# Patient Record
Sex: MALE | Race: WHITE | NOT HISPANIC OR LATINO | Employment: UNEMPLOYED | URBAN - NONMETROPOLITAN AREA
[De-identification: names, ages, dates, MRNs, and addresses within clinical notes are randomized per-mention and may not be internally consistent; named-entity substitution may affect disease eponyms.]

---

## 2018-08-24 ENCOUNTER — OFFICE VISIT (OUTPATIENT)
Dept: FAMILY MEDICINE CLINIC | Facility: CLINIC | Age: 43
End: 2018-08-24

## 2018-08-24 VITALS
SYSTOLIC BLOOD PRESSURE: 122 MMHG | DIASTOLIC BLOOD PRESSURE: 86 MMHG | OXYGEN SATURATION: 99 % | HEART RATE: 73 BPM | BODY MASS INDEX: 23.36 KG/M2 | HEIGHT: 74 IN | WEIGHT: 182 LBS | TEMPERATURE: 97.8 F

## 2018-08-24 DIAGNOSIS — Z00.00 ENCOUNTER FOR MEDICAL EXAMINATION TO ESTABLISH CARE: Primary | ICD-10-CM

## 2018-08-24 DIAGNOSIS — Z13.29 THYROID DISORDER SCREENING: ICD-10-CM

## 2018-08-24 DIAGNOSIS — E34.9 TESTOSTERONE DEFICIENCY: ICD-10-CM

## 2018-08-24 DIAGNOSIS — E55.9 VITAMIN D DEFICIENCY: ICD-10-CM

## 2018-08-24 DIAGNOSIS — R30.0 DYSURIA: ICD-10-CM

## 2018-08-24 DIAGNOSIS — N52.8 OTHER MALE ERECTILE DYSFUNCTION: ICD-10-CM

## 2018-08-24 DIAGNOSIS — Z13.220 SCREENING CHOLESTEROL LEVEL: ICD-10-CM

## 2018-08-24 DIAGNOSIS — R53.83 OTHER FATIGUE: ICD-10-CM

## 2018-08-24 LAB
25(OH)D3 SERPL-MCNC: 22 NG/ML
ALBUMIN SERPL-MCNC: 4.4 G/DL (ref 3.5–5)
ALBUMIN/GLOB SERPL: 1.6 G/DL (ref 1.5–2.5)
ALP SERPL-CCNC: 122 U/L (ref 40–129)
ALT SERPL W P-5'-P-CCNC: 24 U/L (ref 10–44)
ANION GAP SERPL CALCULATED.3IONS-SCNC: 1.9 MMOL/L (ref 3.6–11.2)
AST SERPL-CCNC: 21 U/L (ref 10–34)
BASOPHILS # BLD AUTO: 0.08 10*3/MM3 (ref 0–0.3)
BASOPHILS NFR BLD AUTO: 0.7 % (ref 0–2)
BILIRUB BLD-MCNC: NEGATIVE MG/DL
BILIRUB SERPL-MCNC: 0.5 MG/DL (ref 0.2–1.8)
BUN BLD-MCNC: 10 MG/DL (ref 7–21)
BUN/CREAT SERPL: 15.2 (ref 7–25)
CALCIUM SPEC-SCNC: 9.3 MG/DL (ref 7.7–10)
CHLORIDE SERPL-SCNC: 109 MMOL/L (ref 99–112)
CHOLEST SERPL-MCNC: 204 MG/DL (ref 0–200)
CLARITY, POC: CLEAR
CO2 SERPL-SCNC: 28.1 MMOL/L (ref 24.3–31.9)
COLOR UR: YELLOW
CREAT BLD-MCNC: 0.66 MG/DL (ref 0.43–1.29)
DEPRECATED RDW RBC AUTO: 46.5 FL (ref 37–54)
EOSINOPHIL # BLD AUTO: 0.18 10*3/MM3 (ref 0–0.7)
EOSINOPHIL NFR BLD AUTO: 1.5 % (ref 0–5)
ERYTHROCYTE [DISTWIDTH] IN BLOOD BY AUTOMATED COUNT: 14.7 % (ref 11.5–14.5)
GFR SERPL CREATININE-BSD FRML MDRD: 131 ML/MIN/1.73
GLOBULIN UR ELPH-MCNC: 2.7 GM/DL
GLUCOSE BLD-MCNC: 81 MG/DL (ref 70–110)
GLUCOSE UR STRIP-MCNC: NEGATIVE MG/DL
HBA1C MFR BLD: 5.2 % (ref 4.5–5.7)
HCT VFR BLD AUTO: 44.2 % (ref 42–52)
HDLC SERPL-MCNC: 34 MG/DL (ref 60–100)
HGB BLD-MCNC: 14.6 G/DL (ref 14–18)
IMM GRANULOCYTES # BLD: 0.03 10*3/MM3 (ref 0–0.03)
IMM GRANULOCYTES NFR BLD: 0.2 % (ref 0–0.5)
KETONES UR QL: NEGATIVE
LDLC SERPL CALC-MCNC: 144 MG/DL (ref 0–100)
LDLC/HDLC SERPL: 4.22 {RATIO}
LEUKOCYTE EST, POC: NEGATIVE
LYMPHOCYTES # BLD AUTO: 2.85 10*3/MM3 (ref 1–3)
LYMPHOCYTES NFR BLD AUTO: 23.7 % (ref 21–51)
MCH RBC QN AUTO: 29 PG (ref 27–33)
MCHC RBC AUTO-ENTMCNC: 33 G/DL (ref 33–37)
MCV RBC AUTO: 87.7 FL (ref 80–94)
MONOCYTES # BLD AUTO: 0.88 10*3/MM3 (ref 0.1–0.9)
MONOCYTES NFR BLD AUTO: 7.3 % (ref 0–10)
NEUTROPHILS # BLD AUTO: 7.99 10*3/MM3 (ref 1.4–6.5)
NEUTROPHILS NFR BLD AUTO: 66.6 % (ref 30–70)
NITRITE UR-MCNC: NEGATIVE MG/ML
OSMOLALITY SERPL CALC.SUM OF ELEC: 275.6 MOSM/KG (ref 273–305)
PH UR: 6 [PH] (ref 5–8)
PLATELET # BLD AUTO: 303 10*3/MM3 (ref 130–400)
PMV BLD AUTO: 11.3 FL (ref 6–10)
POTASSIUM BLD-SCNC: 4.5 MMOL/L (ref 3.5–5.3)
PROT SERPL-MCNC: 7.1 G/DL (ref 6–8)
PROT UR STRIP-MCNC: NEGATIVE MG/DL
RBC # BLD AUTO: 5.04 10*6/MM3 (ref 4.7–6.1)
RBC # UR STRIP: NEGATIVE /UL
SODIUM BLD-SCNC: 139 MMOL/L (ref 135–153)
SP GR UR: 1.01 (ref 1–1.03)
T4 FREE SERPL-MCNC: 1.07 NG/DL (ref 0.89–1.76)
TRIGL SERPL-MCNC: 132 MG/DL (ref 0–150)
TSH SERPL DL<=0.05 MIU/L-ACNC: 1.1 MIU/ML (ref 0.55–4.78)
UROBILINOGEN UR QL: NORMAL
VIT B12 BLD-MCNC: 272 PG/ML (ref 211–911)
VLDLC SERPL-MCNC: 26.4 MG/DL
WBC NRBC COR # BLD: 12.01 10*3/MM3 (ref 4.5–12.5)

## 2018-08-24 PROCEDURE — 84439 ASSAY OF FREE THYROXINE: CPT | Performed by: NURSE PRACTITIONER

## 2018-08-24 PROCEDURE — 84443 ASSAY THYROID STIM HORMONE: CPT | Performed by: NURSE PRACTITIONER

## 2018-08-24 PROCEDURE — 36415 COLL VENOUS BLD VENIPUNCTURE: CPT | Performed by: NURSE PRACTITIONER

## 2018-08-24 PROCEDURE — 84402 ASSAY OF FREE TESTOSTERONE: CPT | Performed by: NURSE PRACTITIONER

## 2018-08-24 PROCEDURE — 85025 COMPLETE CBC W/AUTO DIFF WBC: CPT | Performed by: NURSE PRACTITIONER

## 2018-08-24 PROCEDURE — 82306 VITAMIN D 25 HYDROXY: CPT | Performed by: NURSE PRACTITIONER

## 2018-08-24 PROCEDURE — 84403 ASSAY OF TOTAL TESTOSTERONE: CPT | Performed by: NURSE PRACTITIONER

## 2018-08-24 PROCEDURE — 83036 HEMOGLOBIN GLYCOSYLATED A1C: CPT | Performed by: NURSE PRACTITIONER

## 2018-08-24 PROCEDURE — 81003 URINALYSIS AUTO W/O SCOPE: CPT | Performed by: NURSE PRACTITIONER

## 2018-08-24 PROCEDURE — 99203 OFFICE O/P NEW LOW 30 MIN: CPT | Performed by: NURSE PRACTITIONER

## 2018-08-24 PROCEDURE — 80053 COMPREHEN METABOLIC PANEL: CPT | Performed by: NURSE PRACTITIONER

## 2018-08-24 PROCEDURE — 80061 LIPID PANEL: CPT | Performed by: NURSE PRACTITIONER

## 2018-08-24 PROCEDURE — 82607 VITAMIN B-12: CPT | Performed by: NURSE PRACTITIONER

## 2018-08-24 RX ORDER — SILDENAFIL CITRATE 20 MG/1
60-100 TABLET ORAL DAILY
Qty: 60 TABLET | Refills: 5 | Status: SHIPPED | OUTPATIENT
Start: 2018-08-24 | End: 2020-06-05 | Stop reason: SDUPTHER

## 2018-08-24 NOTE — PROGRESS NOTES
"Subjective   Scott Montes is a 43 y.o. male.     Chief Complaint   Patient presents with   • Establish Care       History of Present Illness     Establish Care-Here to establish Care.    Low Testosterone-has been under care of Dr Diehl.  Has been on testosterone injections and sildenafil but reports he has not seen him since he \"left [place]\".  He has been off approx 1 year.  He reports increase in fatigue and ED.  He reports he tolerated the injection well.  Would liek to resume.  Not at goall   Low back pain-on the right side.  He reports sharp radiated up and down his back.  He does reports an increase in soda intake.  He denies any urine change or dysuria since the pain started.  Not at goal.    Labs-needs updated routine labs.      The following portions of the patient's history were reviewed and updated as appropriate: allergies, current medications, past family history, past medical history, past social history, past surgical history and problem list.    Review of Systems   Constitutional: Positive for fatigue. Negative for appetite change and unexpected weight change.   HENT: Positive for rhinorrhea. Negative for congestion, ear pain, nosebleeds, postnasal drip, sore throat, trouble swallowing and voice change.    Eyes: Negative for pain and visual disturbance.        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  Does not wear corrective lens   Respiratory: Positive for cough. Negative for shortness of breath and wheezing.    Cardiovascular: Negative for chest pain and palpitations.   Gastrointestinal: Positive for abdominal pain. Negative for blood in stool, constipation and diarrhea.   Endocrine: Negative for cold intolerance and polydipsia.   Genitourinary: Positive for flank pain and testicular pain. Negative for difficulty urinating and hematuria.   Musculoskeletal: Positive for back pain. Negative for arthralgias, gait problem, joint swelling and myalgias.   Skin: Negative for color change and " "rash.        sebaceous cyst on right under chin, top of left ear   Allergic/Immunologic: Negative.    Neurological: Positive for headaches (occasional). Negative for dizziness, syncope and numbness.   Hematological: Negative.    Psychiatric/Behavioral: Negative for dysphoric mood, sleep disturbance and suicidal ideas. The patient is not nervous/anxious.    All other systems reviewed and are negative.      Objective     /86   Pulse 73   Temp 97.8 °F (36.6 °C) (Temporal Artery )   Ht 188 cm (74\")   Wt 82.6 kg (182 lb)   SpO2 99%   BMI 23.37 kg/m²     Physical Exam   Constitutional: He is oriented to person, place, and time. He appears well-developed and well-nourished. No distress.   HENT:   Head: Normocephalic and atraumatic.   Right Ear: Hearing, tympanic membrane, external ear and ear canal normal.   Left Ear: Hearing, tympanic membrane, external ear and ear canal normal.   Nose: No mucosal edema or rhinorrhea.   Mouth/Throat: Oropharynx is clear and moist and mucous membranes are normal.   Eyes: Pupils are equal, round, and reactive to light. Conjunctivae, EOM and lids are normal. No scleral icterus. Right eye exhibits normal extraocular motion and no nystagmus. Left eye exhibits normal extraocular motion and no nystagmus.   Neck: Normal range of motion. Neck supple. No JVD present. No tracheal tenderness present. Carotid bruit is not present. No thyromegaly present.   Cardiovascular: Normal rate, regular rhythm, S1 normal, S2 normal, normal heart sounds and intact distal pulses.    No murmur heard.  Pulmonary/Chest: Effort normal and breath sounds normal. He exhibits no tenderness.   Abdominal: Soft. Bowel sounds are normal. He exhibits no mass. There is no hepatosplenomegaly. There is no tenderness.   Musculoskeletal: Normal range of motion. He exhibits no edema or tenderness.   Gait normal.   equal bilaterally. No muscular atrophy or flaccidity.   Lymphadenopathy:     He has no cervical " adenopathy.        Right cervical: No superficial cervical adenopathy present.       Left cervical: No superficial cervical adenopathy present.   Neurological: He is alert and oriented to person, place, and time. He has normal strength and normal reflexes. He displays no tremor. No cranial nerve deficit or sensory deficit. He exhibits normal muscle tone. Coordination and gait normal.   Skin: Skin is warm and dry. Capillary refill takes less than 2 seconds. No rash noted. He is not diaphoretic. No cyanosis. No pallor. Nails show no clubbing.   Multiple sebaceous cyst under neck   Psychiatric: He has a normal mood and affect. His speech is normal and behavior is normal. Judgment and thought content normal. He is not actively hallucinating. Cognition and memory are normal. He is attentive.   Vitals reviewed.      Assessment/Plan     Problem List Items Addressed This Visit        Endocrine    Testosterone deficiency    Relevant Medications    Testosterone Cypionate 200 MG/ML kit    Other Relevant Orders    Comprehensive Metabolic Panel (Completed)    CBC & Differential (Completed)    Lipid Panel (Completed)    TSH (Completed)    Hemoglobin A1c (Completed)    Testosterone, Free, Total (Completed)    Vitamin D 25 Hydroxy (Completed)    Vitamin B12 (Completed)    T4, Free (Completed)    CBC Auto Differential (Completed)    Osmolality, Calculated (Completed)       Other    Other male erectile dysfunction    Relevant Medications    sildenafil (REVATIO) 20 MG tablet    Other Relevant Orders    Comprehensive Metabolic Panel (Completed)    CBC & Differential (Completed)    Lipid Panel (Completed)    TSH (Completed)    Hemoglobin A1c (Completed)    Testosterone, Free, Total (Completed)    Vitamin D 25 Hydroxy (Completed)    Vitamin B12 (Completed)    T4, Free (Completed)    CBC Auto Differential (Completed)    Osmolality, Calculated (Completed)      Other Visit Diagnoses     Encounter for medical examination to establish care     -  Primary    Relevant Orders    Comprehensive Metabolic Panel (Completed)    CBC & Differential (Completed)    Lipid Panel (Completed)    TSH (Completed)    Hemoglobin A1c (Completed)    Testosterone, Free, Total (Completed)    Vitamin D 25 Hydroxy (Completed)    Vitamin B12 (Completed)    T4, Free (Completed)    CBC Auto Differential (Completed)    Osmolality, Calculated (Completed)    Screening cholesterol level        Relevant Orders    Comprehensive Metabolic Panel (Completed)    CBC & Differential (Completed)    Lipid Panel (Completed)    TSH (Completed)    Hemoglobin A1c (Completed)    Testosterone, Free, Total (Completed)    Vitamin D 25 Hydroxy (Completed)    Vitamin B12 (Completed)    T4, Free (Completed)    CBC Auto Differential (Completed)    Osmolality, Calculated (Completed)    Thyroid disorder screening        Relevant Orders    Comprehensive Metabolic Panel (Completed)    CBC & Differential (Completed)    Lipid Panel (Completed)    TSH (Completed)    Hemoglobin A1c (Completed)    Testosterone, Free, Total (Completed)    Vitamin D 25 Hydroxy (Completed)    Vitamin B12 (Completed)    T4, Free (Completed)    CBC Auto Differential (Completed)    Osmolality, Calculated (Completed)    Other fatigue        Relevant Orders    Comprehensive Metabolic Panel (Completed)    CBC & Differential (Completed)    Lipid Panel (Completed)    TSH (Completed)    Hemoglobin A1c (Completed)    Testosterone, Free, Total (Completed)    Vitamin D 25 Hydroxy (Completed)    Vitamin B12 (Completed)    T4, Free (Completed)    CBC Auto Differential (Completed)    Osmolality, Calculated (Completed)    Vitamin D deficiency        Relevant Orders    Comprehensive Metabolic Panel (Completed)    CBC & Differential (Completed)    Lipid Panel (Completed)    TSH (Completed)    Hemoglobin A1c (Completed)    Testosterone, Free, Total (Completed)    Vitamin D 25 Hydroxy (Completed)    Vitamin B12 (Completed)    T4, Free (Completed)    CBC  Auto Differential (Completed)    Osmolality, Calculated (Completed)    Dysuria        Relevant Orders    POC Urinalysis Dipstick, Automated (Completed)          Patient's Body mass index is 23.37 kg/m². BMI is within normal parameters. No follow-up required.   (Normal BMI:  18.5-24.9, OW 25-29.9, Obesity 30 or greater)  Understands disease processes and need for medications.  Understands reasons for urgent and emergent care.  Patient (& family) verbalized agreement for treatment plan.   Emotional support and active listening provided.  Patient provided time to verbalize feelings.  CHADWICK reviewed today and consistent.  Will refill prescribed controlled medication today.  Patient is aware they cannot receive narcotics from any other provider except if under care of pain management or speciality clinic.  Risk and benefits of medication use has been reviewed.  History and physical exam exhibit continued safe and appropriate use of controlled substances.  Fasting labs ordered.   RTC 6 months, sooner if needed.           This document has been electronically signed by:  EDDIE Thurman, FNP-C

## 2018-08-28 LAB
TESTOST FREE SERPL-MCNC: 5.3 PG/ML (ref 6.8–21.5)
TESTOST SERPL-MCNC: 358 NG/DL (ref 264–916)

## 2018-08-28 NOTE — PROGRESS NOTES
Testosterone is low on the free lab but low normal on the total.  Thyroid is normal.  Vit D is low.  A1C is normal (no diabetes).  No anemia.  B12 is normal but low.  Would he like for me to send a RX for B12 and Vit d or would he like to obtain OTC?  I will print RX for testosterone.

## 2018-08-31 ENCOUNTER — TELEPHONE (OUTPATIENT)
Dept: FAMILY MEDICINE CLINIC | Facility: CLINIC | Age: 43
End: 2018-08-31

## 2018-08-31 DIAGNOSIS — E34.9 TESTOSTERONE DEFICIENCY: ICD-10-CM

## 2018-08-31 RX ORDER — RANITIDINE 150 MG/1
150 TABLET ORAL 2 TIMES DAILY
Qty: 60 TABLET | Refills: 5 | Status: SHIPPED | OUTPATIENT
Start: 2018-08-31 | End: 2020-07-01

## 2018-09-06 RX ORDER — ERGOCALCIFEROL 1.25 MG/1
50000 CAPSULE ORAL WEEKLY
Qty: 4 CAPSULE | Refills: 5 | Status: SHIPPED | OUTPATIENT
Start: 2018-09-06 | End: 2020-07-01

## 2018-09-10 ENCOUNTER — TELEPHONE (OUTPATIENT)
Dept: FAMILY MEDICINE CLINIC | Facility: CLINIC | Age: 43
End: 2018-09-10

## 2018-09-10 NOTE — TELEPHONE ENCOUNTER
----- Message from EDDIE Thurman sent at 9/6/2018  3:39 PM EDT -----  done  ----- Message -----  From: Rosa Bryant MA  Sent: 9/4/2018   3:55 PM  To: EDDIE Thurman    Patient called and wanted to see if you would send him in some Vit b and Vit d to the pharmacy.

## 2018-09-21 ENCOUNTER — PRIOR AUTHORIZATION (OUTPATIENT)
Dept: FAMILY MEDICINE CLINIC | Facility: CLINIC | Age: 43
End: 2018-09-21

## 2019-08-24 ENCOUNTER — OFFICE VISIT (OUTPATIENT)
Dept: FAMILY MEDICINE CLINIC | Facility: CLINIC | Age: 44
End: 2019-08-24

## 2019-08-24 VITALS
HEIGHT: 74 IN | DIASTOLIC BLOOD PRESSURE: 70 MMHG | RESPIRATION RATE: 14 BRPM | WEIGHT: 178.6 LBS | HEART RATE: 80 BPM | SYSTOLIC BLOOD PRESSURE: 110 MMHG | OXYGEN SATURATION: 98 % | TEMPERATURE: 97.7 F | BODY MASS INDEX: 22.92 KG/M2

## 2019-08-24 DIAGNOSIS — M25.512 ACUTE PAIN OF LEFT SHOULDER: ICD-10-CM

## 2019-08-24 DIAGNOSIS — R07.9 CHEST PAIN IN ADULT: Primary | ICD-10-CM

## 2019-08-24 PROCEDURE — 99214 OFFICE O/P EST MOD 30 MIN: CPT | Performed by: NURSE PRACTITIONER

## 2019-08-24 RX ORDER — NAPROXEN SODIUM 220 MG
220 TABLET ORAL 2 TIMES DAILY PRN
COMMUNITY
End: 2021-03-31

## 2019-08-24 NOTE — PROGRESS NOTES
"   History of Present Illness   Scott Montes is a 44 y.o. male who presents to the clinic today c/o chest pain which started two weeks ago. He described it as a pain in his left side of his chest which \"took my breath\" The episode he reports lasted only a few seconds. After the episode, he developed left shoulder, axillary, and left arm discomfort. He denies any shortness of breath, nausea or vomiting. His symptoms since that time \"come and go\" and there is no specific activities which aggravate them. He has tried no medications or home remedies.   Refer to ROS for additional information.    The following portions of the patient's history were reviewed and updated as appropriate: allergies, current medications, past family history, past medical history, past social history, past surgical history and problem list.    Current Outpatient Medications:   •  naproxen sodium (ALEVE) 220 MG tablet, Take 220 mg by mouth 2 (Two) Times a Day As Needed., Disp: , Rfl:   •  Cyanocobalamin (VITAMIN B12) 1000 MCG tablet controlled-release, Take 1 tablet by mouth Daily., Disp: 30 tablet, Rfl: 5  •  raNITIdine (ZANTAC) 150 MG tablet, Take 1 tablet by mouth 2 (Two) Times a Day., Disp: 60 tablet, Rfl: 5  •  sildenafil (REVATIO) 20 MG tablet, Take 3-5 tablets by mouth Daily., Disp: 60 tablet, Rfl: 5  •  Testosterone Cypionate 200 MG/ML kit, Inject 200 mg into the appropriate muscle as directed by prescriber 1 (One) Time Per Week., Disp: 4 each, Rfl: 0  •  vitamin D (ERGOCALCIFEROL) 31959 units capsule capsule, Take 1 capsule by mouth 1 (One) Time Per Week., Disp: 4 capsule, Rfl: 5    No Known Allergies    Review of Systems   Constitutional: Positive for activity change and fatigue. Negative for appetite change, chills and fever.   HENT: Positive for congestion. Negative for sinus pressure and sore throat.    Respiratory: Positive for cough (Baseline). Negative for shortness of breath and wheezing.    Cardiovascular: Negative for chest " "pain.   Gastrointestinal: Negative for nausea and vomiting.   Musculoskeletal: Positive for arthralgias (Left shoulder/arm: achy).   Skin: Negative for color change and rash.   Neurological: Negative for dizziness, speech difficulty and headaches.   Hematological: Negative for adenopathy.     Visit Vitals  /70   Pulse 80   Temp 97.7 °F (36.5 °C) (Oral)   Resp 14   Ht 188 cm (74\")   Wt 81 kg (178 lb 9.6 oz)   SpO2 98%   BMI 22.93 kg/m²     Physical Exam   Constitutional: He is oriented to person, place, and time. He appears well-developed and well-nourished. No distress.   HENT:   Head: Normocephalic.   Right Ear: Tympanic membrane and ear canal normal.   Left Ear: Tympanic membrane and ear canal normal.   Nose: Nose normal.   Mouth/Throat: Oropharynx is clear and moist. No oropharyngeal exudate.   Eyes: Conjunctivae are normal. Pupils are equal, round, and reactive to light. Right eye exhibits no discharge. Left eye exhibits no discharge. No scleral icterus.   Neck: Neck supple. No tracheal tenderness present.   Cardiovascular: Normal rate, regular rhythm and normal heart sounds. Exam reveals no friction rub.   No murmur heard.  Pulmonary/Chest: Effort normal and breath sounds normal. No respiratory distress. He has no wheezes. He has no rales.   Musculoskeletal: He exhibits no edema.        Left shoulder: He exhibits normal range of motion, no tenderness, no swelling, no crepitus and normal strength. Pain: Achy.   Lymphadenopathy:     He has no cervical adenopathy.   Neurological: He is alert and oriented to person, place, and time.   Skin: Skin is warm and dry. Capillary refill takes less than 2 seconds. No rash noted. No erythema.   Psychiatric: He has a normal mood and affect. His behavior is normal. Judgment and thought content normal.   Vitals reviewed.    Assessment/Plan   Diagnoses and all orders for this visit:    Chest pain in adult  Comments:   Recommended he seek higher level of care at this time. "     Acute pain of left shoulder  Comments:  Findings and recommendations discussed with Scott and his wife who is present today. Treatment options reviewed.       Findings and recommendations discussed with Scott and his wife who is present today. Recommended he seek higher level of care at this time. Wife wishes to drive him to the Emergency Room. Encouraged him to return to the clinic as needed.         This document has been electronically signed by EDDIE Seymour, KATHERYN, SUMIT

## 2019-08-24 NOTE — PATIENT INSTRUCTIONS
Shoulder Pain  Many things can cause shoulder pain, including:  · An injury.  · Moving the arm in the same way again and again (overuse).  · Joint pain (arthritis).  Follow these instructions at home:  Take these actions to help with your pain:  · Squeeze a soft ball or a foam pad as much as you can. This helps to prevent swelling. It also makes the arm stronger.  · Take over-the-counter and prescription medicines only as told by your doctor.  · If told, put ice on the area:  ? Put ice in a plastic bag.  ? Place a towel between your skin and the bag.  ? Leave the ice on for 20 minutes, 2-3 times per day. Stop putting on ice if it does not help with the pain.  · If you were given a shoulder sling or immobilizer:  ? Wear it as told.  ? Remove it to shower or bathe.  ? Move your arm as little as possible.  ? Keep your hand moving. This helps prevent swelling.  Contact a doctor if:  · Your pain gets worse.  · Medicine does not help your pain.  · You have new pain in your arm, hand, or fingers.  Get help right away if:  · Your arm, hand, or fingers:  ? Tingle.  ? Are numb.  ? Are swollen.  ? Are painful.  ? Turn white or blue.  This information is not intended to replace advice given to you by your health care provider. Make sure you discuss any questions you have with your health care provider.  Document Released: 06/05/2009 Document Revised: 08/13/2017 Document Reviewed: 04/11/2016  [employer] Interactive Patient Education © 2019 [employer]

## 2020-06-04 DIAGNOSIS — N52.8 OTHER MALE ERECTILE DYSFUNCTION: ICD-10-CM

## 2020-06-05 RX ORDER — SILDENAFIL CITRATE 20 MG/1
TABLET ORAL
Qty: 60 TABLET | Refills: 5 | Status: SHIPPED | OUTPATIENT
Start: 2020-06-05 | End: 2020-07-01 | Stop reason: SINTOL

## 2020-07-01 ENCOUNTER — OFFICE VISIT (OUTPATIENT)
Dept: FAMILY MEDICINE CLINIC | Facility: CLINIC | Age: 45
End: 2020-07-01

## 2020-07-01 ENCOUNTER — TELEPHONE (OUTPATIENT)
Dept: FAMILY MEDICINE CLINIC | Facility: CLINIC | Age: 45
End: 2020-07-01

## 2020-07-01 DIAGNOSIS — E34.9 TESTOSTERONE DEFICIENCY: ICD-10-CM

## 2020-07-01 DIAGNOSIS — N52.8 OTHER MALE ERECTILE DYSFUNCTION: Primary | ICD-10-CM

## 2020-07-01 PROCEDURE — 99442 PR PHYS/QHP TELEPHONE EVALUATION 11-20 MIN: CPT | Performed by: NURSE PRACTITIONER

## 2020-07-01 RX ORDER — TADALAFIL 20 MG/1
20 TABLET ORAL
Qty: 10 TABLET | Refills: 5 | Status: SHIPPED | OUTPATIENT
Start: 2020-07-01 | End: 2021-04-30

## 2020-07-01 RX ORDER — ERGOCALCIFEROL 1.25 MG/1
50000 CAPSULE ORAL WEEKLY
Qty: 4 CAPSULE | Refills: 5 | Status: SHIPPED | OUTPATIENT
Start: 2020-07-01 | End: 2022-02-09 | Stop reason: SDUPTHER

## 2020-07-01 NOTE — TELEPHONE ENCOUNTER
LVM      ----- Message from EDDIE Thurman sent at 7/1/2020 11:32 AM EDT -----  Stop the sildenafil.  He needs to make An appointment for follow-up as I have not seen him in approximately 2 years.  He will need some updated labs  ----- Message -----  From: Loraine Gonzales MA  Sent: 7/1/2020  11:07 AM EDT  To: EDDIE Thurman    Pt wants to change to Cialis... The sidenafil is giving him headaches.

## 2020-07-01 NOTE — PROGRESS NOTES
"You have chosen to receive care through a telephone visit today. Do you consent to use a telephone visit for your medical care today? Yes    Establish patient makes contact with office of [facility] to discuss health conditions.  Patient is due for routine checkup but due to current pandemic is not recommended to present to the office for face-to-face evaluation.      Chief Complaint   Patient presents with   • Erectile Dysfunction       Brief HPI/ROS obtained as follows:    Discussed medication-patient found the office today to discuss medication.  He reports that sildenafil is giving him a headache and he would like to change to Cialis.  He reports he has always had a HA but he has reached the point he \"cannot tolerate\".  He reports he has stopped testosterone but would be interested in resuming.  He reports he took \"4 tablets\" of the 20 mg last night and \"woke with a HA that still hasn't  Gone away\".    Chest pain- last seen in this office by different provider for reports of chest pain.  He reports was muscular in nature and \"has not happened again\".  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  He reports that after several days the symptoms in his chest resolved he reports he did not have any shortness of breath or palpitations with the episode.  Pain did radiate into his shoulder but reports \"could not tell if it was coming from my shoulder or going to my shoulder\".    The following portions of the patient's history were reviewed and updated as appropriate: CC, ROS, allergies, current medications, past family history, past medical history, past social history, past surgical history and problem list.    Review of Systems   Constitutional: Negative for appetite change, fatigue and unexpected weight change.   HENT: Negative for congestion, ear pain, nosebleeds, postnasal drip, rhinorrhea, sore throat, trouble swallowing and voice change.    Eyes: Negative for " photophobia, pain and visual disturbance.   Respiratory: Negative for cough, chest tightness, shortness of breath and wheezing.    Cardiovascular: Negative for chest pain and palpitations.   Gastrointestinal: Negative for abdominal pain, blood in stool, constipation, diarrhea and nausea.   Endocrine: Negative for cold intolerance and polydipsia.   Genitourinary: Negative for difficulty urinating, flank pain, frequency and hematuria.   Musculoskeletal: Negative for arthralgias (occasional), back pain, gait problem, joint swelling and myalgias.   Skin: Negative for color change and rash.   Allergic/Immunologic: Negative.    Neurological: Negative for dizziness, syncope, numbness and headaches.   Hematological: Negative.    Psychiatric/Behavioral: Negative for dysphoric mood, sleep disturbance and suicidal ideas. The patient is not nervous/anxious.    All other systems reviewed and are negative.      Assessment     Physical Exam     Patient alert and oriented.  Able to follow conversation without sounding breathless.  No obvious distress.  Thought processes congruent with conversation.  Answers and ask questions without difficulty.      Problem List Items Addressed This Visit        Endocrine    Testosterone deficiency    Relevant Medications    Testosterone Cypionate 200 MG/ML kit       Other    Other male erectile dysfunction - Primary    Relevant Medications    tadalafil (CIALIS) 20 MG tablet          Patient instructed and advised to call if symptoms are increasing or new symptoms occur.    Understands reasons for urgent and emergent care.  Patient (& family) verbalized agreement for treatment plan.     Generalized precautions advised including social distancing, hand washing, cough/sneeze hygiene.     [facility] #[id number] reviewed today and consistent.  Will refill prescribed controlled medication today.  Patient is aware they cannot receive narcotics from any other provider except if under care of pain management or  speciality clinic.  Risk and benefits of medication use has been reviewed.  History and physical exam exhibit continued safe and appropriate use of controlled substances.  The patient is aware of the potential for addiction and dependence.  This patient has been made aware of the appropriate use of such medications, including potential risk of somnolence, limited ability to drive and / or work safely, and potential for overdose.    It has also been made clear that these medications are for use by this patient only, without concomitant use of alcohol or other substances unless prescribed/advised by medical provider.  Patient understands they may be subject to UDS and pill counts at random.      Patient considered to be low risk for addiction due to use of single controlled medications.  Patient understands and accepts these risks.  Patient need for medication will be reassessed at each visit.  Doses will be adjusted according to patient need and findings.    Goal of TX: Patient will not have any adverse reactions of medication.  Patient will have stabilization of testosterone with use of testosterone supplement.  Patient will not have any injection site reactions.    RTC PRN      This visit has been rescheduled as a phone visit to comply with patient safety concerns in accordance with CDC recommendations. Total time of discussion was 12 minutes.        This document has been electronically signed by:  EDDIE Thurman, FNP-C    Dragon disclaimer:  Much of this encounter note is an electronic transcription/translation of spoken language to printed text. The electronic translation of spoken language may permit erroneous, or at times, nonsensical words or phrases to be inadvertently transcribed; Although I have reviewed the note for such errors, some may still exist.

## 2020-08-04 DIAGNOSIS — E34.9 TESTOSTERONE DEFICIENCY: ICD-10-CM

## 2020-08-06 RX ORDER — TESTOSTERONE CYPIONATE 200 MG/ML
INJECTION, SOLUTION INTRAMUSCULAR
Qty: 4 ML | Refills: 0 | Status: SHIPPED | OUTPATIENT
Start: 2020-08-06 | End: 2020-09-17

## 2020-09-17 DIAGNOSIS — E34.9 TESTOSTERONE DEFICIENCY: ICD-10-CM

## 2020-09-17 RX ORDER — TESTOSTERONE CYPIONATE 200 MG/ML
INJECTION, SOLUTION INTRAMUSCULAR
Qty: 4 ML | Refills: 0 | Status: SHIPPED | OUTPATIENT
Start: 2020-09-17 | End: 2020-10-19

## 2020-09-18 ENCOUNTER — PRIOR AUTHORIZATION (OUTPATIENT)
Dept: FAMILY MEDICINE CLINIC | Facility: CLINIC | Age: 45
End: 2020-09-18

## 2020-10-17 DIAGNOSIS — E34.9 TESTOSTERONE DEFICIENCY: ICD-10-CM

## 2020-10-19 RX ORDER — TESTOSTERONE CYPIONATE 200 MG/ML
INJECTION, SOLUTION INTRAMUSCULAR
Qty: 1 ML | Refills: 0 | Status: SHIPPED | OUTPATIENT
Start: 2020-10-19 | End: 2020-10-21 | Stop reason: SDUPTHER

## 2020-10-21 ENCOUNTER — TELEPHONE (OUTPATIENT)
Dept: FAMILY MEDICINE CLINIC | Facility: CLINIC | Age: 45
End: 2020-10-21

## 2020-10-21 DIAGNOSIS — E34.9 TESTOSTERONE DEFICIENCY: ICD-10-CM

## 2020-10-21 RX ORDER — TESTOSTERONE CYPIONATE 200 MG/ML
200 INJECTION, SOLUTION INTRAMUSCULAR WEEKLY
Qty: 4 ML | Refills: 0 | Status: SHIPPED | OUTPATIENT
Start: 2020-10-21 | End: 2021-03-31 | Stop reason: SDUPTHER

## 2020-10-21 NOTE — TELEPHONE ENCOUNTER
----- Message from EDDIE Thurman sent at 10/21/2020 11:19 AM EDT -----  It said 10ml so I meant for 1 vial.  I will send a new   ----- Message -----  From: Rosa Bryant MA  Sent: 10/21/2020  10:54 AM EDT  To: EDDIE Thurman    On the patient's testosterone prescription the quantity was sent in for 1 instead of 4. Could you send in a new prescription or does he only need 1 injection?

## 2021-03-25 ENCOUNTER — HOSPITAL ENCOUNTER (OUTPATIENT)
Dept: GENERAL RADIOLOGY | Facility: HOSPITAL | Age: 46
Discharge: HOME | End: 2021-03-25
Admitting: NURSE PRACTITIONER

## 2021-03-25 ENCOUNTER — OFFICE VISIT (OUTPATIENT)
Dept: FAMILY MEDICINE CLINIC | Facility: CLINIC | Age: 46
End: 2021-03-25

## 2021-03-25 VITALS
WEIGHT: 187.2 LBS | HEART RATE: 72 BPM | BODY MASS INDEX: 24.02 KG/M2 | OXYGEN SATURATION: 98 % | SYSTOLIC BLOOD PRESSURE: 132 MMHG | HEIGHT: 74 IN | TEMPERATURE: 97.1 F | DIASTOLIC BLOOD PRESSURE: 80 MMHG

## 2021-03-25 DIAGNOSIS — M54.9 MECHANICAL BACK PAIN: ICD-10-CM

## 2021-03-25 DIAGNOSIS — E34.9 TESTOSTERONE DEFICIENCY: Primary | ICD-10-CM

## 2021-03-25 DIAGNOSIS — Z13.21 ENCOUNTER FOR VITAMIN DEFICIENCY SCREENING: ICD-10-CM

## 2021-03-25 DIAGNOSIS — Z13.1 DIABETES MELLITUS SCREENING: ICD-10-CM

## 2021-03-25 DIAGNOSIS — Z13.220 SCREENING CHOLESTEROL LEVEL: ICD-10-CM

## 2021-03-25 DIAGNOSIS — M25.512 CHRONIC LEFT SHOULDER PAIN: ICD-10-CM

## 2021-03-25 DIAGNOSIS — Z13.29 THYROID DISORDER SCREEN: ICD-10-CM

## 2021-03-25 DIAGNOSIS — G89.29 CHRONIC LEFT SHOULDER PAIN: ICD-10-CM

## 2021-03-25 DIAGNOSIS — R07.9 CHEST PAIN IN ADULT: ICD-10-CM

## 2021-03-25 DIAGNOSIS — Z12.5 PROSTATE CANCER SCREENING: ICD-10-CM

## 2021-03-25 PROCEDURE — 72040 X-RAY EXAM NECK SPINE 2-3 VW: CPT

## 2021-03-25 PROCEDURE — 84403 ASSAY OF TOTAL TESTOSTERONE: CPT | Performed by: NURSE PRACTITIONER

## 2021-03-25 PROCEDURE — 99214 OFFICE O/P EST MOD 30 MIN: CPT | Performed by: NURSE PRACTITIONER

## 2021-03-25 PROCEDURE — 73523 X-RAY EXAM HIPS BI 5/> VIEWS: CPT | Performed by: RADIOLOGY

## 2021-03-25 PROCEDURE — 82607 VITAMIN B-12: CPT | Performed by: NURSE PRACTITIONER

## 2021-03-25 PROCEDURE — 72100 X-RAY EXAM L-S SPINE 2/3 VWS: CPT | Performed by: RADIOLOGY

## 2021-03-25 PROCEDURE — 84439 ASSAY OF FREE THYROXINE: CPT | Performed by: NURSE PRACTITIONER

## 2021-03-25 PROCEDURE — 72040 X-RAY EXAM NECK SPINE 2-3 VW: CPT | Performed by: RADIOLOGY

## 2021-03-25 PROCEDURE — 73030 X-RAY EXAM OF SHOULDER: CPT

## 2021-03-25 PROCEDURE — 82306 VITAMIN D 25 HYDROXY: CPT | Performed by: NURSE PRACTITIONER

## 2021-03-25 PROCEDURE — 85025 COMPLETE CBC W/AUTO DIFF WBC: CPT | Performed by: NURSE PRACTITIONER

## 2021-03-25 PROCEDURE — 84402 ASSAY OF FREE TESTOSTERONE: CPT | Performed by: NURSE PRACTITIONER

## 2021-03-25 PROCEDURE — 84153 ASSAY OF PSA TOTAL: CPT | Performed by: NURSE PRACTITIONER

## 2021-03-25 PROCEDURE — 73523 X-RAY EXAM HIPS BI 5/> VIEWS: CPT

## 2021-03-25 PROCEDURE — 72070 X-RAY EXAM THORAC SPINE 2VWS: CPT | Performed by: RADIOLOGY

## 2021-03-25 PROCEDURE — 84154 ASSAY OF PSA FREE: CPT | Performed by: NURSE PRACTITIONER

## 2021-03-25 PROCEDURE — 72100 X-RAY EXAM L-S SPINE 2/3 VWS: CPT

## 2021-03-25 PROCEDURE — 73030 X-RAY EXAM OF SHOULDER: CPT | Performed by: RADIOLOGY

## 2021-03-25 PROCEDURE — 80061 LIPID PANEL: CPT | Performed by: NURSE PRACTITIONER

## 2021-03-25 PROCEDURE — 72070 X-RAY EXAM THORAC SPINE 2VWS: CPT

## 2021-03-25 PROCEDURE — 83036 HEMOGLOBIN GLYCOSYLATED A1C: CPT | Performed by: NURSE PRACTITIONER

## 2021-03-25 PROCEDURE — 84443 ASSAY THYROID STIM HORMONE: CPT | Performed by: NURSE PRACTITIONER

## 2021-03-25 PROCEDURE — 80053 COMPREHEN METABOLIC PANEL: CPT | Performed by: NURSE PRACTITIONER

## 2021-03-25 NOTE — PROGRESS NOTES
"Subjective   Scott Montes is a 45 y.o. male.     Chief Complaint   Patient presents with   • Back Pain       History of Present Illness     Back pain-ongoing. Has been intermittent for about 6 months but for the last 2 weeks.   He reports feels like \"crunched together\".  He reports some neck pain.    Left shoulder and arm pain-has noted for at least a couple of months.  He reports is occurring at rest as well as with certain arm positions.  \"Like a tooth ache\".   He reports no specific injury.  He reports pain has been so bad he stopped all meds to see if symptoms changed.  He reports it has not  Testosterone deficiency-is presently off supplement.  He was not having any side effects of medication.  He denies any injection site reactions.  He would like to have an updated lab review today.  Routine health-would like to have updated labs.     The following portions of the patient's history were reviewed and updated as appropriate: CC, ROS, allergies, current medications, past family history, past medical history, past social history, past surgical history and problem list.      Review of Systems   Constitutional: Negative for activity change, appetite change, fatigue and fever.   HENT: Negative for congestion, ear pain, facial swelling, nosebleeds, rhinorrhea, sinus pressure, sore throat and trouble swallowing.    Eyes: Negative for blurred vision, double vision and redness.   Respiratory: Negative for cough, chest tightness, shortness of breath and wheezing.    Cardiovascular: Positive for chest pain. Negative for palpitations and leg swelling.   Gastrointestinal: Negative for abdominal pain, blood in stool, constipation and diarrhea.   Endocrine: Negative.    Genitourinary: Negative for dysuria, flank pain, frequency, hematuria and urgency.   Musculoskeletal: Positive for back pain and neck pain.   Skin: Negative.    Allergic/Immunologic: Negative.    Neurological: Negative for dizziness, weakness, " "light-headedness and headache.   Hematological: Negative.    Psychiatric/Behavioral: Negative for self-injury, sleep disturbance and suicidal ideas.   All other systems reviewed and are negative.      Objective     /80   Pulse 72   Temp 97.1 °F (36.2 °C)   Ht 188 cm (74.02\")   Wt 84.9 kg (187 lb 3.2 oz)   SpO2 98%   BMI 24.02 kg/m²     Physical Exam  Vitals reviewed.   Constitutional:       General: He is not in acute distress.     Appearance: He is well-developed. He is not diaphoretic.   HENT:      Head: Normocephalic and atraumatic.      Comments: Oropharynx not examined.  Patient is presently wearing a face covering/mask due to COVID-19 pandemic.     Right Ear: Hearing, tympanic membrane, ear canal and external ear normal.      Left Ear: Hearing, tympanic membrane, ear canal and external ear normal.   Eyes:      General: Lids are normal. No scleral icterus.     Extraocular Movements:      Right eye: Normal extraocular motion and no nystagmus.      Left eye: Normal extraocular motion and no nystagmus.      Conjunctiva/sclera: Conjunctivae normal.      Pupils: Pupils are equal, round, and reactive to light.   Neck:      Thyroid: No thyromegaly.      Vascular: No carotid bruit or JVD.      Trachea: No tracheal tenderness.      Comments: Positive Spurling's test with worsening on the left versus right.  Bilateral trapezius spasm  Cardiovascular:      Rate and Rhythm: Normal rate and regular rhythm.      Heart sounds: Normal heart sounds, S1 normal and S2 normal. No murmur heard.     Pulmonary:      Effort: Pulmonary effort is normal.      Breath sounds: Normal breath sounds.   Chest:      Chest wall: No tenderness.   Abdominal:      General: Bowel sounds are normal.      Palpations: Abdomen is soft. There is no mass.      Tenderness: There is no abdominal tenderness.   Musculoskeletal:      Right shoulder: Tenderness and crepitus present.      Left shoulder: Tenderness and crepitus present. Decreased " range of motion.      Cervical back: Neck supple. Pain with movement, spinous process tenderness and muscular tenderness present. Decreased range of motion.      Thoracic back: Tenderness present.      Lumbar back: Spasms, tenderness and bony tenderness present. Decreased range of motion.      Right lower leg: No edema.      Left lower leg: No edema.      Comments: No muscular atrophy or flaccidity.   Lymphadenopathy:      Cervical: No cervical adenopathy.      Right cervical: No superficial cervical adenopathy.     Left cervical: No superficial cervical adenopathy.   Skin:     General: Skin is warm and dry.      Capillary Refill: Capillary refill takes less than 2 seconds.      Coloration: Skin is not pale.      Findings: No erythema.      Nails: There is no clubbing.   Neurological:      Mental Status: He is alert and oriented to person, place, and time.      Cranial Nerves: No cranial nerve deficit or facial asymmetry.      Sensory: No sensory deficit.      Motor: No tremor, atrophy or abnormal muscle tone.      Coordination: Coordination normal.      Deep Tendon Reflexes: Reflexes are normal and symmetric.   Psychiatric:         Attention and Perception: He is attentive.         Mood and Affect: Mood normal.         Speech: Speech normal.         Behavior: Behavior normal.         Thought Content: Thought content normal.         Judgment: Judgment normal.       Assessment/Plan     Problem List Items Addressed This Visit        Endocrine and Metabolic    Testosterone deficiency - Primary    Relevant Medications    Testosterone Cypionate (DEPOTESTOTERONE CYPIONATE) 200 MG/ML injection    Other Relevant Orders    Testosterone, Free, Total      Other Visit Diagnoses     Screening cholesterol level        Relevant Orders    Lipid Panel (Completed)    Thyroid disorder screen        Relevant Orders    TSH (Completed)    T4, Free (Completed)    Encounter for vitamin deficiency screening        Relevant Orders    Vitamin  D 25 Hydroxy (Completed)    Vitamin B12 (Completed)    Diabetes mellitus screening        Relevant Orders    CBC & Differential (Completed)    Comprehensive Metabolic Panel (Completed)    Hemoglobin A1c (Completed)    Chest pain in adult        Relevant Orders    Ambulatory Referral to Cardiology    Mechanical back pain        Relevant Orders    XR Spine Cervical 2 View (Completed)    XR Spine Thoracic 2 View (Completed)    XR Spine Lumbar 2 or 3 View (Completed)    XR Hip With or Without Pelvis 2 - 3 View Left    XR Hips Bilateral With or Without Pelvis 5 View (Completed)    Chronic left shoulder pain        Relevant Orders    XR Shoulder 2+ View Left (Completed)    Prostate cancer screening        Relevant Orders    PSA, Total & Free (Completed)          Patient's Body mass index is 24.02 kg/m². BMI is within normal parameters. No follow-up required..       Understands disease processes and need for medications.  Understands reasons for urgent and emergent care.  Patient (& family) verbalized agreement for treatment plan.   Emotional support and active listening provided.  Patient provided time to verbalize feelings.    Imaging ordered.   Labs ordered.     RTC 2-3 weeks for re-eval, sooner for worsening of symptoms.             This document has been electronically signed by:  EDDIE Thurman, FNP-C    Dragon disclaimer:  Much of this encounter note is an electronic transcription/translation of spoken language to printed text. The electronic translation of spoken language may permit erroneous, or at times, nonsensical words or phrases to be inadvertently transcribed; Although I have reviewed the note for such errors, some may still exist.

## 2021-03-26 LAB
25(OH)D3 SERPL-MCNC: 29.6 NG/ML (ref 30–100)
ALBUMIN SERPL-MCNC: 4.3 G/DL (ref 3.5–5.2)
ALBUMIN/GLOB SERPL: 1.5 G/DL
ALP SERPL-CCNC: 138 U/L (ref 39–117)
ALT SERPL W P-5'-P-CCNC: 17 U/L (ref 1–41)
ANION GAP SERPL CALCULATED.3IONS-SCNC: 9.3 MMOL/L (ref 5–15)
AST SERPL-CCNC: 13 U/L (ref 1–40)
BASOPHILS # BLD AUTO: 0.12 10*3/MM3 (ref 0–0.2)
BASOPHILS NFR BLD AUTO: 1 % (ref 0–1.5)
BILIRUB SERPL-MCNC: 0.2 MG/DL (ref 0–1.2)
BUN SERPL-MCNC: 12 MG/DL (ref 6–20)
BUN/CREAT SERPL: 14.3 (ref 7–25)
CALCIUM SPEC-SCNC: 9.5 MG/DL (ref 8.6–10.5)
CHLORIDE SERPL-SCNC: 104 MMOL/L (ref 98–107)
CHOLEST SERPL-MCNC: 194 MG/DL (ref 0–200)
CO2 SERPL-SCNC: 25.7 MMOL/L (ref 22–29)
CREAT SERPL-MCNC: 0.84 MG/DL (ref 0.76–1.27)
DEPRECATED RDW RBC AUTO: 42.5 FL (ref 37–54)
EOSINOPHIL # BLD AUTO: 0.2 10*3/MM3 (ref 0–0.4)
EOSINOPHIL NFR BLD AUTO: 1.6 % (ref 0.3–6.2)
ERYTHROCYTE [DISTWIDTH] IN BLOOD BY AUTOMATED COUNT: 13.7 % (ref 12.3–15.4)
GFR SERPL CREATININE-BSD FRML MDRD: 98 ML/MIN/1.73
GLOBULIN UR ELPH-MCNC: 2.8 GM/DL
GLUCOSE SERPL-MCNC: 82 MG/DL (ref 65–99)
HBA1C MFR BLD: 5.3 % (ref 4.8–5.6)
HCT VFR BLD AUTO: 48.2 % (ref 37.5–51)
HDLC SERPL-MCNC: 28 MG/DL (ref 40–60)
HGB BLD-MCNC: 15.5 G/DL (ref 13–17.7)
IMM GRANULOCYTES # BLD AUTO: 0.05 10*3/MM3 (ref 0–0.05)
IMM GRANULOCYTES NFR BLD AUTO: 0.4 % (ref 0–0.5)
LDLC SERPL CALC-MCNC: 125 MG/DL (ref 0–100)
LDLC/HDLC SERPL: 4.29 {RATIO}
LYMPHOCYTES # BLD AUTO: 2.66 10*3/MM3 (ref 0.7–3.1)
LYMPHOCYTES NFR BLD AUTO: 21.6 % (ref 19.6–45.3)
MCH RBC QN AUTO: 27.7 PG (ref 26.6–33)
MCHC RBC AUTO-ENTMCNC: 32.2 G/DL (ref 31.5–35.7)
MCV RBC AUTO: 86.1 FL (ref 79–97)
MONOCYTES # BLD AUTO: 0.93 10*3/MM3 (ref 0.1–0.9)
MONOCYTES NFR BLD AUTO: 7.5 % (ref 5–12)
NEUTROPHILS NFR BLD AUTO: 67.9 % (ref 42.7–76)
NEUTROPHILS NFR BLD AUTO: 8.38 10*3/MM3 (ref 1.7–7)
NRBC BLD AUTO-RTO: 0 /100 WBC (ref 0–0.2)
PLATELET # BLD AUTO: 362 10*3/MM3 (ref 140–450)
PMV BLD AUTO: 11.4 FL (ref 6–12)
POTASSIUM SERPL-SCNC: 4.8 MMOL/L (ref 3.5–5.2)
PROT SERPL-MCNC: 7.1 G/DL (ref 6–8.5)
RBC # BLD AUTO: 5.6 10*6/MM3 (ref 4.14–5.8)
SODIUM SERPL-SCNC: 139 MMOL/L (ref 136–145)
T4 FREE SERPL-MCNC: 1.29 NG/DL (ref 0.93–1.7)
TRIGL SERPL-MCNC: 230 MG/DL (ref 0–150)
TSH SERPL DL<=0.05 MIU/L-ACNC: 1.25 UIU/ML (ref 0.27–4.2)
VIT B12 BLD-MCNC: 922 PG/ML (ref 211–946)
VLDLC SERPL-MCNC: 41 MG/DL (ref 5–40)
WBC # BLD AUTO: 12.34 10*3/MM3 (ref 3.4–10.8)

## 2021-03-27 LAB
PSA FREE MFR SERPL: 25.7 %
PSA FREE SERPL-MCNC: 0.18 NG/ML
PSA SERPL-MCNC: 0.7 NG/ML (ref 0–4)

## 2021-03-29 ENCOUNTER — TELEPHONE (OUTPATIENT)
Dept: CARDIOLOGY | Facility: CLINIC | Age: 46
End: 2021-03-29

## 2021-03-29 ENCOUNTER — TELEPHONE (OUTPATIENT)
Dept: FAMILY MEDICINE CLINIC | Facility: CLINIC | Age: 46
End: 2021-03-29

## 2021-03-29 NOTE — TELEPHONE ENCOUNTER
.    Caller: Jeanette Montes    Relationship: Emergency Contact    Best call back number:     Caller requesting test results: SPOUSE    What test was performed: LABS    When was the test performed: LAST WEEK    Where was the test performed: [facility]     Additional notes: WOULD LIKE TO GO OVER RESULTS WITH HIM; HE GOT THE RESULTS IN MYCHART BUT HAS ISSUES UNDERSTANDING

## 2021-03-29 NOTE — PROGRESS NOTES
Results released to [employer].  Patient message is as follows    It is noted that you have degenerative changes in your neck with some bone spurring.  The worst of those changes is at C5-6 and C6-7B alignment of your neck is normal.

## 2021-03-29 NOTE — PROGRESS NOTES
Patient notified of labs via [employer].  Patient message is as follows:      Arthritis changes are noted in your low back with evidence of an old fracture at your L1.  Arthritis changes are noted to be moderate

## 2021-03-29 NOTE — PROGRESS NOTES
Patient notified of labs via [email or web address].  Patient message is as follows:      You have arthritis changes in both of your hips.

## 2021-03-29 NOTE — TELEPHONE ENCOUNTER
His labs are good overall.  No significant concerns.  He needs to watch his diet.  His back and neck have a lot of arthritis changes.  If he would like to see me sooner to go over all the test he can make an appt.

## 2021-03-29 NOTE — TELEPHONE ENCOUNTER
Patients wife notified of providers response and states verbal understanding.     She wants to know if he has bone spurs?

## 2021-03-29 NOTE — PROGRESS NOTES
Patient notified of labs via [employer].  Patient message is as follows:      PSA was normal.  Vitamin D is minimally low but not significant.  Your white blood count was mildly elevated we will continue to watch that.  Thyroid was normal.  Blood sugars were normal.  Kidney function is good.  Cholesterol is on the border of normal but your triglycerides is elevated.  I encourage you to be watching your diet and eliminating any fried or fatty foods.  Your testosterone level is not resulted at this time.  I will let you know how it is later.

## 2021-03-29 NOTE — TELEPHONE ENCOUNTER
Left message on answering machine requesting patient return my call.  Patient needs a new patient appointment scheduled.

## 2021-03-29 NOTE — PROGRESS NOTES
Patient notified of labs via [employer].  Patient message is as follows:      The x-ray of your mid back is normal

## 2021-03-30 LAB
TESTOST FREE SERPL-MCNC: 5.3 PG/ML (ref 6.8–21.5)
TESTOST SERPL-MCNC: 323 NG/DL (ref 264–916)

## 2021-03-30 NOTE — PROGRESS NOTES
Patient notified of labs via [employer].  Patient message is as follows:      Testosterone is low.  You can resume your supplement if you would like

## 2021-03-31 ENCOUNTER — OFFICE VISIT (OUTPATIENT)
Dept: FAMILY MEDICINE CLINIC | Facility: CLINIC | Age: 46
End: 2021-03-31

## 2021-03-31 VITALS
OXYGEN SATURATION: 98 % | TEMPERATURE: 96.9 F | SYSTOLIC BLOOD PRESSURE: 120 MMHG | WEIGHT: 185 LBS | BODY MASS INDEX: 23.74 KG/M2 | HEART RATE: 79 BPM | HEIGHT: 74 IN | DIASTOLIC BLOOD PRESSURE: 80 MMHG

## 2021-03-31 DIAGNOSIS — E55.9 VITAMIN D DEFICIENCY: ICD-10-CM

## 2021-03-31 DIAGNOSIS — E34.9 TESTOSTERONE DEFICIENCY: ICD-10-CM

## 2021-03-31 DIAGNOSIS — M15.9 PRIMARY OSTEOARTHRITIS INVOLVING MULTIPLE JOINTS: Primary | ICD-10-CM

## 2021-03-31 PROCEDURE — 99214 OFFICE O/P EST MOD 30 MIN: CPT | Performed by: NURSE PRACTITIONER

## 2021-03-31 RX ORDER — PREDNISONE 20 MG/1
TABLET ORAL
Qty: 10 TABLET | Refills: 0 | Status: SHIPPED | OUTPATIENT
Start: 2021-03-31 | End: 2021-04-10

## 2021-03-31 RX ORDER — NABUMETONE 500 MG/1
500 TABLET, FILM COATED ORAL 2 TIMES DAILY PRN
Qty: 60 TABLET | Refills: 1 | Status: SHIPPED | OUTPATIENT
Start: 2021-03-31 | End: 2022-02-09

## 2021-03-31 RX ORDER — SYRINGE WITH NEEDLE, 1 ML 25GX5/8"
1 SYRINGE, EMPTY DISPOSABLE MISCELLANEOUS WEEKLY
Qty: 4 EACH | Refills: 5 | Status: SHIPPED | OUTPATIENT
Start: 2021-03-31 | End: 2022-02-09 | Stop reason: SDUPTHER

## 2021-03-31 RX ORDER — CYCLOBENZAPRINE HCL 5 MG
5 TABLET ORAL 3 TIMES DAILY PRN
Qty: 90 TABLET | Refills: 5 | Status: SHIPPED | OUTPATIENT
Start: 2021-03-31 | End: 2022-02-09 | Stop reason: SDUPTHER

## 2021-03-31 RX ORDER — TESTOSTERONE CYPIONATE 200 MG/ML
200 INJECTION, SOLUTION INTRAMUSCULAR WEEKLY
Qty: 4 ML | Refills: 0 | Status: SHIPPED | OUTPATIENT
Start: 2021-03-31 | End: 2022-02-09 | Stop reason: SDUPTHER

## 2021-04-06 ENCOUNTER — TELEPHONE (OUTPATIENT)
Dept: FAMILY MEDICINE CLINIC | Facility: CLINIC | Age: 46
End: 2021-04-06

## 2021-04-09 PROBLEM — E55.9 VITAMIN D DEFICIENCY: Status: ACTIVE | Noted: 2021-04-09

## 2021-04-09 PROBLEM — M15.0 PRIMARY OSTEOARTHRITIS INVOLVING MULTIPLE JOINTS: Status: ACTIVE | Noted: 2021-04-09

## 2021-04-09 PROBLEM — M15.9 PRIMARY OSTEOARTHRITIS INVOLVING MULTIPLE JOINTS: Status: ACTIVE | Noted: 2021-04-09

## 2021-04-09 NOTE — ASSESSMENT & PLAN NOTE
Noted on most recent x-rays.  Degenerative disc changes with osteophytes.  Trial of anti-inflammatories for pain symptoms.  Steroids today to help decrease current inflammation.

## 2021-04-09 NOTE — ASSESSMENT & PLAN NOTE
Noted on most recent labs.  Supplement has been started.  We will continue to monitor vitamin D levels

## 2021-04-28 RX ORDER — PREDNISONE 20 MG/1
TABLET ORAL
Qty: 10 TABLET | Refills: 0 | Status: SHIPPED | OUTPATIENT
Start: 2021-04-28 | End: 2021-05-08

## 2021-04-30 DIAGNOSIS — N52.8 OTHER MALE ERECTILE DYSFUNCTION: ICD-10-CM

## 2021-04-30 RX ORDER — TADALAFIL 10 MG/1
TABLET ORAL
Qty: 20 TABLET | Refills: 5 | Status: SHIPPED | OUTPATIENT
Start: 2021-04-30 | End: 2022-02-09 | Stop reason: SDUPTHER

## 2022-01-26 ENCOUNTER — LAB (OUTPATIENT)
Dept: FAMILY MEDICINE CLINIC | Facility: CLINIC | Age: 47
End: 2022-01-26

## 2022-01-26 DIAGNOSIS — R05.9 COUGH: ICD-10-CM

## 2022-01-26 DIAGNOSIS — J34.89 STUFFY AND RUNNY NOSE: Primary | ICD-10-CM

## 2022-01-26 LAB
B PARAPERT DNA SPEC QL NAA+PROBE: NOT DETECTED
B PERT DNA SPEC QL NAA+PROBE: NOT DETECTED
C PNEUM DNA NPH QL NAA+NON-PROBE: NOT DETECTED
FLUAV SUBTYP SPEC NAA+PROBE: NOT DETECTED
FLUBV RNA ISLT QL NAA+PROBE: NOT DETECTED
HADV DNA SPEC NAA+PROBE: NOT DETECTED
HCOV 229E RNA SPEC QL NAA+PROBE: NOT DETECTED
HCOV HKU1 RNA SPEC QL NAA+PROBE: NOT DETECTED
HCOV NL63 RNA SPEC QL NAA+PROBE: NOT DETECTED
HCOV OC43 RNA SPEC QL NAA+PROBE: NOT DETECTED
HMPV RNA NPH QL NAA+NON-PROBE: NOT DETECTED
HPIV1 RNA ISLT QL NAA+PROBE: NOT DETECTED
HPIV2 RNA SPEC QL NAA+PROBE: NOT DETECTED
HPIV3 RNA NPH QL NAA+PROBE: NOT DETECTED
HPIV4 P GENE NPH QL NAA+PROBE: NOT DETECTED
M PNEUMO IGG SER IA-ACNC: NOT DETECTED
RHINOVIRUS RNA SPEC NAA+PROBE: NOT DETECTED
RSV RNA NPH QL NAA+NON-PROBE: NOT DETECTED
SARS-COV-2 RNA NPH QL NAA+NON-PROBE: DETECTED

## 2022-01-26 PROCEDURE — 0202U NFCT DS 22 TRGT SARS-COV-2: CPT | Performed by: NURSE PRACTITIONER

## 2022-02-09 DIAGNOSIS — E34.9 TESTOSTERONE DEFICIENCY: ICD-10-CM

## 2022-02-09 DIAGNOSIS — M15.9 PRIMARY OSTEOARTHRITIS INVOLVING MULTIPLE JOINTS: ICD-10-CM

## 2022-02-09 DIAGNOSIS — N52.8 OTHER MALE ERECTILE DYSFUNCTION: ICD-10-CM

## 2022-02-09 RX ORDER — TADALAFIL 10 MG/1
10 TABLET ORAL DAILY PRN
Qty: 20 TABLET | Refills: 5 | Status: SHIPPED | OUTPATIENT
Start: 2022-02-09 | End: 2022-09-07 | Stop reason: HOSPADM

## 2022-02-09 RX ORDER — TESTOSTERONE CYPIONATE 200 MG/ML
200 INJECTION, SOLUTION INTRAMUSCULAR WEEKLY
Qty: 4 ML | Refills: 0 | Status: SHIPPED | OUTPATIENT
Start: 2022-02-09 | End: 2022-04-14

## 2022-02-09 RX ORDER — CYCLOBENZAPRINE HCL 5 MG
5 TABLET ORAL 3 TIMES DAILY PRN
Qty: 90 TABLET | Refills: 5 | Status: SHIPPED | OUTPATIENT
Start: 2022-02-09 | End: 2023-03-05

## 2022-02-09 RX ORDER — SYRINGE WITH NEEDLE, 1 ML 25GX5/8"
1 SYRINGE, EMPTY DISPOSABLE MISCELLANEOUS WEEKLY
Qty: 4 EACH | Refills: 5 | Status: ON HOLD | OUTPATIENT
Start: 2022-02-09 | End: 2022-05-06

## 2022-02-09 RX ORDER — ERGOCALCIFEROL 1.25 MG/1
50000 CAPSULE ORAL WEEKLY
Qty: 4 CAPSULE | Refills: 5 | Status: SHIPPED | OUTPATIENT
Start: 2022-02-09 | End: 2022-04-21 | Stop reason: SDUPTHER

## 2022-04-05 ENCOUNTER — TELEPHONE (OUTPATIENT)
Dept: FAMILY MEDICINE CLINIC | Facility: CLINIC | Age: 47
End: 2022-04-05

## 2022-04-05 NOTE — TELEPHONE ENCOUNTER
----- Message from EDDIE Thurman sent at 4/1/2022  4:36 PM EDT -----  He will likely need to see a general surgeon first and have a PCP visit.  He needs to make an appt.   ----- Message -----  From: Ирина Helms MA  Sent: 4/1/2022   1:29 PM EDT  To: EDDIE Thurman    Patient calling, requesting referral to Dr. Arnold in [place]. Patients brother has found spots that are cancerous and he said he has similar spots and he believes he might have cancer.     Please advise.

## 2022-04-14 ENCOUNTER — OFFICE VISIT (OUTPATIENT)
Dept: FAMILY MEDICINE CLINIC | Facility: CLINIC | Age: 47
End: 2022-04-14

## 2022-04-14 VITALS
WEIGHT: 179 LBS | SYSTOLIC BLOOD PRESSURE: 120 MMHG | BODY MASS INDEX: 22.97 KG/M2 | HEIGHT: 74 IN | HEART RATE: 95 BPM | DIASTOLIC BLOOD PRESSURE: 85 MMHG | TEMPERATURE: 97.1 F | OXYGEN SATURATION: 97 %

## 2022-04-14 DIAGNOSIS — Z13.220 SCREENING CHOLESTEROL LEVEL: ICD-10-CM

## 2022-04-14 DIAGNOSIS — Z13.1 DIABETES MELLITUS SCREENING: ICD-10-CM

## 2022-04-14 DIAGNOSIS — E55.9 VITAMIN D DEFICIENCY: ICD-10-CM

## 2022-04-14 DIAGNOSIS — R53.83 OTHER FATIGUE: ICD-10-CM

## 2022-04-14 DIAGNOSIS — Z13.29 THYROID DISORDER SCREEN: ICD-10-CM

## 2022-04-14 DIAGNOSIS — M15.9 PRIMARY OSTEOARTHRITIS INVOLVING MULTIPLE JOINTS: ICD-10-CM

## 2022-04-14 DIAGNOSIS — E34.9 TESTOSTERONE DEFICIENCY: ICD-10-CM

## 2022-04-14 DIAGNOSIS — R63.4 WEIGHT LOSS: ICD-10-CM

## 2022-04-14 DIAGNOSIS — L72.3 SEBACEOUS CYST: Primary | ICD-10-CM

## 2022-04-14 PROCEDURE — 99214 OFFICE O/P EST MOD 30 MIN: CPT | Performed by: NURSE PRACTITIONER

## 2022-04-14 NOTE — PROGRESS NOTES
Subjective   Scott Montes is a 47 y.o. male.     Chief Complaint   Patient presents with   • Cyst       History of Present Illness     sebaceous cyst-on his sternum and around his neck.  He has had one removed from his back that was a lipoma.  He is concerned since his brother has been diagnosed with Lymphoma.  He reports the area on the sternum he is able to express out at times but it returns.    Recent URI symptoms-lasted several days.  He reports fever and chills.  Generalized malaise.  Poor appetite.  He used Shreya seltzer cold and flu and is feeling better.  Joint pain-continues to have random joint pain.    He reports worse in his back and hip.   He reports his left hip is the most bothersome.  He continues to work as a .  He is taking aleve daily.  He reports that the aleve does help with his symptoms.  He has tried some prescription anti-inflammatories but does not feel that they helped any better.  Testosterone-has not been on supplement.  He has been on and off.  He reports his wife became concerned that they were dangerous so she refused to give him the shot.  He reports when he is not on the supplement he does seem to have decreased muscle mass.  He is also noted some unintended weight loss.    The following portions of the patient's history were reviewed and updated as appropriate: CC, ROS, allergies, current medications, past family history, past medical history, past social history, past surgical history and problem list.      Review of Systems   Constitutional: Positive for fatigue. Negative for appetite change, unexpected weight gain and unexpected weight loss.   HENT: Negative for congestion, ear pain, postnasal drip, rhinorrhea, sore throat, swollen glands, trouble swallowing and voice change.    Eyes: Negative for pain and visual disturbance.   Respiratory: Negative for cough, chest tightness, shortness of breath and wheezing.    Cardiovascular: Negative for chest pain, palpitations and  "leg swelling.   Gastrointestinal: Negative for abdominal pain, blood in stool, constipation, diarrhea, nausea and indigestion.   Genitourinary: Negative for dysuria, hematuria and urgency.   Musculoskeletal: Positive for arthralgias, back pain and myalgias. Negative for gait problem and joint swelling.   Skin: Negative for color change and skin lesions.   Allergic/Immunologic: Negative.    Neurological: Negative for numbness, headache and confusion.   Hematological: Negative.    Psychiatric/Behavioral: Negative for dysphoric mood, sleep disturbance and suicidal ideas. The patient is not nervous/anxious.    All other systems reviewed and are negative.       Objective     /85   Pulse 95   Temp 97.1 °F (36.2 °C) (Temporal)   Ht 188 cm (74\")   Wt 81.2 kg (179 lb)   SpO2 97%   BMI 22.98 kg/m²     Physical Exam  Vitals reviewed.   Constitutional:       General: He is not in acute distress.     Appearance: He is well-developed. He is not diaphoretic.   HENT:      Head: Normocephalic and atraumatic.      Jaw: No tenderness.        Comments: Oropharynx not examined.  Patient is presently wearing a face covering/mask due to COVID-19 pandemic.     Right Ear: Hearing, tympanic membrane, ear canal and external ear normal.      Left Ear: Hearing, tympanic membrane, ear canal and external ear normal.   Eyes:      General: Lids are normal. No scleral icterus.     Extraocular Movements:      Right eye: Normal extraocular motion and no nystagmus.      Left eye: Normal extraocular motion and no nystagmus.      Conjunctiva/sclera: Conjunctivae normal.      Pupils: Pupils are equal, round, and reactive to light.   Neck:      Thyroid: No thyromegaly or thyroid tenderness.      Vascular: No carotid bruit or JVD.      Trachea: No tracheal tenderness.   Cardiovascular:      Rate and Rhythm: Normal rate and regular rhythm.      Pulses:           Dorsalis pedis pulses are 2+ on the right side and 2+ on the left side.        " Posterior tibial pulses are 2+ on the right side and 2+ on the left side.      Heart sounds: Normal heart sounds, S1 normal and S2 normal. No murmur heard.  Pulmonary:      Effort: Pulmonary effort is normal. No accessory muscle usage, prolonged expiration or respiratory distress.      Breath sounds: Normal breath sounds.   Chest:      Chest wall: No tenderness.   Abdominal:      General: Bowel sounds are normal. There is no distension.      Palpations: Abdomen is soft. There is no mass.      Tenderness: There is no abdominal tenderness.   Musculoskeletal:      Right shoulder: Tenderness present. Decreased range of motion.      Left shoulder: Tenderness present. Decreased range of motion.      Cervical back: Neck supple. Spasms, tenderness and bony tenderness present. Pain with movement present. Decreased range of motion.      Thoracic back: Spasms and tenderness present. Decreased range of motion.      Lumbar back: Spasms and tenderness present. Decreased range of motion.      Right lower leg: No edema.      Left lower leg: No edema.      Comments: No muscular atrophy or flaccidity.   Lymphadenopathy:      Head:      Right side of head: No submental or submandibular adenopathy.      Left side of head: No submental or submandibular adenopathy.      Cervical: No cervical adenopathy.      Right cervical: No superficial cervical adenopathy.     Left cervical: No superficial cervical adenopathy.   Skin:     General: Skin is warm and dry.      Capillary Refill: Capillary refill takes less than 2 seconds.      Coloration: Skin is not jaundiced or pale.      Findings: No erythema.      Nails: There is no clubbing.   Neurological:      Mental Status: He is alert and oriented to person, place, and time.      Cranial Nerves: No cranial nerve deficit or facial asymmetry.      Sensory: No sensory deficit.      Motor: No weakness, tremor, atrophy or abnormal muscle tone.      Coordination: Coordination normal.      Deep Tendon  Reflexes: Reflexes are normal and symmetric.   Psychiatric:         Attention and Perception: He is attentive.         Mood and Affect: Mood normal.         Speech: Speech normal.         Behavior: Behavior normal. Behavior is cooperative.         Thought Content: Thought content normal.         Judgment: Judgment normal.       Assessment/Plan     Diagnoses and all orders for this visit:    1. Sebaceous cyst (Primary)  -     Ambulatory Referral to General Surgery    2. Testosterone deficiency  Assessment & Plan:  Testosterone level ordered.    Orders:  -     Testosterone, Free, Total    3. Vitamin D deficiency  Assessment & Plan:  Vitamin D level ordered.  Continue vitamin D supplement    Orders:  -     Vitamin D 25 Hydroxy    4. Thyroid disorder screen  -     TSH  -     T4, Free    5. Screening cholesterol level    6. Diabetes mellitus screening  -     Hemoglobin A1c    7. Weight loss  -     CBC & Differential  -     Comprehensive Metabolic Panel  -     Lipid Panel  -     TSH  -     Hemoglobin A1c  -     T4, Free  -     Vitamin B12  -     Testosterone, Free, Total    8. Other fatigue  -     Hemoglobin A1c  -     Vitamin D 25 Hydroxy  -     Testosterone, Free, Total    9. Primary osteoarthritis involving multiple joints  Assessment & Plan:  He may continue to use OTC Aleve as long as kidney function is stable.         Patient's Body mass index is 22.98 kg/m². indicating that he is within normal range (BMI 18.5-24.9). No BMI management plan needed.     Understands disease processes and need for medications.  Understands reasons for urgent and emergent care.  Patient (& family) verbalized agreement for treatment plan.   Emotional support and active listening provided.  Patient provided time to verbalize feelings.      We will plan for updated fasting labs.  We will plan for general surgery consult for possible removal of cystic/vasculitis.  We will await lab results before making decision regarding hematology  referral.    RTC in 1 month, sooner if needed for problems or concerns              This document has been electronically signed by:  EDDIE Thurman, FNP-C    Dragon disclaimer:  Part of this note may be an electronic transcription/translation of spoken language to printed text using the Dragon Dictation System.

## 2022-04-19 ENCOUNTER — LAB (OUTPATIENT)
Dept: FAMILY MEDICINE CLINIC | Facility: CLINIC | Age: 47
End: 2022-04-19

## 2022-04-19 LAB
25(OH)D3 SERPL-MCNC: 18.5 NG/ML (ref 30–100)
ALBUMIN SERPL-MCNC: 3.5 G/DL (ref 3.5–5.2)
ALBUMIN/GLOB SERPL: 1 G/DL
ALP SERPL-CCNC: 149 U/L (ref 39–117)
ALT SERPL W P-5'-P-CCNC: 35 U/L (ref 1–41)
ANION GAP SERPL CALCULATED.3IONS-SCNC: 14.3 MMOL/L (ref 5–15)
AST SERPL-CCNC: 18 U/L (ref 1–40)
BASOPHILS # BLD AUTO: 0.09 10*3/MM3 (ref 0–0.2)
BASOPHILS NFR BLD AUTO: 0.6 % (ref 0–1.5)
BILIRUB SERPL-MCNC: 0.3 MG/DL (ref 0–1.2)
BUN SERPL-MCNC: 10 MG/DL (ref 6–20)
BUN/CREAT SERPL: 13.5 (ref 7–25)
CALCIUM SPEC-SCNC: 9.2 MG/DL (ref 8.6–10.5)
CHLORIDE SERPL-SCNC: 100 MMOL/L (ref 98–107)
CHOLEST SERPL-MCNC: 161 MG/DL (ref 0–200)
CO2 SERPL-SCNC: 23.7 MMOL/L (ref 22–29)
CREAT SERPL-MCNC: 0.74 MG/DL (ref 0.76–1.27)
DEPRECATED RDW RBC AUTO: 38.4 FL (ref 37–54)
EGFRCR SERPLBLD CKD-EPI 2021: 112.5 ML/MIN/1.73
EOSINOPHIL # BLD AUTO: 0.07 10*3/MM3 (ref 0–0.4)
EOSINOPHIL NFR BLD AUTO: 0.5 % (ref 0.3–6.2)
ERYTHROCYTE [DISTWIDTH] IN BLOOD BY AUTOMATED COUNT: 13.4 % (ref 12.3–15.4)
GLOBULIN UR ELPH-MCNC: 3.5 GM/DL
GLUCOSE SERPL-MCNC: 81 MG/DL (ref 65–99)
HBA1C MFR BLD: 5.6 % (ref 4.8–5.6)
HCT VFR BLD AUTO: 40.7 % (ref 37.5–51)
HDLC SERPL-MCNC: 28 MG/DL (ref 40–60)
HGB BLD-MCNC: 13.1 G/DL (ref 13–17.7)
IMM GRANULOCYTES # BLD AUTO: 0.08 10*3/MM3 (ref 0–0.05)
IMM GRANULOCYTES NFR BLD AUTO: 0.5 % (ref 0–0.5)
LDLC SERPL CALC-MCNC: 115 MG/DL (ref 0–100)
LDLC/HDLC SERPL: 4.06 {RATIO}
LYMPHOCYTES # BLD AUTO: 1.88 10*3/MM3 (ref 0.7–3.1)
LYMPHOCYTES NFR BLD AUTO: 12.5 % (ref 19.6–45.3)
MCH RBC QN AUTO: 25.7 PG (ref 26.6–33)
MCHC RBC AUTO-ENTMCNC: 32.2 G/DL (ref 31.5–35.7)
MCV RBC AUTO: 80 FL (ref 79–97)
MONOCYTES # BLD AUTO: 1.09 10*3/MM3 (ref 0.1–0.9)
MONOCYTES NFR BLD AUTO: 7.2 % (ref 5–12)
NEUTROPHILS NFR BLD AUTO: 11.84 10*3/MM3 (ref 1.7–7)
NEUTROPHILS NFR BLD AUTO: 78.7 % (ref 42.7–76)
NRBC BLD AUTO-RTO: 0 /100 WBC (ref 0–0.2)
PLATELET # BLD AUTO: 538 10*3/MM3 (ref 140–450)
PMV BLD AUTO: 10.5 FL (ref 6–12)
POTASSIUM SERPL-SCNC: 4.5 MMOL/L (ref 3.5–5.2)
PROT SERPL-MCNC: 7 G/DL (ref 6–8.5)
RBC # BLD AUTO: 5.09 10*6/MM3 (ref 4.14–5.8)
SODIUM SERPL-SCNC: 138 MMOL/L (ref 136–145)
T4 FREE SERPL-MCNC: 1.38 NG/DL (ref 0.93–1.7)
TRIGL SERPL-MCNC: 97 MG/DL (ref 0–150)
TSH SERPL DL<=0.05 MIU/L-ACNC: 1.15 UIU/ML (ref 0.27–4.2)
VIT B12 BLD-MCNC: 548 PG/ML (ref 211–946)
VLDLC SERPL-MCNC: 18 MG/DL (ref 5–40)
WBC NRBC COR # BLD: 15.05 10*3/MM3 (ref 3.4–10.8)

## 2022-04-19 PROCEDURE — 84403 ASSAY OF TOTAL TESTOSTERONE: CPT | Performed by: NURSE PRACTITIONER

## 2022-04-19 PROCEDURE — 84402 ASSAY OF FREE TESTOSTERONE: CPT | Performed by: NURSE PRACTITIONER

## 2022-04-19 PROCEDURE — 83036 HEMOGLOBIN GLYCOSYLATED A1C: CPT | Performed by: NURSE PRACTITIONER

## 2022-04-19 PROCEDURE — 80061 LIPID PANEL: CPT | Performed by: NURSE PRACTITIONER

## 2022-04-19 PROCEDURE — 84439 ASSAY OF FREE THYROXINE: CPT | Performed by: NURSE PRACTITIONER

## 2022-04-19 PROCEDURE — 82306 VITAMIN D 25 HYDROXY: CPT | Performed by: NURSE PRACTITIONER

## 2022-04-19 PROCEDURE — 82607 VITAMIN B-12: CPT | Performed by: NURSE PRACTITIONER

## 2022-04-19 PROCEDURE — 80050 GENERAL HEALTH PANEL: CPT | Performed by: NURSE PRACTITIONER

## 2022-04-21 ENCOUNTER — OFFICE VISIT (OUTPATIENT)
Dept: FAMILY MEDICINE CLINIC | Facility: CLINIC | Age: 47
End: 2022-04-21

## 2022-04-21 VITALS — BODY MASS INDEX: 22.97 KG/M2 | HEIGHT: 74 IN | WEIGHT: 179 LBS

## 2022-04-21 DIAGNOSIS — E55.9 VITAMIN D DEFICIENCY: ICD-10-CM

## 2022-04-21 DIAGNOSIS — R74.8 ELEVATED ALKALINE PHOSPHATASE LEVEL: ICD-10-CM

## 2022-04-21 DIAGNOSIS — E34.9 TESTOSTERONE DEFICIENCY: ICD-10-CM

## 2022-04-21 DIAGNOSIS — D72.828 OTHER ELEVATED WHITE BLOOD CELL (WBC) COUNT: Primary | ICD-10-CM

## 2022-04-21 DIAGNOSIS — R79.89 ELEVATED PLATELET COUNT: ICD-10-CM

## 2022-04-21 LAB
TESTOST FREE SERPL-MCNC: 6.6 PG/ML (ref 6.8–21.5)
TESTOST SERPL-MCNC: 340 NG/DL (ref 264–916)

## 2022-04-21 PROCEDURE — 99214 OFFICE O/P EST MOD 30 MIN: CPT | Performed by: NURSE PRACTITIONER

## 2022-04-21 RX ORDER — ERGOCALCIFEROL 1.25 MG/1
50000 CAPSULE ORAL WEEKLY
Qty: 4 CAPSULE | Refills: 5 | Status: ON HOLD | OUTPATIENT
Start: 2022-04-21 | End: 2022-09-06

## 2022-05-04 ENCOUNTER — CONSULT (OUTPATIENT)
Dept: ONCOLOGY | Facility: CLINIC | Age: 47
End: 2022-05-04

## 2022-05-04 VITALS
HEIGHT: 74 IN | OXYGEN SATURATION: 98 % | WEIGHT: 173 LBS | HEART RATE: 90 BPM | BODY MASS INDEX: 22.2 KG/M2 | SYSTOLIC BLOOD PRESSURE: 116 MMHG | TEMPERATURE: 97.8 F | DIASTOLIC BLOOD PRESSURE: 82 MMHG | RESPIRATION RATE: 18 BRPM

## 2022-05-04 DIAGNOSIS — D72.829 LEUKOCYTOSIS, UNSPECIFIED TYPE: Primary | ICD-10-CM

## 2022-05-04 DIAGNOSIS — D75.839 THROMBOCYTOSIS, UNSPECIFIED: ICD-10-CM

## 2022-05-04 LAB
ALBUMIN SERPL-MCNC: 3.56 G/DL (ref 3.5–5.2)
ALBUMIN/GLOB SERPL: 0.6 G/DL
ALP SERPL-CCNC: 176 U/L (ref 39–117)
ALT SERPL W P-5'-P-CCNC: 28 U/L (ref 1–41)
ANION GAP SERPL CALCULATED.3IONS-SCNC: 11.6 MMOL/L (ref 5–15)
AST SERPL-CCNC: 22 U/L (ref 1–40)
BASOPHILS # BLD AUTO: 0.1 10*3/MM3 (ref 0–0.2)
BASOPHILS NFR BLD AUTO: 0.9 % (ref 0–1.5)
BILIRUB SERPL-MCNC: 0.5 MG/DL (ref 0–1.2)
BUN SERPL-MCNC: 9 MG/DL (ref 6–20)
BUN/CREAT SERPL: 11.4 (ref 7–25)
CALCIUM SPEC-SCNC: 9.3 MG/DL (ref 8.6–10.5)
CHLORIDE SERPL-SCNC: 97 MMOL/L (ref 98–107)
CO2 SERPL-SCNC: 23.4 MMOL/L (ref 22–29)
CREAT SERPL-MCNC: 0.79 MG/DL (ref 0.76–1.27)
CRP SERPL-MCNC: 11.5 MG/DL (ref 0–0.5)
DEPRECATED RDW RBC AUTO: 41.6 FL (ref 37–54)
EGFRCR SERPLBLD CKD-EPI 2021: 109.6 ML/MIN/1.73
EOSINOPHIL # BLD AUTO: 0.15 10*3/MM3 (ref 0–0.4)
EOSINOPHIL NFR BLD AUTO: 1.3 % (ref 0.3–6.2)
ERYTHROCYTE [DISTWIDTH] IN BLOOD BY AUTOMATED COUNT: 14.4 % (ref 12.3–15.4)
ERYTHROCYTE [SEDIMENTATION RATE] IN BLOOD: 118 MM/HR (ref 0–15)
FERRITIN SERPL-MCNC: 659.9 NG/ML (ref 30–400)
GLOBULIN UR ELPH-MCNC: 5.8 GM/DL
GLUCOSE SERPL-MCNC: 84 MG/DL (ref 65–99)
HCT VFR BLD AUTO: 41.6 % (ref 37.5–51)
HGB BLD-MCNC: 12.9 G/DL (ref 13–17.7)
HIV1+2 AB SER QL: NORMAL
IMM GRANULOCYTES # BLD AUTO: 0.03 10*3/MM3 (ref 0–0.05)
IMM GRANULOCYTES NFR BLD AUTO: 0.3 % (ref 0–0.5)
IRON 24H UR-MRATE: 17 MCG/DL (ref 59–158)
IRON SATN MFR SERPL: 5 % (ref 20–50)
LYMPHOCYTES # BLD AUTO: 2.47 10*3/MM3 (ref 0.7–3.1)
LYMPHOCYTES NFR BLD AUTO: 21.9 % (ref 19.6–45.3)
MCH RBC QN AUTO: 24.6 PG (ref 26.6–33)
MCHC RBC AUTO-ENTMCNC: 31 G/DL (ref 31.5–35.7)
MCV RBC AUTO: 79.4 FL (ref 79–97)
MONOCYTES # BLD AUTO: 1.12 10*3/MM3 (ref 0.1–0.9)
MONOCYTES NFR BLD AUTO: 9.9 % (ref 5–12)
NEUTROPHILS NFR BLD AUTO: 65.7 % (ref 42.7–76)
NEUTROPHILS NFR BLD AUTO: 7.4 10*3/MM3 (ref 1.7–7)
NRBC BLD AUTO-RTO: 0 /100 WBC (ref 0–0.2)
PLATELET # BLD AUTO: 559 10*3/MM3 (ref 140–450)
PMV BLD AUTO: 9.6 FL (ref 6–12)
POTASSIUM SERPL-SCNC: 4.1 MMOL/L (ref 3.5–5.2)
PROT SERPL-MCNC: 9.4 G/DL (ref 6–8.5)
RBC # BLD AUTO: 5.24 10*6/MM3 (ref 4.14–5.8)
SODIUM SERPL-SCNC: 132 MMOL/L (ref 136–145)
TIBC SERPL-MCNC: 349 MCG/DL (ref 298–536)
TRANSFERRIN SERPL-MCNC: 234 MG/DL (ref 200–360)
WBC NRBC COR # BLD: 11.27 10*3/MM3 (ref 3.4–10.8)

## 2022-05-04 PROCEDURE — 81279 JAK2 GENE TRGT SEQUENCE ALYS: CPT

## 2022-05-04 PROCEDURE — 86140 C-REACTIVE PROTEIN: CPT | Performed by: NURSE PRACTITIONER

## 2022-05-04 PROCEDURE — 99215 OFFICE O/P EST HI 40 MIN: CPT | Performed by: NURSE PRACTITIONER

## 2022-05-04 PROCEDURE — 85025 COMPLETE CBC W/AUTO DIFF WBC: CPT | Performed by: NURSE PRACTITIONER

## 2022-05-04 PROCEDURE — 81206 BCR/ABL1 GENE MAJOR BP: CPT

## 2022-05-04 PROCEDURE — 80053 COMPREHEN METABOLIC PANEL: CPT | Performed by: NURSE PRACTITIONER

## 2022-05-04 PROCEDURE — 86038 ANTINUCLEAR ANTIBODIES: CPT | Performed by: NURSE PRACTITIONER

## 2022-05-04 PROCEDURE — 81207 BCR/ABL1 GENE MINOR BP: CPT

## 2022-05-04 PROCEDURE — 82728 ASSAY OF FERRITIN: CPT | Performed by: NURSE PRACTITIONER

## 2022-05-04 PROCEDURE — G0432 EIA HIV-1/HIV-2 SCREEN: HCPCS | Performed by: NURSE PRACTITIONER

## 2022-05-04 PROCEDURE — 84466 ASSAY OF TRANSFERRIN: CPT | Performed by: NURSE PRACTITIONER

## 2022-05-04 PROCEDURE — 83540 ASSAY OF IRON: CPT | Performed by: NURSE PRACTITIONER

## 2022-05-04 PROCEDURE — 80074 ACUTE HEPATITIS PANEL: CPT | Performed by: NURSE PRACTITIONER

## 2022-05-04 PROCEDURE — 81270 JAK2 GENE: CPT | Performed by: NURSE PRACTITIONER

## 2022-05-04 PROCEDURE — 85652 RBC SED RATE AUTOMATED: CPT | Performed by: NURSE PRACTITIONER

## 2022-05-04 PROCEDURE — 86431 RHEUMATOID FACTOR QUANT: CPT | Performed by: NURSE PRACTITIONER

## 2022-05-04 PROCEDURE — 85060 BLOOD SMEAR INTERPRETATION: CPT | Performed by: NURSE PRACTITIONER

## 2022-05-04 RX ORDER — FERROUS SULFATE 325(65) MG
TABLET ORAL
Qty: 30 TABLET | Refills: 1 | Status: ON HOLD | OUTPATIENT
Start: 2022-05-04 | End: 2022-05-06

## 2022-05-04 RX ORDER — AMOXICILLIN 250 MG
2 CAPSULE ORAL 2 TIMES DAILY
Qty: 120 TABLET | Refills: 1 | Status: SHIPPED | OUTPATIENT
Start: 2022-05-04 | End: 2022-05-07 | Stop reason: HOSPADM

## 2022-05-04 NOTE — PROGRESS NOTES
DATE OF CONSULTATION:  5/4/2022    REASON FOR REFERRAL: Leukocytosis;Thrombocytosis    REFERRING PHYSICIAN:  EDDIE Thurman    CHIEF COMPLAINT:  Weakness and Fatigue, Weight loss      HISTORY OF PRESENT ILLNESS:   Scott Montes is a very pleasant 47 y.o. male who is being seen today at the request of EDDIE Thurman for evaluation and treatment of leukocytosis and thrombocytosis. He reports that he follows with PCP on an as needed basis. However, he recently developed pain in the abdomen and groin area and was evaluated by PCP. At that time, she obtained lab work which showed abnormal counts. Previous available labs were reviewed and patient had elevated WBC (15.05) and elevated platelet count 538,000 on 04/19/2022. CBC from one year ago showed elevated WBC (12.34) with normal H/H and platelet count. CBC from 2018 was normal. Unfortunately, there are no other CBCs available to review for comparison of trend. He is a chronic, heavy smoker 1 PPD for the last 30 years. He will occasionally drink 2-3 beers on the weekend. He reports a poor appetite and has had a 12 pound weight loss within the last year. He denies any fever/chills or drenching night sweats. Denies tender/enlarged LNs. Denies any recent infections. He does report worsening fatigue and generalized weakness. He also reports chronic arthritic pain in his back.  Denies headache or any focal weakness. Denies any abnormal bleeding. He reports having EGD/colonoscopy many years ago. His PCP has arranged CT Chest, AP on 05/18/2022. He denies any other specific complaints today.     PAST MEDICAL HISTORY:  Past Medical History:   Diagnosis Date   • Low back pain        PAST SURGICAL HISTORY:  Past Surgical History:   Procedure Laterality Date   • CYST REMOVAL     • VASECTOMY         FAMILY HISTORY:  History reviewed. No pertinent family history.    SOCIAL HISTORY:  Social History     Socioeconomic History   • Marital status: Unknown   Tobacco Use   •  "Smoking status: Current Every Day Smoker     Packs/day: 1.00     Years: 30.00     Pack years: 30.00   • Smokeless tobacco: Never Used   • Tobacco comment: Smoking cessation encouraged   Vaping Use   • Vaping Use: Never used   Substance and Sexual Activity   • Alcohol use: No   • Drug use: No   • Sexual activity: Defer       MEDICATIONS:  The current medication list was reviewed in the EMR    Current Outpatient Medications:   •  Cyanocobalamin (Vitamin B12) 1000 MCG tablet controlled-release, Take 1 tablet by mouth Daily., Disp: 30 tablet, Rfl: 5  •  cyclobenzaprine (FLEXERIL) 5 MG tablet, Take 1 tablet by mouth 3 (Three) Times a Day As Needed for Muscle Spasms., Disp: 90 tablet, Rfl: 5  •  Diclofenac Sodium (VOLTAREN) 1 % gel gel, Apply 4 g topically to the appropriate area as directed 3 (Three) Times a Day., Disp: 100 g, Rfl: 5  •  Syringe/Needle, Disp, (B-D 3CC LUER-DAYNA SYR 23GX1\") 23G X 1\" 3 ML misc, 1 each 1 (One) Time Per Week., Disp: 4 each, Rfl: 5  •  tadalafil (CIALIS) 10 MG tablet, Take 1 tablet by mouth Daily As Needed for Erectile Dysfunction., Disp: 20 tablet, Rfl: 5  •  vitamin D (ERGOCALCIFEROL) 1.25 MG (43290 UT) capsule capsule, Take 1 capsule by mouth 1 (One) Time Per Week., Disp: 4 capsule, Rfl: 5    ALLERGIES:  No Known Allergies      REVIEW OF SYSTEMS:    A comprehensive 14 point review of systems was performed.  Significant findings as mentioned above.  All other systems reviewed and are negative.        Physical Exam   Vital Signs: /82   Pulse 90   Temp 97.8 °F (36.6 °C)   Resp 18   Ht 188 cm (74\")   Wt 78.5 kg (173 lb)   SpO2 98%   BMI 22.21 kg/m²  BMI is within normal parameters. No follow-up required.      ECOG score: 0   General: Well developed, well nourished, alert and oriented x 3, in no acute distress.   Head: ATNC   Eyes: PERRL, No evidence of conjunctivitis.   Nose: No nasal discharge.   Mouth: Oral mucosal membranes moist. No oral ulceration or hemorrhages.   Neck: Neck " supple. No thyromegaly. No JVD. Sebaceous cyst right neck  Lungs: Clear in all fields to A&P without rales, rhonchi or wheezing.   Heart: S1, S2. Regular rate and rhythm. No murmurs, rubs, or gallops.   Abdomen: Soft. Bowel sounds are normoactive. Nontender with palpation. No Hepatosplenomegaly can be appreciated.   Extremities: No cyanosis or edema. Peripheral pulses palpable and equal bilaterally.   Integumentary: Warm, dry, intact.   Hem/Lymph Nodes: No palpable cervical, submandibular, supraclavicular, axillary  lymphadenopathy noted. No petechiae, purpura or ecchymosis noted.   Neurologic: Grossly non-focal exam    Pain Score:  Pain Score    05/04/22 1251   PainSc: 0-No pain       PHQ-Score Total:  PHQ-9 Total Score:         PATHOLOGY:        ENDOSCOPY:        IMAGING:  XR Hips Bilateral With or Without Pelvis 5 View 03/25/2021         RECENT LABS:  Lab Results   Component Value Date    WBC 15.05 (H) 04/19/2022    HGB 13.1 04/19/2022    HCT 40.7 04/19/2022    MCV 80.0 04/19/2022    RDW 13.4 04/19/2022     (H) 04/19/2022    NEUTRORELPCT 78.7 (H) 04/19/2022    LYMPHORELPCT 12.5 (L) 04/19/2022    MONORELPCT 7.2 04/19/2022    EOSRELPCT 0.5 04/19/2022    BASORELPCT 0.6 04/19/2022    NEUTROABS 11.84 (H) 04/19/2022    LYMPHSABS 1.88 04/19/2022       Lab Results   Component Value Date     04/19/2022    K 4.5 04/19/2022    CO2 23.7 04/19/2022     04/19/2022    BUN 10 04/19/2022    CREATININE 0.74 (L) 04/19/2022    EGFRIFNONA 98 03/25/2021    GLUCOSE 81 04/19/2022    CALCIUM 9.2 04/19/2022    ALKPHOS 149 (H) 04/19/2022    AST 18 04/19/2022    ALT 35 04/19/2022    BILITOT 0.3 04/19/2022    ALBUMIN 3.50 04/19/2022    PROTEINTOT 7.0 04/19/2022           ASSESSMENT & PLAN:  Scott Montes is a very pleasant 47 y.o. male with    1. Leukocytosis  2. Thrombocytosis  - Previous available labs were reviewed and patient had elevated WBC (15.05) and elevated platelet count 538,000 on 04/19/2022. CBC from one year  ago showed elevated WBC (12.34) with normal H/H and platelet count. CBC from 2018 was normal. Unfortunately, there are no other CBCs available to review for comparison of trend.  - He is a chronic, heavy smoker 1 PPD for the last 30 years. Other than worsening fatigue, he has no concerning B-symptoms.   - Based on history, smoking could be contributing to his elevated WBC along with underlying inflammation.  - Obtained repeat CBC today which showed ongoing leukocytosis with WBC (11.27) which has improved with mildly low Hg (12.9) and elevated platelet count 559,000.   - Sent additional labs today to further evaluate including PBS, ESR, CRP, Acute Hepatitis panel, HIV panel, CADY and RF. Will also obtain Iron panel and ferritin to evaluate for reactive causes related to thrombocytosis. Also sent Flow cytometry, BCR/ABL, ELOY II V617F and ELOY II Exon 12-15 to evaluate for possible underlying myeloproliferative disorder.   - Advised to have CT imaging as scheduled or sooner if possible. Would also recommend GI referral for possible EGD/colonoscopy.   - Will follow up in 4 weeks with repeat CBC and to discuss above pending work up and further management.   - In the meantime, strongly advised patient to cut back and quit smoking.     ACO / MATIAS/Other  Quality measures  -  Scott Montes did not receive 2021 flu vaccine. This was recommended, he declined.  -  Scott Montes reports a pain score of 0.    -  Current outpatient and discharge medications have been reconciled for the patient.  Reviewed by: EDDIE Gray        The patient was in agreement with the plan and all questions were answered to his satisfaction.     Thank you so much for allowing us to participate in the care of Scott Montes . Please do not hesitate to contact us with any questions or concerns.     A total of 45 minutes were spent coordinating this patient’s care in clinic today; more than 50% of this time was face-to-face with the patient,  reviewing his interim medical history and counseling on the current treatment and followup plan. All questions were answered to his satisfaction.      Electronically Signed by: EDDIE Desai , May 4, 2022 13:49 EDT           Electronically Signed by: EDDIE Desai , May 4, 2022 13:49 EDT       CC:   EDDIE Thurman Cynthia, APRN

## 2022-05-05 DIAGNOSIS — D64.9 ANEMIA, UNSPECIFIED TYPE: Primary | ICD-10-CM

## 2022-05-05 LAB
ANA SER QL: NEGATIVE
CHROMATIN AB SERPL-ACNC: 12.2 IU/ML (ref 0–14)
CYTOLOGIST CVX/VAG CYTO: NORMAL
HAV IGM SERPL QL IA: NORMAL
HBV CORE IGM SERPL QL IA: NORMAL
HBV SURFACE AG SERPL QL IA: NORMAL
HCV AB SER DONR QL: NORMAL
PATH INTERP BLD-IMP: NORMAL

## 2022-05-06 ENCOUNTER — HOSPITAL ENCOUNTER (OUTPATIENT)
Dept: CT IMAGING | Facility: HOSPITAL | Age: 47
Discharge: HOME | End: 2022-05-06

## 2022-05-06 ENCOUNTER — APPOINTMENT (OUTPATIENT)
Dept: GENERAL RADIOLOGY | Facility: HOSPITAL | Age: 47
End: 2022-05-06

## 2022-05-06 ENCOUNTER — HOSPITAL ENCOUNTER (OUTPATIENT)
Facility: HOSPITAL | Age: 47
Setting detail: OBSERVATION
Discharge: HOME | End: 2022-05-07
Attending: FAMILY MEDICINE | Admitting: SURGERY

## 2022-05-06 ENCOUNTER — TELEPHONE (OUTPATIENT)
Dept: GENERAL RADIOLOGY | Facility: HOSPITAL | Age: 47
End: 2022-05-06

## 2022-05-06 DIAGNOSIS — R79.89 ELEVATED PLATELET COUNT: ICD-10-CM

## 2022-05-06 DIAGNOSIS — D72.828 OTHER ELEVATED WHITE BLOOD CELL (WBC) COUNT: ICD-10-CM

## 2022-05-06 DIAGNOSIS — R10.9 ABDOMINAL PAIN, UNSPECIFIED ABDOMINAL LOCATION: ICD-10-CM

## 2022-05-06 DIAGNOSIS — K52.9 COLITIS: Primary | ICD-10-CM

## 2022-05-06 DIAGNOSIS — R74.8 ELEVATED ALKALINE PHOSPHATASE LEVEL: ICD-10-CM

## 2022-05-06 DIAGNOSIS — R93.89 ABNORMAL CHEST CT: Primary | ICD-10-CM

## 2022-05-06 DIAGNOSIS — R91.1 INCIDENTAL LUNG NODULE, GREATER THAN OR EQUAL TO 8MM: ICD-10-CM

## 2022-05-06 DIAGNOSIS — R79.89 ELEVATED LFTS: ICD-10-CM

## 2022-05-06 LAB
ALBUMIN SERPL-MCNC: 3.2 G/DL (ref 3.5–5.2)
ALBUMIN/GLOB SERPL: 0.6 G/DL
ALP SERPL-CCNC: 145 U/L (ref 39–117)
ALT SERPL W P-5'-P-CCNC: 20 U/L (ref 1–41)
ANION GAP SERPL CALCULATED.3IONS-SCNC: 9.1 MMOL/L (ref 5–15)
AST SERPL-CCNC: 17 U/L (ref 1–40)
BASOPHILS # BLD AUTO: 0.12 10*3/MM3 (ref 0–0.2)
BASOPHILS NFR BLD AUTO: 1 % (ref 0–1.5)
BILIRUB SERPL-MCNC: 0.3 MG/DL (ref 0–1.2)
BUN SERPL-MCNC: 11 MG/DL (ref 6–20)
BUN/CREAT SERPL: 16.2 (ref 7–25)
CALCIUM SPEC-SCNC: 9.1 MG/DL (ref 8.6–10.5)
CHLORIDE SERPL-SCNC: 101 MMOL/L (ref 98–107)
CO2 SERPL-SCNC: 24.9 MMOL/L (ref 22–29)
CREAT SERPL-MCNC: 0.68 MG/DL (ref 0.76–1.27)
CRP SERPL-MCNC: 6.72 MG/DL (ref 0–0.5)
D-LACTATE SERPL-SCNC: 1.3 MMOL/L (ref 0.5–2)
DEPRECATED RDW RBC AUTO: 41.6 FL (ref 37–54)
EGFRCR SERPLBLD CKD-EPI 2021: 114.7 ML/MIN/1.73
EOSINOPHIL # BLD AUTO: 0.2 10*3/MM3 (ref 0–0.4)
EOSINOPHIL NFR BLD AUTO: 1.7 % (ref 0.3–6.2)
ERYTHROCYTE [DISTWIDTH] IN BLOOD BY AUTOMATED COUNT: 14.5 % (ref 12.3–15.4)
FLUAV RNA RESP QL NAA+PROBE: NOT DETECTED
FLUBV RNA RESP QL NAA+PROBE: NOT DETECTED
GLOBULIN UR ELPH-MCNC: 5.2 GM/DL
GLUCOSE SERPL-MCNC: 81 MG/DL (ref 65–99)
HCT VFR BLD AUTO: 38.1 % (ref 37.5–51)
HGB BLD-MCNC: 11.9 G/DL (ref 13–17.7)
HOLD SPECIMEN: NORMAL
HOLD SPECIMEN: NORMAL
IMM GRANULOCYTES # BLD AUTO: 0.07 10*3/MM3 (ref 0–0.05)
IMM GRANULOCYTES NFR BLD AUTO: 0.6 % (ref 0–0.5)
LEUK/LYMPH PHENO: NORMAL
LIPASE SERPL-CCNC: 18 U/L (ref 13–60)
LYMPHOCYTES # BLD AUTO: 2.47 10*3/MM3 (ref 0.7–3.1)
LYMPHOCYTES NFR BLD AUTO: 21.6 % (ref 19.6–45.3)
MCH RBC QN AUTO: 24.6 PG (ref 26.6–33)
MCHC RBC AUTO-ENTMCNC: 31.2 G/DL (ref 31.5–35.7)
MCV RBC AUTO: 78.7 FL (ref 79–97)
MONOCYTES # BLD AUTO: 1.01 10*3/MM3 (ref 0.1–0.9)
MONOCYTES NFR BLD AUTO: 8.8 % (ref 5–12)
NEUTROPHILS NFR BLD AUTO: 66.3 % (ref 42.7–76)
NEUTROPHILS NFR BLD AUTO: 7.59 10*3/MM3 (ref 1.7–7)
NRBC BLD AUTO-RTO: 0 /100 WBC (ref 0–0.2)
PLATELET # BLD AUTO: 532 10*3/MM3 (ref 140–450)
PMV BLD AUTO: 9.6 FL (ref 6–12)
POTASSIUM SERPL-SCNC: 4.5 MMOL/L (ref 3.5–5.2)
PROT SERPL-MCNC: 8.4 G/DL (ref 6–8.5)
RBC # BLD AUTO: 4.84 10*6/MM3 (ref 4.14–5.8)
SARS-COV-2 RNA RESP QL NAA+PROBE: NOT DETECTED
SODIUM SERPL-SCNC: 135 MMOL/L (ref 136–145)
TROPONIN T SERPL-MCNC: <0.01 NG/ML (ref 0–0.03)
WBC NRBC COR # BLD: 11.46 10*3/MM3 (ref 3.4–10.8)
WHOLE BLOOD HOLD SPECIMEN: NORMAL
WHOLE BLOOD HOLD SPECIMEN: NORMAL

## 2022-05-06 PROCEDURE — G0378 HOSPITAL OBSERVATION PER HR: HCPCS

## 2022-05-06 PROCEDURE — 96365 THER/PROPH/DIAG IV INF INIT: CPT

## 2022-05-06 PROCEDURE — 93010 ELECTROCARDIOGRAM REPORT: CPT | Performed by: INTERNAL MEDICINE

## 2022-05-06 PROCEDURE — 96372 THER/PROPH/DIAG INJ SC/IM: CPT

## 2022-05-06 PROCEDURE — 74177 CT ABD & PELVIS W/CONTRAST: CPT

## 2022-05-06 PROCEDURE — 74177 CT ABD & PELVIS W/CONTRAST: CPT | Performed by: RADIOLOGY

## 2022-05-06 PROCEDURE — 99284 EMERGENCY DEPT VISIT MOD MDM: CPT

## 2022-05-06 PROCEDURE — 25010000002 HEPARIN (PORCINE) PER 1000 UNITS: Performed by: SURGERY

## 2022-05-06 PROCEDURE — 80053 COMPREHEN METABOLIC PANEL: CPT | Performed by: FAMILY MEDICINE

## 2022-05-06 PROCEDURE — 71045 X-RAY EXAM CHEST 1 VIEW: CPT

## 2022-05-06 PROCEDURE — 25010000002 PIPERACILLIN SOD-TAZOBACTAM PER 1 G: Performed by: SURGERY

## 2022-05-06 PROCEDURE — 82378 CARCINOEMBRYONIC ANTIGEN: CPT | Performed by: FAMILY MEDICINE

## 2022-05-06 PROCEDURE — 85025 COMPLETE CBC W/AUTO DIFF WBC: CPT | Performed by: FAMILY MEDICINE

## 2022-05-06 PROCEDURE — 83605 ASSAY OF LACTIC ACID: CPT | Performed by: FAMILY MEDICINE

## 2022-05-06 PROCEDURE — 86140 C-REACTIVE PROTEIN: CPT | Performed by: FAMILY MEDICINE

## 2022-05-06 PROCEDURE — 99219 PR INITIAL OBSERVATION CARE/DAY 50 MINUTES: CPT | Performed by: SURGERY

## 2022-05-06 PROCEDURE — C9803 HOPD COVID-19 SPEC COLLECT: HCPCS

## 2022-05-06 PROCEDURE — 87040 BLOOD CULTURE FOR BACTERIA: CPT | Performed by: FAMILY MEDICINE

## 2022-05-06 PROCEDURE — 84484 ASSAY OF TROPONIN QUANT: CPT | Performed by: FAMILY MEDICINE

## 2022-05-06 PROCEDURE — 71260 CT THORAX DX C+: CPT | Performed by: RADIOLOGY

## 2022-05-06 PROCEDURE — 25010000002 IOPAMIDOL 61 % SOLUTION: Performed by: NURSE PRACTITIONER

## 2022-05-06 PROCEDURE — 83690 ASSAY OF LIPASE: CPT | Performed by: FAMILY MEDICINE

## 2022-05-06 PROCEDURE — 93005 ELECTROCARDIOGRAM TRACING: CPT | Performed by: FAMILY MEDICINE

## 2022-05-06 PROCEDURE — 25010000002 PIPERACILLIN SOD-TAZOBACTAM PER 1 G: Performed by: FAMILY MEDICINE

## 2022-05-06 PROCEDURE — 96361 HYDRATE IV INFUSION ADD-ON: CPT

## 2022-05-06 PROCEDURE — 71045 X-RAY EXAM CHEST 1 VIEW: CPT | Performed by: RADIOLOGY

## 2022-05-06 PROCEDURE — 87636 SARSCOV2 & INF A&B AMP PRB: CPT | Performed by: FAMILY MEDICINE

## 2022-05-06 PROCEDURE — 71260 CT THORAX DX C+: CPT

## 2022-05-06 RX ORDER — FERROUS SULFATE 325(65) MG
325 TABLET ORAL
Status: CANCELLED | OUTPATIENT
Start: 2022-05-07

## 2022-05-06 RX ORDER — AMOXICILLIN 250 MG
2 CAPSULE ORAL 2 TIMES DAILY
Status: CANCELLED | OUTPATIENT
Start: 2022-05-06

## 2022-05-06 RX ORDER — SODIUM CHLORIDE 0.9 % (FLUSH) 0.9 %
10 SYRINGE (ML) INJECTION AS NEEDED
Status: DISCONTINUED | OUTPATIENT
Start: 2022-05-06 | End: 2022-05-07 | Stop reason: HOSPADM

## 2022-05-06 RX ORDER — NAPROXEN 250 MG/1
250 TABLET ORAL DAILY PRN
Status: CANCELLED | OUTPATIENT
Start: 2022-05-06

## 2022-05-06 RX ORDER — NAPROXEN SODIUM 220 MG
440 TABLET ORAL DAILY PRN
COMMUNITY
End: 2022-07-12 | Stop reason: HOSPADM

## 2022-05-06 RX ORDER — ONDANSETRON 2 MG/ML
4 INJECTION INTRAMUSCULAR; INTRAVENOUS EVERY 6 HOURS PRN
Status: DISCONTINUED | OUTPATIENT
Start: 2022-05-06 | End: 2022-05-07 | Stop reason: HOSPADM

## 2022-05-06 RX ORDER — CYCLOBENZAPRINE HCL 10 MG
5 TABLET ORAL 3 TIMES DAILY PRN
Status: CANCELLED | OUTPATIENT
Start: 2022-05-06

## 2022-05-06 RX ORDER — SODIUM CHLORIDE, SODIUM LACTATE, POTASSIUM CHLORIDE, CALCIUM CHLORIDE 600; 310; 30; 20 MG/100ML; MG/100ML; MG/100ML; MG/100ML
125 INJECTION, SOLUTION INTRAVENOUS CONTINUOUS
Status: DISCONTINUED | OUTPATIENT
Start: 2022-05-06 | End: 2022-05-07

## 2022-05-06 RX ORDER — NAPROXEN 250 MG/1
500 TABLET ORAL DAILY PRN
Status: CANCELLED | OUTPATIENT
Start: 2022-05-06

## 2022-05-06 RX ORDER — NICOTINE 21 MG/24HR
1 PATCH, TRANSDERMAL 24 HOURS TRANSDERMAL
Status: DISCONTINUED | OUTPATIENT
Start: 2022-05-06 | End: 2022-05-07 | Stop reason: HOSPADM

## 2022-05-06 RX ORDER — ACETAMINOPHEN 500 MG
1000 TABLET ORAL EVERY 6 HOURS
Status: DISCONTINUED | OUTPATIENT
Start: 2022-05-06 | End: 2022-05-07 | Stop reason: HOSPADM

## 2022-05-06 RX ORDER — FERROUS SULFATE 325(65) MG
325 TABLET ORAL
COMMUNITY
End: 2022-05-26

## 2022-05-06 RX ORDER — TRAMADOL HYDROCHLORIDE 50 MG/1
50 TABLET ORAL EVERY 6 HOURS PRN
Status: DISCONTINUED | OUTPATIENT
Start: 2022-05-06 | End: 2022-05-07 | Stop reason: HOSPADM

## 2022-05-06 RX ORDER — HEPARIN SODIUM 5000 [USP'U]/ML
5000 INJECTION, SOLUTION INTRAVENOUS; SUBCUTANEOUS EVERY 8 HOURS SCHEDULED
Status: DISCONTINUED | OUTPATIENT
Start: 2022-05-06 | End: 2022-05-07 | Stop reason: HOSPADM

## 2022-05-06 RX ORDER — LANOLIN ALCOHOL/MO/W.PET/CERES
1000 CREAM (GRAM) TOPICAL DAILY
Refills: 5 | Status: CANCELLED | OUTPATIENT
Start: 2022-05-06

## 2022-05-06 RX ORDER — LANOLIN ALCOHOL/MO/W.PET/CERES
1000 CREAM (GRAM) TOPICAL DAILY
Status: CANCELLED | OUTPATIENT
Start: 2022-05-06

## 2022-05-06 RX ORDER — CYCLOBENZAPRINE HCL 10 MG
5 TABLET ORAL 3 TIMES DAILY PRN
Status: DISCONTINUED | OUTPATIENT
Start: 2022-05-06 | End: 2022-05-07 | Stop reason: HOSPADM

## 2022-05-06 RX ADMIN — PIPERACILLIN SODIUM AND TAZOBACTAM SODIUM 3.38 G: 3; .375 INJECTION, POWDER, LYOPHILIZED, FOR SOLUTION INTRAVENOUS at 18:20

## 2022-05-06 RX ADMIN — HEPARIN SODIUM 5000 UNITS: 5000 INJECTION INTRAVENOUS; SUBCUTANEOUS at 14:57

## 2022-05-06 RX ADMIN — NICOTINE 1 PATCH: 14 PATCH, EXTENDED RELEASE TRANSDERMAL at 15:19

## 2022-05-06 RX ADMIN — IOPAMIDOL 88 ML: 612 INJECTION, SOLUTION INTRAVENOUS at 08:28

## 2022-05-06 RX ADMIN — ACETAMINOPHEN 1000 MG: 500 TABLET ORAL at 14:58

## 2022-05-06 RX ADMIN — ACETAMINOPHEN 1000 MG: 500 TABLET ORAL at 20:47

## 2022-05-06 RX ADMIN — PIPERACILLIN SODIUM AND TAZOBACTAM SODIUM 3.38 G: 3; .375 INJECTION, POWDER, LYOPHILIZED, FOR SOLUTION INTRAVENOUS at 14:55

## 2022-05-06 RX ADMIN — HEPARIN SODIUM 5000 UNITS: 5000 INJECTION INTRAVENOUS; SUBCUTANEOUS at 20:47

## 2022-05-06 RX ADMIN — SODIUM CHLORIDE, POTASSIUM CHLORIDE, SODIUM LACTATE AND CALCIUM CHLORIDE 125 ML/HR: 600; 310; 30; 20 INJECTION, SOLUTION INTRAVENOUS at 14:55

## 2022-05-06 RX ADMIN — SODIUM CHLORIDE, POTASSIUM CHLORIDE, SODIUM LACTATE AND CALCIUM CHLORIDE 1000 ML: 600; 310; 30; 20 INJECTION, SOLUTION INTRAVENOUS at 14:55

## 2022-05-06 NOTE — PLAN OF CARE
Goal Outcome Evaluation:  Plan of Care Reviewed With: patient        Progress: improving  Outcome Evaluation: recieved pt from ER. family at bedside. pt denies pain at this time. VSS. Will coninue with plan of care.

## 2022-05-06 NOTE — PROGRESS NOTES
Discussed with patient and his wife that he has a right nodule 9.9 mm and a left lung nodule that is 7 mm.  It is advised that he have additional imaging/PET scan.  Patient understands this scan has already been ordered.  He will be called for scheduling.

## 2022-05-06 NOTE — ED PROVIDER NOTES
Subjective   47-year-old male presents the emergency room with complaints of abnormal imaging.  Patient reports he has had abdominal pain for the past 2 weeks.  He states abdominal discomfort is diffuse.  He states it comes and goes.  He also reports decreased bowel sounds as well.  He denies nausea or vomiting.  Denies fever chills.  Denies abdominal injury.  Patient reports that he had a CT performed by outpatient imaging today per the request of his family doctor.  He states that he was called by his pain doctor and told to come emergency room for evaluation.  He denies chest pain shortness of breath.  He denies anticoagulation.  He does report that he last ate approximate couple hours ago.  He denies pain with urination.      Abdominal Pain  Pain location:  Generalized  Pain quality: aching    Pain radiates to:  Does not radiate  Pain severity:  Mild  Duration:  2 weeks  Timing:  Intermittent  Progression:  Waxing and waning  Chronicity:  New  Relieved by:  Nothing  Worsened by:  Nothing  Ineffective treatments:  None tried  Associated symptoms: constipation    Associated symptoms: no belching, no chest pain, no chills, no cough, no fever, no flatus, no nausea, no shortness of breath and no vomiting        Review of Systems   Constitutional: Negative for chills and fever.   Respiratory: Negative for cough and shortness of breath.    Cardiovascular: Negative for chest pain.   Gastrointestinal: Positive for abdominal pain and constipation. Negative for flatus, nausea and vomiting.   All other systems reviewed and are negative.      Past Medical History:   Diagnosis Date   • Low back pain        No Known Allergies    Past Surgical History:   Procedure Laterality Date   • CYST REMOVAL     • VASECTOMY         No family history on file.    Social History     Socioeconomic History   • Marital status: Unknown   Tobacco Use   • Smoking status: Current Every Day Smoker     Packs/day: 1.00     Years: 30.00     Pack years:  30.00   • Smokeless tobacco: Never Used   • Tobacco comment: Smoking cessation encouraged   Vaping Use   • Vaping Use: Never used   Substance and Sexual Activity   • Alcohol use: No   • Drug use: No   • Sexual activity: Defer           Objective   Physical Exam  Vitals and nursing note reviewed.   Constitutional:       Appearance: He is not ill-appearing or diaphoretic.   HENT:      Head: Normocephalic and atraumatic.      Comments: Moist mucous membranes.     Mouth/Throat:      Mouth: Mucous membranes are moist.   Cardiovascular:      Rate and Rhythm: Normal rate and regular rhythm.      Comments: No murmurs rubs or gallops.  2+ radial pulse bilaterally.  Pulmonary:      Effort: Pulmonary effort is normal.      Breath sounds: Normal breath sounds.      Comments: No rhonchi's rales or wheezes.  Abdominal:      Palpations: Abdomen is soft.      Tenderness: There is generalized abdominal tenderness. There is no right CVA tenderness, left CVA tenderness, guarding or rebound. Negative signs include McBurney's sign.   Skin:     General: Skin is warm and dry.   Neurological:      General: No focal deficit present.      Mental Status: He is alert.      Cranial Nerves: No cranial nerve deficit.   Psychiatric:         Mood and Affect: Mood normal.         Procedures           ED Course  ED Course as of 05/06/22 1502   Fri May 06, 2022   1404 Patient is noted to have recent CT imaging that shows concern for urgent infection sigmoid colon as well as possible small abscesses microperforations.  Patient to be started on IV antibiotics.  Will obtain labs.  Patient is hemodynamically stable. [BB]   1410 Patient's white blood cell count 11.46. [BB]   1414 Have spoken to Dr. Rudolph with general surgery who states that he wants a CRP as well as CEA added to patient's labs.  He states he will review imaging [BB]   1430 Lactic acid 1.3. [BB]   1436 Lipase unremarkable.  Sodium 135 creatinine 0.68 patient's LFTs outside of slightly  elevated alk phos is unremarkable.  CRP 6.72 troponin unremarkable. [BB]   1445 Dr. Kramer has presented to bedside and evaluated patient he states he will admit patient to the hospital and observe him overnight. [BB]   1501 EKG normal sinus rhythm rate 77 parable 128 QRS 74  normal axis no ST elevation. [BB]      ED Course User Index  [BB] Slim Thompson MD                                                 [facility]    Final diagnoses:   Abdominal pain, unspecified abdominal location   Colitis       ED Disposition  ED Disposition     ED Disposition   Decision to Admit    Condition   --    Comment   Level of Care: Med/Surg [1]   Diagnosis: Colitis [[id number]]   Admitting Physician: CALDERON KRAMER [[id number]]   Attending Physician: CALDERON KRAMER [[id number]]               No follow-up provider specified.       Medication List      No changes were made to your prescriptions during this visit.          Slim Thompson MD  05/06/22 1149       Slim Thompson MD  05/06/22 [id number]

## 2022-05-06 NOTE — PROGRESS NOTES
Discussed with patient and his wife that the CT of his pelvis shows a potential perforation and abscess.  He is advised to report to the emergency department due to his most recent labs with abnormal white count.  Patient verbalized understanding.

## 2022-05-06 NOTE — CONSULTS
Consulting physician: EDDIE Rodrigues    Referring physician: Dr. Rae    Date of consultation: 05/06/22     Chief complaint Contained perforation    Subjective     Patient is a 47 y.o. male who presented to the ED today per his PCP after a CT scan. He had a CT scan of his abdomen and pelvis that revealed thickening of his sigmoid colon, fluid collections (Abscess vs. Necrotic masses), and a contained perforation. He states that he has had abdominal pain times 2-3 weeks. Reports that his pain is on his right side. States that it eased off over the past several days. He does report radiation of pain to his groin, legs and back at times. He reports that he normally has daily bowel movements, however, has recently been constipated. Last BM was 3 days ago, states that it was loose. Rates pain a 4-5/10 currently. Denies any vomiting, does complain of nausea at times, as well as belching. He too complains of urinary hesitancy. His wife also reports that he has lost 12 lbs over the course of the last several weeks.       Review of Systems  Review of Systems - General ROS: positive for - weight loss  Psychological ROS: negative for - behavioral disorder   Ophthalmic ROS: negative for - dry eyes  ENT ROS: negative for - vertigo or vocal changes  Hematological and Lymphatic ROS: negative for - swollen lymph nodes, DVT, PE.   Respiratory ROS: negative for - sputum changes or stridor.  Cardiovascular ROS: negative for - irregular heartbeat or murmur  Gastrointestinal ROS: negative for - blood in stools or change in stools. Positive for abdominal pain and nausea.  Genitourinary ROS: negative for - hematuria or incontinence. Positive for hesitancy.   Musculoskeletal ROS: negative for - gait disturbance      History  Past Medical History:   Diagnosis Date   • Low back pain      Past Surgical History:   Procedure Laterality Date   • CYST REMOVAL     • VASECTOMY       No family history on file.  Social History     Tobacco Use    • Smoking status: Current Every Day Smoker     Packs/day: 1.00     Years: 30.00     Pack years: 30.00   • Smokeless tobacco: Never Used   • Tobacco comment: Smoking cessation encouraged   Vaping Use   • Vaping Use: Never used   Substance Use Topics   • Alcohol use: No   • Drug use: No     (Not in a hospital admission)    Allergies:  Patient has no known allergies.    Objective     Vital Signs  Temp:  [97.7 °F (36.5 °C)] 97.7 °F (36.5 °C)  Heart Rate:  [83] 83  Resp:  [18] 18  BP: (129)/(85) 129/85    Physical Exam:  General:  This is a WD WN thin in no acute distress  Vital signs: Stable, afebrile  HEENT exam:  WNL. Sclerae are anicteric.  EOMI  Neck:  Supple, FROM.  No JVD.  Trachea midline  Lungs:  Respiratory effort normal. Auscultation: Clear, without wheezes, rhonchi, rales  Heart:  Regular rate and rhythm, without murmur, gallop, rub.  No pedal edema  Abdomen: soft, no guarding. Patient complains of mild tenderness upon palpation. Abdomen is nondistended.  Musculoskeletal:  Muscle strength/tone is normal.    Psych:  Alert, oriented x 3.  Mood and affect are appropriate  Skin:  Warm with good turgor.  Without rash or lesion  Extremities:  Examination of the extremities revealed no cyanosis, clubbing or edema.    Results Review:   Results from last 7 days   Lab Units 05/06/22  1359   TROPONIN T ng/mL <0.010     Results from last 7 days   Lab Units 05/06/22  1359 05/04/22  1359   CRP mg/dL 6.72* 11.50*   LACTATE mmol/L 1.3  --    WBC 10*3/mm3 11.46* 11.27*   HEMOGLOBIN g/dL 11.9* 12.9*   HEMATOCRIT % 38.1 41.6   PLATELETS 10*3/mm3 532* 559*         Results from last 7 days   Lab Units 05/06/22  1359 05/04/22  1359   SODIUM mmol/L 135* 132*   POTASSIUM mmol/L 4.5 4.1   CHLORIDE mmol/L 101 97*   CO2 mmol/L 24.9 23.4   BUN mg/dL 11 9   CREATININE mg/dL 0.68* 0.79   CALCIUM mg/dL 9.1 9.3   GLUCOSE mg/dL 81 84   ALBUMIN g/dL 3.20* 3.56   BILIRUBIN mg/dL 0.3 0.5   ALK PHOS U/L 145* 176*   AST (SGOT) U/L 17 22   ALT  (SGPT) U/L 20 28   Estimated Creatinine Clearance: 147.9 mL/min (A) (by C-G formula based on SCr of 0.68 mg/dL (L)).  No results found for: AMMONIA      No results found for: BLOODCX  No results found for: URINECX  No results found for: WOUNDCX  No results found for: STOOLCX    Imaging:  Imaging Results (Last 24 Hours)     ** No results found for the last 24 hours. **          Reveiwed      Impression:     47 year old male smoker with colitis of unknown etiology with contained perforation, hemodynamically stable         Patient Active Problem List   Diagnosis Code   • Testosterone deficiency E34.9   • Other male erectile dysfunction N52.8   • Primary osteoarthritis involving multiple joints M15.9   • Vitamin D deficiency E55.9       Plan:  Colitis with contained Perforation  -Differential includes malignancy, diverticulitis and inflammatory colitis  -Observe overnight on IV antibiotics. If remains stable, transition to oral antibiotics upon discharge.  -Colonoscopy in 4-6 weeks    Tobacco abuse  -Encouraged cessation of smoking and educated on the importance due to nicotine increasing risk of perioperative complications  -Nicotine patches as needed    Unintentional weight loss, hypoalbuminemia  -TID ensure  -Nutrition consult    Chronic anemia  -No signs of acute bleeding    Disposition  -Admit to general surgery for IV antibiotics and monitoring. Potential discharge tomorrow.      Discussion:  Discussed with patient and family and Dr. Ronni Modi, APRN  05/06/22  14:48 EDT    Time: Time spent:    Please note that portions of this note were completed with a voice recognition program.

## 2022-05-07 ENCOUNTER — READMISSION MANAGEMENT (OUTPATIENT)
Dept: CALL CENTER | Facility: HOSPITAL | Age: 47
End: 2022-05-07

## 2022-05-07 VITALS
SYSTOLIC BLOOD PRESSURE: 124 MMHG | RESPIRATION RATE: 16 BRPM | HEIGHT: 74 IN | OXYGEN SATURATION: 99 % | DIASTOLIC BLOOD PRESSURE: 85 MMHG | TEMPERATURE: 98.4 F | BODY MASS INDEX: 22.2 KG/M2 | HEART RATE: 77 BPM | WEIGHT: 173 LBS

## 2022-05-07 LAB
CEA SERPL-MCNC: 0.68 NG/ML
CRP SERPL-MCNC: 5.15 MG/DL (ref 0–0.5)
DEPRECATED RDW RBC AUTO: 41.1 FL (ref 37–54)
ERYTHROCYTE [DISTWIDTH] IN BLOOD BY AUTOMATED COUNT: 14.4 % (ref 12.3–15.4)
HCT VFR BLD AUTO: 35.4 % (ref 37.5–51)
HGB BLD-MCNC: 11.1 G/DL (ref 13–17.7)
MCH RBC QN AUTO: 24.7 PG (ref 26.6–33)
MCHC RBC AUTO-ENTMCNC: 31.4 G/DL (ref 31.5–35.7)
MCV RBC AUTO: 78.7 FL (ref 79–97)
PLATELET # BLD AUTO: 430 10*3/MM3 (ref 140–450)
PMV BLD AUTO: 9.9 FL (ref 6–12)
QT INTERVAL: 388 MS
QTC INTERVAL: 439 MS
RBC # BLD AUTO: 4.5 10*6/MM3 (ref 4.14–5.8)
WBC NRBC COR # BLD: 12.31 10*3/MM3 (ref 3.4–10.8)

## 2022-05-07 PROCEDURE — 96361 HYDRATE IV INFUSION ADD-ON: CPT

## 2022-05-07 PROCEDURE — 96372 THER/PROPH/DIAG INJ SC/IM: CPT

## 2022-05-07 PROCEDURE — 86140 C-REACTIVE PROTEIN: CPT | Performed by: SURGERY

## 2022-05-07 PROCEDURE — 25010000002 PIPERACILLIN SOD-TAZOBACTAM PER 1 G: Performed by: SURGERY

## 2022-05-07 PROCEDURE — 96366 THER/PROPH/DIAG IV INF ADDON: CPT

## 2022-05-07 PROCEDURE — G0378 HOSPITAL OBSERVATION PER HR: HCPCS

## 2022-05-07 PROCEDURE — 25010000002 HEPARIN (PORCINE) PER 1000 UNITS: Performed by: SURGERY

## 2022-05-07 PROCEDURE — 85027 COMPLETE CBC AUTOMATED: CPT | Performed by: SURGERY

## 2022-05-07 PROCEDURE — 99217 PR OBSERVATION CARE DISCHARGE MANAGEMENT: CPT | Performed by: SURGERY

## 2022-05-07 RX ORDER — TRAMADOL HYDROCHLORIDE 50 MG/1
50 TABLET ORAL EVERY 6 HOURS PRN
Qty: 12 TABLET | Refills: 0 | Status: SHIPPED | OUTPATIENT
Start: 2022-05-07 | End: 2022-05-13

## 2022-05-07 RX ORDER — ACETAMINOPHEN 500 MG
1000 TABLET ORAL EVERY 6 HOURS
Qty: 40 TABLET | Refills: 0 | Status: ON HOLD | OUTPATIENT
Start: 2022-05-07 | End: 2022-07-12

## 2022-05-07 RX ORDER — NICOTINE 21 MG/24HR
1 PATCH, TRANSDERMAL 24 HOURS TRANSDERMAL
Qty: 7 PATCH | Refills: 0 | Status: ON HOLD | OUTPATIENT
Start: 2022-05-08 | End: 2022-09-06

## 2022-05-07 RX ORDER — AMOXICILLIN AND CLAVULANATE POTASSIUM 875; 125 MG/1; MG/1
1 TABLET, FILM COATED ORAL 2 TIMES DAILY
Qty: 28 TABLET | Refills: 0 | Status: SHIPPED | OUTPATIENT
Start: 2022-05-07 | End: 2022-05-26

## 2022-05-07 RX ADMIN — ACETAMINOPHEN 1000 MG: 500 TABLET ORAL at 08:27

## 2022-05-07 RX ADMIN — PIPERACILLIN SODIUM AND TAZOBACTAM SODIUM 3.38 G: 3; .375 INJECTION, POWDER, LYOPHILIZED, FOR SOLUTION INTRAVENOUS at 06:20

## 2022-05-07 RX ADMIN — PIPERACILLIN SODIUM AND TAZOBACTAM SODIUM 3.38 G: 3; .375 INJECTION, POWDER, LYOPHILIZED, FOR SOLUTION INTRAVENOUS at 01:14

## 2022-05-07 RX ADMIN — HEPARIN SODIUM 5000 UNITS: 5000 INJECTION INTRAVENOUS; SUBCUTANEOUS at 06:20

## 2022-05-07 RX ADMIN — SODIUM CHLORIDE, POTASSIUM CHLORIDE, SODIUM LACTATE AND CALCIUM CHLORIDE 125 ML/HR: 600; 310; 30; 20 INJECTION, SOLUTION INTRAVENOUS at 01:14

## 2022-05-07 NOTE — PLAN OF CARE
Goal Outcome Evaluation:  Plan of Care Reviewed With: patient        Progress: improving     Pt denies pain, but took his scheduled tylenol. Fluids and ABX running. No acute changes. Will continue to monitor.

## 2022-05-07 NOTE — OUTREACH NOTE
Prep Survey    Flowsheet Row Responses   [facility] facility patient discharged from? [place]   Is LACE score < 7 ? Yes   Emergency Room discharge w/ pulse ox? No   Eligibility [facility]   Hospital [place]   Date of Admission 05/06/22   Date of Discharge 05/07/22   Discharge Disposition Home or Self Care   Discharge diagnosis Coloitis   Does the patient have one of the following disease processes/diagnoses(primary or secondary)? Other   Does the patient have Home health ordered? No   Is there a DME ordered? No   Prep survey completed? Yes          RIC OSORIO - Registered Nurse

## 2022-05-07 NOTE — DISCHARGE SUMMARY
Discharge Summary    Patient Name:  Scott Montes  YOB: 1975  [id number]      DATE OF ADMISSION: 2022    DATE OF DISCHARGE: 2022       FINAL DIAGNOSES:   Patient Active Problem List   Diagnosis   • Testosterone deficiency   • Other male erectile dysfunction   • Primary osteoarthritis involving multiple joints   • Vitamin D deficiency   • Colitis       CONSULTING SERVICES: None      PROCEDURES PERFORMED:   1.  None    HISTORY: The patient is a 47-year-old male smoker who was incidentally found to have colitis with a contained perforation on imaging as part of outpatient work-up for 2 to 3 weeks of abdominal pain and unintentional weight loss.  He had never had a colonoscopy.  Mother  of a colonic perforation.     HOSPITAL COURSE: Patient appeared very comfortable in the emergency room with stable vital signs.  He had a benign abdominal exam.  He was admitted for observation on IV antibiotics.  The following morning, he was feeling very well.  Abdominal exam was unchanged.  Lab work stable.  He was discharged in stable condition with instructions to follow-up in several weeks so that we may discuss colonoscopy     CONDITION: At the time of discharge, the patient is tolerating a clear liquid diet without difficulty.     DISCHARGE MEDICATIONS:   1. All previous home medications.   2. Tramadol 50 mg PO q4h PRN severe pain  3.  I would suggest MiraLAX for a daily bowel regimen  4.  Acetaminophen 1000 mg q6h a for baseline pain control  5.  14-day course of Augmentin     DISCHARGE INSTRUCTIONS:  1. The patient is instructed to follow up with Dr. Rudolph in roughly 3 weeks.  2. If the patient has worsening problems with fever >101.5, nausea or vomiting affecting oral intake or causing dehydration, he is to contact Dr. Rudolph's office         Gonzalo Rudolph MD  2022  09:51 EDT

## 2022-05-07 NOTE — H&P
[facility] General Surgery  History & Physical    Patient Identification:  Name:  Scott Montes  Age:  47 y.o.  Sex:  male  :  1975  MRN:  [id number]   Visit Number:  [id number]  Admit Date: 2022   Primary Care Physician:  Asuncion Keyes APRN    Subjective     Chief complaint abdominal pain, abnormal imaging    Subjective     Patient is a 47 y.o. male who presented to the ED today per his PCP after a CT scan. He had a CT scan of his abdomen and pelvis that revealed thickening of his sigmoid colon, fluid collections (Abscess vs. Necrotic masses), and a contained perforation. He states that he has had abdominal pain times 2-3 weeks. Reports that his pain is on his right side. States that it eased off over the past several days. He does report radiation of pain to his groin, legs and back at times. He reports that he normally has daily bowel movements, however, has recently been constipated. Last BM was 3 days ago, states that it was loose. Rates pain a 4-5/10 currently. Denies any vomiting, does complain of nausea at times, as well as belching. He too complains of urinary hesitancy. His wife also reports that he has lost 12 lbs over the course of the last several weeks.         ---------------------------------------------------------------------------------------------------------------------   Review of Systems  General ROS: positive for - weight loss  Psychological ROS: negative for - behavioral disorder   Ophthalmic ROS: negative for - dry eyes  ENT ROS: negative for - vertigo or vocal changes  Hematological and Lymphatic ROS: negative for - swollen lymph nodes, DVT, PE.   Respiratory ROS: negative for - sputum changes or stridor.  Cardiovascular ROS: negative for - irregular heartbeat or murmur  Gastrointestinal ROS: negative for - blood in stools or change in stools. Positive for abdominal pain and  nausea.    ---------------------------------------------------------------------------------------------------------------------   History  Past Medical History:   Diagnosis Date   • Low back pain      Past Surgical History:   Procedure Laterality Date   • CYST REMOVAL     • VASECTOMY       History reviewed. No pertinent family history.  Social History     Tobacco Use   • Smoking status: Current Every Day Smoker     Packs/day: 1.00     Years: 30.00     Pack years: 30.00   • Smokeless tobacco: Never Used   • Tobacco comment: Smoking cessation encouraged   Vaping Use   • Vaping Use: Never used   Substance Use Topics   • Alcohol use: No   • Drug use: No     Medications Prior to Admission   Medication Sig Dispense Refill Last Dose   • Cyanocobalamin (Vitamin B12) 1000 MCG tablet controlled-release Take 1 tablet by mouth Daily. 30 tablet 5 5/5/2022 at Unknown time   • cyclobenzaprine (FLEXERIL) 5 MG tablet Take 1 tablet by mouth 3 (Three) Times a Day As Needed for Muscle Spasms. 90 tablet 5 5/6/2022 at Unknown time   • ferrous sulfate 325 (65 FE) MG tablet Take 325 mg by mouth Daily With Breakfast.   5/6/2022 at Unknown time   • naproxen sodium (ALEVE) 220 MG tablet Take 440 mg by mouth Daily As Needed.   5/6/2022 at Unknown time   • sennosides-docusate (senna-docusate sodium) 8.6-50 MG per tablet Take 2 tablets by mouth 2 (Two) Times a Day. 120 tablet 1 5/6/2022 at Unknown time   • vitamin D (ERGOCALCIFEROL) 1.25 MG (58757 UT) capsule capsule Take 1 capsule by mouth 1 (One) Time Per Week. (Patient taking differently: Take 50,000 Units by mouth 1 (One) Time Per Week. Prior to [facility] Admission, Patient was on:  patient takes on Fridays) 4 capsule 5 5/6/2022 at Unknown time   • tadalafil (CIALIS) 10 MG tablet Take 1 tablet by mouth Daily As Needed for Erectile Dysfunction. 20 tablet 5      Allergies:  Patient has no known allergies.    Objective     Objective     Vital Signs:  Temp:  [97.7 °F (36.5 °C)-98.1 °F (36.7 °C)]  98.1 °F (36.7 °C)  Heart Rate:  [73-83] 81  Resp:  [18-20] 20  BP: (117-135)/(76-98) 132/76      05/06/22  1345 05/06/22  1625   Weight: 78.7 kg (173 lb 6.4 oz) 78.5 kg (173 lb)     Body mass index is 22.21 kg/m².  ---------------------------------------------------------------------------------------------------------------------       Physical Exam  General:  This is a WD WN thin in no acute distress  Vital signs: Stable, afebrile  HEENT exam:  WNL. Sclerae are anicteric.  EOMI  Neck:  Supple, FROM.  No JVD.  Trachea midline  Lungs:  Respiratory effort normal. Auscultation: Clear, without wheezes, rhonchi, rales  Heart:  Regular rate and rhythm, without murmur, gallop, rub.  No pedal edema  Abdomen: soft, no guarding. Patient complains of mild tenderness upon palpation. Abdomen is nondistended.  Musculoskeletal:  Muscle strength/tone is normal.    Psych:  Alert, oriented x 3.  Mood and affect are appropriate  Skin:  Warm with good turgor.  Without rash or lesion  Extremities:  Examination of the extremities revealed no cyanosis, clubbing or edema.    ---------------------------------------------------------------------------------------------------------------------   Results Review:   Results from last 7 days   Lab Units 05/06/22  1359   TROPONIN T ng/mL <0.010     Results from last 7 days   Lab Units 05/06/22  1359 05/04/22  1359   CRP mg/dL 6.72* 11.50*   LACTATE mmol/L 1.3  --    WBC 10*3/mm3 11.46* 11.27*   HEMOGLOBIN g/dL 11.9* 12.9*   HEMATOCRIT % 38.1 41.6   PLATELETS 10*3/mm3 532* 559*         Results from last 7 days   Lab Units 05/06/22  1359 05/04/22  1359   SODIUM mmol/L 135* 132*   POTASSIUM mmol/L 4.5 4.1   CHLORIDE mmol/L 101 97*   CO2 mmol/L 24.9 23.4   BUN mg/dL 11 9   CREATININE mg/dL 0.68* 0.79   CALCIUM mg/dL 9.1 9.3   GLUCOSE mg/dL 81 84   ALBUMIN g/dL 3.20* 3.56   BILIRUBIN mg/dL 0.3 0.5   ALK PHOS U/L 145* 176*   AST (SGOT) U/L 17 22   ALT (SGPT) U/L 20 28   Estimated Creatinine Clearance:  147.5 mL/min (A) (by C-G formula based on SCr of 0.68 mg/dL (L)).  No results found for: AMMONIA      No results found for: BLOODCX  No results found for: URINECX  No results found for: WOUNDCX  No results found for: STOOLCX  ---------------------------------------------------------------------------------------------------------------------  Imaging:  Imaging Results (Last 24 Hours)     Procedure Component Value Units Date/Time    XR Chest AP [[id number]] Collected: 05/06/22 1612     Updated: 05/06/22 1633    Narrative:      EXAM:    XR Chest, 1 View     EXAM DATE:    5/6/2022 2:53 PM     CLINICAL HISTORY:    abdominal pain; R10.9-Unspecified abdominal pain; K52.9-Noninfective  gastroenteritis and colitis, unspecified     TECHNIQUE:    Frontal view of the chest.     COMPARISON:    No relevant prior studies available.     FINDINGS:    Lungs:  Unremarkable.  No consolidation.    Pleural space:  Unremarkable.  No pneumothorax.    Heart:  Unremarkable.  No cardiomegaly.    Mediastinum:  Unremarkable.    Bones/joints:  Unremarkable.       Impression:        Unremarkable exam. No acute cardiopulmonary findings identified.     This report was finalized on 5/6/2022 4:12 PM by Dr. Tarun Chavira MD.         Results for orders placed during the hospital encounter of 05/06/22    CT Abdomen Pelvis With Contrast    Narrative  EXAM: CT ABDOMEN PELVIS W CONTRAST-      TECHNIQUE: Multiple axial CT images were obtained from lung bases  through pubic symphysis WITH administration of IV contrast. Reformatted  images in the coronal and/or sagittal plane(s) were generated from the  axial data set to facilitate diagnostic accuracy and/or surgical  planning.  Oral Contrast:NONE.    Radiation dose reduction techniques were utilized per ALARA protocol.  Automated exposure control was initiated through either or [employer] or  [employer] software packages by  protocol.  DOSE: 701.31 mGy.cm    Clinical information elevated WBC,  elevated Alk Phos; D72.828-Other  elevated white blood cell count; R74.8-Abnormal levels of other serum  enzymes; R79.89-Other specified abnormal findings of blood chemistry    Comparison None available.    FINDINGS:    Lower thorax: Please see the report for the CT scan of the chest.    Abdomen:    Liver: Homogeneous. No focal hepatic mass or ductal dilatation.    Gallbladder: Cholelithiasis    Pancreas: Unremarkable. No mass or ductal dilatation.    Spleen: Homogeneous. No splenomegaly.    Adrenals: No mass.    Kidneys/ureters: No mass. No obstructive uropathy.  No evidence of  urolithiasis.    GI tract: Extensive thickening in the sigmoid colon wall. There is  adjacent stranding and a small 1.7 cm fluid collection as well as what  appears to be a contained focus of extraluminal air.. Possibilities  consider extensive inflammation with resulting tiny contained  perforation and abscess. Cannot exclude a mass, however.    MESENTERY: Stranding in the midline of the pelvis as above.    Trace free fluid is seen    Vasculature: Evidence of atherosclerotic vascular disease    Abdominal wall: No focal hernia or mass.      Bladder: Urinary bladder wall thickening    Reproductive: Unremarkable as visualized    Bones: No acute bony abnormality.    Impression  1. Extensive abnormal thickening in the sigmoid colon. Small adjacent  fluid collections concerning for small adjacent abscesses versus less  likely necrotic masses. Contained perforation is noted as well. Trace  free fluid in the pelvis. I favor this representing inflammatory change  with resulting small adjacent abscesses. However, direct visualization  is suggested after resolution of the acute inflammatory event to exclude  any underlying mass.  2.Thickening of the urinary bladder wall.  3. Cholelithiasis.    This report was finalized on 5/6/2022 9:23 AM by Dr. Miller Guzman MD.    I have personally reviewed the above radiology images and read the final radiology  report on 05/06/22  ---------------------------------------------------------------------------------------------------------------------  Assessment / Plan     Impression:  47 year old male smoker with colitis of unknown etiology with contained perforation, hemodynamically stable   Patient Active Problem List   Diagnosis Code   • Testosterone deficiency E34.9   • Other male erectile dysfunction N52.8   • Primary osteoarthritis involving multiple joints M15.9   • Vitamin D deficiency E55.9   • Colitis K52.9       Plan:  Colitis with contained Perforation  -Differential includes malignancy, diverticulitis and inflammatory colitis  -Observe overnight on IV antibiotics. If remains stable, transition to oral antibiotics upon discharge.  -Colonoscopy in 4-6 weeks    Tobacco abuse  -Encouraged cessation of smoking and educated on the importance due to nicotine increasing risk of perioperative complications  -Nicotine patches as needed    Unintentional weight loss, hypoalbuminemia  -TID ensure  -Nutrition consult    Chronic anemia  -No signs of acute bleeding    Disposition  -Admit to general surgery for IV antibiotics and monitoring. Potential discharge tomorrow.      Discussion:  Discussed with patient, family and Dr. Ronni Modi, APRN  05/06/22  20:16 EDT  ---------------------------------------------------------------------------------------------------------------------     Please note that portions of this note were completed with a voice recognition program.

## 2022-05-09 ENCOUNTER — TRANSITIONAL CARE MANAGEMENT TELEPHONE ENCOUNTER (OUTPATIENT)
Dept: CALL CENTER | Facility: HOSPITAL | Age: 47
End: 2022-05-09

## 2022-05-09 NOTE — OUTREACH NOTE
Call Center TCM Note    Flowsheet Row Responses   [facility] patient discharged from? [facility]   Does the patient have one of the following disease processes/diagnoses(primary or secondary)? Other   TCM attempt successful? Yes  [no verbal release]   Call start time 1431   Call end time 1432   Discharge diagnosis Coloitis   Meds reviewed with patient/caregiver? Yes   Is the patient having any side effects they believe may be caused by any medication additions or changes? No   Does the patient have all medications ordered at discharge? Yes   Is the patient taking all medications as directed (includes completed medication regime)? Yes   Does the patient have a primary care provider?  Yes   Does the patient have an appointment with their PCP within 7 days of discharge? Greater than 7 days   Comments regarding PCP PCP appt on 5/16/22 @ 4pm. Not listed as hosp dc fu and states telephone visit. Please ensure appt meets TCM guidelines as Pt has DC from hosp. (route to office)   What is preventing the patient from scheduling follow up appointments within 7 days of discharge? Earlier appointment not available   Nursing Interventions Verified appointment date/time/provider   Has home health visited the patient within 72 hours of discharge? N/A   Psychosocial issues? No   Did the patient receive a copy of their discharge instructions? Yes   Nursing interventions Reviewed instructions with patient   What is the patient's perception of their health status since discharge? Improving   Is the patient/caregiver able to teach back signs and symptoms related to disease process for when to call PCP? Yes   Is the patient/caregiver able to teach back signs and symptoms related to disease process for when to call 911? Yes   Is the patient/caregiver able to teach back the hierarchy of who to call/visit for symptoms/problems? PCP, Specialist, Home health nurse, Urgent Care, ED, 911 Yes   If the patient is a current smoker, are they able  to teach back resources for cessation? Smoking cessation medications   TCM call completed? Yes   Wrap up additional comments Pt reports he is improving.           Clementina Gamez RN    5/9/2022, 14:33 EDT

## 2022-05-11 ENCOUNTER — HOSPITAL ENCOUNTER (OUTPATIENT)
Dept: PET IMAGING | Facility: HOSPITAL | Age: 47
Discharge: HOME | End: 2022-05-11
Admitting: NURSE PRACTITIONER

## 2022-05-11 LAB
BACTERIA SPEC AEROBE CULT: NORMAL
BACTERIA SPEC AEROBE CULT: NORMAL
CITATION REF LAB TEST: NORMAL
CITATION REF LAB TEST: NORMAL
INTERPRETATION: NEGATIVE
JAK2 GENE MUT ANL BLD/T: NORMAL
JAK2 P.V617F BLD/T QL: NORMAL
LAB DIRECTOR NAME PROVIDER: NORMAL
LABORATORY COMMENT REPORT: NORMAL
REF LAB TEST METHOD: NORMAL
T(ABL1,BCR)B2A2/CONTROL BLD/T: NORMAL %
T(ABL1,BCR)B3A2/CONTROL BLD/T: NORMAL %
T(ABL1,BCR)E1A2/CONTROL BLD/T: NORMAL %

## 2022-05-11 PROCEDURE — 0 FLUDEOXYGLUCOSE F18 SOLUTION: Performed by: NURSE PRACTITIONER

## 2022-05-11 PROCEDURE — 78815 PET IMAGE W/CT SKULL-THIGH: CPT

## 2022-05-11 PROCEDURE — A9552 F18 FDG: HCPCS | Performed by: NURSE PRACTITIONER

## 2022-05-11 PROCEDURE — 78815 PET IMAGE W/CT SKULL-THIGH: CPT | Performed by: RADIOLOGY

## 2022-05-11 RX ADMIN — FLUDEOXYGLUCOSE F18 1 DOSE: 300 INJECTION INTRAVENOUS at 10:29

## 2022-05-12 NOTE — PROGRESS NOTES
Ok. At least the lung nodules aren't concerning. Doesn't make a difference regarding the colon since it can't differentiate between cancer and infection

## 2022-05-18 ENCOUNTER — APPOINTMENT (OUTPATIENT)
Dept: CT IMAGING | Facility: HOSPITAL | Age: 47
End: 2022-05-18

## 2022-05-26 ENCOUNTER — OFFICE VISIT (OUTPATIENT)
Dept: SURGERY | Facility: CLINIC | Age: 47
End: 2022-05-26

## 2022-05-26 VITALS
BODY MASS INDEX: 22.89 KG/M2 | HEIGHT: 74 IN | HEART RATE: 97 BPM | SYSTOLIC BLOOD PRESSURE: 131 MMHG | DIASTOLIC BLOOD PRESSURE: 87 MMHG | WEIGHT: 178.4 LBS

## 2022-05-26 DIAGNOSIS — K52.9 COLITIS: Primary | ICD-10-CM

## 2022-05-26 PROCEDURE — 99214 OFFICE O/P EST MOD 30 MIN: CPT | Performed by: SURGERY

## 2022-05-26 RX ORDER — NICOTINE 21 MG/24HR
1 PATCH, TRANSDERMAL 24 HOURS TRANSDERMAL EVERY 24 HOURS
Qty: 7 PATCH | Refills: 0 | Status: ON HOLD | OUTPATIENT
Start: 2022-05-26 | End: 2022-09-06

## 2022-05-26 RX ORDER — CEFAZOLIN SODIUM 2 G/50ML
2 SOLUTION INTRAVENOUS ONCE
Status: CANCELLED | OUTPATIENT
Start: 2022-05-26 | End: 2022-05-26

## 2022-05-26 RX ORDER — POLYETHYLENE GLYCOL 3350 17 G/17G
17 POWDER, FOR SOLUTION ORAL DAILY
Qty: 30 EACH | Refills: 0 | Status: ON HOLD | OUTPATIENT
Start: 2022-05-26 | End: 2022-09-06

## 2022-05-26 NOTE — PROGRESS NOTES
Patient Name:  Scott Montes  YOB: 1975  [id number]    Surgery Progress Note    Date of visit: 2022    Subjective   Subjective:   I first saw Mr. Montes on  with 2 to 3 weeks of abdominal discomfort associate with obstipation and unintentional weight loss.  He has never had a colonoscopy and his mother  of a colon perforation of unknown etiology.  He is a heavy smoker.  In the emergency department, his vital signs were stable, he appeared comfortable and his abdominal exam was benign.  I had recommended overnight observation on IV antibiotics with discharge on an oral regimen   He was observed overnight on IV antibiotics and discharged the following day on Augmentin.    He has completed antibiotics and is feeling better.  He is eating more and taking in boost supplements.  He is trying to quit smoking along with his significant other.  He did have some constipation that he attributes to iron supplement so he stopped taking his iron.    Past Medical History:   Diagnosis Date   • Low back pain           Surgical History         Past Surgical History:   Procedure Laterality Date   • CYST REMOVAL       • VASECTOMY             History reviewed. No pertinent family history.  Social History            Tobacco Use   • Smoking status: Current Every Day Smoker       Packs/day: 1.00       Years: 30.00       Pack years: 30.00   • Smokeless tobacco: Never Used   • Tobacco comment: Smoking cessation encouraged   Vaping Use   • Vaping Use: Never used   Substance Use Topics   • Alcohol use: No   • Drug use: No      Prescriptions Prior to Admission           Medications Prior to Admission   Medication Sig Dispense Refill Last Dose   • Cyanocobalamin (Vitamin B12) 1000 MCG tablet controlled-release Take 1 tablet by mouth Daily. 30 tablet 5 2022 at Unknown time   • cyclobenzaprine (FLEXERIL) 5 MG tablet Take 1 tablet by mouth 3 (Three) Times a Day As Needed for Muscle Spasms. 90 tablet 5 2022 at  "Unknown time   • ferrous sulfate 325 (65 FE) MG tablet Take 325 mg by mouth Daily With Breakfast.     5/6/2022 at Unknown time   • naproxen sodium (ALEVE) 220 MG tablet Take 440 mg by mouth Daily As Needed.     5/6/2022 at Unknown time   • sennosides-docusate (senna-docusate sodium) 8.6-50 MG per tablet Take 2 tablets by mouth 2 (Two) Times a Day. 120 tablet 1 5/6/2022 at Unknown time   • vitamin D (ERGOCALCIFEROL) 1.25 MG (75278 UT) capsule capsule Take 1 capsule by mouth 1 (One) Time Per Week. (Patient taking differently: Take 50,000 Units by mouth 1 (One) Time Per Week. Prior to [facility] Admission, Patient was on:  patient takes on Fridays) 4 capsule 5 5/6/2022 at Unknown time   • tadalafil (CIALIS) 10 MG tablet Take 1 tablet by mouth Daily As Needed for Erectile Dysfunction. 20 tablet 5           Allergies:  Patient has no known allergies.          Objective     Objective:     /87   Pulse 97   Ht 188 cm (74\")   Wt 80.9 kg (178 lb 6.4 oz)   BMI 22.91 kg/m²       Constitution: No acute distress  CV:  Rhythm regular and rate regular   L:  Symmetric chest expansion. No respiratory distress  Abd:   soft, nondistended, nontender   Ext:  No cyanosis, clubbing, edema           Assessment & Plan     Assessment/ Plan:    47-year-old male smoker with recent sigmoid colitis versus malignancy with contained perforation    - He has completed oral antibiotics and feels better  - He is working on his nutrition with dietary supplementation  - He is trying to quit smoking.  I have prescribed him the 21 mg nicotine patches since he does not think the 14 mg patches are enough  - I prescribed him some MiraLAX for daily bowel regimen that is gentle  - I will perform a colonoscopy and excision of right neck mass in mid June        Gonzalo Rudolph MD  [facility]  5/26/2022  16:19 EDT    "

## 2022-06-01 ENCOUNTER — OFFICE VISIT (OUTPATIENT)
Dept: ONCOLOGY | Facility: CLINIC | Age: 47
End: 2022-06-01

## 2022-06-01 ENCOUNTER — TELEPHONE (OUTPATIENT)
Dept: ONCOLOGY | Facility: OTHER | Age: 47
End: 2022-06-01

## 2022-06-01 ENCOUNTER — LAB (OUTPATIENT)
Dept: ONCOLOGY | Facility: CLINIC | Age: 47
End: 2022-06-01

## 2022-06-01 VITALS
OXYGEN SATURATION: 99 % | BODY MASS INDEX: 22.54 KG/M2 | SYSTOLIC BLOOD PRESSURE: 128 MMHG | DIASTOLIC BLOOD PRESSURE: 82 MMHG | WEIGHT: 175.6 LBS | TEMPERATURE: 97.1 F | HEART RATE: 90 BPM | RESPIRATION RATE: 18 BRPM | HEIGHT: 74 IN

## 2022-06-01 DIAGNOSIS — D64.9 ANEMIA, UNSPECIFIED TYPE: ICD-10-CM

## 2022-06-01 DIAGNOSIS — D72.829 LEUKOCYTOSIS, UNSPECIFIED TYPE: ICD-10-CM

## 2022-06-01 DIAGNOSIS — D75.839 THROMBOCYTOSIS, UNSPECIFIED: ICD-10-CM

## 2022-06-01 DIAGNOSIS — D72.829 LEUKOCYTOSIS, UNSPECIFIED TYPE: Primary | ICD-10-CM

## 2022-06-01 LAB
BASOPHILS # BLD AUTO: 0.13 10*3/MM3 (ref 0–0.2)
BASOPHILS NFR BLD AUTO: 0.8 % (ref 0–1.5)
DEPRECATED RDW RBC AUTO: 49.1 FL (ref 37–54)
EOSINOPHIL # BLD AUTO: 0.13 10*3/MM3 (ref 0–0.4)
EOSINOPHIL NFR BLD AUTO: 0.8 % (ref 0.3–6.2)
ERYTHROCYTE [DISTWIDTH] IN BLOOD BY AUTOMATED COUNT: 17.2 % (ref 12.3–15.4)
HCT VFR BLD AUTO: 37.9 % (ref 37.5–51)
HGB BLD-MCNC: 11.8 G/DL (ref 13–17.7)
IMM GRANULOCYTES # BLD AUTO: 0.07 10*3/MM3 (ref 0–0.05)
IMM GRANULOCYTES NFR BLD AUTO: 0.4 % (ref 0–0.5)
LYMPHOCYTES # BLD AUTO: 2.32 10*3/MM3 (ref 0.7–3.1)
LYMPHOCYTES NFR BLD AUTO: 14.7 % (ref 19.6–45.3)
MCH RBC QN AUTO: 24.6 PG (ref 26.6–33)
MCHC RBC AUTO-ENTMCNC: 31.1 G/DL (ref 31.5–35.7)
MCV RBC AUTO: 79 FL (ref 79–97)
MONOCYTES # BLD AUTO: 1.22 10*3/MM3 (ref 0.1–0.9)
MONOCYTES NFR BLD AUTO: 7.7 % (ref 5–12)
NEUTROPHILS NFR BLD AUTO: 11.91 10*3/MM3 (ref 1.7–7)
NEUTROPHILS NFR BLD AUTO: 75.6 % (ref 42.7–76)
NRBC BLD AUTO-RTO: 0 /100 WBC (ref 0–0.2)
PLATELET # BLD AUTO: 464 10*3/MM3 (ref 140–450)
PMV BLD AUTO: 9.3 FL (ref 6–12)
RBC # BLD AUTO: 4.8 10*6/MM3 (ref 4.14–5.8)
WBC NRBC COR # BLD: 15.78 10*3/MM3 (ref 3.4–10.8)

## 2022-06-01 PROCEDURE — 85025 COMPLETE CBC W/AUTO DIFF WBC: CPT | Performed by: NURSE PRACTITIONER

## 2022-06-01 PROCEDURE — 99214 OFFICE O/P EST MOD 30 MIN: CPT | Performed by: NURSE PRACTITIONER

## 2022-06-01 NOTE — PROGRESS NOTES
DATE:  6/1/2022    DIAGNOSIS:   Leukocytosis, Thrombocytosis, Anemia    CHIEF COMPLAINT:  Abdominal Pain    TREATMENT HISTORY:  Ferrous Sulfate 325 mg daily- discontinued     HISTORY OF PRESENT ILLNESS:   Scott Montes is a very pleasant 47 y.o. male who is being seen today at the request of EDDIE Thurman for evaluation and treatment of leukocytosis and thrombocytosis. He reports that he follows with PCP on an as needed basis. However, he recently developed pain in the abdomen and groin area and was evaluated by PCP. At that time, she obtained lab work which showed abnormal counts. Previous available labs were reviewed and patient had elevated WBC (15.05) and elevated platelet count 538,000 on 04/19/2022. CBC from one year ago showed elevated WBC (12.34) with normal H/H and platelet count. CBC from 2018 was normal. Unfortunately, there are no other CBCs available to review for comparison of trend. He is a chronic, heavy smoker 1 PPD for the last 30 years. He will occasionally drink 2-3 beers on the weekend. He reports a poor appetite and has had a 12 pound weight loss within the last year. He denies any fever/chills or drenching night sweats. Denies tender/enlarged LNs. Denies any recent infections. He does report worsening fatigue and generalized weakness. He also reports chronic arthritic pain in his back.  Denies headache or any focal weakness. Denies any abnormal bleeding. He reports having EGD/colonoscopy many years ago. His PCP has arranged CT Chest, AP on 05/18/2022. He denies any other specific complaints today.     Interval History:  Mr. Montes presents today accompanied by wife for follow up. He reports that since his last visit he is feeling slightly better. CT imaging was performed which showed colitis with a contained perforation. He was admitted for an overnight observation and discharged home on oral antibiotics which he has completed. Pain has somewhat improved. He continues to struggle  with poor appetite, stable weight. He is able to supplement with 1-2 Boost daily. He also continues to have a difficult time with constipation despite the use of stool softeners and Miralax. He discontinued Ferrous Sulfate due to constipation.  He has an upcoming follow up with GI next week and Dr. Rudolph is planning to follow up with patient in mid June. He denies any other specific complaints today.     The following portions of the patient's history were reviewed and updated as appropriate: allergies, current medications, past family history, past medical history, past social history, past surgical history and problem list.    PAST MEDICAL HISTORY:  Past Medical History:   Diagnosis Date   • Low back pain        PAST SURGICAL HISTORY:  Past Surgical History:   Procedure Laterality Date   • CYST REMOVAL     • VASECTOMY         SOCIAL HISTORY:  Social History     Socioeconomic History   • Marital status:    Tobacco Use   • Smoking status: Current Every Day Smoker     Packs/day: 0.50     Years: 30.00     Pack years: 15.00   • Smokeless tobacco: Never Used   • Tobacco comment: Smoking cessation encouraged   Vaping Use   • Vaping Use: Never used   Substance and Sexual Activity   • Alcohol use: No   • Drug use: No   • Sexual activity: Defer           FAMILY HISTORY:  History reviewed. No pertinent family history.      MEDICATIONS:  The current medication list was reviewed in the EMR    Current Outpatient Medications:   •  acetaminophen (TYLENOL) 500 MG tablet, Take 2 tablets by mouth Every 6 (Six) Hours., Disp: 40 tablet, Rfl: 0  •  Cyanocobalamin (Vitamin B12) 1000 MCG tablet controlled-release, Take 1 tablet by mouth Daily., Disp: 30 tablet, Rfl: 5  •  cyclobenzaprine (FLEXERIL) 5 MG tablet, Take 1 tablet by mouth 3 (Three) Times a Day As Needed for Muscle Spasms., Disp: 90 tablet, Rfl: 5  •  naproxen sodium (ALEVE) 220 MG tablet, Take 440 mg by mouth Daily As Needed., Disp: , Rfl:   •  nicotine (NICODERM  CQ) 14 MG/24HR patch, Place 1 patch on the skin as directed by provider Daily., Disp: 7 patch, Rfl: 0  •  nicotine (NICODERM CQ) 21 MG/24HR patch, Place 1 patch on the skin as directed by provider Daily., Disp: 7 patch, Rfl: 0  •  nicotine (Nicoderm CQ) 7 MG/24HR patch, Place 1 patch on the skin as directed by provider Daily. Start after completed 14 mg patches, Disp: 14 patch, Rfl: 0  •  polyethylene glycol (MIRALAX) 17 g packet, Take 17 g by mouth Daily., Disp: 30 each, Rfl: 0  •  tadalafil (CIALIS) 10 MG tablet, Take 1 tablet by mouth Daily As Needed for Erectile Dysfunction., Disp: 20 tablet, Rfl: 5  •  vitamin D (ERGOCALCIFEROL) 1.25 MG (11383 UT) capsule capsule, Take 1 capsule by mouth 1 (One) Time Per Week. (Patient taking differently: Take 50,000 Units by mouth 1 (One) Time Per Week. Prior to [facility] Admission, Patient was on:  patient takes on Fridays), Disp: 4 capsule, Rfl: 5    ALLERGIES:  No Known Allergies      REVIEW OF SYSTEMS:    A comprehensive 14 point review of systems was performed.  Significant findings as mentioned above.  All other systems reviewed and are negative.        Physical Exam   Vital Signs:   Vitals:    06/01/22 1015   BP: 128/82   Pulse: 90   Resp: 18   Temp: 97.1 °F (36.2 °C)   SpO2: 99%    BMI is within normal parameters. No other follow-up for BMI required.    ECOG score: 0   General: Well developed, well nourished, alert and oriented x 3, in no acute distress.   Head: ATNC   Eyes: PERRL, No evidence of conjunctivitis.   Nose: No nasal discharge.   Mouth: Oral mucosal membranes moist. No oral ulceration or hemorrhages.   Neck: Neck supple. No thyromegaly. No JVD.   Lungs: Clear in all fields to A&P without rales, rhonchi or wheezing.   Heart: S1, S2. Regular rate and rhythm. No murmurs, rubs, or gallops.   Abdomen: Soft. Bowel sounds are normoactive. Mild tenderness with palpation.  Extremities: No clubbing, cyanosis or edema bilaterally.    Integumentary: No rash, sores,  erythema or nodules. No blistering, bruising, or dry skin.   Lymph Nodes: No palpable cervical, submandibular, supraclavicular, axillary lymphadenopathy noted. No petechiae, purpura or ecchymosis noted.   Neurologic: Grossly non-focal exam.     Pain Score:  Pain Score    06/01/22 1015   PainSc:   4   PainLoc: Comment: side       PHQ-Score Total:  PHQ-9 Total Score:         PATHOLOGY:        ENDOSCOPY:        IMAGING:  CT Chest with Contrast 05/06/2022  IMPRESSION:  1. Parenchymal nodules bilaterally. The largest is in the right lung and  measures above 8 mm. Therefore, this may be visible at PET/CT.  2. No effusions or adenopathy.  3. Recommend PET/CT or short interval 3-month follow-up CT of the chest.    CT AP with Contrast 05/06/2022  IMPRESSION:   1. Extensive abnormal thickening in the sigmoid colon. Small adjacent  fluid collections concerning for small adjacent abscesses versus less  likely necrotic masses. Contained perforation is noted as well. Trace  free fluid in the pelvis. I favor this representing inflammatory change  with resulting small adjacent abscesses. However, direct visualization  is suggested after resolution of the acute inflammatory event to exclude  any underlying mass.  2.Thickening of the urinary bladder wall.  3. Cholelithiasis.    NM PET-CT 05/11/2022  IMPRESSION:  1.  Bilateral pulmonary nodules detailed above which show no FDG  hypermetabolism in the range of malignancy. Some of the nodules are  below size threshold for adequate characterization with PET/CT.  2.  Wall thickening with inflammation and small extraluminal fluid, air,  and retraction type changes in the rectosigmoid region overall minimally  improved from the previous exam and with intense FDG hypermetabolism  noted. Maximum SUV: 10.7. This may represent infection or inflammation.  Malignancy not excluded given location and other findings detailed  above. Continued surveillance and follow-up imaging is warranted.  3.   Cholelithiasis.  4.  Other nonacute findings as detailed above.      RECENT LABS:  Lab Results   Component Value Date    WBC 15.78 (H) 06/01/2022    HGB 11.8 (L) 06/01/2022    HCT 37.9 06/01/2022    MCV 79.0 06/01/2022    RDW 17.2 (H) 06/01/2022     (H) 06/01/2022    NEUTRORELPCT 75.6 06/01/2022    LYMPHORELPCT 14.7 (L) 06/01/2022    MONORELPCT 7.7 06/01/2022    EOSRELPCT 0.8 06/01/2022    BASORELPCT 0.8 06/01/2022    NEUTROABS 11.91 (H) 06/01/2022    LYMPHSABS 2.32 06/01/2022       Lab Results   Component Value Date     (L) 05/06/2022    K 4.5 05/06/2022    CO2 24.9 05/06/2022     05/06/2022    BUN 11 05/06/2022    CREATININE 0.68 (L) 05/06/2022    EGFRIFNONA 98 03/25/2021    GLUCOSE 81 05/06/2022    CALCIUM 9.1 05/06/2022    ALKPHOS 145 (H) 05/06/2022    AST 17 05/06/2022    ALT 20 05/06/2022    BILITOT 0.3 05/06/2022    ALBUMIN 3.20 (L) 05/06/2022    PROTEINTOT 8.4 05/06/2022           Lab Results   Component Value Date    FERRITIN 659.90 (H) 05/04/2022    IRON 17 (L) 05/04/2022    TIBC 349 05/04/2022    LABIRON 5 (L) 05/04/2022    LNRZUISN22 548 04/19/2022      Work up 05/04/2022      Sed Rate   0 - 15 mm/hr 118 High      C-Reactive Protein   0.00 - 0.50 mg/dL 11.50 High      Hepatitis B Surface Ag   Non-Reactive Non-Reactive    Hep A IgM   Non-Reactive Non-Reactive    Hep B C IgM   Non-Reactive Non-Reactive    Hepatitis C Ab   Non-Reactive Non-Reactive      HIV-1/ HIV-2   Non-Reactive Non-Reactive      CADY Direct   Negative Negative      Rheumatoid Factor Quantitative   0.0 - 14.0 IU/mL 12.2         BCR/ABL: Negative  JAK2 V617 Mutation Qnt Result Comment    Comment: NEGATIVE      JAK2 Exons 12-15 Mut Det PCR Comment    Comment: NEGATIVE        ASSESSMENT & PLAN:  Scott Montes is a very pleasant 47 y.o. male with    1. Leukocytosis  2. Thrombocytosis  - Previous available labs were reviewed and patient had elevated WBC (15.05) and elevated platelet count 538,000 on 04/19/2022. CBC from one  year ago showed elevated WBC (12.34) with normal H/H and platelet count. CBC from 2018 was normal. Unfortunately, there are no other CBCs available to review for comparison of trend.  - He is a chronic, heavy smoker 1 PPD for the last 30 years. Other than worsening fatigue, he has no concerning B-symptoms.   - Based on history, smoking could be contributing to his elevated WBC along with underlying inflammation.  - CBC from initial consultation showed ongoing leukocytosis with WBC (11.27) which had improved with mildly low Hg (12.9) and elevated platelet count 559,000.   - Sent additional labs to further evaluate including PBS (summarized above). ESR and CRP significantly elevated and suggestive of chronic, underlying inflammation. Acute Hepatitis panel and HIV panel non-reactive. CADY and RF negative. Iron panel was low with elevated ferritin which is likely reactive. Therefore, started him on Ferrous Sulfate daily which he discontinued due to worsening constipation. Low Iron panel is also likely contributing to thrombocytosis. Also sent Flow cytometry, BCR/ABL, ELOY II V617F and ELOY II Exon 12-15 to evaluate for possible underlying myeloproliferative disorder which was negative.    - CT imaging was performed (summarized above) and was concerning for colitis with contained perforation. PET-CT was also obtained which showed bilateral pulmonary nodules which showed no FDG hypermetabolism in the range of malignancy, wall thickening with inflammation and small extraluminal fluid, air and retraction type changes in the rectosigmoid region minimally improved from previous exam and with intense FDG hypermetabolism noted which may represent infection or inflammation, malignancy not excluded, cholelithiasis.  - Repeat CBC from today continues to show leukocytosis, anemia and thrombocytosis. Discussed with patient that abnormal findings on CBC is likely related to acute issues (inflammation/infection versus possible  malignancy) and will improve once acute issues have resolved. Will discontinue Ferrous Sulfate and continue to closely monitor blood work.   - Will follow up in 3 months with repeat labs.   - In the meantime, strongly advised patient to cut back and quit smoking.       ACO / MATIAS/Other  Quality measures  -  Scott Montes did not receive 2021 flu vaccine. This was recommended, he declined.   -  Scott Montes reports a pain score of 4.  Given his pain assessment as noted, treatment options were discussed and the following options were decided upon as a follow-up plan to address the patient's pain: continuation of current treatment plan for pain and referral to Primary Care for assistance in pain treatment guidance.  -  Current outpatient and discharge medications have been reconciled for the patient.  Reviewed by: EDDIE Gray        A total of 30 minutes were spent coordinating this patient’s care in clinic today; more than 50% of this time was face-to-face with the patient, reviewing his interim medical history and counseling on the current treatment and followup plan. All questions were answered to his satisfaction.          Electronically Signed by: EDDIE Desai APRN June 1, 2022 11:51 EDT       CC:   No ref. provider found  Asuncion Keyes APRN

## 2022-06-01 NOTE — TELEPHONE ENCOUNTER
PTS WIFE STATED THEY JUST LEFT OFFICE AND REALIZED APPT SCHED DIDN'T WORK FOR THEM - W/T TO [facility] TO FURTHER ASSIST.

## 2022-06-06 ENCOUNTER — OFFICE VISIT (OUTPATIENT)
Dept: GASTROENTEROLOGY | Facility: CLINIC | Age: 47
End: 2022-06-06

## 2022-06-06 VITALS
SYSTOLIC BLOOD PRESSURE: 132 MMHG | BODY MASS INDEX: 23.98 KG/M2 | OXYGEN SATURATION: 98 % | HEART RATE: 88 BPM | HEIGHT: 72 IN | DIASTOLIC BLOOD PRESSURE: 74 MMHG | WEIGHT: 177 LBS

## 2022-06-06 DIAGNOSIS — R10.84 GENERALIZED ABDOMINAL PAIN: Primary | ICD-10-CM

## 2022-06-06 PROCEDURE — 99214 OFFICE O/P EST MOD 30 MIN: CPT | Performed by: PHYSICIAN ASSISTANT

## 2022-06-06 NOTE — PROGRESS NOTES
Chief Complaint   Patient presents with   • Anemia       Scott Montes is a 47 y.o. male who presents to the office today for evaluation of Anemia  .    HPI  Patient was seen in the ED back on 5/6 for abdominal pain.  He had recently underwent CT scan ordered by his PCP that showed extensive thickening in the sigmoid colon wall.  There is adjacent stranding and a small 1.7 cm fluid collection as well as what appears to be a contained focus of extraluminal air.  Possibly consider extensive inflammation with resulting tiny contained perforation and abscess-cannot exclude a mass however.  Patient was started on IV antibiotics while in the ED.  He was admitted to the hospital by Dr. Rudolph for overnight observation-she was discharged home with Augmentin prescription and told to follow-up for colonoscopy in roughly 6 weeks.  He is hesitant to have this colonoscopy performed so soon as his abdominal pain is not improved and he is afraid of colon perforation.  His mother does have a history of colon perforation that ultimately led to her death.  Patient did complete the antibiotic regimen he was discharged home with.  His PCP did lab work a few days ago in which it showed a white blood cell count of 15.7 which is actually increased from when he was originally seen in the ED.  His hemoglobin level is currently holding steady at 11.8.  Devious CEA drawn in the ED was within normal limits.  C-reactive protein showed increased at 6.7.  He has never had a colonoscopy performed in the past-denies any family history of GI or colon cancers.  His primary care also had him undergo a PET scan which showed Wall thickening with inflammation and small extraluminal fluid, air, and retraction type changes in the rectosigmoid region overall minimally improved from the previous exam and with intense FDG hypermetabolism  noted. Maximum SUV: 10.7. This may represent infection or inflammation.  Malignancy not excluded given location and  other findings detailed  above. Continued surveillance and follow-up imaging is warranted.  Review of Systems   Constitutional: Positive for unexpected weight change.   HENT: Negative for sore throat and trouble swallowing.    Eyes: Negative.    Respiratory: Negative for chest tightness.    Cardiovascular: Negative for chest pain.   Gastrointestinal: Positive for abdominal distention, abdominal pain, constipation and rectal pain. Negative for anal bleeding, blood in stool, diarrhea, nausea and vomiting.   Endocrine: Negative.    Genitourinary: Positive for decreased urine volume, difficulty urinating and urgency.   Musculoskeletal: Positive for back pain. Negative for neck pain.   Skin: Negative.    Allergic/Immunologic: Negative for environmental allergies and food allergies.   Neurological: Negative for dizziness and headaches.   Hematological: Bruises/bleeds easily.   Psychiatric/Behavioral: Positive for sleep disturbance. Negative for agitation. The patient is not nervous/anxious.        ACTIVE PROBLEMS:   Specialty Problems        Gastroenterology Problems    Colitis              PAST MEDICAL HISTORY:  Past Medical History:   Diagnosis Date   • Anemia    • Low back pain        SURGICAL HISTORY:  Past Surgical History:   Procedure Laterality Date   • CYST REMOVAL     • VASECTOMY         FAMILY HISTORY:  Family History   Problem Relation Age of Onset   • Colon cancer Mother        SOCIAL HISTORY:  Social History     Tobacco Use   • Smoking status: Current Every Day Smoker     Packs/day: 0.50     Years: 30.00     Pack years: 15.00   • Smokeless tobacco: Never Used   • Tobacco comment: Smoking cessation encouraged   Substance Use Topics   • Alcohol use: No       CURRENT MEDICATION:    Current Outpatient Medications:   •  naproxen sodium (ALEVE) 220 MG tablet, Take 440 mg by mouth Daily As Needed., Disp: , Rfl:   •  nicotine (NICODERM CQ) 14 MG/24HR patch, Place 1 patch on the skin as directed by provider Daily.,  "Disp: 7 patch, Rfl: 0  •  nicotine (NICODERM CQ) 21 MG/24HR patch, Place 1 patch on the skin as directed by provider Daily., Disp: 7 patch, Rfl: 0  •  nicotine (Nicoderm CQ) 7 MG/24HR patch, Place 1 patch on the skin as directed by provider Daily. Start after completed 14 mg patches, Disp: 14 patch, Rfl: 0  •  polyethylene glycol (MIRALAX) 17 g packet, Take 17 g by mouth Daily., Disp: 30 each, Rfl: 0  •  vitamin D (ERGOCALCIFEROL) 1.25 MG (73149 UT) capsule capsule, Take 1 capsule by mouth 1 (One) Time Per Week. (Patient taking differently: Take 50,000 Units by mouth 1 (One) Time Per Week. Prior to [facility] Admission, Patient was on:  patient takes on Fridays), Disp: 4 capsule, Rfl: 5  •  acetaminophen (TYLENOL) 500 MG tablet, Take 2 tablets by mouth Every 6 (Six) Hours., Disp: 40 tablet, Rfl: 0  •  ciprofloxacin (Cipro) 500 MG tablet, Take 1 tablet by mouth 2 (Two) Times a Day for 10 days., Disp: 20 tablet, Rfl: 0  •  Cyanocobalamin (Vitamin B12) 1000 MCG tablet controlled-release, Take 1 tablet by mouth Daily., Disp: 30 tablet, Rfl: 5  •  cyclobenzaprine (FLEXERIL) 5 MG tablet, Take 1 tablet by mouth 3 (Three) Times a Day As Needed for Muscle Spasms., Disp: 90 tablet, Rfl: 5  •  linaclotide (Linzess) 72 MCG capsule capsule, Take 1 capsule by mouth Every Morning Before Breakfast., Disp: 30 capsule, Rfl: 1  •  magnesium citrate 1.745 GM/30ML solution solution, Take 296 mL by mouth Take As Directed for 2 doses. Take one full bottle at 4:00 PM and take second bottle at 10:00 PM., Disp: 592 mL, Rfl: 0  •  metroNIDAZOLE (FLAGYL) 500 MG tablet, Take 1 tablet by mouth 4 (Four) Times a Day for 10 days., Disp: 56 tablet, Rfl: 0  •  tadalafil (CIALIS) 10 MG tablet, Take 1 tablet by mouth Daily As Needed for Erectile Dysfunction., Disp: 20 tablet, Rfl: 5    ALLERGIES:  Patient has no known allergies.    VISIT VITALS:  /74   Pulse 88   Ht 182.9 cm (72\")   Wt 80.3 kg (177 lb)   SpO2 98%   BMI 24.01 kg/m²   Physical " Exam  Constitutional:       General: He is not in acute distress.     Appearance: Normal appearance. He is well-developed.   HENT:      Head: Normocephalic and atraumatic.   Eyes:      Pupils: Pupils are equal, round, and reactive to light.   Cardiovascular:      Rate and Rhythm: Normal rate and regular rhythm.      Heart sounds: Normal heart sounds.   Pulmonary:      Effort: Pulmonary effort is normal. No respiratory distress.      Breath sounds: Normal breath sounds. No wheezing, rhonchi or rales.   Abdominal:      General: Abdomen is flat. Bowel sounds are normal. There is no distension.      Palpations: Abdomen is soft. There is no mass.      Tenderness: There is abdominal tenderness (Generalized tenderness). There is no guarding or rebound.      Hernia: No hernia is present.   Musculoskeletal:         General: No swelling. Normal range of motion.      Cervical back: Normal range of motion and neck supple.      Right lower leg: No edema.      Left lower leg: No edema.   Skin:     General: Skin is warm and dry.   Neurological:      Mental Status: He is alert and oriented to person, place, and time.   Psychiatric:         Attention and Perception: Attention normal.         Mood and Affect: Mood normal.         Speech: Speech normal.         Behavior: Behavior normal. Behavior is cooperative.         Thought Content: Thought content normal.         Assessment    Due to the patient's symptoms-I will go ahead and obtain a stat CT of the abdomen and pelvis with and without contrast to evaluate if abdominal perforation/diverticulitis/inflammatory bowel/malignancy has improved or worsened.  Depending on the results of the CT we will continue with additional round of Cipro/Flagyl to help improve white blood cell count which is elevated currently.  He is electing to undergo colonoscopy with Dr. Casanova rather than general surgery at this time.  Procedure was thoroughly explained to the patient he voiced understanding and  agreement to the treatment plan.   Diagnosis Plan   1. Generalized abdominal pain  CT Abdomen Pelvis With & Without Contrast       Return After procedure.                   This document has been electronically signed by Akira Marie PA-C  June 9, 2022 10:26 EDT    Part of this note may be an electronic transcription/translation of spoken language to printed text using the Dragon Dictation System.

## 2022-06-07 ENCOUNTER — OFFICE VISIT (OUTPATIENT)
Dept: FAMILY MEDICINE CLINIC | Facility: CLINIC | Age: 47
End: 2022-06-07

## 2022-06-07 ENCOUNTER — HOSPITAL ENCOUNTER (OUTPATIENT)
Dept: CT IMAGING | Facility: HOSPITAL | Age: 47
Discharge: HOME | End: 2022-06-07
Admitting: PHYSICIAN ASSISTANT

## 2022-06-07 VITALS — HEIGHT: 72 IN | BODY MASS INDEX: 23.98 KG/M2 | WEIGHT: 177 LBS

## 2022-06-07 DIAGNOSIS — R93.3 ABNORMAL CT SCAN, COLON: ICD-10-CM

## 2022-06-07 DIAGNOSIS — K59.01 SLOW TRANSIT CONSTIPATION: ICD-10-CM

## 2022-06-07 DIAGNOSIS — D72.828 OTHER ELEVATED WHITE BLOOD CELL (WBC) COUNT: Primary | ICD-10-CM

## 2022-06-07 DIAGNOSIS — R10.84 GENERALIZED ABDOMINAL PAIN: ICD-10-CM

## 2022-06-07 DIAGNOSIS — R10.84 GENERALIZED ABDOMINAL PAIN: Primary | ICD-10-CM

## 2022-06-07 PROCEDURE — 74178 CT ABD&PLV WO CNTR FLWD CNTR: CPT | Performed by: RADIOLOGY

## 2022-06-07 PROCEDURE — 74178 CT ABD&PLV WO CNTR FLWD CNTR: CPT

## 2022-06-07 PROCEDURE — 99213 OFFICE O/P EST LOW 20 MIN: CPT | Performed by: NURSE PRACTITIONER

## 2022-06-07 PROCEDURE — 25010000002 IOPAMIDOL 61 % SOLUTION: Performed by: PHYSICIAN ASSISTANT

## 2022-06-07 RX ORDER — CIPROFLOXACIN 500 MG/1
500 TABLET, FILM COATED ORAL 2 TIMES DAILY
Qty: 20 TABLET | Refills: 0 | Status: SHIPPED | OUTPATIENT
Start: 2022-06-07 | End: 2022-06-17

## 2022-06-07 RX ORDER — BISACODYL 5 MG/1
20 TABLET, DELAYED RELEASE ORAL ONCE
Qty: 4 TABLET | Refills: 0 | Status: SHIPPED | OUTPATIENT
Start: 2022-06-07 | End: 2022-06-07

## 2022-06-07 RX ORDER — METRONIDAZOLE 500 MG/1
500 TABLET ORAL 4 TIMES DAILY
Qty: 56 TABLET | Refills: 0 | Status: SHIPPED | OUTPATIENT
Start: 2022-06-07 | End: 2022-06-17

## 2022-06-07 RX ORDER — MAGNESIUM CARB/ALUMINUM HYDROX 105-160MG
296 TABLET,CHEWABLE ORAL TAKE AS DIRECTED
Qty: 592 ML | Refills: 0 | Status: SHIPPED | OUTPATIENT
Start: 2022-06-07 | End: 2022-07-12 | Stop reason: HOSPADM

## 2022-06-07 RX ADMIN — IOPAMIDOL 100 ML: 612 INJECTION, SOLUTION INTRAVENOUS at 08:36

## 2022-06-07 NOTE — PROGRESS NOTES
You have chosen to receive care through a telehealth visit.  Do you consent to use a video/audio connection for your medical care today? Yes    Patient has chosen to have a telehealth/video visit due to current pandemic is not recommended to present to the office for face-to-face evaluation.   I did not complete this video visit with the patient using [employer].  This video visit was done with [employer] application.    Chief Complaint   Patient presents with   • GI Problem       Brief HPI/ROS obtained as follows:    GI Problem-continues to have some GI pain and discomfort.  He had a CT noted today.  Some thickening noted of the sigmoid colon and terminal ileum not changed.  Mesenteric fat stranding.   Small amount free fluid.  He continues to have some free fluid.      The following portions of the patient's history were reviewed and updated as appropriate: CC, ROS, allergies, current medications, past family history, past medical history, past social history, past surgical history and problem list.    Review of Systems   Constitutional: Negative for appetite change, fatigue and unexpected weight change.   HENT: Negative for congestion, ear pain, nosebleeds, postnasal drip, rhinorrhea, sore throat, trouble swallowing and voice change.    Eyes: Negative for photophobia, pain and visual disturbance.   Respiratory: Negative for cough, chest tightness, shortness of breath and wheezing.    Cardiovascular: Negative for chest pain and palpitations.   Gastrointestinal: Negative for abdominal pain, blood in stool, constipation, diarrhea and nausea.   Endocrine: Negative for cold intolerance and polydipsia.   Genitourinary: Negative for difficulty urinating, flank pain, frequency and hematuria.   Musculoskeletal: Negative for arthralgias, back pain, gait problem, joint swelling and myalgias.   Skin: Negative for color change and rash.   Allergic/Immunologic: Negative.    Neurological: Negative for dizziness, syncope, numbness  and headaches.   Hematological: Negative.    Psychiatric/Behavioral: Negative for dysphoric mood, sleep disturbance and suicidal ideas. The patient is not nervous/anxious.    All other systems reviewed and are negative.      Assessment     Physical Exam   Constitutional: He appears well-developed and well-nourished. No distress.   fatigued appearing    HENT:   Head: Normocephalic.   Right Ear: External ear normal.   Left Ear: External ear normal.   Nose: Nose normal.   Mouth/Throat: Oropharynx is clear and moist.   Eyes: Pupils are equal, round, and reactive to light. Conjunctivae and EOM are normal. No scleral icterus.   Neck: Neck normal appearance.No JVD present. No tracheal deviation present. No thyromegaly present.   Pulmonary/Chest: Effort normal.  No respiratory distress. He no audible wheeze...  Abdominal: Abdomen appears normal. Soft. He exhibits no distension and no visible mass.   Musculoskeletal:         General: Tenderness (generalized back pain) present.      Lumbar back: He exhibits tenderness.   Neurological: He is alert. No cranial nerve deficit. Coordination normal.   Skin: Skin is warm and dry. Capillary refill takes less than 2 seconds. He is not diaphoretic. No nail bed cyanosis or erythema. No pallor. Nails show no clubbing.   Psychiatric: He has a normal mood and affect. He mood appears normal. His affect is normal. His behavior is normal. Thought content is normal. He does not express abnormal judgement.       Diagnoses and all orders for this visit:    1. Other elevated white blood cell (WBC) count (Primary)  Comments:  Continue under the care of hematology  Orders:  -     CBC w AUTO Differential  -     Comprehensive Metabolic Panel    2. Slow transit constipation  Comments:  Bowel care advised:  Increase PO fluids for hydration, exercise regularly, Increase fiber.  Stool softeners PRN  Orders:  -     linaclotide (Linzess) 72 MCG capsule capsule; Take 1 capsule by mouth Every Morning Before  Breakfast.  Dispense: 30 capsule; Refill: 1        Patient instructed and advised to call if symptoms are increasing or new symptoms occur.    Understands reasons for urgent and emergent care.  Patient (& family) verbalized agreement for treatment plan.     Generalized precautions advised including social distancing, hand washing, cough/sneeze hygiene.     Reviewed CT scan.     RTC 1 month, sooner if needed.       This is an audio and video enabled enabled telehealth encounter.      The provider was in the office at [facility] and the patient was in their home in a private area.      This visit/video call lasted:   25 minutes      This document has been electronically signed by:  EDDIE Thurman, FNP-C    Dragon disclaimer:  Part of this note may be an electronic transcription/translation of spoken language to printed text using the Dragon Dictation System.

## 2022-06-08 DIAGNOSIS — R93.3 ABNORMAL CT SCAN, COLON: Primary | ICD-10-CM

## 2022-06-08 DIAGNOSIS — R10.84 GENERALIZED ABDOMINAL PAIN: ICD-10-CM

## 2022-06-13 ENCOUNTER — TELEPHONE (OUTPATIENT)
Dept: SURGERY | Facility: CLINIC | Age: 47
End: 2022-06-13

## 2022-06-13 NOTE — TELEPHONE ENCOUNTER
"SPOKE WITH PABLO THIS DATE REGARDING SCHEDULING OF HIS COLONOSCOPY AND NECK MASS EXCISION.  PATIENT STATES HE IS BEING REFERRED TO ANOTHER \"BLOOD DOCTOR\" AT THIS TIME AND REQUESTS TO HOLD OFF ON THE COLONOSCOPY AND NECK MASS EXCISION AT THIS TIME.  PATIENT STATES HE WILL CONTACT CLINIC WHEN HE IS CLEARED TO HAVE THIS PROCEDURE DONE WITH DR. KRAMER.  INSTRUCTED PATIENT TO CALL US IF HE HAD ANY QUESTIONS OR CONCERNS.    PATIENT VERBALIZED UNDERSTANDING THAT AT HIS REQUEST WE WILL HOLD SCHEDULING HIS COLONOSCOPY AND NECK MASS EXCISION AT THIS TIME.  "

## 2022-06-29 PROBLEM — R93.3 ABNORMAL CT SCAN, COLON: Status: ACTIVE | Noted: 2022-06-29

## 2022-06-29 PROBLEM — R10.84 GENERALIZED ABDOMINAL PAIN: Status: ACTIVE | Noted: 2022-06-29

## 2022-07-08 ENCOUNTER — LAB (OUTPATIENT)
Dept: LAB | Facility: HOSPITAL | Age: 47
End: 2022-07-08

## 2022-07-08 DIAGNOSIS — R93.3 ABNORMAL CT SCAN, COLON: ICD-10-CM

## 2022-07-08 DIAGNOSIS — R10.84 GENERALIZED ABDOMINAL PAIN: ICD-10-CM

## 2022-07-08 DIAGNOSIS — D64.9 ANEMIA, UNSPECIFIED TYPE: ICD-10-CM

## 2022-07-08 PROCEDURE — U0004 COV-19 TEST NON-CDC HGH THRU: HCPCS | Performed by: INTERNAL MEDICINE

## 2022-07-09 LAB — SARS-COV-2 RNA PNL SPEC NAA+PROBE: NOT DETECTED

## 2022-07-12 ENCOUNTER — ANESTHESIA EVENT (OUTPATIENT)
Dept: PERIOP | Facility: HOSPITAL | Age: 47
End: 2022-07-12

## 2022-07-12 ENCOUNTER — TELEPHONE (OUTPATIENT)
Dept: SURGERY | Facility: CLINIC | Age: 47
End: 2022-07-12

## 2022-07-12 ENCOUNTER — HOSPITAL ENCOUNTER (OUTPATIENT)
Facility: HOSPITAL | Age: 47
Setting detail: HOSPITAL OUTPATIENT SURGERY
Discharge: HOME | End: 2022-07-12
Attending: INTERNAL MEDICINE | Admitting: INTERNAL MEDICINE

## 2022-07-12 ENCOUNTER — ANESTHESIA (OUTPATIENT)
Dept: PERIOP | Facility: HOSPITAL | Age: 47
End: 2022-07-12

## 2022-07-12 VITALS
WEIGHT: 169 LBS | HEART RATE: 85 BPM | DIASTOLIC BLOOD PRESSURE: 95 MMHG | TEMPERATURE: 98 F | OXYGEN SATURATION: 99 % | SYSTOLIC BLOOD PRESSURE: 131 MMHG | HEIGHT: 74 IN | BODY MASS INDEX: 21.69 KG/M2 | RESPIRATION RATE: 20 BRPM

## 2022-07-12 DIAGNOSIS — R93.3 ABNORMAL CT SCAN, COLON: ICD-10-CM

## 2022-07-12 DIAGNOSIS — R10.84 GENERALIZED ABDOMINAL PAIN: ICD-10-CM

## 2022-07-12 DIAGNOSIS — R30.0 DYSURIA: Primary | ICD-10-CM

## 2022-07-12 PROCEDURE — 25010000002 PROPOFOL 10 MG/ML EMULSION: Performed by: NURSE ANESTHETIST, CERTIFIED REGISTERED

## 2022-07-12 PROCEDURE — 45380 COLONOSCOPY AND BIOPSY: CPT | Performed by: INTERNAL MEDICINE

## 2022-07-12 RX ORDER — SODIUM CHLORIDE, SODIUM LACTATE, POTASSIUM CHLORIDE, CALCIUM CHLORIDE 600; 310; 30; 20 MG/100ML; MG/100ML; MG/100ML; MG/100ML
125 INJECTION, SOLUTION INTRAVENOUS ONCE
Status: DISCONTINUED | OUTPATIENT
Start: 2022-07-12 | End: 2022-07-12 | Stop reason: HOSPADM

## 2022-07-12 RX ORDER — ONDANSETRON 2 MG/ML
4 INJECTION INTRAMUSCULAR; INTRAVENOUS AS NEEDED
Status: DISCONTINUED | OUTPATIENT
Start: 2022-07-12 | End: 2022-07-12 | Stop reason: HOSPADM

## 2022-07-12 RX ORDER — SODIUM CHLORIDE 0.9 % (FLUSH) 0.9 %
10 SYRINGE (ML) INJECTION AS NEEDED
Status: DISCONTINUED | OUTPATIENT
Start: 2022-07-12 | End: 2022-07-12 | Stop reason: HOSPADM

## 2022-07-12 RX ORDER — FENTANYL CITRATE 50 UG/ML
50 INJECTION, SOLUTION INTRAMUSCULAR; INTRAVENOUS
Status: DISCONTINUED | OUTPATIENT
Start: 2022-07-12 | End: 2022-07-12 | Stop reason: HOSPADM

## 2022-07-12 RX ORDER — MEPERIDINE HYDROCHLORIDE 25 MG/ML
12.5 INJECTION INTRAMUSCULAR; INTRAVENOUS; SUBCUTANEOUS
Status: DISCONTINUED | OUTPATIENT
Start: 2022-07-12 | End: 2022-07-12 | Stop reason: HOSPADM

## 2022-07-12 RX ORDER — MIDAZOLAM HYDROCHLORIDE 1 MG/ML
1 INJECTION INTRAMUSCULAR; INTRAVENOUS
Status: DISCONTINUED | OUTPATIENT
Start: 2022-07-12 | End: 2022-07-12 | Stop reason: HOSPADM

## 2022-07-12 RX ORDER — SODIUM CHLORIDE, SODIUM LACTATE, POTASSIUM CHLORIDE, CALCIUM CHLORIDE 600; 310; 30; 20 MG/100ML; MG/100ML; MG/100ML; MG/100ML
INJECTION, SOLUTION INTRAVENOUS CONTINUOUS PRN
Status: DISCONTINUED | OUTPATIENT
Start: 2022-07-12 | End: 2022-07-12 | Stop reason: SURG

## 2022-07-12 RX ORDER — SODIUM CHLORIDE, SODIUM LACTATE, POTASSIUM CHLORIDE, CALCIUM CHLORIDE 600; 310; 30; 20 MG/100ML; MG/100ML; MG/100ML; MG/100ML
100 INJECTION, SOLUTION INTRAVENOUS ONCE AS NEEDED
Status: DISCONTINUED | OUTPATIENT
Start: 2022-07-12 | End: 2022-07-12 | Stop reason: HOSPADM

## 2022-07-12 RX ORDER — IPRATROPIUM BROMIDE AND ALBUTEROL SULFATE 2.5; .5 MG/3ML; MG/3ML
3 SOLUTION RESPIRATORY (INHALATION) ONCE AS NEEDED
Status: DISCONTINUED | OUTPATIENT
Start: 2022-07-12 | End: 2022-07-12 | Stop reason: HOSPADM

## 2022-07-12 RX ORDER — KETOROLAC TROMETHAMINE 30 MG/ML
30 INJECTION, SOLUTION INTRAMUSCULAR; INTRAVENOUS EVERY 6 HOURS PRN
Status: DISCONTINUED | OUTPATIENT
Start: 2022-07-12 | End: 2022-07-12 | Stop reason: HOSPADM

## 2022-07-12 RX ORDER — SODIUM CHLORIDE 0.9 % (FLUSH) 0.9 %
10 SYRINGE (ML) INJECTION EVERY 12 HOURS SCHEDULED
Status: DISCONTINUED | OUTPATIENT
Start: 2022-07-12 | End: 2022-07-12 | Stop reason: HOSPADM

## 2022-07-12 RX ORDER — PROPOFOL 10 MG/ML
VIAL (ML) INTRAVENOUS AS NEEDED
Status: DISCONTINUED | OUTPATIENT
Start: 2022-07-12 | End: 2022-07-12 | Stop reason: SURG

## 2022-07-12 RX ORDER — LIDOCAINE HYDROCHLORIDE 20 MG/ML
INJECTION, SOLUTION INFILTRATION; PERINEURAL AS NEEDED
Status: DISCONTINUED | OUTPATIENT
Start: 2022-07-12 | End: 2022-07-12 | Stop reason: SURG

## 2022-07-12 RX ORDER — OXYCODONE HYDROCHLORIDE AND ACETAMINOPHEN 5; 325 MG/1; MG/1
1 TABLET ORAL ONCE AS NEEDED
Status: DISCONTINUED | OUTPATIENT
Start: 2022-07-12 | End: 2022-07-12 | Stop reason: HOSPADM

## 2022-07-12 RX ADMIN — LIDOCAINE HYDROCHLORIDE 60 MG: 20 INJECTION, SOLUTION INFILTRATION; PERINEURAL at 11:03

## 2022-07-12 RX ADMIN — PROPOFOL 100 MG: 10 INJECTION, EMULSION INTRAVENOUS at 11:03

## 2022-07-12 RX ADMIN — LIDOCAINE HYDROCHLORIDE 40 MG: 20 INJECTION, SOLUTION INFILTRATION; PERINEURAL at 11:09

## 2022-07-12 RX ADMIN — SODIUM CHLORIDE, POTASSIUM CHLORIDE, SODIUM LACTATE AND CALCIUM CHLORIDE: 600; 310; 30; 20 INJECTION, SOLUTION INTRAVENOUS at 11:01

## 2022-07-12 RX ADMIN — PROPOFOL 100 MCG/KG/MIN: 10 INJECTION, EMULSION INTRAVENOUS at 11:03

## 2022-07-12 NOTE — H&P
"      Chief complaint follow-up on possible bowel perforation    Subjective     Patient is a 47 y.o. male who presents today for a colonoscopy.   The patient was recently seen in our GI clinic.  He had been having abdominal pain and was seen in the ED on 5/6/2022.  He had recently underwent CT scan ordered by his PCP that showed extensive thickening in the sigmoid colon wall.  There is adjacent stranding and a small 1.7 cm fluid collection as well as what appears to be a contained focus of extraluminal air.  Possibly consider extensive inflammation with resulting tiny contained perforation and abscess-cannot exclude a mass however.  Patient was started on IV antibiotics while in the ED.  He was admitted to the hospital by Dr. Rudolph for overnight observation.  The patient was discharged home with Augmentin prescription and told to follow-up for colonoscopy in roughly 6 weeks.   The patient reports continued abdominal pain in his lower abdomen.  He also reports problem with his prostate gland which is causing him dysuria.  He has not followed up with anyone for the prostate.  He denies rectal bleeding, black stools, nausea, and vomiting.  Patient had a CEA drawn in the ER that was normal.  His primary care had patient undergo a PET scan which showed \"wall thickening and inflammation and small extraluminal fluid, air, and retraction type changes in the rectosigmoid region overall minimally improved from the previous exam and with intense FDG hypermetabolism noted.  Maximum SUV: 10.7.  This may represent infection or inflammation malignancy not excluded given location of the findings above\".  The patient has had a significant weight loss.  Most recent labs were taken on 6/1/2022 and revealed an elevated white blood count of 15.78 and hemoglobin of 11.8.  His platelets were elevated to 464.  Lymphocyte percentage was decreased at 14.7.  Absolute neutrophils were increased to 11.91. A follow up CT scan was done on " "6/7/22 with the following results:  \"1.  Again there is extensive abnormal thickening of the sigmoid colon  and terminal ileum that has not changed drastically in appearance since  previous study.  Mesenteric fat stranding again noted.  2.  Small free fluid in the pelvis is again noted.  3.  Right lower lobe pulmonary nodule measuring 9 mm in maximum  transverse dimension.  For low-risk or high-risk patients consider a  follow-up chest CT at 3 months.  If unchanged consider an additional  follow-up CT at 18-24 months.  Alternatively (or additionally) PET/CT or  tissue sampling could be performed.\"    Family history is positive for his mother having a perforated bowel and father with prostate cancer.  Medical, surgical, and social histories were reviewed and are listed below.\"      Review of Systems  Review of Systems - General ROS: Positive for- weight loss  Psychological ROS: negative for - behavioral disorder  Ophthalmic ROS: negative for - dry eyes  ENT ROS: negative for - vertigo or vocal changes  Hematological and Lymphatic ROS: negative for - jaundice or swollen lymph nodes  Respiratory ROS: negative for - sputum changes or stridor  Cardiovascular ROS: negative for - irregular heartbeat or murmur  Gastrointestinal ROS: positive for-abdominal pain; negative for - blood in stools or change in stools  Genitourinary ROS: Positive for- dysuria, suprapubic pain; negative for - hematuria or incontinence  Musculoskeletal ROS: negative for - gait disturbance      History  Past Medical History:   Diagnosis Date   • Anemia    • Arthritis    • Elevated cholesterol    • Low back pain      Past Surgical History:   Procedure Laterality Date   • CYST REMOVAL     • VASECTOMY       Family History   Problem Relation Age of Onset   • Colon cancer Mother      Social History     Tobacco Use   • Smoking status: Current Every Day Smoker     Packs/day: 0.50     Years: 30.00     Pack years: 15.00   • Smokeless tobacco: Never Used   • " Tobacco comment: Smoking cessation encouraged   Vaping Use   • Vaping Use: Never used   Substance Use Topics   • Alcohol use: No   • Drug use: No     Medications Prior to Admission   Medication Sig Dispense Refill Last Dose   • magnesium citrate 1.745 GM/30ML solution solution Take 296 mL by mouth Take As Directed for 2 doses. Take one full bottle at 4:00 PM and take second bottle at 10:00 PM. 592 mL 0 7/11/2022 at Unknown time   • naproxen sodium (ALEVE) 220 MG tablet Take 440 mg by mouth Daily As Needed.   Past Week at Unknown time   • Cyanocobalamin (Vitamin B12) 1000 MCG tablet controlled-release Take 1 tablet by mouth Daily. 30 tablet 5 More than a month at Unknown time   • cyclobenzaprine (FLEXERIL) 5 MG tablet Take 1 tablet by mouth 3 (Three) Times a Day As Needed for Muscle Spasms. 90 tablet 5 More than a month at Unknown time   • linaclotide (Linzess) 72 MCG capsule capsule Take 1 capsule by mouth Every Morning Before Breakfast. 30 capsule 1 More than a month at Unknown time   • nicotine (NICODERM CQ) 14 MG/24HR patch Place 1 patch on the skin as directed by provider Daily. 7 patch 0 More than a month at Unknown time   • nicotine (NICODERM CQ) 21 MG/24HR patch Place 1 patch on the skin as directed by provider Daily. 7 patch 0 More than a month at Unknown time   • nicotine (Nicoderm CQ) 7 MG/24HR patch Place 1 patch on the skin as directed by provider Daily. Start after completed 14 mg patches 14 patch 0 More than a month at Unknown time   • polyethylene glycol (MIRALAX) 17 g packet Take 17 g by mouth Daily. 30 each 0 More than a month at Unknown time   • tadalafil (CIALIS) 10 MG tablet Take 1 tablet by mouth Daily As Needed for Erectile Dysfunction. 20 tablet 5 More than a month at Unknown time   • vitamin D (ERGOCALCIFEROL) 1.25 MG (52336 UT) capsule capsule Take 1 capsule by mouth 1 (One) Time Per Week. (Patient taking differently: Take 50,000 Units by mouth 1 (One) Time Per Week. Prior to [facility], Patient was on:  patient takes on Fridays) 4 capsule 5 More than a month at Unknown time     Allergies:  Patient has no known allergies.    Objective     Vital Signs  Temp:  [98.1 °F (36.7 °C)] 98.1 °F (36.7 °C)  Heart Rate:  [98] 98  Resp:  [18] 18  BP: (124)/(91) 124/91    Physical Exam  General:  This is a WD pleasant male in no acute distress  Vital signs: Stable, afebrile  HEENT exam:  WNL. Sclerae are anicteric.  EOMI  Neck: Supple, FROM.  No JVD.  Trachea midline  Lungs:  Respiratory effort normal. Auscultation: Clear, without wheezes, rhonchi, rales  Heart:  Regular rate and rhythm, without murmur, gallop, rub.  No pedal edema  Abdomen: Lower quadrant abdominal and suprapubic tenderness; no palpable masses;  Bowel sounds normal  Musculoskeletal: Muscle strength/tone is normal.    Psych:  Alert, oriented x 3.  Mood and affect are appropriate  Skin:  Warm with good turgor.  Without rash or lesion  Extremities:  Examination of the extremities revealed no cyanosis, clubbing or edema.    Results Review:                     Invalid input(s): PROTCrCl cannot be calculated (Patient's most recent lab result is older than the maximum 30 days allowed.).  No results found for: AMMONIA      No results found for: BLOODCX  No results found for: URINECX  No results found for: WOUNDCX  No results found for: STOOLCX    Imaging:  Imaging Results (Last 24 Hours)     ** No results found for the last 24 hours. **                Impression:  Patient Active Problem List   Diagnosis Code   • Testosterone deficiency E34.9   • Other male erectile dysfunction N52.8   • Primary osteoarthritis involving multiple joints M15.9   • Vitamin D deficiency E55.9   • Colitis K52.9   • Generalized abdominal pain R10.84   • Abnormal CT scan, colon R93.3       Assessment:  1.  Anemia  2.  Thickening in the sigmoid colon wall   3.  Small free fluid in pelvis  4.  Lower abdominal pain  5.  Suprapubic pain    Plan:  The patient will undergo  a colonoscopy today.  He voiced understanding and agreement of the procedure.    CHEO Rhoades  07/12/22  10:36 EDT

## 2022-07-12 NOTE — ANESTHESIA PREPROCEDURE EVALUATION
Anesthesia Evaluation     Patient summary reviewed and Nursing notes reviewed   no history of anesthetic complications:  NPO Solid Status: > 8 hours  NPO Liquid Status: > 8 hours           Airway   Mallampati: II  TM distance: >3 FB  Neck ROM: full  No difficulty expected  Dental - normal exam     Pulmonary - normal exam    breath sounds clear to auscultation  (+) a smoker Current Smoked day of surgery,   Cardiovascular - normal exam    ECG reviewed  Rhythm: regular  Rate: normal    (+) hyperlipidemia,   (-) past MI      Neuro/Psych- negative ROS  (-) seizures, CVA  GI/Hepatic/Renal/Endo - negative ROS     Musculoskeletal     Abdominal  - normal exam   Substance History - negative use  (-) alcohol use     OB/GYN negative ob/gyn ROS         Other   arthritis,                      Anesthesia Plan    ASA 2     general   total IV anesthesia  intravenous induction     Anesthetic plan, risks, benefits, and alternatives have been provided, discussed and informed consent has been obtained with: patient.  Use of blood products discussed with patient  Consented to blood products.       CODE STATUS:

## 2022-07-12 NOTE — ANESTHESIA POSTPROCEDURE EVALUATION
Patient: Scott Montes    Procedure Summary     Date: 07/12/22 Room / Location: [facility] OR  /  [facility] OR    Anesthesia Start: 1101 Anesthesia Stop: 1125    Procedure: COLONOSCOPY (N/A ) Diagnosis:       Generalized abdominal pain      Abnormal CT scan, colon      (Generalized abdominal pain [R10.84])      (Abnormal CT scan, colon [R93.3])    Surgeons: Stella Aparicio MD Provider: Jb Leonard MD    Anesthesia Type: general ASA Status: 2          Anesthesia Type: general    Vitals  Vitals Value Taken Time   /95 07/12/22 1200   Temp 98 °F (36.7 °C) 07/12/22 1200   Pulse 85 07/12/22 1200   Resp 20 07/12/22 1200   SpO2 99 % 07/12/22 1200           Post Anesthesia Care and Evaluation    Patient location during evaluation: PACU  Patient participation: complete - patient participated  Level of consciousness: awake  Pain score: 1  Pain management: adequate    Airway patency: patent  Anesthetic complications: No anesthetic complications  PONV Status: controlled  Cardiovascular status: acceptable and blood pressure returned to baseline  Respiratory status: acceptable and room air  Hydration status: acceptable    Comments: Patient comfortable with discharge at this time.

## 2022-07-12 NOTE — TELEPHONE ENCOUNTER
LEFT MESSAGE FOR PATIENT TO CONTACT CLINIC TOMORROW 07/13/22 REGARDING HIS FMLA PAPERWORK THAT WE HAVE RECEIVED.  INFORM PATIENT THAT THIS FMLA PAPER WORK WILL NEED TO BE FILLED OUT BY HIS CURRENT MD THAT HAS HIM PLACED OFF WORK DUE TO CURRENT CONDITION / DX.    PABLO WILL NEED TO CONTACT HIS BENEFITS GROUP AND HAVE THEM SEND REQUEST TO THAT MD / SPECIALIST.

## 2022-07-14 LAB — REF LAB TEST METHOD: NORMAL

## 2022-07-15 ENCOUNTER — TELEPHONE (OUTPATIENT)
Dept: ONCOLOGY | Facility: CLINIC | Age: 47
End: 2022-07-15

## 2022-07-15 ENCOUNTER — LAB (OUTPATIENT)
Dept: LAB | Facility: HOSPITAL | Age: 47
End: 2022-07-15

## 2022-07-15 DIAGNOSIS — K50.00 CROHN'S DISEASE OF SMALL INTESTINE WITHOUT COMPLICATION: Primary | ICD-10-CM

## 2022-07-15 PROCEDURE — 85025 COMPLETE CBC W/AUTO DIFF WBC: CPT | Performed by: PHYSICIAN ASSISTANT

## 2022-07-15 PROCEDURE — 86140 C-REACTIVE PROTEIN: CPT | Performed by: PHYSICIAN ASSISTANT

## 2022-07-15 PROCEDURE — 85652 RBC SED RATE AUTOMATED: CPT | Performed by: PHYSICIAN ASSISTANT

## 2022-07-15 RX ORDER — PREDNISONE 10 MG/1
10 TABLET ORAL TAKE AS DIRECTED
Qty: 73 TABLET | Refills: 0 | Status: ON HOLD | OUTPATIENT
Start: 2022-07-15 | End: 2022-09-06

## 2022-07-15 NOTE — TELEPHONE ENCOUNTER
I called Jeanette and recommended she call Dr. Casanova's office to obtain the results from his recent colonoscopy. She verbalized understanding and has no other questions at this time.

## 2022-07-15 NOTE — TELEPHONE ENCOUNTER
Caller: Jeanette Montes    Relationship: Emergency Contact    Best call back number: [phone]    Caller requesting test results: YES    What test was performed: LABS AND SCOPE, AND BIOPSY      When was the test performed: 7-12    Where was the test performed:     Additional notes: PLEASE ADVISE

## 2022-07-16 ENCOUNTER — LAB (OUTPATIENT)
Dept: LAB | Facility: HOSPITAL | Age: 47
End: 2022-07-16

## 2022-07-16 DIAGNOSIS — K50.00 CROHN'S DISEASE OF SMALL INTESTINE WITHOUT COMPLICATION: ICD-10-CM

## 2022-07-16 LAB
BASOPHILS # BLD AUTO: 0.11 10*3/MM3 (ref 0–0.2)
BASOPHILS NFR BLD AUTO: 0.8 % (ref 0–1.5)
CRP SERPL-MCNC: 5.43 MG/DL (ref 0–0.5)
DEPRECATED RDW RBC AUTO: 47.3 FL (ref 37–54)
EOSINOPHIL # BLD AUTO: 0.1 10*3/MM3 (ref 0–0.4)
EOSINOPHIL NFR BLD AUTO: 0.7 % (ref 0.3–6.2)
ERYTHROCYTE [DISTWIDTH] IN BLOOD BY AUTOMATED COUNT: 17.1 % (ref 12.3–15.4)
ERYTHROCYTE [SEDIMENTATION RATE] IN BLOOD: 47 MM/HR (ref 0–15)
HCT VFR BLD AUTO: 38.5 % (ref 37.5–51)
HGB BLD-MCNC: 12.3 G/DL (ref 13–17.7)
IMM GRANULOCYTES # BLD AUTO: 0.05 10*3/MM3 (ref 0–0.05)
IMM GRANULOCYTES NFR BLD AUTO: 0.3 % (ref 0–0.5)
LYMPHOCYTES # BLD AUTO: 1.78 10*3/MM3 (ref 0.7–3.1)
LYMPHOCYTES NFR BLD AUTO: 12.4 % (ref 19.6–45.3)
MCH RBC QN AUTO: 24.9 PG (ref 26.6–33)
MCHC RBC AUTO-ENTMCNC: 31.9 G/DL (ref 31.5–35.7)
MCV RBC AUTO: 77.9 FL (ref 79–97)
MONOCYTES # BLD AUTO: 1.15 10*3/MM3 (ref 0.1–0.9)
MONOCYTES NFR BLD AUTO: 8 % (ref 5–12)
NEUTROPHILS NFR BLD AUTO: 11.22 10*3/MM3 (ref 1.7–7)
NEUTROPHILS NFR BLD AUTO: 77.8 % (ref 42.7–76)
NRBC BLD AUTO-RTO: 0 /100 WBC (ref 0–0.2)
PLATELET # BLD AUTO: 478 10*3/MM3 (ref 140–450)
PMV BLD AUTO: 11 FL (ref 6–12)
RBC # BLD AUTO: 4.94 10*6/MM3 (ref 4.14–5.8)
WBC NRBC COR # BLD: 14.41 10*3/MM3 (ref 3.4–10.8)

## 2022-07-16 PROCEDURE — 83993 ASSAY FOR CALPROTECTIN FECAL: CPT

## 2022-07-18 NOTE — PROGRESS NOTES
At the time of your recent colonoscopy, biopsies were taken of the colon and the terminal ileum.  The biopsies are consistent with Crohn's disease.  We have started you on prednisone and will check with your insurance to start a biologic.  Please keep your follow-up appointment.

## 2022-07-19 ENCOUNTER — TRANSCRIBE ORDERS (OUTPATIENT)
Dept: ONCOLOGY | Facility: HOSPITAL | Age: 47
End: 2022-07-19

## 2022-07-20 ENCOUNTER — OFFICE VISIT (OUTPATIENT)
Dept: GASTROENTEROLOGY | Facility: CLINIC | Age: 47
End: 2022-07-20

## 2022-07-20 VITALS
OXYGEN SATURATION: 98 % | SYSTOLIC BLOOD PRESSURE: 115 MMHG | HEART RATE: 82 BPM | WEIGHT: 167.6 LBS | DIASTOLIC BLOOD PRESSURE: 76 MMHG | BODY MASS INDEX: 21.51 KG/M2 | HEIGHT: 74 IN

## 2022-07-20 DIAGNOSIS — K50.00 CROHN'S DISEASE OF SMALL INTESTINE WITHOUT COMPLICATION: Primary | ICD-10-CM

## 2022-07-20 PROCEDURE — 99214 OFFICE O/P EST MOD 30 MIN: CPT | Performed by: PHYSICIAN ASSISTANT

## 2022-07-20 RX ORDER — MESALAMINE 1.2 G/1
1200 TABLET, DELAYED RELEASE ORAL
Qty: 30 TABLET | Refills: 1 | Status: ON HOLD | OUTPATIENT
Start: 2022-07-20 | End: 2022-09-06

## 2022-07-20 NOTE — PROGRESS NOTES
Chief Complaint   Patient presents with   • Crohn's Disease       Scott Montes is a 47 y.o. male who presents to the office today for evaluation of Crohn's Disease  .    HPI   Patient presents the clinic today for follow-up of recently diagnosed Crohn's disease.  If she originally started back in May of this year when he was hospitalized.  CT scan revealed extensive thickening in the sigmoid colon wall as well as adjacent stranding with small 1.7 cm fluid collection as well as what appears to be contained focus of extraluminal air.  At the time they were concerned for perforation and abscess.  Patient was given IV antibiotics while in the hospital and discharged home with oral.  He presented to the clinic concerned about having a colonoscopy performed due to his severe abdominal pain.  We continued with a another round of antibiotics prior to getting him scheduled for colonoscopy with Dr. Casanova.  He underwent that procedure on 7/12 which ultimately led to his diagnosis of Crohn's disease.  It appears that is Crohn's disease is located within the sigmoid colon and terminal ileum.  Patient continues to have severe abdominal pain that seems to be worsened after eating.  Pain is generalized throughout the abdomen and is not in one particular spot.  He does have some continued weight loss.  He has not been able to eat much due to the pain and loose bowel movements.  Most of his stools are type V-VII on the [place] stool scale.  Colonoscopy results:  - The perianal and digital rectal examinations were normal.  - Multiple patchy non-bleeding erosions were found in the rectum, in the sigmoid colon, in the descending colon, in the  transverse colon and in the ascending colon. Biopsies were taken with a cold forceps for histology.  - Internal hemorrhoids were found during retroflexion. The hemorrhoids were Grade I (internal hemorrhoids that do not  prolapse).  - The terminal ileum appeared normal.    Review of Systems    Constitutional: Positive for unexpected weight change.   HENT: Negative for sore throat and trouble swallowing.    Eyes: Negative.    Respiratory: Negative for chest tightness.    Cardiovascular: Negative for chest pain.   Gastrointestinal: Positive for abdominal distention, abdominal pain, constipation, nausea and rectal pain. Negative for anal bleeding, blood in stool, diarrhea and vomiting.   Endocrine: Negative.    Genitourinary: Positive for decreased urine volume, difficulty urinating and urgency.   Musculoskeletal: Positive for back pain. Negative for neck pain.   Skin: Negative.    Allergic/Immunologic: Negative for environmental allergies and food allergies.   Neurological: Negative for dizziness and headaches.   Hematological: Bruises/bleeds easily.   Psychiatric/Behavioral: Positive for sleep disturbance. Negative for agitation. The patient is not nervous/anxious.        ACTIVE PROBLEMS:   Specialty Problems        Gastroenterology Problems    Colitis              PAST MEDICAL HISTORY:  Past Medical History:   Diagnosis Date   • Anemia    • Arthritis    • Elevated cholesterol    • Low back pain        SURGICAL HISTORY:  Past Surgical History:   Procedure Laterality Date   • COLONOSCOPY N/A 7/12/2022    Procedure: COLONOSCOPY;  Surgeon: Stella Aparicio MD;  Location: [facility];  Service: Gastroenterology;  Laterality: N/A;   • CYST REMOVAL     • VASECTOMY         FAMILY HISTORY:  Family History   Problem Relation Age of Onset   • Colon cancer Mother        SOCIAL HISTORY:  Social History     Tobacco Use   • Smoking status: Current Every Day Smoker     Packs/day: 0.50     Years: 30.00     Pack years: 15.00   • Smokeless tobacco: Never Used   • Tobacco comment: Smoking cessation encouraged   Substance Use Topics   • Alcohol use: No       CURRENT MEDICATION:    Current Outpatient Medications:   •  Cyanocobalamin (Vitamin B12) 1000 MCG tablet controlled-release, Take 1 tablet by mouth Daily.,  "Disp: 30 tablet, Rfl: 5  •  cyclobenzaprine (FLEXERIL) 5 MG tablet, Take 1 tablet by mouth 3 (Three) Times a Day As Needed for Muscle Spasms., Disp: 90 tablet, Rfl: 5  •  linaclotide (Linzess) 72 MCG capsule capsule, Take 1 capsule by mouth Every Morning Before Breakfast., Disp: 30 capsule, Rfl: 1  •  mesalamine (LIALDA) 1.2 g EC tablet, Take 1 tablet by mouth Daily With Breakfast., Disp: 30 tablet, Rfl: 1  •  nicotine (NICODERM CQ) 14 MG/24HR patch, Place 1 patch on the skin as directed by provider Daily., Disp: 7 patch, Rfl: 0  •  nicotine (NICODERM CQ) 21 MG/24HR patch, Place 1 patch on the skin as directed by provider Daily., Disp: 7 patch, Rfl: 0  •  nicotine (Nicoderm CQ) 7 MG/24HR patch, Place 1 patch on the skin as directed by provider Daily. Start after completed 14 mg patches, Disp: 14 patch, Rfl: 0  •  polyethylene glycol (MIRALAX) 17 g packet, Take 17 g by mouth Daily., Disp: 30 each, Rfl: 0  •  predniSONE (DELTASONE) 10 MG tablet, Take 1 tablet by mouth Take As Directed. 40mgx7, 30mgx7, 20mgx7, 10mgx7, 5mgx6, Disp: 73 tablet, Rfl: 0  •  tadalafil (CIALIS) 10 MG tablet, Take 1 tablet by mouth Daily As Needed for Erectile Dysfunction., Disp: 20 tablet, Rfl: 5  •  vitamin D (ERGOCALCIFEROL) 1.25 MG (80570 UT) capsule capsule, Take 1 capsule by mouth 1 (One) Time Per Week. (Patient taking differently: Take 50,000 Units by mouth 1 (One) Time Per Week. Prior to [facility] Admission, Patient was on:  patient takes on Fridays), Disp: 4 capsule, Rfl: 5    ALLERGIES:  Patient has no known allergies.    VISIT VITALS:  /76   Pulse 82   Ht 188 cm (74\")   Wt 76 kg (167 lb 9.6 oz)   SpO2 98%   BMI 21.52 kg/m²   Physical Exam  Constitutional:       General: He is not in acute distress.     Appearance: Normal appearance. He is well-developed.   HENT:      Head: Normocephalic and atraumatic.   Eyes:      Pupils: Pupils are equal, round, and reactive to light.   Cardiovascular:      Rate and Rhythm: Normal rate " and regular rhythm.      Heart sounds: Normal heart sounds.   Pulmonary:      Effort: Pulmonary effort is normal. No respiratory distress.      Breath sounds: Normal breath sounds. No wheezing, rhonchi or rales.   Abdominal:      General: Abdomen is flat. Bowel sounds are normal. There is no distension.      Palpations: Abdomen is soft. There is no mass.      Tenderness: There is abdominal tenderness (Generalized). There is no guarding or rebound.      Hernia: No hernia is present.   Musculoskeletal:         General: No swelling. Normal range of motion.      Cervical back: Normal range of motion and neck supple.      Right lower leg: No edema.      Left lower leg: No edema.   Skin:     General: Skin is warm and dry.   Neurological:      Mental Status: He is alert and oriented to person, place, and time.   Psychiatric:         Attention and Perception: Attention normal.         Mood and Affect: Mood normal.         Speech: Speech normal.         Behavior: Behavior normal. Behavior is cooperative.         Thought Content: Thought content normal.         Assessment    Due to the patient's symptoms-I will go ahead and get him started on mesalamine 1.2 grams once daily for his Crohn's disease.  In addition to that an order will be placed for Entyvio infusions to control the Crohn's disease.  A previous prednisone taper pack was sent into his pharmacy however he is not picked this up as of yet that she was told to do so today and start tomorrow.  He and his wife were educated on Crohn's disease and several questions ask.    I explained that the patient is at increased risk of infection and lymphoma as well as PML when taking a biologic. There is also increased risk of skin cancers, such as, melanoma and non-melanoma skin cancers.  Because biologics affect the immune system to help control IBD they can affect your ability to fight infection.  Anti-- TNF medications have been associated with a small increase in the  incidence of lymphoma which is an uncommon cancer. The risk is higher in patients that are using anti-TNF medications in combination with azathioprine.  Rarely, anti-TNF medications can cause a lupus-like reaction.  This would be characterized by rash, joint pain, muscle pain and/or fever.  This will resolve generally upon stopping the anti-TNF medication.  Skin reaction such as rashes and an anti-TNF induced psoriasis have been reported.  Rarely, a disorder called progressive multifocal leukoencephalopathy (PML) can occur with certain biologics.  This is very rare.  Occasionally biologics will cause redness, itching, bruising, pain or swelling at the injection site if the medication is given intravenously.  Biologics can also cause headache, fever, chills, hives and other rashes.  Rarely, severe allergic reaction may occur.       Diagnosis Plan   1. Crohn's disease of small intestine without complication (HCC)  mesalamine (LIALDA) 1.2 g EC tablet       Return in about 3 months (around 10/20/2022).                   This document has been electronically signed by Akira Nettles PA-C  July 22, 2022 10:49 EDT    Part of this note may be an electronic transcription/translation of spoken language to printed text using the Dragon Dictation System.

## 2022-07-22 LAB — CALPROTECTIN STL-MCNT: 895 UG/G (ref 0–120)

## 2022-07-25 DIAGNOSIS — R91.8 MULTIPLE LUNG NODULES: Primary | ICD-10-CM

## 2022-07-26 ENCOUNTER — TELEPHONE (OUTPATIENT)
Dept: FAMILY MEDICINE CLINIC | Facility: CLINIC | Age: 47
End: 2022-07-26

## 2022-07-26 ENCOUNTER — TELEPHONE (OUTPATIENT)
Dept: SURGERY | Facility: CLINIC | Age: 47
End: 2022-07-26

## 2022-07-26 NOTE — TELEPHONE ENCOUNTER
SPOKE TO PATIENT, INFORMED HIM  THAT DR. KRAMER CAN NOT FILL OUT HIS FMLA PAPER WORK THAT THE PAPER WORK WILL NEED TO BE FILLED OUT BY MD / PROVIDER THAT PLACED HIM OFF WORK.    PATIENT VERBALLY ACKNOWLEDGED TO CONTACT CURRENT PROVIDER AND LET THEM KNOW THAT FMLA PAPER WORK WILL BE SCANNED INTO MEDIA AND THEY CAN PRINT FROM THAT OPTION.

## 2022-07-26 NOTE — TELEPHONE ENCOUNTER
----- Message from EDDIE Thurman sent at 7/25/2022 12:20 PM EDT -----  Regarding: referral to lung nodule clinic  Due to his lung nodules, I have placed an order for the lung nodule clinic to monitor him.  This will be at [facility].

## 2022-08-01 ENCOUNTER — TELEPHONE (OUTPATIENT)
Dept: ONCOLOGY | Facility: CLINIC | Age: 47
End: 2022-08-01

## 2022-08-01 NOTE — TELEPHONE ENCOUNTER
Caller: Scott Montes    Relationship to patient: Self    Best call back number: [phone]    Chief complaint: PATIENT WANTED TO RESCHEDULE     Type of visit: LAB AND FOLLOW UP    Requested date: SOMETIME IN AUGUST     If rescheduling, when is the original appointment: 9-12-22     Additional notes:PLEASE CALL TO CONFIRM WITH PATIENT

## 2022-08-04 ENCOUNTER — LAB (OUTPATIENT)
Dept: FAMILY MEDICINE CLINIC | Facility: CLINIC | Age: 47
End: 2022-08-04

## 2022-08-04 DIAGNOSIS — U07.1 COVID-19: Primary | ICD-10-CM

## 2022-08-04 LAB
FLUAV SUBTYP SPEC NAA+PROBE: NOT DETECTED
FLUBV RNA ISLT QL NAA+PROBE: NOT DETECTED
SARS-COV-2 RNA PNL SPEC NAA+PROBE: DETECTED

## 2022-08-04 PROCEDURE — 87636 SARSCOV2 & INF A&B AMP PRB: CPT | Performed by: NURSE PRACTITIONER

## 2022-08-09 ENCOUNTER — INFUSION (OUTPATIENT)
Dept: ONCOLOGY | Facility: HOSPITAL | Age: 47
End: 2022-08-09

## 2022-08-09 VITALS
RESPIRATION RATE: 18 BRPM | DIASTOLIC BLOOD PRESSURE: 80 MMHG | SYSTOLIC BLOOD PRESSURE: 124 MMHG | TEMPERATURE: 97.5 F | OXYGEN SATURATION: 99 % | HEART RATE: 71 BPM

## 2022-08-09 DIAGNOSIS — K52.9 COLITIS: Primary | ICD-10-CM

## 2022-08-09 PROCEDURE — 96365 THER/PROPH/DIAG IV INF INIT: CPT

## 2022-08-09 PROCEDURE — 25010000002 VEDOLIZUMAB 300 MG RECONSTITUTED SOLUTION 1 EACH VIAL: Performed by: PHYSICIAN ASSISTANT

## 2022-08-09 RX ADMIN — VEDOLIZUMAB 300 MG: 300 INJECTION, POWDER, LYOPHILIZED, FOR SOLUTION INTRAVENOUS at 12:20

## 2022-08-10 ENCOUNTER — OFFICE VISIT (OUTPATIENT)
Dept: GASTROENTEROLOGY | Facility: CLINIC | Age: 47
End: 2022-08-10

## 2022-08-10 VITALS
DIASTOLIC BLOOD PRESSURE: 85 MMHG | OXYGEN SATURATION: 99 % | SYSTOLIC BLOOD PRESSURE: 133 MMHG | WEIGHT: 172.4 LBS | HEIGHT: 72 IN | HEART RATE: 89 BPM | BODY MASS INDEX: 23.35 KG/M2

## 2022-08-10 DIAGNOSIS — K50.00 CROHN'S DISEASE OF SMALL INTESTINE WITHOUT COMPLICATION: Primary | ICD-10-CM

## 2022-08-10 PROCEDURE — 99213 OFFICE O/P EST LOW 20 MIN: CPT | Performed by: PHYSICIAN ASSISTANT

## 2022-08-10 NOTE — PROGRESS NOTES
Chief Complaint   Patient presents with   • Crohn's Disease       Scott Montes is a 47 y.o. male who presents to the office today for evaluation of Crohn's Disease  .    HPI  Presents to the clinic today for follow-up of Crohn's disease.  He recently underwent his first infusion of Entyvio.  Patient states that he has been doing very well since having the infusion and is not having any side effects at this time.  He states that his abdominal pain has been improving with the use of steroids.  He is currently on the 20 mg dose and drops down to 10 mg tomorrow.  He states as the dosing continues to decrease his pain slightly increases but hopefully with Entyvio on board now it we will start improving.  We have recently resubmitted his [facility] paperwork.  He is currently denying any bright red blood per rectum or melena.  Patient has gained roughly 5 pounds since he was seen in clinic last.     Review of Systems   Constitutional: Positive for unexpected weight change.   HENT: Negative for sore throat and trouble swallowing.    Eyes: Negative.    Respiratory: Negative for chest tightness.    Cardiovascular: Negative for chest pain.   Gastrointestinal: Positive for abdominal distention, abdominal pain, constipation and nausea. Negative for anal bleeding, blood in stool, diarrhea, rectal pain and vomiting.   Endocrine: Negative.    Genitourinary: Positive for decreased urine volume, difficulty urinating and urgency.   Musculoskeletal: Positive for back pain. Negative for neck pain.   Skin: Negative.    Allergic/Immunologic: Negative for environmental allergies and food allergies.   Neurological: Negative for dizziness and headaches.   Hematological: Bruises/bleeds easily.   Psychiatric/Behavioral: Positive for sleep disturbance. Negative for agitation. The patient is not nervous/anxious.        ACTIVE PROBLEMS:   Specialty Problems        Gastroenterology Problems    Colitis              PAST MEDICAL HISTORY:  Past Medical  History:   Diagnosis Date   • Anemia    • Arthritis    • Elevated cholesterol    • Low back pain        SURGICAL HISTORY:  Past Surgical History:   Procedure Laterality Date   • COLONOSCOPY N/A 7/12/2022    Procedure: COLONOSCOPY;  Surgeon: Stella Aparicio MD;  Location: [facility];  Service: Gastroenterology;  Laterality: N/A;   • CYST REMOVAL     • VASECTOMY         FAMILY HISTORY:  Family History   Problem Relation Age of Onset   • Colon cancer Mother        SOCIAL HISTORY:  Social History     Tobacco Use   • Smoking status: Current Every Day Smoker     Packs/day: 0.50     Years: 30.00     Pack years: 15.00   • Smokeless tobacco: Never Used   • Tobacco comment: Smoking cessation encouraged   Substance Use Topics   • Alcohol use: No       CURRENT MEDICATION:    Current Outpatient Medications:   •  Cyanocobalamin (Vitamin B12) 1000 MCG tablet controlled-release, Take 1 tablet by mouth Daily., Disp: 30 tablet, Rfl: 5  •  cyclobenzaprine (FLEXERIL) 5 MG tablet, Take 1 tablet by mouth 3 (Three) Times a Day As Needed for Muscle Spasms., Disp: 90 tablet, Rfl: 5  •  linaclotide (Linzess) 72 MCG capsule capsule, Take 1 capsule by mouth Every Morning Before Breakfast., Disp: 30 capsule, Rfl: 1  •  mesalamine (LIALDA) 1.2 g EC tablet, Take 1 tablet by mouth Daily With Breakfast., Disp: 30 tablet, Rfl: 1  •  nicotine (NICODERM CQ) 14 MG/24HR patch, Place 1 patch on the skin as directed by provider Daily., Disp: 7 patch, Rfl: 0  •  nicotine (NICODERM CQ) 21 MG/24HR patch, Place 1 patch on the skin as directed by provider Daily., Disp: 7 patch, Rfl: 0  •  nicotine (Nicoderm CQ) 7 MG/24HR patch, Place 1 patch on the skin as directed by provider Daily. Start after completed 14 mg patches, Disp: 14 patch, Rfl: 0  •  polyethylene glycol (MIRALAX) 17 g packet, Take 17 g by mouth Daily., Disp: 30 each, Rfl: 0  •  predniSONE (DELTASONE) 10 MG tablet, Take 1 tablet by mouth Take As Directed. 40mgx7, 30mgx7, 20mgx7, 10mgx7,  "5mgx6, Disp: 73 tablet, Rfl: 0  •  tadalafil (CIALIS) 10 MG tablet, Take 1 tablet by mouth Daily As Needed for Erectile Dysfunction., Disp: 20 tablet, Rfl: 5  •  vitamin D (ERGOCALCIFEROL) 1.25 MG (74382 UT) capsule capsule, Take 1 capsule by mouth 1 (One) Time Per Week. (Patient taking differently: Take 50,000 Units by mouth 1 (One) Time Per Week. Prior to [facility] Admission, Patient was on:  patient takes on Fridays), Disp: 4 capsule, Rfl: 5    ALLERGIES:  Patient has no known allergies.    VISIT VITALS:  /85   Pulse 89   Ht 182.9 cm (72\")   Wt 78.2 kg (172 lb 6.4 oz)   SpO2 99%   BMI 23.38 kg/m²   Physical Exam  Constitutional:       General: He is not in acute distress.     Appearance: Normal appearance. He is well-developed.   HENT:      Head: Normocephalic and atraumatic.   Eyes:      Pupils: Pupils are equal, round, and reactive to light.   Cardiovascular:      Rate and Rhythm: Normal rate and regular rhythm.      Heart sounds: Normal heart sounds.   Pulmonary:      Effort: Pulmonary effort is normal. No respiratory distress.      Breath sounds: Normal breath sounds. No wheezing, rhonchi or rales.   Abdominal:      General: Abdomen is flat. Bowel sounds are normal. There is no distension.      Palpations: Abdomen is soft. There is no mass.      Tenderness: There is no abdominal tenderness. There is no guarding or rebound.      Hernia: No hernia is present.   Musculoskeletal:         General: No swelling. Normal range of motion.      Cervical back: Normal range of motion and neck supple.      Right lower leg: No edema.      Left lower leg: No edema.   Skin:     General: Skin is warm and dry.   Neurological:      Mental Status: He is alert and oriented to person, place, and time.   Psychiatric:         Attention and Perception: Attention normal.         Mood and Affect: Mood normal.         Speech: Speech normal.         Behavior: Behavior normal. Behavior is cooperative.         Thought Content: " Thought content normal.         Assessment    Patient recently underwent his first infusion of Entyvio for his Crohn's disease.  He is currently doing well and we will continue with therapy.  His insurance is requiring nonhospital-based infusion clinic to take over maintenance dosing.  I will be working on finding patient a place for this can be performed.   Diagnosis Plan   1. Crohn's disease of small intestine without complication (HCC)         Return in about 2 months (around 10/10/2022).                   This document has been electronically signed by Akira Nettles PA-C  August 18, 2022 11:31 EDT    Part of this note may be an electronic transcription/translation of spoken language to printed text using the Dragon Dictation System.

## 2022-08-17 ENCOUNTER — TELEPHONE (OUTPATIENT)
Dept: ONCOLOGY | Facility: CLINIC | Age: 47
End: 2022-08-17

## 2022-08-17 NOTE — TELEPHONE ENCOUNTER
Caller: NESHA    Relationship: [facility] [employer]    Best call back number: [phone]    What is the best time to reach you: ANYTIME    Who are you requesting to speak with (clinical staff, provider,  specific staff member): SONU MORGAN OR NURSE    What was the call regarding: CALLING TO REQ FMLA PAPERWORK - LOOKS LIKE IT IS IN THE PTS CHART UNDER MEDIA FOR 8/10/22. STATES THEY DID NOT RECV YET.     PLEASE FAX -[phone]     REF # [id number]    Do you require a callback: NO - PLEASE FAX COPY OF PAPERWORK ASAP.

## 2022-08-23 ENCOUNTER — INFUSION (OUTPATIENT)
Dept: ONCOLOGY | Facility: HOSPITAL | Age: 47
End: 2022-08-23

## 2022-08-23 ENCOUNTER — OFFICE VISIT (OUTPATIENT)
Dept: ONCOLOGY | Facility: CLINIC | Age: 47
End: 2022-08-23

## 2022-08-23 ENCOUNTER — LAB (OUTPATIENT)
Dept: ONCOLOGY | Facility: CLINIC | Age: 47
End: 2022-08-23

## 2022-08-23 VITALS
RESPIRATION RATE: 18 BRPM | WEIGHT: 171.8 LBS | BODY MASS INDEX: 22.05 KG/M2 | HEART RATE: 76 BPM | TEMPERATURE: 97.8 F | OXYGEN SATURATION: 98 % | HEIGHT: 74 IN | SYSTOLIC BLOOD PRESSURE: 128 MMHG | DIASTOLIC BLOOD PRESSURE: 88 MMHG

## 2022-08-23 VITALS
RESPIRATION RATE: 18 BRPM | SYSTOLIC BLOOD PRESSURE: 126 MMHG | BODY MASS INDEX: 22.05 KG/M2 | HEIGHT: 74 IN | TEMPERATURE: 97.5 F | WEIGHT: 171.8 LBS | HEART RATE: 96 BPM | DIASTOLIC BLOOD PRESSURE: 83 MMHG | OXYGEN SATURATION: 98 %

## 2022-08-23 DIAGNOSIS — D64.9 ANEMIA, UNSPECIFIED TYPE: ICD-10-CM

## 2022-08-23 DIAGNOSIS — D75.839 THROMBOCYTOSIS, UNSPECIFIED: ICD-10-CM

## 2022-08-23 DIAGNOSIS — D72.829 LEUKOCYTOSIS, UNSPECIFIED TYPE: Primary | ICD-10-CM

## 2022-08-23 DIAGNOSIS — K50.10 CROHN'S DISEASE OF COLON WITHOUT COMPLICATION: ICD-10-CM

## 2022-08-23 DIAGNOSIS — K52.9 COLITIS: Primary | ICD-10-CM

## 2022-08-23 DIAGNOSIS — K51.80 OTHER ULCERATIVE COLITIS WITHOUT COMPLICATION: ICD-10-CM

## 2022-08-23 LAB
BASOPHILS # BLD AUTO: 0.15 10*3/MM3 (ref 0–0.2)
BASOPHILS NFR BLD AUTO: 1.2 % (ref 0–1.5)
DEPRECATED RDW RBC AUTO: 53.4 FL (ref 37–54)
EOSINOPHIL # BLD AUTO: 0.21 10*3/MM3 (ref 0–0.4)
EOSINOPHIL NFR BLD AUTO: 1.6 % (ref 0.3–6.2)
ERYTHROCYTE [DISTWIDTH] IN BLOOD BY AUTOMATED COUNT: 17.5 % (ref 12.3–15.4)
FERRITIN SERPL-MCNC: 325.3 NG/ML (ref 30–400)
HCT VFR BLD AUTO: 46.1 % (ref 37.5–51)
HGB BLD-MCNC: 14.3 G/DL (ref 13–17.7)
IMM GRANULOCYTES # BLD AUTO: 0.06 10*3/MM3 (ref 0–0.05)
IMM GRANULOCYTES NFR BLD AUTO: 0.5 % (ref 0–0.5)
IRON 24H UR-MRATE: 40 MCG/DL (ref 59–158)
IRON SATN MFR SERPL: 10 % (ref 20–50)
LYMPHOCYTES # BLD AUTO: 2.34 10*3/MM3 (ref 0.7–3.1)
LYMPHOCYTES NFR BLD AUTO: 18.2 % (ref 19.6–45.3)
MCH RBC QN AUTO: 26 PG (ref 26.6–33)
MCHC RBC AUTO-ENTMCNC: 31 G/DL (ref 31.5–35.7)
MCV RBC AUTO: 83.7 FL (ref 79–97)
MONOCYTES # BLD AUTO: 0.79 10*3/MM3 (ref 0.1–0.9)
MONOCYTES NFR BLD AUTO: 6.1 % (ref 5–12)
NEUTROPHILS NFR BLD AUTO: 72.4 % (ref 42.7–76)
NEUTROPHILS NFR BLD AUTO: 9.3 10*3/MM3 (ref 1.7–7)
NRBC BLD AUTO-RTO: 0 /100 WBC (ref 0–0.2)
PLATELET # BLD AUTO: 360 10*3/MM3 (ref 140–450)
PMV BLD AUTO: 9.5 FL (ref 6–12)
RBC # BLD AUTO: 5.51 10*6/MM3 (ref 4.14–5.8)
TIBC SERPL-MCNC: 404 MCG/DL (ref 298–536)
TRANSFERRIN SERPL-MCNC: 271 MG/DL (ref 200–360)
WBC NRBC COR # BLD: 12.85 10*3/MM3 (ref 3.4–10.8)

## 2022-08-23 PROCEDURE — 84466 ASSAY OF TRANSFERRIN: CPT | Performed by: NURSE PRACTITIONER

## 2022-08-23 PROCEDURE — 85025 COMPLETE CBC W/AUTO DIFF WBC: CPT | Performed by: NURSE PRACTITIONER

## 2022-08-23 PROCEDURE — 96365 THER/PROPH/DIAG IV INF INIT: CPT

## 2022-08-23 PROCEDURE — 99214 OFFICE O/P EST MOD 30 MIN: CPT | Performed by: NURSE PRACTITIONER

## 2022-08-23 PROCEDURE — 83540 ASSAY OF IRON: CPT | Performed by: NURSE PRACTITIONER

## 2022-08-23 PROCEDURE — 82728 ASSAY OF FERRITIN: CPT | Performed by: NURSE PRACTITIONER

## 2022-08-23 PROCEDURE — 25010000002 VEDOLIZUMAB 300 MG RECONSTITUTED SOLUTION 1 EACH VIAL: Performed by: PHYSICIAN ASSISTANT

## 2022-08-23 RX ADMIN — VEDOLIZUMAB 300 MG: 300 INJECTION, POWDER, LYOPHILIZED, FOR SOLUTION INTRAVENOUS at 11:03

## 2022-08-23 NOTE — PROGRESS NOTES
DATE:  8/23/2022    DIAGNOSIS:   Leukocytosis, Thrombocytosis, Anemia    CHIEF COMPLAINT:  None.    TREATMENT HISTORY:  Ferrous Sulfate 325 mg daily- discontinued     HISTORY OF PRESENT ILLNESS:   Scott Montes is a very pleasant 47 y.o. male who is being seen today at the request of EDDIE Thurman for evaluation and treatment of leukocytosis and thrombocytosis. He reports that he follows with PCP on an as needed basis. However, he recently developed pain in the abdomen and groin area and was evaluated by PCP. At that time, she obtained lab work which showed abnormal counts. Previous available labs were reviewed and patient had elevated WBC (15.05) and elevated platelet count 538,000 on 04/19/2022. CBC from one year ago showed elevated WBC (12.34) with normal H/H and platelet count. CBC from 2018 was normal. Unfortunately, there are no other CBCs available to review for comparison of trend. He is a chronic, heavy smoker 1 PPD for the last 30 years. He will occasionally drink 2-3 beers on the weekend. He reports a poor appetite and has had a 12 pound weight loss within the last year. He denies any fever/chills or drenching night sweats. Denies tender/enlarged LNs. Denies any recent infections. He does report worsening fatigue and generalized weakness. He also reports chronic arthritic pain in his back.  Denies headache or any focal weakness. Denies any abnormal bleeding. He reports having EGD/colonoscopy many years ago. His PCP has arranged CT Chest, AP on 05/18/2022. He denies any other specific complaints today.     Interval History:  Mr. Montes presents today for follow up. Overall, he reports that he has been doing much better since his last visit. He was diagnosed with Crohn's and Ulcerative Colitis and has been started on Entyvio and mesalamine which has been helpful. He has also recently completed a prednisone taper. Appetite has improved and he has gained weight. He denies any abdominal pain today.  Denies weakness/fatigue or headache. He denies any obvious or abnormal bleeding. He continues to smoke 1 PPD.  He denies any other specific complaints today.     The following portions of the patient's history were reviewed and updated as appropriate: allergies, current medications, past family history, past medical history, past social history, past surgical history and problem list.    PAST MEDICAL HISTORY:  Past Medical History:   Diagnosis Date   • Anemia    • Arthritis    • Elevated cholesterol    • Low back pain        PAST SURGICAL HISTORY:  Past Surgical History:   Procedure Laterality Date   • COLONOSCOPY N/A 7/12/2022    Procedure: COLONOSCOPY;  Surgeon: Stella Aparicio MD;  Location: [facility];  Service: Gastroenterology;  Laterality: N/A;   • CYST REMOVAL     • VASECTOMY         SOCIAL HISTORY:  Social History     Socioeconomic History   • Marital status:    Tobacco Use   • Smoking status: Current Every Day Smoker     Packs/day: 0.50     Years: 30.00     Pack years: 15.00   • Smokeless tobacco: Never Used   • Tobacco comment: Smoking cessation encouraged   Vaping Use   • Vaping Use: Never used   Substance and Sexual Activity   • Alcohol use: No   • Drug use: No   • Sexual activity: Defer           FAMILY HISTORY:  Family History   Problem Relation Age of Onset   • Colon cancer Mother          MEDICATIONS:  The current medication list was reviewed in the EMR    Current Outpatient Medications:   •  Cyanocobalamin (Vitamin B12) 1000 MCG tablet controlled-release, Take 1 tablet by mouth Daily., Disp: 30 tablet, Rfl: 5  •  cyclobenzaprine (FLEXERIL) 5 MG tablet, Take 1 tablet by mouth 3 (Three) Times a Day As Needed for Muscle Spasms., Disp: 90 tablet, Rfl: 5  •  linaclotide (Linzess) 72 MCG capsule capsule, Take 1 capsule by mouth Every Morning Before Breakfast., Disp: 30 capsule, Rfl: 1  •  mesalamine (LIALDA) 1.2 g EC tablet, Take 1 tablet by mouth Daily With Breakfast., Disp: 30 tablet,  Rfl: 1  •  nicotine (NICODERM CQ) 14 MG/24HR patch, Place 1 patch on the skin as directed by provider Daily., Disp: 7 patch, Rfl: 0  •  nicotine (NICODERM CQ) 21 MG/24HR patch, Place 1 patch on the skin as directed by provider Daily., Disp: 7 patch, Rfl: 0  •  nicotine (Nicoderm CQ) 7 MG/24HR patch, Place 1 patch on the skin as directed by provider Daily. Start after completed 14 mg patches, Disp: 14 patch, Rfl: 0  •  polyethylene glycol (MIRALAX) 17 g packet, Take 17 g by mouth Daily., Disp: 30 each, Rfl: 0  •  tadalafil (CIALIS) 10 MG tablet, Take 1 tablet by mouth Daily As Needed for Erectile Dysfunction., Disp: 20 tablet, Rfl: 5  •  vitamin D (ERGOCALCIFEROL) 1.25 MG (04648 UT) capsule capsule, Take 1 capsule by mouth 1 (One) Time Per Week. (Patient taking differently: Take 50,000 Units by mouth 1 (One) Time Per Week. Prior to [facility] [place] [place] by [facility] Admission, Patient was on:  patient takes on Fridays), Disp: 4 capsule, Rfl: 5  •  predniSONE (DELTASONE) 10 MG tablet, Take 1 tablet by mouth Take As Directed. 40mgx7, 30mgx7, 20mgx7, 10mgx7, 5mgx6, Disp: 73 tablet, Rfl: 0    ALLERGIES:  No Known Allergies      REVIEW OF SYSTEMS:    A comprehensive 14 point review of systems was performed.  Significant findings as mentioned above.  All other systems reviewed and are negative.        Physical Exam   Vital Signs:   Vitals:    08/23/22 0922   BP: 126/83   Pulse: 96   Resp: 18   Temp: 97.5 °F (36.4 °C)   SpO2: 98%    BMI is within normal parameters. No other follow-up for BMI required.    ECOG score: 0   General: Well developed, well nourished, alert and oriented x 3, in no acute distress.   Head: ATNC   Eyes: PERRL, No evidence of conjunctivitis.   Nose: No nasal discharge.   Mouth: Oral mucosal membranes moist. No oral ulceration or hemorrhages.   Neck: Neck supple. No thyromegaly. No JVD.   Lungs: Clear in all fields to A&P without rales, rhonchi or wheezing.   Heart: S1, S2. Regular rate and rhythm. No murmurs, rubs, or gallops.    Abdomen: Soft. Bowel sounds are normoactive. Mild tenderness with palpation.  Extremities: No clubbing, cyanosis or edema bilaterally.    Integumentary: No rash, sores, erythema or nodules. No blistering, bruising, or dry skin.   Lymph Nodes: No palpable cervical, submandibular, supraclavicular, axillary lymphadenopathy noted. No petechiae, purpura or ecchymosis noted.   Neurologic: Grossly non-focal exam.     Pain Score:  Pain Score    08/23/22 0922   PainSc: 0-No pain       PHQ-Score Total:  PHQ-9 Total Score:         PATHOLOGY:        ENDOSCOPY:  Colonoscopy 07/12/2022- No procedure note available.       IMAGING:  CT Chest with Contrast 05/06/2022  IMPRESSION:  1. Parenchymal nodules bilaterally. The largest is in the right lung and  measures above 8 mm. Therefore, this may be visible at PET/CT.  2. No effusions or adenopathy.  3. Recommend PET/CT or short interval 3-month follow-up CT of the chest.    CT AP with Contrast 05/06/2022  IMPRESSION:   1. Extensive abnormal thickening in the sigmoid colon. Small adjacent  fluid collections concerning for small adjacent abscesses versus less  likely necrotic masses. Contained perforation is noted as well. Trace  free fluid in the pelvis. I favor this representing inflammatory change  with resulting small adjacent abscesses. However, direct visualization  is suggested after resolution of the acute inflammatory event to exclude  any underlying mass.  2.Thickening of the urinary bladder wall.  3. Cholelithiasis.    NM PET-CT 05/11/2022  IMPRESSION:  1.  Bilateral pulmonary nodules detailed above which show no FDG  hypermetabolism in the range of malignancy. Some of the nodules are  below size threshold for adequate characterization with PET/CT.  2.  Wall thickening with inflammation and small extraluminal fluid, air,  and retraction type changes in the rectosigmoid region overall minimally  improved from the previous exam and with intense FDG hypermetabolism  noted.  Maximum SUV: 10.7. This may represent infection or inflammation.  Malignancy not excluded given location and other findings detailed  above. Continued surveillance and follow-up imaging is warranted.  3.  Cholelithiasis.  4.  Other nonacute findings as detailed above.      RECENT LABS:  Lab Results   Component Value Date    WBC 12.85 (H) 08/23/2022    HGB 14.3 08/23/2022    HCT 46.1 08/23/2022    MCV 83.7 08/23/2022    RDW 17.5 (H) 08/23/2022     08/23/2022    NEUTRORELPCT 72.4 08/23/2022    LYMPHORELPCT 18.2 (L) 08/23/2022    MONORELPCT 6.1 08/23/2022    EOSRELPCT 1.6 08/23/2022    BASORELPCT 1.2 08/23/2022    NEUTROABS 9.30 (H) 08/23/2022    LYMPHSABS 2.34 08/23/2022       Lab Results   Component Value Date     (L) 05/06/2022    K 4.5 05/06/2022    CO2 24.9 05/06/2022     05/06/2022    BUN 11 05/06/2022    CREATININE 0.68 (L) 05/06/2022    EGFRIFNONA 98 03/25/2021    GLUCOSE 81 05/06/2022    CALCIUM 9.1 05/06/2022    ALKPHOS 145 (H) 05/06/2022    AST 17 05/06/2022    ALT 20 05/06/2022    BILITOT 0.3 05/06/2022    ALBUMIN 3.20 (L) 05/06/2022    PROTEINTOT 8.4 05/06/2022           Lab Results   Component Value Date    FERRITIN 659.90 (H) 05/04/2022    IRON 17 (L) 05/04/2022    TIBC 349 05/04/2022    LABIRON 5 (L) 05/04/2022    EIDSEUBY91 548 04/19/2022      Work up 05/04/2022      Sed Rate   0 - 15 mm/hr 118 High      C-Reactive Protein   0.00 - 0.50 mg/dL 11.50 High      Hepatitis B Surface Ag   Non-Reactive Non-Reactive    Hep A IgM   Non-Reactive Non-Reactive    Hep B C IgM   Non-Reactive Non-Reactive    Hepatitis C Ab   Non-Reactive Non-Reactive      HIV-1/ HIV-2   Non-Reactive Non-Reactive      CADY Direct   Negative Negative      Rheumatoid Factor Quantitative   0.0 - 14.0 IU/mL 12.2         BCR/ABL: Negative  JAK2 V617 Mutation Qnt Result Comment    Comment: NEGATIVE      JAK2 Exons 12-15 Mut Det PCR Comment    Comment: NEGATIVE        ASSESSMENT & PLAN:  Scott Montes is a very pleasant 47  y.o. male with    1. Leukocytosis  2. Thrombocytosis  3. Microcytic Anemia/MICHELLE  - Previous available labs were reviewed and patient had elevated WBC (15.05) and elevated platelet count 538,000 on 04/19/2022. CBC from one year ago showed elevated WBC (12.34) with normal H/H and platelet count. CBC from 2018 was normal. Unfortunately, there are no other CBCs available to review for comparison of trend.  - He is a chronic, heavy smoker 1 PPD for the last 30 years. Other than worsening fatigue, he has no concerning B-symptoms.   - Based on history, smoking could be contributing to his elevated WBC along with underlying inflammation.  - CBC from initial consultation showed ongoing leukocytosis with WBC (11.27) which had improved with mildly low Hg (12.9) and elevated platelet count 559,000.   - Sent additional labs to further evaluate including PBS (summarized above). ESR and CRP significantly elevated and suggestive of chronic, underlying inflammation. Acute Hepatitis panel and HIV panel non-reactive. CADY and RF negative. Iron panel was low with elevated ferritin which is likely reactive. Therefore, started him on Ferrous Sulfate daily which he discontinued due to worsening constipation and nausea. Low Iron panel was also likely contributing to thrombocytosis. Also sent Flow cytometry, BCR/ABL, ELOY II V617F and ELOY II Exon 12-15 to evaluate for possible underlying myeloproliferative disorder which were negative.    - CT imaging was performed (summarized above) and was concerning for colitis with contained perforation. PET-CT was also obtained which showed bilateral pulmonary nodules which showed no FDG hypermetabolism in the range of malignancy, wall thickening with inflammation and small extraluminal fluid, air and retraction type changes in the rectosigmoid region minimally improved from previous exam and with intense FDG hypermetabolism noted which may represent infection or inflammation, malignancy not excluded,  cholelithiasis.  - Repeat CBC from today shows ongoing leukocytosis. WBC and H/H are within normal. Discussed with patient that leukocytosis is likely multifactorial and related to chronic underlying inflammation, smoking and medication (prednisone). Iron panel is low with normal ferritin. Will continue to monitor.   - Will follow up in 6 months with repeat labs.   - In the meantime, strongly advised patient to cut back and quit smoking.     4. Crohn's Disease  5. Ulcerative Colitis  - Recently diagnosed and following with GI. He was started on Entyvio and Mesalamine and symptoms have significantly improved.   - Ongoing management per GI.        ACO / MATIAS/Other  Quality measures  -  Scott Montes did not receive 2021 flu vaccine. This was recommended, he declined.  -  Scott Montes reports a pain score of 0.    -  Current outpatient and discharge medications have been reconciled for the patient.  Reviewed by: EDDIE Gray        A total of 30 minutes were spent coordinating this patient’s care in clinic today; more than 50% of this time was face-to-face with the patient, reviewing his interim medical history and counseling on the current treatment and followup plan. All questions were answered to his satisfaction.          Electronically Signed by: EDDIE Desai APRN August 23, 2022 09:49 EDT       CC:   No ref. provider found  Asuncion Keyes APRN

## 2022-09-06 ENCOUNTER — HOSPITAL ENCOUNTER (INPATIENT)
Facility: HOSPITAL | Age: 47
LOS: 1 days | Discharge: HOME | End: 2022-09-07
Attending: INTERNAL MEDICINE | Admitting: INTERNAL MEDICINE

## 2022-09-06 ENCOUNTER — APPOINTMENT (OUTPATIENT)
Dept: GENERAL RADIOLOGY | Facility: HOSPITAL | Age: 47
End: 2022-09-06

## 2022-09-06 DIAGNOSIS — I21.11 ST ELEVATION MYOCARDIAL INFARCTION INVOLVING RIGHT CORONARY ARTERY: Primary | ICD-10-CM

## 2022-09-06 PROBLEM — I21.3 STEMI (ST ELEVATION MYOCARDIAL INFARCTION) (HCC): Status: ACTIVE | Noted: 2022-09-06

## 2022-09-06 LAB
ACT BLD: 214 SECONDS (ref 82–152)
ALBUMIN SERPL-MCNC: 3.41 G/DL (ref 3.5–5.2)
ALBUMIN/GLOB SERPL: 1.2 G/DL
ALP SERPL-CCNC: 101 U/L (ref 39–117)
ALT SERPL W P-5'-P-CCNC: 24 U/L (ref 1–41)
ANION GAP SERPL CALCULATED.3IONS-SCNC: 15.9 MMOL/L (ref 5–15)
AST SERPL-CCNC: 118 U/L (ref 1–40)
BASOPHILS # BLD AUTO: 0.16 10*3/MM3 (ref 0–0.2)
BASOPHILS NFR BLD AUTO: 0.6 % (ref 0–1.5)
BILIRUB SERPL-MCNC: 0.3 MG/DL (ref 0–1.2)
BUN SERPL-MCNC: 10 MG/DL (ref 6–20)
BUN/CREAT SERPL: 13.7 (ref 7–25)
CALCIUM SPEC-SCNC: 8.8 MG/DL (ref 8.6–10.5)
CHLORIDE SERPL-SCNC: 100 MMOL/L (ref 98–107)
CO2 SERPL-SCNC: 17.1 MMOL/L (ref 22–29)
CREAT SERPL-MCNC: 0.73 MG/DL (ref 0.76–1.27)
DEPRECATED RDW RBC AUTO: 51.6 FL (ref 37–54)
EGFRCR SERPLBLD CKD-EPI 2021: 112.2 ML/MIN/1.73
EOSINOPHIL # BLD AUTO: 0.07 10*3/MM3 (ref 0–0.4)
EOSINOPHIL NFR BLD AUTO: 0.3 % (ref 0.3–6.2)
ERYTHROCYTE [DISTWIDTH] IN BLOOD BY AUTOMATED COUNT: 16.8 % (ref 12.3–15.4)
GLOBULIN UR ELPH-MCNC: 2.9 GM/DL
GLUCOSE SERPL-MCNC: 130 MG/DL (ref 65–99)
HCT VFR BLD AUTO: 44.2 % (ref 37.5–51)
HGB BLD-MCNC: 13.7 G/DL (ref 13–17.7)
IMM GRANULOCYTES # BLD AUTO: 0.24 10*3/MM3 (ref 0–0.05)
IMM GRANULOCYTES NFR BLD AUTO: 0.9 % (ref 0–0.5)
LYMPHOCYTES # BLD AUTO: 2.18 10*3/MM3 (ref 0.7–3.1)
LYMPHOCYTES NFR BLD AUTO: 8.1 % (ref 19.6–45.3)
MCH RBC QN AUTO: 25.9 PG (ref 26.6–33)
MCHC RBC AUTO-ENTMCNC: 31 G/DL (ref 31.5–35.7)
MCV RBC AUTO: 83.7 FL (ref 79–97)
MONOCYTES # BLD AUTO: 1.11 10*3/MM3 (ref 0.1–0.9)
MONOCYTES NFR BLD AUTO: 4.1 % (ref 5–12)
NEUTROPHILS NFR BLD AUTO: 23.04 10*3/MM3 (ref 1.7–7)
NEUTROPHILS NFR BLD AUTO: 86 % (ref 42.7–76)
NRBC BLD AUTO-RTO: 0 /100 WBC (ref 0–0.2)
PLATELET # BLD AUTO: 425 10*3/MM3 (ref 140–450)
PMV BLD AUTO: 9.9 FL (ref 6–12)
POTASSIUM SERPL-SCNC: 3.5 MMOL/L (ref 3.5–5.2)
PROT SERPL-MCNC: 6.3 G/DL (ref 6–8.5)
RBC # BLD AUTO: 5.28 10*6/MM3 (ref 4.14–5.8)
SODIUM SERPL-SCNC: 133 MMOL/L (ref 136–145)
TROPONIN T SERPL-MCNC: 0.78 NG/ML (ref 0–0.03)
WBC NRBC COR # BLD: 26.8 10*3/MM3 (ref 3.4–10.8)

## 2022-09-06 PROCEDURE — 71045 X-RAY EXAM CHEST 1 VIEW: CPT

## 2022-09-06 PROCEDURE — 25010000002 PHENYLEPHRINE 10 MG/ML SOLUTION: Performed by: INTERNAL MEDICINE

## 2022-09-06 PROCEDURE — B2111ZZ FLUOROSCOPY OF MULTIPLE CORONARY ARTERIES USING LOW OSMOLAR CONTRAST: ICD-10-PCS | Performed by: INTERNAL MEDICINE

## 2022-09-06 PROCEDURE — 93458 L HRT ARTERY/VENTRICLE ANGIO: CPT | Performed by: INTERNAL MEDICINE

## 2022-09-06 PROCEDURE — C9606 PERC D-E COR REVASC W AMI S: HCPCS | Performed by: INTERNAL MEDICINE

## 2022-09-06 PROCEDURE — 92978 ENDOLUMINL IVUS OCT C 1ST: CPT | Performed by: INTERNAL MEDICINE

## 2022-09-06 PROCEDURE — 80053 COMPREHEN METABOLIC PANEL: CPT | Performed by: INTERNAL MEDICINE

## 2022-09-06 PROCEDURE — C1894 INTRO/SHEATH, NON-LASER: HCPCS | Performed by: INTERNAL MEDICINE

## 2022-09-06 PROCEDURE — 0 IOPAMIDOL PER 1 ML: Performed by: INTERNAL MEDICINE

## 2022-09-06 PROCEDURE — C1757 CATH, THROMBECTOMY/EMBOLECT: HCPCS | Performed by: INTERNAL MEDICINE

## 2022-09-06 PROCEDURE — 92941 PRQ TRLML REVSC TOT OCCL AMI: CPT | Performed by: INTERNAL MEDICINE

## 2022-09-06 PROCEDURE — C1725 CATH, TRANSLUMIN NON-LASER: HCPCS | Performed by: INTERNAL MEDICINE

## 2022-09-06 PROCEDURE — 99223 1ST HOSP IP/OBS HIGH 75: CPT | Performed by: INTERNAL MEDICINE

## 2022-09-06 PROCEDURE — 25010000002 ATROPINE PER 0.01 MG: Performed by: INTERNAL MEDICINE

## 2022-09-06 PROCEDURE — 0 LIDOCAINE 1 % SOLUTION: Performed by: INTERNAL MEDICINE

## 2022-09-06 PROCEDURE — 25010000002 MIDAZOLAM PER 1 MG: Performed by: INTERNAL MEDICINE

## 2022-09-06 PROCEDURE — 25010000002 FENTANYL CITRATE (PF) 50 MCG/ML SOLUTION: Performed by: INTERNAL MEDICINE

## 2022-09-06 PROCEDURE — 85347 COAGULATION TIME ACTIVATED: CPT

## 2022-09-06 PROCEDURE — 4A023N7 MEASUREMENT OF CARDIAC SAMPLING AND PRESSURE, LEFT HEART, PERCUTANEOUS APPROACH: ICD-10-PCS | Performed by: INTERNAL MEDICINE

## 2022-09-06 PROCEDURE — B2151ZZ FLUOROSCOPY OF LEFT HEART USING LOW OSMOLAR CONTRAST: ICD-10-PCS | Performed by: INTERNAL MEDICINE

## 2022-09-06 PROCEDURE — B240ZZ3 ULTRASONOGRAPHY OF SINGLE CORONARY ARTERY, INTRAVASCULAR: ICD-10-PCS | Performed by: INTERNAL MEDICINE

## 2022-09-06 PROCEDURE — 84484 ASSAY OF TROPONIN QUANT: CPT | Performed by: INTERNAL MEDICINE

## 2022-09-06 PROCEDURE — 25010000002 EPTIFIBATIDE 20 MG/10ML SOLUTION: Performed by: INTERNAL MEDICINE

## 2022-09-06 PROCEDURE — C1753 CATH, INTRAVAS ULTRASOUND: HCPCS | Performed by: INTERNAL MEDICINE

## 2022-09-06 PROCEDURE — 25010000002 HEPARIN (PORCINE) PER 1000 UNITS: Performed by: INTERNAL MEDICINE

## 2022-09-06 PROCEDURE — C1887 CATHETER, GUIDING: HCPCS | Performed by: INTERNAL MEDICINE

## 2022-09-06 PROCEDURE — 027034Z DILATION OF CORONARY ARTERY, ONE ARTERY WITH DRUG-ELUTING INTRALUMINAL DEVICE, PERCUTANEOUS APPROACH: ICD-10-PCS | Performed by: INTERNAL MEDICINE

## 2022-09-06 PROCEDURE — C1874 STENT, COATED/COV W/DEL SYS: HCPCS | Performed by: INTERNAL MEDICINE

## 2022-09-06 PROCEDURE — C1769 GUIDE WIRE: HCPCS | Performed by: INTERNAL MEDICINE

## 2022-09-06 PROCEDURE — 02C03ZZ EXTIRPATION OF MATTER FROM CORONARY ARTERY, ONE ARTERY, PERCUTANEOUS APPROACH: ICD-10-PCS | Performed by: INTERNAL MEDICINE

## 2022-09-06 PROCEDURE — 85025 COMPLETE CBC W/AUTO DIFF WBC: CPT | Performed by: INTERNAL MEDICINE

## 2022-09-06 DEVICE — XIENCE SKYPOINT™ EVEROLIMUS ELUTING CORONARY STENT SYSTEM 3.50 MM X 28 MM / RAPID-EXCHANGE
Type: IMPLANTABLE DEVICE | Status: FUNCTIONAL
Brand: XIENCE SKYPOINT™

## 2022-09-06 RX ORDER — ASPIRIN 81 MG/1
81 TABLET ORAL DAILY
Status: DISCONTINUED | OUTPATIENT
Start: 2022-09-07 | End: 2022-09-07 | Stop reason: HOSPADM

## 2022-09-06 RX ORDER — ATORVASTATIN CALCIUM 40 MG/1
80 TABLET, FILM COATED ORAL NIGHTLY
Status: DISCONTINUED | OUTPATIENT
Start: 2022-09-06 | End: 2022-09-07 | Stop reason: HOSPADM

## 2022-09-06 RX ORDER — ATROPINE SULFATE 1 MG/ML
INJECTION, SOLUTION INTRAMUSCULAR; INTRAVENOUS; SUBCUTANEOUS AS NEEDED
Status: DISCONTINUED | OUTPATIENT
Start: 2022-09-06 | End: 2022-09-06 | Stop reason: HOSPADM

## 2022-09-06 RX ORDER — FENTANYL CITRATE 50 UG/ML
INJECTION, SOLUTION INTRAMUSCULAR; INTRAVENOUS AS NEEDED
Status: DISCONTINUED | OUTPATIENT
Start: 2022-09-06 | End: 2022-09-06 | Stop reason: HOSPADM

## 2022-09-06 RX ORDER — MESALAMINE 400 MG/1
1200 CAPSULE, DELAYED RELEASE ORAL DAILY
Status: CANCELLED | OUTPATIENT
Start: 2022-09-07

## 2022-09-06 RX ORDER — NICOTINE 21 MG/24HR
1 PATCH, TRANSDERMAL 24 HOURS TRANSDERMAL EVERY 24 HOURS
Status: DISCONTINUED | OUTPATIENT
Start: 2022-09-06 | End: 2022-09-07 | Stop reason: HOSPADM

## 2022-09-06 RX ORDER — LISINOPRIL 2.5 MG/1
5 TABLET ORAL DAILY
Status: DISCONTINUED | OUTPATIENT
Start: 2022-09-06 | End: 2022-09-07

## 2022-09-06 RX ORDER — ASPIRIN 81 MG/1
TABLET, CHEWABLE ORAL AS NEEDED
Status: DISCONTINUED | OUTPATIENT
Start: 2022-09-06 | End: 2022-09-06 | Stop reason: HOSPADM

## 2022-09-06 RX ORDER — SODIUM CHLORIDE 9 MG/ML
INJECTION, SOLUTION INTRAVENOUS CONTINUOUS PRN
Status: COMPLETED | OUTPATIENT
Start: 2022-09-06 | End: 2022-09-06

## 2022-09-06 RX ORDER — MIDAZOLAM HYDROCHLORIDE 1 MG/ML
INJECTION INTRAMUSCULAR; INTRAVENOUS AS NEEDED
Status: DISCONTINUED | OUTPATIENT
Start: 2022-09-06 | End: 2022-09-06 | Stop reason: HOSPADM

## 2022-09-06 RX ORDER — VERAPAMIL HYDROCHLORIDE 2.5 MG/ML
INJECTION, SOLUTION INTRAVENOUS AS NEEDED
Status: DISCONTINUED | OUTPATIENT
Start: 2022-09-06 | End: 2022-09-06 | Stop reason: HOSPADM

## 2022-09-06 RX ORDER — MESALAMINE 400 MG/1
400 CAPSULE, DELAYED RELEASE ORAL 3 TIMES DAILY
Status: DISCONTINUED | OUTPATIENT
Start: 2022-09-06 | End: 2022-09-07 | Stop reason: HOSPADM

## 2022-09-06 RX ORDER — HEPARIN SODIUM 1000 [USP'U]/ML
INJECTION, SOLUTION INTRAVENOUS; SUBCUTANEOUS AS NEEDED
Status: DISCONTINUED | OUTPATIENT
Start: 2022-09-06 | End: 2022-09-06 | Stop reason: HOSPADM

## 2022-09-06 RX ORDER — MESALAMINE 1.2 G/1
1200 TABLET, DELAYED RELEASE ORAL
COMMUNITY
End: 2022-09-29 | Stop reason: SDUPTHER

## 2022-09-06 RX ORDER — VEDOLIZUMAB 300 MG/5ML
300 INJECTION, POWDER, LYOPHILIZED, FOR SOLUTION INTRAVENOUS ONCE
COMMUNITY
End: 2022-10-17

## 2022-09-06 RX ORDER — ACETAMINOPHEN 325 MG/1
650 TABLET ORAL EVERY 4 HOURS PRN
Status: DISCONTINUED | OUTPATIENT
Start: 2022-09-06 | End: 2022-09-07 | Stop reason: HOSPADM

## 2022-09-06 RX ORDER — PHENYLEPHRINE HYDROCHLORIDE 10 MG/ML
INJECTION INTRAVENOUS AS NEEDED
Status: DISCONTINUED | OUTPATIENT
Start: 2022-09-06 | End: 2022-09-06 | Stop reason: HOSPADM

## 2022-09-06 RX ORDER — EPTIFIBATIDE 20 MG/10ML
INJECTION INTRAVENOUS AS NEEDED
Status: DISCONTINUED | OUTPATIENT
Start: 2022-09-06 | End: 2022-09-06 | Stop reason: HOSPADM

## 2022-09-06 RX ORDER — LIDOCAINE HYDROCHLORIDE 10 MG/ML
INJECTION, SOLUTION INFILTRATION; PERINEURAL AS NEEDED
Status: DISCONTINUED | OUTPATIENT
Start: 2022-09-06 | End: 2022-09-06 | Stop reason: HOSPADM

## 2022-09-06 RX ORDER — SODIUM CHLORIDE 9 MG/ML
100 INJECTION, SOLUTION INTRAVENOUS CONTINUOUS
Status: DISCONTINUED | OUTPATIENT
Start: 2022-09-06 | End: 2022-09-07 | Stop reason: HOSPADM

## 2022-09-06 RX ORDER — ERGOCALCIFEROL 1.25 MG/1
50000 CAPSULE ORAL WEEKLY
COMMUNITY
End: 2023-02-16 | Stop reason: SDUPTHER

## 2022-09-06 RX ADMIN — SODIUM CHLORIDE 100 ML/HR: 9 INJECTION, SOLUTION INTRAVENOUS at 12:49

## 2022-09-06 RX ADMIN — SODIUM CHLORIDE 100 ML/HR: 9 INJECTION, SOLUTION INTRAVENOUS at 15:20

## 2022-09-06 RX ADMIN — ATORVASTATIN CALCIUM 80 MG: 40 TABLET, FILM COATED ORAL at 20:48

## 2022-09-06 RX ADMIN — TICAGRELOR 90 MG: 90 TABLET ORAL at 23:50

## 2022-09-06 NOTE — PHARMACY PATIENT ASSISTANCE
Pharmacy checked price of Brilinta 90 mg BID for patient. There is a $0.00 copay. No issues at this time.    Thank you  Skyler Fernandez, Pharmacy Intern  09/06/22  15:18 EDT

## 2022-09-06 NOTE — PLAN OF CARE
Problem: Adult Inpatient Plan of Care  Goal: Plan of Care Review  Outcome: Ongoing, Progressing  Goal: Patient-Specific Goal (Individualized)  Outcome: Ongoing, Progressing  Goal: Absence of Hospital-Acquired Illness or Injury  Outcome: Ongoing, Progressing  Intervention: Identify and Manage Fall Risk  Recent Flowsheet Documentation  Taken 9/6/2022 1800 by Sierra Patel RN  Safety Promotion/Fall Prevention: safety round/check completed  Taken 9/6/2022 1700 by Sierra Patel RN  Safety Promotion/Fall Prevention: safety round/check completed  Taken 9/6/2022 1500 by Sierra Patel RN  Safety Promotion/Fall Prevention: safety round/check completed  Intervention: Prevent and Manage VTE (Venous Thromboembolism) Risk  Recent Flowsheet Documentation  Taken 9/6/2022 1800 by Sierra Patel RN  Activity Management: activity adjusted per tolerance  Taken 9/6/2022 1700 by Sierra Patel RN  Activity Management: activity adjusted per tolerance  Taken 9/6/2022 1500 by Sierra Patel RN  Activity Management: activity adjusted per tolerance  Intervention: Prevent Infection  Recent Flowsheet Documentation  Taken 9/6/2022 1800 by Sierra Patel RN  Infection Prevention: rest/sleep promoted  Goal: Optimal Comfort and Wellbeing  Outcome: Ongoing, Progressing  Goal: Readiness for Transition of Care  Outcome: Ongoing, Progressing     Problem: Heart Failure Comorbidity  Goal: Maintenance of Heart Failure Symptom Control  Outcome: Ongoing, Progressing   Goal Outcome Evaluation:            No questions/concerns at this time. CECY. KARLENE

## 2022-09-06 NOTE — PLAN OF CARE
Goal Outcome Evaluation:  Plan of Care Reviewed With: patient        Progress: improving  Outcome Evaluation: Pt A&Ox4. VSS at present time, remains on RA. R. radial site monitored- TR band off. Pt denies any issues. IVF infusing- see MAR. Spouse at bedside.

## 2022-09-06 NOTE — NURSING NOTE
Patient arrived to PCU at this time from Cath lab. VSS at present time, on RA. IVF infusing. Pt denies chest pain at this time. R radial site assessed, TR band in place, instructed to start removing air at 1400. Family at bedside. Belongings at bedside in bag.

## 2022-09-06 NOTE — H&P
Patient Identification:  Name:  Scott Montes  Age:  47 y.o.  Sex:  male  :  1975  MRN:  [id number]   Visit Number:  [id number]  Primary Care Physician:  Asuncion Keyes APRN    Chief complaint:   Chest pain  History of presenting illness: Patient is a 47-year-old gentleman with a history of recent COVID-19 infection, dyslipidemia, presented to our Cath Lab directly brought in by the EMS for field inferior STEMI.  Per EMS patient reported chest pain, sudden onset severe 10 out of 10 substernal radiating to the left arm and jaw 30 minutes prior to EMS arrival.  He said he had some chest pain yesterday but it resolved spontaneously.  Patient apparently took 2 low-dose aspirin prior to the EMS arrival.  He did not receive any more aspirin in EMS.  He was pale and diaphoretic per EMS on scene.  He was then brought to our Cath Lab by directly and his vitals include blood pressure 100/70, heart rate 70, sinus rhythm, he appears in severe distress secondary to chest pain and was diaphoretic.  He was also on 15 L of O2 on arrival.    ROS: All systems reviewed and negative except as mentioned above.     ---------------------------------------------------------------------------------------------------------------------   Past Medical History:   Diagnosis Date   • Anemia    • Arthritis    • Elevated cholesterol    • Low back pain      Past Surgical History:   Procedure Laterality Date   • COLONOSCOPY N/A 2022    Procedure: COLONOSCOPY;  Surgeon: Stella Aparicio MD;  Location: [facility];  Service: Gastroenterology;  Laterality: N/A;   • CYST REMOVAL     • VASECTOMY       Family History   Problem Relation Age of Onset   • Colon cancer Mother      Social History     Socioeconomic History   • Marital status:    Tobacco Use   • Smoking status: Current Every Day Smoker     Packs/day: 0.50     Years: 30.00     Pack years: 15.00   • Smokeless tobacco: Never Used   • Tobacco comment: Smoking cessation  encouraged   Vaping Use   • Vaping Use: Never used   Substance and Sexual Activity   • Alcohol use: No   • Drug use: No   • Sexual activity: Defer     ---------------------------------------------------------------------------------------------------------------------   Allergies:  Patient has no known allergies.  ---------------------------------------------------------------------------------------------------------------------   Prior to Admission Medications     Prescriptions Last Dose Informant Patient Reported? Taking?    Cyanocobalamin (Vitamin B12) 1000 MCG tablet controlled-release  Pharmacy No No    Take 1 tablet by mouth Daily.    cyclobenzaprine (FLEXERIL) 5 MG tablet  Pharmacy No No    Take 1 tablet by mouth 3 (Three) Times a Day As Needed for Muscle Spasms.    linaclotide (Linzess) 72 MCG capsule capsule   No No    Take 1 capsule by mouth Every Morning Before Breakfast.    mesalamine (LIALDA) 1.2 g EC tablet   No No    Take 1 tablet by mouth Daily With Breakfast.    nicotine (NICODERM CQ) 14 MG/24HR patch   No No    Place 1 patch on the skin as directed by provider Daily.    nicotine (NICODERM CQ) 21 MG/24HR patch   No No    Place 1 patch on the skin as directed by provider Daily.    nicotine (Nicoderm CQ) 7 MG/24HR patch   No No    Place 1 patch on the skin as directed by provider Daily. Start after completed 14 mg patches    polyethylene glycol (MIRALAX) 17 g packet   No No    Take 17 g by mouth Daily.    predniSONE (DELTASONE) 10 MG tablet   No No    Take 1 tablet by mouth Take As Directed. 40mgx7, 30mgx7, 20mgx7, 10mgx7, 5mgx6    tadalafil (CIALIS) 10 MG tablet  Pharmacy No No    Take 1 tablet by mouth Daily As Needed for Erectile Dysfunction.    vitamin D (ERGOCALCIFEROL) 1.25 MG (46463 UT) capsule capsule  Pharmacy No No    Take 1 capsule by mouth 1 (One) Time Per Week.    Patient taking differently:  Take 50,000 Units by mouth 1 (One) Time Per Week. Prior to [facility] Admission, Patient was on:   patient takes on Fridays        Hospital Scheduled Meds:  [START ON 9/7/2022] aspirin, 81 mg, Oral, Daily  atorvastatin, 80 mg, Oral, Nightly  lisinopril, 5 mg, Oral, Daily  nicotine, 1 patch, Transdermal, Q24H  ticagrelor, 90 mg, Oral, BID      sodium chloride, , Last Rate: 100 mL/hr (09/06/22 1133)  sodium chloride, 100 mL/hr      ---------------------------------------------------------------------------------------------------------------------   Vital Signs:     There were no vitals filed for this visit.  There is no height or weight on file to calculate BMI.  ---------------------------------------------------------------------------------------------------------------------   Physical Exam:  Constitutional:  Well-developed and well-nourished.  No respiratory distress.      HENT:  Head: Normocephalic and atraumatic.  Mouth:  Moist mucous membranes.    Eyes:  Conjunctivae and EOM are normal.  Pupils are equal, round, and reactive to light.  No scleral icterus.  Neck:  Neck supple.  No JVD present.    Cardiovascular:  Normal rate, regular rhythm and normal heart sounds with no murmur.  Pulmonary/Chest:  No respiratory distress, no wheezes, no crackles, with normal breath sounds and good air movement.  Abdominal:  Soft.  Bowel sounds are normal.  No distension and no tenderness.   Musculoskeletal:  No edema, no tenderness, and no deformity.  No red or swollen joints anywhere.    Neurological:  Alert and oriented to person, place, and time.  No cranial nerve deficit.  No tongue deviation.  No facial droop.  No slurred speech.   Skin:  Skin is warm and dry.  No rash noted.  No pallor.   Psychiatric:  Normal mood and affect.  Behavior is normal.  Judgment and thought content normal.   Peripheral vascular:  No edema and strong pulses on all 4 extremities.  ---------------------------------------------------------------------------------------------------------------------  EKG: Sinus rhythm, inferior  STEMI  Telemetry: Sinus  I have personally looked at both the EKG and the telemetry strips.  ---------------------------------------------------------------------------------------------------------------------                 Invalid input(s): PROTCrCl cannot be calculated (Patient's most recent lab result is older than the maximum 30 days allowed.).  No results found for: AMMONIA          Lab Results   Component Value Date    HGBA1C 5.60 04/19/2022     Lab Results   Component Value Date    TSH 1.150 04/19/2022    FREET4 1.38 04/19/2022     No results found for: PREGTESTUR, PREGSERUM, HCG, HCGQUANT  Pain Management Panel    There is no flowsheet data to display.       No results found for: BLOODCX  No results found for: URINECX  No results found for: WOUNDCX  No results found for: STOOLCX      ---------------------------------------------------------------------------------------------------------------------  Imaging Results (Last 7 Days)     ** No results found for the last 168 hours. **          I have personally reviewed the radiology images and read the final radiology report.  ---------------------------------------------------------------------------------------------------------------------  Assessment and Plan:   Acute inferior STEMI  Dyslipidemia  Recent COVID-19 infection a month ago.    We will give him aspirin, heparin, Brilinta bolus per ACS protocol.  Emergent coronary angiogram  I did start him on IV fluid boluses  I have explained the risks associated with the procedure to the patient including but not limited to an allergic reaction to the contrast material or medications used during the procedure bleeding, infection, and bruising at the catheter insertion site blood clots, which may trigger heart attack, stroke,   damage to the artery where the catheter was inserted, or damage to the arteries as the catheter travels through your body, irregular heart rhythm arrhythmias, kidney damage caused by  the contrast material.                  Matthew Hill MD, [facility]  Interventional Cardiology      09/06/22  12:29 EDT

## 2022-09-07 ENCOUNTER — READMISSION MANAGEMENT (OUTPATIENT)
Dept: CALL CENTER | Facility: HOSPITAL | Age: 47
End: 2022-09-07

## 2022-09-07 ENCOUNTER — APPOINTMENT (OUTPATIENT)
Dept: CARDIOLOGY | Facility: HOSPITAL | Age: 47
End: 2022-09-07

## 2022-09-07 VITALS
HEIGHT: 74 IN | WEIGHT: 184.75 LBS | SYSTOLIC BLOOD PRESSURE: 133 MMHG | OXYGEN SATURATION: 99 % | HEART RATE: 60 BPM | BODY MASS INDEX: 23.71 KG/M2 | DIASTOLIC BLOOD PRESSURE: 87 MMHG | RESPIRATION RATE: 18 BRPM | TEMPERATURE: 98.4 F

## 2022-09-07 LAB
ANION GAP SERPL CALCULATED.3IONS-SCNC: 11.5 MMOL/L (ref 5–15)
BASOPHILS # BLD AUTO: 0.14 10*3/MM3 (ref 0–0.2)
BASOPHILS NFR BLD AUTO: 0.9 % (ref 0–1.5)
BH CV ECHO MEAS - ACS: 2.2 CM
BH CV ECHO MEAS - AO MAX PG: 8.4 MMHG
BH CV ECHO MEAS - AO MEAN PG: 4 MMHG
BH CV ECHO MEAS - AO ROOT DIAM: 3.3 CM
BH CV ECHO MEAS - AO V2 MAX: 145 CM/SEC
BH CV ECHO MEAS - AO V2 VTI: 29.6 CM
BH CV ECHO MEAS - EDV(CUBED): 98 ML
BH CV ECHO MEAS - EDV(MOD-SP4): 119 ML
BH CV ECHO MEAS - EF(MOD-SP4): 62.4 %
BH CV ECHO MEAS - ESV(CUBED): 24.8 ML
BH CV ECHO MEAS - ESV(MOD-SP4): 44.7 ML
BH CV ECHO MEAS - FS: 36.8 %
BH CV ECHO MEAS - IVS/LVPW: 1.11 CM
BH CV ECHO MEAS - IVSD: 1.3 CM
BH CV ECHO MEAS - LA DIMENSION: 3.2 CM
BH CV ECHO MEAS - LAT PEAK E' VEL: 10.7 CM/SEC
BH CV ECHO MEAS - LV DIASTOLIC VOL/BSA (35-75): 56.7 CM2
BH CV ECHO MEAS - LV MASS(C)D: 214.9 GRAMS
BH CV ECHO MEAS - LV SYSTOLIC VOL/BSA (12-30): 21.3 CM2
BH CV ECHO MEAS - LVIDD: 4.6 CM
BH CV ECHO MEAS - LVIDS: 2.9 CM
BH CV ECHO MEAS - LVOT AREA: 3.1 CM2
BH CV ECHO MEAS - LVOT DIAM: 2 CM
BH CV ECHO MEAS - LVPWD: 1.18 CM
BH CV ECHO MEAS - MED PEAK E' VEL: 7.3 CM/SEC
BH CV ECHO MEAS - MV A MAX VEL: 48.4 CM/SEC
BH CV ECHO MEAS - MV E MAX VEL: 65.1 CM/SEC
BH CV ECHO MEAS - MV E/A: 1.35
BH CV ECHO MEAS - PA ACC TIME: 0.09 SEC
BH CV ECHO MEAS - PA PR(ACCEL): 39.4 MMHG
BH CV ECHO MEAS - SI(MOD-SP4): 35.4 ML/M2
BH CV ECHO MEAS - SV(MOD-SP4): 74.3 ML
BH CV ECHO MEASUREMENTS AVERAGE E/E' RATIO: 7.23
BUN SERPL-MCNC: 9 MG/DL (ref 6–20)
BUN/CREAT SERPL: 11.8 (ref 7–25)
CALCIUM SPEC-SCNC: 8.7 MG/DL (ref 8.6–10.5)
CHLORIDE SERPL-SCNC: 106 MMOL/L (ref 98–107)
CHOLEST SERPL-MCNC: 174 MG/DL (ref 0–200)
CO2 SERPL-SCNC: 21.5 MMOL/L (ref 22–29)
CREAT SERPL-MCNC: 0.76 MG/DL (ref 0.76–1.27)
DEPRECATED RDW RBC AUTO: 51.7 FL (ref 37–54)
EGFRCR SERPLBLD CKD-EPI 2021: 110.9 ML/MIN/1.73
EOSINOPHIL # BLD AUTO: 0.21 10*3/MM3 (ref 0–0.4)
EOSINOPHIL NFR BLD AUTO: 1.4 % (ref 0.3–6.2)
ERYTHROCYTE [DISTWIDTH] IN BLOOD BY AUTOMATED COUNT: 17.2 % (ref 12.3–15.4)
GLUCOSE SERPL-MCNC: 102 MG/DL (ref 65–99)
HBA1C MFR BLD: 5.2 % (ref 4.8–5.6)
HCT VFR BLD AUTO: 41.3 % (ref 37.5–51)
HDLC SERPL-MCNC: 28 MG/DL (ref 40–60)
HGB BLD-MCNC: 13 G/DL (ref 13–17.7)
IMM GRANULOCYTES # BLD AUTO: 0.07 10*3/MM3 (ref 0–0.05)
IMM GRANULOCYTES NFR BLD AUTO: 0.5 % (ref 0–0.5)
LDLC SERPL CALC-MCNC: 114 MG/DL (ref 0–100)
LDLC/HDLC SERPL: 3.94 {RATIO}
LYMPHOCYTES # BLD AUTO: 3.65 10*3/MM3 (ref 0.7–3.1)
LYMPHOCYTES NFR BLD AUTO: 24.4 % (ref 19.6–45.3)
MAXIMAL PREDICTED HEART RATE: 172 BPM
MCH RBC QN AUTO: 26.1 PG (ref 26.6–33)
MCHC RBC AUTO-ENTMCNC: 31.5 G/DL (ref 31.5–35.7)
MCV RBC AUTO: 82.9 FL (ref 79–97)
MONOCYTES # BLD AUTO: 1.32 10*3/MM3 (ref 0.1–0.9)
MONOCYTES NFR BLD AUTO: 8.8 % (ref 5–12)
NEUTROPHILS NFR BLD AUTO: 64 % (ref 42.7–76)
NEUTROPHILS NFR BLD AUTO: 9.56 10*3/MM3 (ref 1.7–7)
NRBC BLD AUTO-RTO: 0 /100 WBC (ref 0–0.2)
PLATELET # BLD AUTO: 385 10*3/MM3 (ref 140–450)
PMV BLD AUTO: 10 FL (ref 6–12)
POTASSIUM SERPL-SCNC: 3.9 MMOL/L (ref 3.5–5.2)
RBC # BLD AUTO: 4.98 10*6/MM3 (ref 4.14–5.8)
SODIUM SERPL-SCNC: 139 MMOL/L (ref 136–145)
STRESS TARGET HR: 146 BPM
TRIGL SERPL-MCNC: 178 MG/DL (ref 0–150)
VLDLC SERPL-MCNC: 32 MG/DL (ref 5–40)
WBC NRBC COR # BLD: 14.95 10*3/MM3 (ref 3.4–10.8)

## 2022-09-07 PROCEDURE — 93306 TTE W/DOPPLER COMPLETE: CPT

## 2022-09-07 PROCEDURE — 99239 HOSP IP/OBS DSCHRG MGMT >30: CPT | Performed by: NURSE PRACTITIONER

## 2022-09-07 PROCEDURE — 80061 LIPID PANEL: CPT | Performed by: INTERNAL MEDICINE

## 2022-09-07 PROCEDURE — 93306 TTE W/DOPPLER COMPLETE: CPT | Performed by: INTERNAL MEDICINE

## 2022-09-07 PROCEDURE — 93005 ELECTROCARDIOGRAM TRACING: CPT | Performed by: INTERNAL MEDICINE

## 2022-09-07 PROCEDURE — 83036 HEMOGLOBIN GLYCOSYLATED A1C: CPT | Performed by: INTERNAL MEDICINE

## 2022-09-07 PROCEDURE — 80048 BASIC METABOLIC PNL TOTAL CA: CPT | Performed by: INTERNAL MEDICINE

## 2022-09-07 PROCEDURE — 85025 COMPLETE CBC W/AUTO DIFF WBC: CPT | Performed by: INTERNAL MEDICINE

## 2022-09-07 RX ORDER — ASPIRIN 81 MG/1
81 TABLET ORAL DAILY
Qty: 90 TABLET | Refills: 0 | Status: SHIPPED | OUTPATIENT
Start: 2022-09-08 | End: 2022-11-11 | Stop reason: SDUPTHER

## 2022-09-07 RX ORDER — LISINOPRIL 10 MG/1
10 TABLET ORAL DAILY
Qty: 90 TABLET | Refills: 0 | Status: SHIPPED | OUTPATIENT
Start: 2022-09-08 | End: 2022-11-11 | Stop reason: SDUPTHER

## 2022-09-07 RX ORDER — ATORVASTATIN CALCIUM 80 MG/1
80 TABLET, FILM COATED ORAL NIGHTLY
Qty: 90 TABLET | Refills: 0 | Status: SHIPPED | OUTPATIENT
Start: 2022-09-07 | End: 2022-11-11 | Stop reason: SDUPTHER

## 2022-09-07 RX ORDER — LISINOPRIL 10 MG/1
10 TABLET ORAL DAILY
Status: DISCONTINUED | OUTPATIENT
Start: 2022-09-08 | End: 2022-09-07 | Stop reason: HOSPADM

## 2022-09-07 RX ADMIN — MESALAMINE 400 MG: 400 CAPSULE, DELAYED RELEASE ORAL at 16:04

## 2022-09-07 RX ADMIN — TICAGRELOR 90 MG: 90 TABLET ORAL at 08:16

## 2022-09-07 RX ADMIN — ASPIRIN 81 MG: 81 TABLET, COATED ORAL at 08:16

## 2022-09-07 RX ADMIN — SODIUM CHLORIDE 100 ML/HR: 9 INJECTION, SOLUTION INTRAVENOUS at 02:08

## 2022-09-07 RX ADMIN — MESALAMINE 400 MG: 400 CAPSULE, DELAYED RELEASE ORAL at 08:16

## 2022-09-07 RX ADMIN — LISINOPRIL 5 MG: 2.5 TABLET ORAL at 08:16

## 2022-09-07 NOTE — CASE MANAGEMENT/SOCIAL WORK
Discharge Planning Assessment   [facility]     Patient Name: Scott Montes  MRN: [id number]  Today's Date: 9/7/2022    Admit Date: 9/6/2022     Discharge Needs Assessment     Row Name 09/07/22 1124       Living Environment    People in Home spouse    Name(s) of People in Home Wife, Jeanette    Current Living Arrangements home    Primary Care Provided by self    Provides Primary Care For no one    Family Caregiver if Needed spouse    Quality of Family Relationships supportive    Able to Return to Prior Arrangements yes       Resource/Environmental Concerns    Resource/Environmental Concerns none    Transportation Concerns none       Transition Planning    Patient/Family Anticipates Transition to home with family    Patient/Family Anticipated Services at Transition none    Transportation Anticipated family or friend will provide       Discharge Needs Assessment    Readmission Within the Last 30 Days no previous admission in last 30 days    Equipment Currently Used at Home scales    Concerns to be Addressed no discharge needs identified;denies needs/concerns at this time    Equipment Needed After Discharge none    Outpatient/Agency/Support Group Needs --  Does not utilize HH. Independent at home.               Discharge Plan     Row Name 09/07/22 1126       Plan    Plan Spoke with patient on this date. [place], Jean[place]. Independent at home.  He does not utilize DME/HH and denies the need. His PCP is Asuncion Keyes. He does not have copay concerns with his [employer] insurance coverage. He is enrolled in [facility] and has a Cardiac Rehab consult. His wife, [place]ette will provide transportation [place]. No needs on this date. CM will follow.    Patient/Family in Agreement with Plan yes              Continued Care and Services - Admitted Since 9/6/2022    Coordination has not been started for this encounter.       Expected Discharge Date and Time     Expected Discharge Date Expected Discharge  Time    Sep 7, 2022          Demographic Summary    No documentation.                Functional Status     Row Name 09/07/22 1124       Functional Status    Usual Activity Tolerance excellent       Employment/    Employment Status employed full-time    Current or Previous Occupation service industry               Psychosocial    No documentation.                Abuse/Neglect    No documentation.                Legal    No documentation.                Substance Abuse    No documentation.                Patient Forms    No documentation.                   Celestina Evangelista RN

## 2022-09-07 NOTE — PLAN OF CARE
Problem: Adult Inpatient Plan of Care  Goal: Plan of Care Review  Outcome: Ongoing, Progressing  Flowsheets  Taken 9/6/2022 2222 by Claudia Zamora RN  Plan of Care Reviewed With:   patient   spouse  Taken 9/6/2022 1932 by Karie Armstrong, RN  Progress: improving  Goal: Patient-Specific Goal (Individualized)  Outcome: Ongoing, Progressing  Goal: Absence of Hospital-Acquired Illness or Injury  Outcome: Ongoing, Progressing  Intervention: Identify and Manage Fall Risk  Description: Perform standard risk assessment on admission using a validated tool or comprehensive approach appropriate to the patient; reassess fall risk frequently, with change in status or transfer to another level of care.  Communicate fall injury risk to interprofessional healthcare team.  Determine need for increased observation, equipment and environmental modification, such as low bed, signage and supportive, nonskid footwear.  Adjust safety measures to individual developmental age, stage and identified risk factors.  Reinforce the importance of safety and physical activity with patient and family.  Perform regular intentional rounding to assess need for position change, pain assessment and personal needs, including assistance with toileting.  Recent Flowsheet Documentation  Taken 9/6/2022 2100 by Claudia Zamora, RN  Safety Promotion/Fall Prevention: safety round/check completed  Taken 9/6/2022 1900 by Claudia Zamora RN  Safety Promotion/Fall Prevention:   safety round/check completed   clutter free environment maintained   lighting adjusted   nonskid shoes/slippers when out of bed  Intervention: Prevent Infection  Description: Maintain skin and mucous membrane integrity; promote hand, oral and pulmonary hygiene.  Optimize fluid balance, nutrition, sleep and glycemic control to maximize infection resistance.  Identify potential sources of infection early to prevent or mitigate progression of infection (e.g., wound, lines,  devices).  Evaluate ongoing need for invasive devices; remove promptly when no longer indicated.  Recent Flowsheet Documentation  Taken 9/6/2022 2100 by Claudia Zamora, RN  Infection Prevention:   single patient room provided   rest/sleep promoted  Taken 9/6/2022 1900 by Claudia Zamora, RN  Infection Prevention:   single patient room provided   rest/sleep promoted  Goal: Optimal Comfort and Wellbeing  Outcome: Ongoing, Progressing  Goal: Readiness for Transition of Care  Outcome: Ongoing, Progressing   Goal Outcome Evaluation:  Plan of Care Reviewed With: patient, spouse   VSS, patient resting in bed watching TV.  No c/o pain, no s/s any distress.

## 2022-09-07 NOTE — DISCHARGE SUMMARY
[facility] DISCHARGE SUMMARY    Patient Identification:  Name:  Scott Montes  Age:  47 y.o.  Sex:  male  :  1975  MRN:  [id number]  Visit Number:  [id number]    Date of Admission: 2022  Date of Discharge: 2022    PCP: Asuncion Keyes APRN     Attending at discharge: Dr. Wells     DISCHARGE DIAGNOSES    Acute Inferior STEMI  % proximal RCA occlusion s/ pPCI BUZZ   HLD  Recent COVID-19 infection, around 1 month ago  Crohn's Disease      CONSULTS     none    PROCEDURES PERFORMED    2022    PROCEDURE PERFORMED:      1. Selective right and left Coronary Angiogram  2. Left heart catheretization    3. Left Ventriculography  4. PCI of RCA  5. IVUS of RCA  6. Manual aspiration thrombectomy    2020: Transthoracic echo    HOSPITAL COURSE      Mr. Montes is a 47 year old male patient who was admitted to [facility] On 2022 for Acute inferior stemi. His past medical history is significant for Crohns and tobacco use.    He was taken to the cath lab by Dr. Wells on  and underwent PCI BUZZ to the proximal RCA. He was placed on beta blocker after but has had to be held 2/2 bradycardia (heart rate 50-60s) therefore will hold that on dischrage today. Continue lisiniopril, brilinta 90mg PO BID and statin. He is doing well, denies any chest pain, right wrist with no bruising or pain, pulse intact. He has been up walked around his room. No events noted on telemetry. He has been borderline bradycardic therefore stopped his lopressor for now. BPs have been 120-130s, discussed with Dr. Wells and increase Lisinopril to 10mg PO Daily with holding parameters, BP cuff ordered. Discussed the importance of taking his brilinita and not missing doses.           VITAL SIGNS:  Temp:  [97.1 °F (36.2 °C)-98.4 °F (36.9 °C)] 98.4 °F (36.9 °C)  Heart Rate:  [56-82] 60  Resp:  [14-20] 18  BP: (107-144)/() 133/87  SpO2:  [95 %-100 %] 99 %  on   ;   Device (Oxygen Therapy): room air    Body  mass index is 23.72 kg/m².  Wt Readings from Last 3 Encounters:   09/07/22 83.8 kg (184 lb 11.9 oz)   08/23/22 77.9 kg (171 lb 12.8 oz)   08/23/22 77.9 kg (171 lb 12.8 oz)       PHYSICAL EXAM:  Physical Exam  Eyes:      Pupils: Pupils are equal, round, and reactive to light.   Cardiovascular:      Rate and Rhythm: Normal rate and regular rhythm.      Pulses: Normal pulses.      Heart sounds: Normal heart sounds.   Pulmonary:      Effort: Pulmonary effort is normal.      Breath sounds: Normal breath sounds.   Abdominal:      Palpations: Abdomen is soft.   Neurological:      General: No focal deficit present.      Mental Status: He is alert and oriented to person, place, and time.   Psychiatric:         Mood and Affect: Mood normal.         Behavior: Behavior normal.         Thought Content: Thought content normal.         Judgment: Judgment normal.          DISCHARGE DISPOSITION   Stable       Discharge Medications      New Medications      Instructions Start Date   aspirin 81 MG EC tablet   81 mg, Oral, Daily   Start Date: September 8, 2022     atorvastatin 80 MG tablet  Commonly known as: LIPITOR   80 mg, Oral, Nightly      lisinopril 10 MG tablet  Commonly known as: PRINIVIL,ZESTRIL   10 mg, Oral, Daily   Start Date: September 8, 2022     ticagrelor 90 MG tablet tablet  Commonly known as: BRILINTA   90 mg, Oral, 2 Times Daily         Continue These Medications      Instructions Start Date   cyclobenzaprine 5 MG tablet  Commonly known as: FLEXERIL   5 mg, Oral, 3 Times Daily PRN      Entyvio 300 MG injection  Generic drug: vedolizumab   300 mg, Intravenous, Once      mesalamine 1.2 g EC tablet  Commonly known as: LIALDA   1,200 mg, Oral, Daily With Breakfast      vitamin D 1.25 MG (19199 UT) capsule capsule  Commonly known as: ERGOCALCIFEROL   50,000 Units, Oral, Weekly         Stop These Medications    tadalafil 10 MG tablet  Commonly known as: CIALIS            Diet Instructions     Diet: Regular, Cardiac       Discharge Diet:  Regular  Cardiac           Activity Instructions     Measure Blood Pressure          Additional Instructions for the Follow-ups that You Need to Schedule     Ambulatory Referral to Cardiac Rehab   As directed      Ambulatory Referral to Cardiac Rehab   As directed      Discharge Follow-up with PCP   As directed       Currently Documented PCP:    Asuncion Keyes APRN    PCP Phone Number:    [phone]     Follow Up Details: 1 week         Discharge Follow-up with Specified Provider: Interventional Cardiology; 2 Weeks   As directed      To: Interventional Cardiology    Follow Up: 2 Weeks    Follow Up Details: s/p STEMI with stent placement            Follow-up Information     Asuncion Keyes APRN .    Specialty: Family Medicine  Why: 1 week  Contact information:  [address] [id number]  [phone]                          TEST  RESULTS PENDING AT DISCHARGE  none     CODE STATUS  Code Status and Medical Interventions:   Ordered at: 09/06/22 1228     Level Of Support Discussed With:    Patient     Code Status (Patient has no pulse and is not breathing):    CPR (Attempt to Resuscitate)     Medical Interventions (Patient has pulse or is breathing):    Full Support     Release to patient:    Routine Release       The ASCVD Risk score (Marbury DC Jr., et al., 2013) failed to calculate for the following reasons:    The patient has a prior MI or stroke diagnosis       EDDIE Patricio  [facility]  09/07/22  15:40 EDT    Please note that this discharge summary required more than 30 minutes to complete.

## 2022-09-07 NOTE — NURSING NOTE
Time In 1515 Time Out 1534      Order received for Cardiac Rehab Consultation.     Patient stated he will see Dr Wells in about two weeks and he will decide then if he wants to participate in the program. He said if he did he will stop by our office and schedule his appintment.      Information discussed with: Patient/Spouse        Educated on: Benefits of Exercise,  Educated on Cardiac Rehab and Program Protocol, Brochure and/or educational material provided, Contact information given and Teach Back Verified        Thank you for the referral. Please contact the Cardiac Rehab Dept. (ext. [phone]) with any further questions or concerns.

## 2022-09-08 ENCOUNTER — TRANSITIONAL CARE MANAGEMENT TELEPHONE ENCOUNTER (OUTPATIENT)
Dept: CALL CENTER | Facility: HOSPITAL | Age: 47
End: 2022-09-08

## 2022-09-08 NOTE — OUTREACH NOTE
Call Center TCM Note    Flowsheet Row Responses   [facility] facility patient discharged from? [facility]   Does the patient have one of the following disease processes/diagnoses(primary or secondary)? Acute MI (STEMI,NSTEMI)   TCM attempt successful? No   Unsuccessful attempts Attempt 1          Georgie Auguste MA    9/8/2022, 13:22 EDT

## 2022-09-08 NOTE — OUTREACH NOTE
Call Center TCM Note    Flowsheet Row Responses   [facility] patient discharged from? [facility]   Does the patient have one of the following disease processes/diagnoses(primary or secondary)? Acute MI (STEMI,NSTEMI)   TCM attempt successful? Yes   Discharge diagnosis Acute inferior STEMI,   heart cath    Meds reviewed with patient/caregiver? Yes   Is the patient having any side effects they believe may be caused by any medication additions or changes? No   Does the patient have all prescriptions related to this admission filled (includes statins,anticoagulants,HTN meds,anti-arrhythmia meds) Yes   Is the patient taking all medications as directed (includes completed medication regime)? Yes   Has home health visited the patient within 72 hours of discharge? N/A   Psychosocial issues? No   Did the patient receive a copy of their discharge instructions? Yes   Nursing interventions Reviewed instructions with patient   What is the patient's perception of their health status since discharge? Improving   Nursing interventions Nurse provided patient education   Is the patient/caregiver able to teach back signs and symptoms of when to call for help immediately: Sudden chest discomfort, Nausea or vomiting, Dizziness or lightheadedness, Sudden discomfort in arms, back, neck or jaw, Shortness of breath at any time, Irregular or rapid heart rate, Sudden sweating or clammy skin   Nursing interventions Provided education on importance of cardiac rehab   Is the patient/caregiver able to teach back lifestyle changes to help prevent MIs Quit smoking, Managing diabetes, Reducing stress, Regular exercise as approved by provider, Heart healthy diet, Limiting alcohol intake, Maintaining a healthy weight   Is the patient/caregiver able to teach back ways to prevent a second heart attack: Take medications, Manage risk factors, Get support (AHA website:supportnetwork.heart.org), Follow up with MD   Is the patient/caregiver able to teach  back the hierarchy of who to call/visit for symptoms/problems? PCP, Specialist, Home health nurse, Urgent Care, ED, 911 Yes   TCM call completed? Yes   Wrap up additional comments D/C DX: Acute inferior STEMI,   heart cath **  Pt feeling well, new rx's and BP cuff in place. No questions. TCM APPT IS 09/12, PULM APPT 09/19/2022, CARDIO MD APPT 10/03/2022.          Georgie Auguste MA    9/8/2022, 16:12 EDT

## 2022-09-08 NOTE — PAYOR COMM NOTE
"[facility]  NPI: [id number]    Utilization Review   Contact:Naila Bacon MSN, APRN, FNP- BC  Phone: [phone]  Fax: [phone]    Discharge Notification  REF# [id number]  Dc home on 9-7    Please fax back if stay was approved          Scott Montes (47 y.o. Male)             Date of Birth   1975    Social Security Number       Address   [address]  [facility] [address]    Home Phone   [phone]    MRN   [id number]       [facility]    Marital Status                               Admission Date   9/6/22    Admission Type   Emergency    Admitting Provider   Matthew Hill MD    Attending Provider       Department, Room/Bed   [facility] PROGRESS CARE, P219/1P       Discharge Date   9/7/2022    Discharge Disposition   Home or Self Care    Discharge Destination                               Attending Provider: (none)   Allergies: No Known Allergies    Isolation: None   Infection: COVID (History) (09/07/22)   Code Status: Prior   Advance Care Planning Activity    Ht: 188 cm (74\")   Wt: 83.8 kg (184 lb 11.9 oz)    Admission Cmt: None   Principal Problem: None                Active Insurance as of 9/6/2022     Primary Coverage     Payor Plan Insurance Group Employer/Plan Group    [facility] later [facility] [employer] [facility] [employer] [facility] PPO [id number]     Payor Plan Address Payor Plan Phone Number Payor Plan Fax Number Effective Dates    [address] [phone]  1/1/2016 - None Entered    [facility] [address]       Subscriber Name Subscriber Birth Date Member ID       SCOTT MONTES 1975 [id number]                 Emergency Contacts      (Rel.) Home Phone Work Phone Mobile Phone    Jeanette Montes (Spouse) [phone] -- [phone]    bonita montes (Other) [phone] -- --            Leah Borrero APRN    Nurse Practitioner   Medicine   Discharge Summary       Cosign Needed   Date of Service:  09/07/22 1338   Creation Time:  09/07/22 " 1338              Cosign Needed                Show:Clear all  [x]Manual[x]Template[x]Copied    Added by:  [x]Leah Borrero APRN      []Augie for details         [facility]     Patient Identification:  Name:  Scott Montes  Age:  47 y.o.  Sex:  male  :  1975  MRN:  [id number]  Visit Number:  [id number]     Date of Admission: 2022  Date of Discharge: 2022     PCP: Asuncion Keyes APRN      Attending at discharge: Dr. Wells      DISCHARGE DIAGNOSES     Acute Inferior STEMI  % proximal RCA occlusion s/ pPCI BUZZ   HLD  Recent COVID-19 infection, around 1 month ago  Crohn's Disease       CONSULTS      none     PROCEDURES PERFORMED     2022     PROCEDURE PERFORMED:      1. Selective right and left Coronary Angiogram  2. Left heart catheretization    3. Left Ventriculography  4. PCI of RCA  5. IVUS of RCA  6. Manual aspiration thrombectomy     2020: Transthoracic echo     HOSPITAL COURSE        Mr. Montes is a 47 year old male patient who was admitted to [facility] On 2022 for Acute inferior stemi. His past medical history is significant for Crohns and tobacco use.     He was taken to the cath lab by Dr. Wells on  and underwent PCI BUZZ to the proximal RCA. He was placed on beta blocker after but has had to be held 2/2 bradycardia (heart               [facility] CATHETERIZATION LAB  [address] [address]  [phone]            Scott Montes  Cardiac Catheterization/Vascular Study  Order# [id number]  Reading physician: Matthew Hill MD Ordering physician: Matthew Hill MD Study date: 22      Procedures    Left Heart Cath   Percutaneous Coronary Intervention        Patient Information    Patient Name   Scott Montes MRN   [id number] Legal Sex   Male  (Age)   1975 (47 y.o.)     Race Ethnicity Encounter Category   White or  Not  or  Emergency         [facility] PACS Images     Show images for Cardiac Catheterization/Vascular Study       Printable Vessel Diagram    Printable Vessel Diagram       Physicians    Panel Physicians Referring Physician Case Authorizing Physician   Matthew Hill MD (Primary)           Pre Procedure Diagnosis    STEMI         Conclusion                    CARDIAC CATHETERIZATION / INTERVENTION REPORT                 DATE OF PROCEDURE: 9/6/2022        INDICATION FOR PROCEDURE: STEMI        PRE PROCEDURE DIAGNOSIS:  Inferior STEMI        POST PROCEDURE DIAGNOSIS:  % proximal RCA occlusion s/ pPCI with 3.5 x 28 BUZZ post dilated with 4.0 NC balloon     Face to face mdoerate conscious  sedation time :         COMPLICATIONS : None     Specimens collected : None     PROCEDURE PERFORMED:      1. Selective right and left Coronary Angiogram  2. Left heart catheretization    3. Left Ventriculography  4. PCI of RCA  5. IVUS of RCA  6. Manual aspiration thrombectomy     Description of the procedure:  Prior to the procedure risk, benefits and possible alternative were discussed with the patient and informed consent was obtained. Patient was brought to cardiac cath lab table in post absorbtive state. Patient was prepped and drape in usual sterile fashion. IV Versed and Fentanyl was used for moderate sedation. 2% Lidocaine was used for topical anesthesia. R radial arterial site was prepped and a micropuncture needle was used to access the artery and a 5 F slender sheath was placed. 2.5 mg of Verapamil and 200 mcg of NTG was given through the sheath intra arterial and 5000 units of Heparin was given once the catheter crossed the aortic arch.       5 F TIG 4 catheters was used for right and left coronary angiogram and 5 F pigtail catheter was used for Left heart hemodynamics and left ventriculography. All the catheters were exchanged over 0.035 wire. The R radial arterial sheath was removed and TR band was applied and immediate and  complete hemostasis was achieved. The patient tolerate the entire procedure well without any immediate known complications.     Coronary anatomy findings:     LM: Is a large calibre vessel , normal take off from left cusp, divides into LAD and Lcx. No stenosis     LAD: Mild luminal irregularities no stenosis      LCX: Moderate calibre vessel, mild luminal irregularities.      RCA: Large calibre, dominant artery, normal take off from right cusp.  100% occlusion in proximal.      Left Ventriculography:     LV systolic function was normal with visual estimated EF of 55%.Basal inferior wall hypokinesia   No significant mitral regurgitation noted.      LVEDP: 10 mmHg  No gradient across the aortic valve on pull back.         PERCUTANEOUS CORONARY INTERVENTION PROCEDURE NOTE:     Heparin monotherapy was used for anticoagulation.   Total of 10,000 Units was given IV.Single bolus integrelin was also given     6 F JR 4 guide was used to engage the RCA coaxially. Pronto V4 aspiration catheter was used for manual thrombectomy was performed     0.014 Runthrough guidewire was used to cross the lesion and parked distally.      Used a 2.5 x15 Compliant TREK balloon to predilate the lesion at 10 nandini.      Prepped a 3.5 x 28 Shana Drug eluting stent and position at the lesion and successfully deployed at 16 nandini. IVUS showed vessel diameter was 4.5 proximal and distal, used a 4.0 NC at high pressure to post dilate     Post stent deployment angio pictures showed good expansion with 0% residual stenosis, KELLY 3 flow in the distal vascular bed and no dissection or distal wire perforation.      Lesion length: 20 mm  Pre PCI Stenosis: 100 %  Post PCI stenosis: 0 %  Pre PCI KELLY flow: 0  Post PCI KELLY flow: 3     EBL: Less than 10cc        Final Impression:  % proximal RCA occlusion s/ pPCI with 3.5 x 28 BUZZ post dilated with 4.0 NC balloon        Recommendations:  Aggressive guideline directed medical management for CAD   Dual  Anti platelet medication with Asa 81 mg qd and Brilinta 90 mg bid for minimum 1 year.                 Matthew Hill MD, [facility]  Interventional Cardiology    09/06/22  12:34 EDT               Time Under Physician Observation    Name Panel Role Time Period   Matthew Hill MD Panel 1 Primary 9/6/2022 1134 - 9/6/2022 1219      Total Sedation Time    Moderate sedation event time was not documented.                 Vessel Access    A 6 fr sheath was successfully inserted into the right radial artery.           Sheath Disposition    Hemostasis started on the right radial artery. R-Band was used in achieving hemostasis. Radial compression device applied to vessel. Hemostasis achieved successfully. Closure device additional comment: 15ML OF AIR         Stent Inflated     Event Details User   11:50 AM Stent Inflated Stnt Cornry Rx Xience/Skypoint Rapdxng 3.5x28mm - First balloon inflation max pressure = 14 nandini. First balloon inflation duration = 10 seconds.  JW       Balloon Inflated     Event Details User   11:47 AM Balloon Inflated No Supply Selected - First balloon inflation max pressure = 8 nandini. First balloon inflation duration = 6 seconds.  JW   11:47 AM Balloon Inflated No Supply Selected - Second inflation of balloon - Max pressure = 8 nandini. 2nd Inflation of balloon - Duration = 8 seconds.  JW   11:54 AM Balloon Inflated No Supply Selected - First balloon inflation max pressure = 16 nandini. First balloon inflation duration = 12 seconds.  JW   11:55 AM Balloon Inflated No Supply Selected - Second inflation of balloon - Max pressure = 16 nandini. 2nd Inflation of balloon - Duration = 10 seconds.  JW   12:08 PM Balloon Inflated No Supply Selected - First balloon inflation max pressure = 18 nandini. First balloon inflation duration = 18 seconds.  JW   12:08 PM Balloon Inflated No Supply Selected - Second inflation of balloon - Max pressure = 20 nandini. 2nd Inflation of balloon - Duration = 10 seconds.  JW   12:10 PM  Balloon Inflated Senait Reyes Nc Rx 4x20mm - Third inflation of balloon - Max pressure = 20 nandini. 3rd Inflation of balloon - Duration = 10 seconds.  JW                      Radiation     Event Details User   12:21 PM Radiation Tracking Panel 1: Matthew Hill MD   Cumulative Air Kerma (mGy) = 1367.000; Fluoro Time (min) = 11.6; DAP (mGy-cm2) = 7257.000 JW        Total Contrast    Medication Name Total Dose   iopamidol (ISOVUE-370) 76 % injection 160 mL         Patient Hx Of Height, Weight, and Vitals    Height Weight BSA (Calculated - sq m) BMI (Calculated) Retired BMI (kg/m2) Pulse BP                  Medications  (Filter: Administrations occurring from 0000 to 1239 on 09/06/22)   important  Continuous medications are totaled by the amount administered until 09/06/22 1239.       sodium chloride 0.9 % infusion (mL/hr)  Total dose:  Cannot be calculated* Dosing weight:  77.9    *Continuous medication not stopped within the calculation time range.  Date/Time Rate/Dose/Volume Action    09/06/22 1133 100 mL/hr New Bag      aspirin chewable tablet (mg)  Total dose:  162 mg    Date/Time Rate/Dose/Volume Action    09/06/22 1135 162 mg Given      lidocaine (XYLOCAINE) 1 % injection (mL)  Total volume:  2 mL    Date/Time Rate/Dose/Volume Action    09/06/22 1132 2 mL Given      heparin (porcine) injection (Units)  Total dose:  10,000 Units    Date/Time Rate/Dose/Volume Action    09/06/22 1137 8,000 Units Given    1218 2,000 Units Given      fentaNYL citrate (PF) (SUBLIMAZE) injection (mcg)  Total dose:  25 mcg    Date/Time Rate/Dose/Volume Action    09/06/22 1138 25 mcg Given      midazolam (VERSED) injection (mg)  Total dose:  1 mg    Date/Time Rate/Dose/Volume Action    09/06/22 1138 1 mg Given      atropine injection (mg)  Total dose:  0.5 mg    Date/Time Rate/Dose/Volume Action    09/06/22 1146 0.5 mg Given      phenylephrine (FILEMON-SYNEPHRINE) injection (mcg)  Total dose:  200 mcg    Date/Time Rate/Dose/Volume  Action    09/06/22 1146 200 mcg Given      Eptifibatide (INTEGRILIN) injection (mcg/kg)  Total dose:  14,022 mcg Dosing weight:  77.9    Date/Time Rate/Dose/Volume Action    09/06/22 1153 14,022 mcg Given      verapamil (ISOPTIN) injection (mg)  Total dose:  2.7 mg    Date/Time Rate/Dose/Volume Action    09/06/22 1136 2.5 mg Given    1150 200 mcg Given      nitroglycerin 100 mcg/mL in D5W syringe (mcg)  Total dose:  200 mcg    Date/Time Rate/Dose/Volume Action    09/06/22 1136 200 mcg Given      ticagrelor (BRILINTA) tablet (mg)  Total dose:  180 mg    Date/Time Rate/Dose/Volume Action    09/06/22 1201 180 mg Given      iopamidol (ISOVUE-370) 76 % injection (mL)  Total volume:  160 mL    Date/Time Rate/Dose/Volume Action    09/06/22 1216 160 mL Given        Supplies    Name ID Temporary Type Charge Code Description Charge Code Quantity   KT CONTRST INJ ISOL/RESVR 100ML REUS 642 No Supply   1   KT CONTRL HND ACIST CVI ALONA HIPRESS 65 4375 No Supply   1   PK CATH CARD 70 4734 No Supply   1   RUNWAY RADL W/TOP PAD 66721 No Supply   1   GW RUNTHROUGH NS STR RESHP .014 6N431RD 12674 No Guidewire HC OR SUPPLY SHELL 272/ 423811 1   CATH CORNRY OPTITORQUE 1SH TR4 5F 100 64847 No Diagnostic Catheter HC OR SUPPLY SHELL 272/NO CPT 470109 1   BALN TREK RX 2.5X12MM 09261 No Balloon HC OR SUPPLY SHELL 278/ 452985 1   CATH EXTRCT PRONTO V4 6F 138CM 57456 No Supply HC OR SUPPLY SHELL 278/ 668997 1   BALN TREK NC RX 3.59E02RB 10444 No Balloon HC OR SUPPLY SHELL 278/ 684490 1   BALN TREK NC RX 4X20MM 61922 No Balloon HC OR SUPPLY SHELL 278/ 521240 1   ST INF SHANTA SMRTSTE 20DRP 2VLV 24ML 117 97966 No Supply HC OR SUPPLY SHELL 272/NO CPT 494265 1   ST EXT IV SMARTSITE 2VLV SP M LL 5ML IV1 01204 No Supply HC OR SUPPLY SHELL 272/NO CPT 895852 1   DEV COMPR RAD TR BND REG 24CM 59532 No Hemostasis Device HC OR SUPPLY SHELL 272/NO CPT 730558 1   SENSR SPO2 [facility] A/ 75975 No Supply HC OR SUPPLY SHELL  270/NO CPT 140191 1   INTRO GLIDESHEATH SLENDER SS 21G .021 6F 10CM 45CM 29733 No Supply HC OR SUPPLY SHELL 272/ 855087 1   DRSNG SURESITE WNDW 4X4.5 29968 No Dressing HC OR SUPPLY SHELL 272/NO CPT 618714 1   DEV INFL MONARCH 25W 13826 No Supply HC OR SUPPLY SHELL 272/NO CPT 393901 1   CATH GUIDE VISTABRITE JR4 6F.07IN 26013 No Guide Catheter HC OR SUPPLY SHELL 278/ 239154 1   CATH INTRAVAS ULTRASND EAGLE EYE 2.9FR 54355 No Supply HC OR SUPPLY SHELL 278/ 403500 1   CATH DIAG INFINITI ANG/PIG 6SH 145DEG 5F 49935 No Diagnostic Catheter HC OR SUPPLY SHELL 272/NO CPT 538644 1   ELECTRD PAD DEFIB M/FUNC RADOLCNT W/ZOLL CONN A/ 220788 No Supply   1   DRP SURG RADL STRL 672381 No Supply   1   LN FLTR ORL/NASL MICROSTREAM NONINTUB A/LNG 698268 No Supply   1       Signed    Electronically signed by Matthew Hill MD on 9/6/22 at 1239 EDT          Link to Procedure Log    Procedure Log        Order-Level Documents:    Scan on 9/6/2022 1132 by [employer], [place]: CARDIAC CATH HEMO DATA - SCAN             Results Routing Tracking    View Results Routing Information     Order Report     Order Details

## 2022-09-08 NOTE — OUTREACH NOTE
Prep Survey    Flowsheet Row Responses   [facility] patient discharged from? [place]   Is LACE score < 7 ? Yes   Emergency Room discharge w/ pulse ox? No   Eligibility [facility]   Date of Admission 09/06/22   Date of Discharge 09/07/22   Discharge Disposition Home or Self Care   Discharge diagnosis Acute inferior STEMI,   heart cath    Does the patient have one of the following disease processes/diagnoses(primary or secondary)? Acute MI (STEMI,NSTEMI)   Does the patient have Home health ordered? No   Is there a DME ordered? No   Prep survey completed? Yes          MARY ANNE BOYLE - Registered Nurse

## 2022-09-12 ENCOUNTER — OFFICE VISIT (OUTPATIENT)
Dept: FAMILY MEDICINE CLINIC | Facility: CLINIC | Age: 47
End: 2022-09-12

## 2022-09-12 VITALS
DIASTOLIC BLOOD PRESSURE: 70 MMHG | TEMPERATURE: 97.1 F | BODY MASS INDEX: 21.82 KG/M2 | WEIGHT: 170 LBS | SYSTOLIC BLOOD PRESSURE: 120 MMHG | HEART RATE: 85 BPM | OXYGEN SATURATION: 95 % | HEIGHT: 74 IN

## 2022-09-12 DIAGNOSIS — Z09 ENCOUNTER FOR EXAMINATION FOLLOWING TREATMENT AT HOSPITAL: Primary | ICD-10-CM

## 2022-09-12 DIAGNOSIS — I21.11 ST ELEVATION MYOCARDIAL INFARCTION INVOLVING RIGHT CORONARY ARTERY: ICD-10-CM

## 2022-09-12 DIAGNOSIS — K51.80 OTHER ULCERATIVE COLITIS WITHOUT COMPLICATION: ICD-10-CM

## 2022-09-12 LAB
QT INTERVAL: 434 MS
QTC INTERVAL: 440 MS

## 2022-09-12 PROCEDURE — 99495 TRANSJ CARE MGMT MOD F2F 14D: CPT | Performed by: NURSE PRACTITIONER

## 2022-09-12 NOTE — PROGRESS NOTES
Transitional Care Follow Up Visit  Subjective     Scott Montes Is a 47 y.o. male who presents for a transitional care management visit.    Within 48 business hours after discharge TCM coordination staff contacted patient via telephone to coordinate their care and needs.      I reviewed and discussed the details of that call along with the discharge summary, hospital problems, inpatient lab results, inpatient diagnostic studies, and consultation reports with Scott Montes     Current outpatient and discharge medications have been reconciled for the patient.  Reviewed by: EDDIE Thurman      (  Date of TCM Phone Call 9/7/2022   [facility]   Date of Admission 9/6/2022   Date of Discharge 9/7/2022   Discharge Disposition Home or Self Care   }    The following portions of the patient's history were reviewed and updated as appropriate: CC, ROS, allergies, current medications, past family history, past medical history, past social history, past surgical history and problem list.      Risk for Readmission (LACE) No data recorded    History of Present Illness     Course During Hospital Stay:  Admitted on 9/6/2022 and discharged on 9/7/2022 at [facility] for an acute inferior STEMI.  CAD with 100% proximal RCA occlusion.     Hospital follow-up-patient is here for hospital follow-up after a Acute inferior STEMI with CAD due to 100% proximal RCA occlusion patient recently had COVID approximately 1 month ago.  He also has a new diagnosis of Crohn's disease.  He was bradycardic with borderline hypotension therefore he was not discharged on a beta-blocker.  He was discharged on lisinopril 10 as well as atorvastatin 80 mg and Brilinta 90 mg twice daily.  He reports that he was outside and began to have a sensation of heartburn then progressive to diaphoresis and Chest pain.  He has some mild CP and arm pain.  No HA or dizziness.  No palpitations.   No GI concerns.    UC and Chron's disease-on Mesalamine 1.2 EC  "tablet.  He has been on Entyvio.  He is on hold due to his insurance and recent MI. He continues to have recurrent BM.  Abdominal cramps continue.  Diarrhea is intermittent but no blood in stool currently.  Weight is fairly stable.         Review of Systems   Constitutional: Positive for fatigue. Negative for appetite change, unexpected weight gain and unexpected weight loss.   HENT: Negative for congestion, ear pain, postnasal drip, rhinorrhea, sore throat, swollen glands, trouble swallowing and voice change.    Eyes: Negative for blurred vision, double vision, pain and visual disturbance.   Respiratory: Negative for cough, chest tightness, shortness of breath and wheezing.    Cardiovascular: Negative for chest pain, palpitations and leg swelling.   Gastrointestinal: Positive for abdominal pain. Negative for blood in stool, constipation, diarrhea, nausea and indigestion.   Genitourinary: Negative for dysuria, hematuria and urgency.   Musculoskeletal: Positive for arthralgias and back pain. Negative for gait problem, joint swelling and myalgias.   Skin: Negative for color change and skin lesions.   Allergic/Immunologic: Negative.    Neurological: Negative for numbness, headache and confusion.   Hematological: Negative.    Psychiatric/Behavioral: Negative for dysphoric mood, sleep disturbance and suicidal ideas. The patient is not nervous/anxious.    All other systems reviewed and are negative.      /70   Pulse 85   Temp 97.1 °F (36.2 °C) (Temporal)   Ht 188 cm (74\")   Wt 77.1 kg (170 lb)   SpO2 95%   BMI 21.83 kg/m²     Objective     Physical Exam  Vitals reviewed.   Constitutional:       General: He is not in acute distress.     Appearance: He is well-developed. He is not diaphoretic.   HENT:      Head: Normocephalic and atraumatic.      Jaw: No tenderness.      Comments: Oropharynx not examined.  Patient is presently wearing a face covering/mask due to COVID-19 pandemic.     Right Ear: Hearing, " tympanic membrane, ear canal and external ear normal.      Left Ear: Hearing, tympanic membrane, ear canal and external ear normal.   Eyes:      General: Lids are normal. No scleral icterus.     Extraocular Movements:      Right eye: Normal extraocular motion and no nystagmus.      Left eye: Normal extraocular motion and no nystagmus.      Conjunctiva/sclera: Conjunctivae normal.      Pupils: Pupils are equal, round, and reactive to light.   Neck:      Thyroid: No thyromegaly or thyroid tenderness.      Vascular: No carotid bruit or JVD.      Trachea: No tracheal tenderness.   Cardiovascular:      Rate and Rhythm: Normal rate and regular rhythm.      Pulses:           Dorsalis pedis pulses are 2+ on the right side and 2+ on the left side.        Posterior tibial pulses are 2+ on the right side and 2+ on the left side.      Heart sounds: Normal heart sounds, S1 normal and S2 normal. No murmur heard.  Pulmonary:      Effort: Pulmonary effort is normal. No accessory muscle usage, prolonged expiration or respiratory distress.      Breath sounds: Normal breath sounds.   Chest:      Chest wall: No tenderness.   Abdominal:      General: Bowel sounds are normal. There is no distension.      Palpations: Abdomen is soft. There is no mass.      Tenderness: There is generalized abdominal tenderness.   Musculoskeletal:      Cervical back: Normal range of motion and neck supple. Tenderness present.      Thoracic back: Tenderness present.      Lumbar back: Tenderness present.      Right lower leg: No edema.      Left lower leg: No edema.      Comments: No muscular atrophy or flaccidity.  Multiple areas of generalized arthralgia and myalgia noted throughout spine, hips, knees, and shoulders.   Lymphadenopathy:      Head:      Right side of head: No submental or submandibular adenopathy.      Left side of head: No submental or submandibular adenopathy.      Cervical: No cervical adenopathy.      Right cervical: No superficial  cervical adenopathy.     Left cervical: No superficial cervical adenopathy.   Skin:     General: Skin is warm and dry.      Capillary Refill: Capillary refill takes less than 2 seconds.      Coloration: Skin is not jaundiced or pale.      Findings: No erythema.      Nails: There is no clubbing.   Neurological:      Mental Status: He is alert and oriented to person, place, and time.      Cranial Nerves: No cranial nerve deficit or facial asymmetry.      Sensory: No sensory deficit.      Motor: No weakness, tremor, atrophy or abnormal muscle tone.      Coordination: Coordination normal.      Deep Tendon Reflexes: Reflexes are normal and symmetric.   Psychiatric:         Attention and Perception: He is attentive.         Mood and Affect: Mood normal.         Speech: Speech normal.         Behavior: Behavior normal. Behavior is cooperative.         Thought Content: Thought content normal.         Judgment: Judgment normal.          Diagnoses and all orders for this visit:    1. Encounter for examination following treatment at hospital (Primary)    2. ST elevation myocardial infarction involving right coronary artery (HCC)  Comments:  Continue aspirin 81 mg, atorvastatin 80 mg, lisinopril 10 mg, and Brilinta 90 mg twice daily.  Keep any scheduled cardiology follow-ups    3. Other ulcerative colitis without complication (HCC)  Comments:  Continue under the care of GI specialist for treatment.      BMI is within normal parameters. No other follow-up for BMI required.      Emotional support and active listening provided.  Patient provided time to verbalize feelings.  Understands disease processes and need for medications.  Understands reasons for urgent and emergent care.  Patient (& family) verbalized agreement for treatment plan.     Hospital records reviewed.  Discussed any specific concerns patient had regarding testing, labs, Etc.   Current outpatient and discharge medications have been reconciled for the  patient.  Reviewed by: EDDIE Thurman    FMLA forms/STD to fill out.     RTC 3 months, sooner if needed.         This document has been electronically signed by:  EDDIE Thurman, FNP-C    Dragon disclaimer:  Much of this encounter note is an electronic transcription/translation of spoken language to printed text. The electronic translation of spoken language may permit erroneous, or at times, nonsensical words or phrases to be inadvertently transcribed; Although I have reviewed the note for such errors, some may still exist.

## 2022-09-26 ENCOUNTER — TELEPHONE (OUTPATIENT)
Dept: GASTROENTEROLOGY | Facility: CLINIC | Age: 47
End: 2022-09-26

## 2022-09-27 ENCOUNTER — TELEPHONE (OUTPATIENT)
Dept: UROLOGY | Facility: CLINIC | Age: 47
End: 2022-09-27

## 2022-09-27 NOTE — TELEPHONE ENCOUNTER
Provider: DR. BURCH  Caller: JEFERSON MEEKS  Relationship to Patient: SPOUSE    Phone Number: [phone]  Reason for Call: PATIENT SICK, HAS CROHN'S AND HAD TO RESCHEDULE TODAY'S APPOINTMENT TO NEXT AVAILABLE 12/12/22. NO CALL BACK NEEDED.

## 2022-09-29 DIAGNOSIS — K50.00 CROHN'S DISEASE OF SMALL INTESTINE WITHOUT COMPLICATION: Primary | ICD-10-CM

## 2022-09-29 RX ORDER — MESALAMINE 1.2 G/1
1200 TABLET, DELAYED RELEASE ORAL
Qty: 30 TABLET | Refills: 11 | Status: SHIPPED | OUTPATIENT
Start: 2022-09-29 | End: 2022-11-16 | Stop reason: SDUPTHER

## 2022-10-03 ENCOUNTER — OFFICE VISIT (OUTPATIENT)
Dept: CARDIOLOGY | Facility: CLINIC | Age: 47
End: 2022-10-03

## 2022-10-03 ENCOUNTER — LAB (OUTPATIENT)
Dept: LAB | Facility: HOSPITAL | Age: 47
End: 2022-10-03

## 2022-10-03 VITALS
DIASTOLIC BLOOD PRESSURE: 84 MMHG | OXYGEN SATURATION: 99 % | HEIGHT: 74 IN | WEIGHT: 174.4 LBS | HEART RATE: 80 BPM | BODY MASS INDEX: 22.38 KG/M2 | SYSTOLIC BLOOD PRESSURE: 123 MMHG

## 2022-10-03 DIAGNOSIS — Z72.0 TOBACCO USE: ICD-10-CM

## 2022-10-03 DIAGNOSIS — I25.10 CORONARY ARTERY DISEASE INVOLVING NATIVE CORONARY ARTERY OF NATIVE HEART WITHOUT ANGINA PECTORIS: ICD-10-CM

## 2022-10-03 DIAGNOSIS — E78.5 HYPERLIPIDEMIA LDL GOAL <70: Primary | ICD-10-CM

## 2022-10-03 DIAGNOSIS — M79.602 LEFT ARM PAIN: ICD-10-CM

## 2022-10-03 PROCEDURE — 99214 OFFICE O/P EST MOD 30 MIN: CPT | Performed by: NURSE PRACTITIONER

## 2022-10-03 PROCEDURE — 36415 COLL VENOUS BLD VENIPUNCTURE: CPT | Performed by: NURSE PRACTITIONER

## 2022-10-03 PROCEDURE — 80053 COMPREHEN METABOLIC PANEL: CPT | Performed by: NURSE PRACTITIONER

## 2022-10-03 PROCEDURE — 82550 ASSAY OF CK (CPK): CPT | Performed by: NURSE PRACTITIONER

## 2022-10-03 RX ORDER — ISOSORBIDE MONONITRATE 30 MG/1
30 TABLET, EXTENDED RELEASE ORAL DAILY
Qty: 30 TABLET | Refills: 11 | Status: SHIPPED | OUTPATIENT
Start: 2022-10-03 | End: 2022-11-15 | Stop reason: SDUPTHER

## 2022-10-03 NOTE — PROGRESS NOTES
[facility] CARDIOLOGY  [address] [address]  Phone: [phone]  Fax: [phone]    Name: Scott Montes    Date: 10/3/2022  MRN:  [id number]  :  1975      Encounter Date: 10/03/2022    Chief Complaint   Patient presents with   • Chest Pain     Patient states pressure in chest , left arm numbness and aching denies shortness of breath         History:     Scott Montes is a 47 y.o. male is a 47 year old male patient who was admitted to [facility] on 2022-2022 for Acute Inferior STEMI. He did undergo a LHC showing a 100% occluded proximal RCA.  His past medical history prior to admission was Crohn's which was newly diagnosed, he had also recently had COVID infection.  He does smoke tobacco daily.    HPI:     Mr. Montes is here for follow upon his recent STEMI/hospital stay. He has been doing well, he reports compliance with his medications, he denies any chest pain, dyspnea or swelling. He does report left arm pain which has been present since his MI, it has gotten some better but not fully improved. He is accompanied today by his wife.       Medical History:   Past Medical History:   Diagnosis Date   • Anemia    • Arthritis    • Elevated cholesterol    • Low back pain        Surgical History:   Past Surgical History:   Procedure Laterality Date   • CARDIAC CATHETERIZATION N/A 2022    Procedure: Left Heart Cath;  Surgeon: Matthew Hill MD;  Location: [facility] INVASIVE LOCATION;  Service: Cardiovascular;  Laterality: N/A;   • CARDIAC CATHETERIZATION N/A 2022    Procedure: Percutaneous Coronary Intervention;  Surgeon: Matthew Hill MD;  Location: [facility] CATH INVASIVE LOCATION;  Service: Cardiovascular;  Laterality: N/A;   • COLONOSCOPY N/A 2022    Procedure: COLONOSCOPY;  Surgeon: Stella Aparicio MD;  Location: [facility];  Service: Gastroenterology;  Laterality: N/A;   • CYST REMOVAL     • VASECTOMY    "       Social History:  Social History     Socioeconomic History   • Marital status:    Tobacco Use   • Smoking status: Former Smoker     Packs/day: 0.00     Years: 30.00     Pack years: 0.00     Quit date: 2022     Years since quittin.0   • Smokeless tobacco: Never Used   • Tobacco comment: Smoking cessation encouraged   Vaping Use   • Vaping Use: Some days   • Substances: Nicotine   Substance and Sexual Activity   • Alcohol use: No   • Drug use: No   • Sexual activity: Defer       Family History:  Family History   Problem Relation Age of Onset   • Colon cancer Mother        Current Meds:   Current Outpatient Medications   Medication Sig Dispense Refill   • aspirin 81 MG EC tablet Take 1 tablet by mouth Daily. 90 tablet 0   • atorvastatin (LIPITOR) 80 MG tablet Take 1 tablet by mouth Every Night. 90 tablet 0   • cyclobenzaprine (FLEXERIL) 5 MG tablet Take 1 tablet by mouth 3 (Three) Times a Day As Needed for Muscle Spasms. 90 tablet 5   • lisinopril (PRINIVIL,ZESTRIL) 10 MG tablet Take 1 tablet by mouth Daily. 90 tablet 0   • mesalamine (LIALDA) 1.2 g EC tablet Take 1 tablet by mouth Daily With Breakfast. 30 tablet 11   • ticagrelor (BRILINTA) 90 MG tablet tablet Take 1 tablet by mouth 2 (Two) Times a Day. 60 tablet 2   • vitamin D (ERGOCALCIFEROL) 1.25 MG (53642 UT) capsule capsule Take 50,000 Units by mouth 1 (One) Time Per Week.     • isosorbide mononitrate (IMDUR) 30 MG 24 hr tablet Take 1 tablet by mouth Daily. Hold for SBP <110 30 tablet 11   • vedolizumab (Entyvio) 300 MG injection Infuse 300 mg into a venous catheter 1 (One) Time.       No current facility-administered medications for this visit.        Allergies:   No Known Allergies    Objective     Vital Signs:   /84 (BP Location: Left arm, Patient Position: Sitting, Cuff Size: Adult)   Pulse 80   Ht 188 cm (74\")   Wt 79.1 kg (174 lb 6.4 oz)   SpO2 99%   BMI 22.39 kg/m²     BMI:   BMI is within normal parameters. No other " follow-up for BMI required.       Physical Exam:    Physical Exam  Cardiovascular:      Rate and Rhythm: Normal rate and regular rhythm.      Pulses: Normal pulses.      Heart sounds: Normal heart sounds.   Pulmonary:      Effort: Pulmonary effort is normal.      Breath sounds: Normal breath sounds.   Abdominal:      Palpations: Abdomen is soft.   Musculoskeletal:         General: Normal range of motion.   Skin:     General: Skin is warm and dry.   Neurological:      General: No focal deficit present.      Mental Status: He is alert and oriented to person, place, and time.   Psychiatric:         Mood and Affect: Mood normal.         Behavior: Behavior normal.         Thought Content: Thought content normal.         Judgment: Judgment normal.            DATA:   Results for orders placed during the hospital encounter of 09/06/22    Adult Transthoracic Echo Complete W/ Cont if Necessary Per Protocol    Interpretation Summary  · Left ventricular ejection fraction appears to be 51 - 55%. Left ventricular systolic function is normal, mild hypokinesia of basal inferolateral wall  · Left ventricular diastolic function was normal.  · There is no evidence of pericardial effusion     Results for orders placed during the hospital encounter of 09/06/22    Cardiac Catheterization/Vascular Study    Narrative  CARDIAC CATHETERIZATION / INTERVENTION REPORT            DATE OF PROCEDURE: 9/6/2022      INDICATION FOR PROCEDURE: STEMI      PRE PROCEDURE DIAGNOSIS:  Inferior STEMI      POST PROCEDURE DIAGNOSIS:  % proximal RCA occlusion s/ pPCI with 3.5 x 28 BUZZ post dilated with 4.0 NC balloon    Face to face mdoerate conscious  sedation time :      COMPLICATIONS : None    Specimens collected : None    PROCEDURE PERFORMED:    1. Selective right and left Coronary Angiogram  2. Left heart catheretization  3. Left Ventriculography  4. PCI of RCA  5. IVUS of RCA  6. Manual aspiration thrombectomy    Description of the  procedure:  Prior to the procedure risk, benefits and possible alternative were discussed with the patient and informed consent was obtained. Patient was brought to cardiac cath lab table in post absorbtive state. Patient was prepped and drape in usual sterile fashion. IV Versed and Fentanyl was used for moderate sedation. 2% Lidocaine was used for topical anesthesia. R radial arterial site was prepped and a micropuncture needle was used to access the artery and a 5 F slender sheath was placed. 2.5 mg of Verapamil and 200 mcg of NTG was given through the sheath intra arterial and 5000 units of Heparin was given once the catheter crossed the aortic arch.    5 F TIG 4 catheters was used for right and left coronary angiogram and 5 F pigtail catheter was used for Left heart hemodynamics and left ventriculography. All the catheters were exchanged over 0.035 wire. The R radial arterial sheath was removed and TR band was applied and immediate and complete hemostasis was achieved. The patient tolerate the entire procedure well without any immediate known complications.    Coronary anatomy findings:    LM: Is a large calibre vessel , normal take off from left cusp, divides into LAD and Lcx. No stenosis    LAD: Mild luminal irregularities no stenosis    LCX: Moderate calibre vessel, mild luminal irregularities.    RCA: Large calibre, dominant artery, normal take off from right cusp.  100% occlusion in proximal.    Left Ventriculography:    LV systolic function was normal with visual estimated EF of 55%.Basal inferior wall hypokinesia  No significant mitral regurgitation noted.    LVEDP: 10 mmHg  No gradient across the aortic valve on pull back.      PERCUTANEOUS CORONARY INTERVENTION PROCEDURE NOTE:    Heparin monotherapy was used for anticoagulation.  Total of 10,000 Units was given IV.Single bolus integrelin was also given    6 F JR 4 guide was used to engage the RCA coaxially. Pronto V4 aspiration catheter was used for  manual thrombectomy was performed    0.014 Runthrough guidewire was used to cross the lesion and parked distally.    Used a 2.5 x15 Compliant TREK balloon to predilate the lesion at 10 nandini.    Prepped a 3.5 x 28 Shana [employer] eluting stent and position at the lesion and successfully deployed at 16 nandini. IVUS showed vessel diameter was 4.5 proximal and distal, used a 4.0 NC at high pressure to post dilate    Post stent deployment angio pictures showed good expansion with 0% residual stenosis, KELLY 3 flow in the distal vascular bed and no dissection or distal wire perforation.    Lesion length: 20 mm  Pre PCI Stenosis: 100 %  Post PCI stenosis: 0 %  Pre PCI KELLY flow: 0  Post PCI KELLY flow: 3    EBL: Less than 10cc      Final Impression:  % proximal RCA occlusion s/ pPCI with 3.5 x 28 BUZZ post dilated with 4.0 NC balloon      Recommendations:  Aggressive guideline directed medical management for CAD  Dual Anti platelet medication with Asa 81 mg qd and Brilinta 90 mg bid for minimum 1 year.            Matthew MD Liz, [facility]  Interventional Cardiology    09/06/22  12:34 EDT        Lab Results   Component Value Date    CHOL 174 09/07/2022    CHOL 161 04/19/2022    CHOL 194 03/25/2021     Lab Results   Component Value Date    TRIG 178 (H) 09/07/2022    TRIG 97 04/19/2022    TRIG 230 (H) 03/25/2021     Lab Results   Component Value Date    HDL 28 (L) 09/07/2022    HDL 28 (L) 04/19/2022    HDL 28 (L) 03/25/2021     Lab Results   Component Value Date     (H) 09/07/2022     (H) 04/19/2022     (H) 03/25/2021       Lab Results   Component Value Date    TSH 1.150 04/19/2022       Results from last 7 days   Lab Units 10/03/22  1233   SODIUM mmol/L 140   POTASSIUM mmol/L 4.3   CHLORIDE mmol/L 102   CO2 mmol/L 26.4   BUN mg/dL 7   CREATININE mg/dL 0.76   CALCIUM mg/dL 9.4   BILIRUBIN mg/dL 0.6   ALK PHOS U/L 115   ALT (SGPT) U/L 15   AST (SGOT) U/L 13   GLUCOSE mg/dL 65         ECG 12  Lead    Date/Time: 10/4/2022 2:38 PM  Performed by: Leah Borrero APRN  Authorized by: Leah Borrero APRN   Comparison: compared with previous ECG from 9/7/2022  Comparison to previous ECG: SR rate 62  Rhythm: sinus rhythm  Comments: SR rate 80, QTc 402ms, discussed EKG with Dr. Wells               Assessment and Plan:   Visit Diagnosis:   Problem List Items Addressed This Visit        Cardiac and Vasculature    Coronary artery disease involving native coronary artery of native heart without angina pectoris    Relevant Medications    isosorbide mononitrate (IMDUR) 30 MG 24 hr tablet       Tobacco    Tobacco use      Other Visit Diagnoses     Hyperlipidemia LDL goal <70    -  Primary    Relevant Orders    Comprehensive Metabolic Panel (Completed)    CK (Completed)    Left arm pain        Relevant Orders    US Arterial Doppler Upper Extremity Left    US venous doppler upper extremity left (duplex)           CAD   -s/p STEMI, PCI BUZZ RCA   -9/2022 TTE: EF 51-55%, LV diastolic function is normal, mild hypokinesia of basal inferolateral wall.  -he denies any chest pain, but having left arm pain, discussed EKG and films with Dr. Wells, added Imdur to medication regimen  -had bradycardia in the hospital and wasn't able to tolerate Lopressor   -continue lisinopril, BP is doing well on it.   -will attempt to add back beta blocker at his next appointment.   -CBC, CMP ordered today   -encouraged cardiac rehab    HLD  -9/7/2022 Lipid profile: total cholesterol 174, HDL 28, , Triglycerides 178  -Continue statin  -CMP ordered    Left arm pain  -patient does report pain with movement so very likely musculoskeletal. He does report a history of arthritis  -will do a arterial and venous US on the left upper extremity  -also encouraged him to discuss this with his PCP if work up negative.     Tobacco use  -patient has been vaping, encouraged him to stop smoking/vaping.     Meds Ordered During Visit:  New  Medications Ordered This Visit   Medications   • isosorbide mononitrate (IMDUR) 30 MG 24 hr tablet     Sig: Take 1 tablet by mouth Daily. Hold for SBP <110     Dispense:  30 tablet     Refill:  11     Follow Up:   Return in about 8 weeks (around 11/28/2022).   Has follow up appointment for 11/15/2022 at 1045am           Thank you for allowing me to participate in the care of Scott Montes. Feel free to contact me directly with any further questions or concerns.    This document has been electronically signed by EDDIE Patricio  October 4, 2022 14:36 EDT      Dictated Utilizing Dragon Dictation: Part of this note may be an electronic transcription/translation of spoken language to printed text using the Dragon Dictation System.

## 2022-10-04 PROBLEM — Z72.0 TOBACCO USE: Status: ACTIVE | Noted: 2022-10-04

## 2022-10-04 PROBLEM — I25.10 CORONARY ARTERY DISEASE INVOLVING NATIVE CORONARY ARTERY OF NATIVE HEART WITHOUT ANGINA PECTORIS: Status: ACTIVE | Noted: 2022-10-04

## 2022-10-04 LAB
ALBUMIN SERPL-MCNC: 4 G/DL (ref 3.5–5.2)
ALBUMIN/GLOB SERPL: 1.4 G/DL
ALP SERPL-CCNC: 115 U/L (ref 39–117)
ALT SERPL W P-5'-P-CCNC: 15 U/L (ref 1–41)
ANION GAP SERPL CALCULATED.3IONS-SCNC: 11.6 MMOL/L (ref 5–15)
AST SERPL-CCNC: 13 U/L (ref 1–40)
BILIRUB SERPL-MCNC: 0.6 MG/DL (ref 0–1.2)
BUN SERPL-MCNC: 7 MG/DL (ref 6–20)
BUN/CREAT SERPL: 9.2 (ref 7–25)
CALCIUM SPEC-SCNC: 9.4 MG/DL (ref 8.6–10.5)
CHLORIDE SERPL-SCNC: 102 MMOL/L (ref 98–107)
CK SERPL-CCNC: 44 U/L (ref 20–200)
CO2 SERPL-SCNC: 26.4 MMOL/L (ref 22–29)
CREAT SERPL-MCNC: 0.76 MG/DL (ref 0.76–1.27)
EGFRCR SERPLBLD CKD-EPI 2021: 110.9 ML/MIN/1.73
GLOBULIN UR ELPH-MCNC: 2.9 GM/DL
GLUCOSE SERPL-MCNC: 65 MG/DL (ref 65–99)
POTASSIUM SERPL-SCNC: 4.3 MMOL/L (ref 3.5–5.2)
PROT SERPL-MCNC: 6.9 G/DL (ref 6–8.5)
SODIUM SERPL-SCNC: 140 MMOL/L (ref 136–145)

## 2022-10-04 PROCEDURE — 93000 ELECTROCARDIOGRAM COMPLETE: CPT | Performed by: NURSE PRACTITIONER

## 2022-10-10 ENCOUNTER — OFFICE VISIT (OUTPATIENT)
Dept: GASTROENTEROLOGY | Facility: CLINIC | Age: 47
End: 2022-10-10

## 2022-10-10 ENCOUNTER — LAB (OUTPATIENT)
Dept: LAB | Facility: HOSPITAL | Age: 47
End: 2022-10-10

## 2022-10-10 VITALS — BODY MASS INDEX: 22.51 KG/M2 | HEIGHT: 74 IN | WEIGHT: 175.4 LBS

## 2022-10-10 DIAGNOSIS — K50.00 CROHN'S DISEASE OF SMALL INTESTINE WITHOUT COMPLICATION: Primary | ICD-10-CM

## 2022-10-10 PROCEDURE — 85652 RBC SED RATE AUTOMATED: CPT | Performed by: PHYSICIAN ASSISTANT

## 2022-10-10 PROCEDURE — 80074 ACUTE HEPATITIS PANEL: CPT | Performed by: PHYSICIAN ASSISTANT

## 2022-10-10 PROCEDURE — 85027 COMPLETE CBC AUTOMATED: CPT | Performed by: PHYSICIAN ASSISTANT

## 2022-10-10 PROCEDURE — 80053 COMPREHEN METABOLIC PANEL: CPT | Performed by: PHYSICIAN ASSISTANT

## 2022-10-10 PROCEDURE — 86140 C-REACTIVE PROTEIN: CPT | Performed by: PHYSICIAN ASSISTANT

## 2022-10-10 PROCEDURE — 86480 TB TEST CELL IMMUN MEASURE: CPT

## 2022-10-10 PROCEDURE — 99213 OFFICE O/P EST LOW 20 MIN: CPT | Performed by: PHYSICIAN ASSISTANT

## 2022-10-10 PROCEDURE — 36415 COLL VENOUS BLD VENIPUNCTURE: CPT | Performed by: PHYSICIAN ASSISTANT

## 2022-10-10 NOTE — PROGRESS NOTES
Chief Complaint   Patient presents with   • Crohn's Disease       Scott Montes is a 47 y.o. male who presents to the office today for evaluation of Crohn's Disease  .      HPI  The patient is here for a follow-up visit for Crohn's disease.     Crohn's disease  Patient states that he is roughly the same as when he was seen in clinic last.  He has not noticed any improvement in appetite.  He continues to feel very weak and lethargic at times.  He has not been able to go back to work due to this.  Primary care has filled out his long-term disability paperwork.  He has not had any biologic therapy since his last Entyvio infusion which was on 8/23.  Patient recently had a myocardial infarction in which he is currently following with cardiology for treatment.    Patient admits to running out of his mesalamine for roughly a week-during that timeframe he experienced a lot of abdominal pain that was significantly increased from his baseline.  He reports getting a refill from his pharmacy and started back on the medication which abdominal pain seemed to subside.  He denies experiencing any rectal bleeding.    Medication  He denies taking Humira in the past.     Review of Systems   Constitutional: Positive for unexpected weight change.   HENT: Negative for sore throat and trouble swallowing.    Eyes: Negative.    Respiratory: Negative for chest tightness.    Cardiovascular: Negative for chest pain.   Gastrointestinal: Positive for abdominal distention, abdominal pain, constipation and diarrhea. Negative for anal bleeding, blood in stool, nausea, rectal pain and vomiting.   Endocrine: Negative.    Genitourinary: Positive for decreased urine volume, difficulty urinating and urgency.   Musculoskeletal: Positive for back pain. Negative for neck pain.   Skin: Negative.    Allergic/Immunologic: Negative for environmental allergies and food allergies.   Neurological: Negative for dizziness and headaches.   Hematological:  Bruises/bleeds easily.   Psychiatric/Behavioral: Positive for sleep disturbance. Negative for agitation. The patient is not nervous/anxious.        ACTIVE PROBLEMS:   Specialty Problems        Gastroenterology Problems    Colitis           PAST MEDICAL HISTORY:  Past Medical History:   Diagnosis Date   • Anemia    • Arthritis    • Elevated cholesterol    • Heart attack (HCC)    • Low back pain        SURGICAL HISTORY:  Past Surgical History:   Procedure Laterality Date   • CARDIAC CATHETERIZATION N/A 2022    Procedure: Left Heart Cath;  Surgeon: Matthew Hill MD;  Location:  [facility] CATH INVASIVE LOCATION;  Service: Cardiovascular;  Laterality: N/A;   • CARDIAC CATHETERIZATION N/A 2022    Procedure: Percutaneous Coronary Intervention;  Surgeon: Matthew Hill MD;  Location:  [facility] CATH INVASIVE LOCATION;  Service: Cardiovascular;  Laterality: N/A;   • COLONOSCOPY N/A 2022    Procedure: COLONOSCOPY;  Surgeon: Stella Aparicio MD;  Location: [facility] OR;  Service: Gastroenterology;  Laterality: N/A;   • CYST REMOVAL     • VASECTOMY         FAMILY HISTORY:  Family History   Problem Relation Age of Onset   • Colon cancer Mother        SOCIAL HISTORY:  Social History     Tobacco Use   • Smoking status: Former     Packs/day: 0.00     Years: 30.00     Pack years: 0.00     Types: Cigarettes     Quit date: 2022     Years since quittin.1   • Smokeless tobacco: Never   • Tobacco comments:     Smoking cessation encouraged   Substance Use Topics   • Alcohol use: No       CURRENT MEDICATION:    Current Outpatient Medications:   •  aspirin 81 MG EC tablet, Take 1 tablet by mouth Daily., Disp: 90 tablet, Rfl: 0  •  atorvastatin (LIPITOR) 80 MG tablet, Take 1 tablet by mouth Every Night., Disp: 90 tablet, Rfl: 0  •  cyclobenzaprine (FLEXERIL) 5 MG tablet, Take 1 tablet by mouth 3 (Three) Times a Day As Needed for Muscle Spasms., Disp: 90 tablet, Rfl: 5  •  isosorbide  "mononitrate (IMDUR) 30 MG 24 hr tablet, Take 1 tablet by mouth Daily. Hold for SBP <110, Disp: 30 tablet, Rfl: 11  •  lisinopril (PRINIVIL,ZESTRIL) 10 MG tablet, Take 1 tablet by mouth Daily., Disp: 90 tablet, Rfl: 0  •  mesalamine (LIALDA) 1.2 g EC tablet, Take 1 tablet by mouth Daily With Breakfast., Disp: 30 tablet, Rfl: 11  •  ticagrelor (BRILINTA) 90 MG tablet tablet, Take 1 tablet by mouth 2 (Two) Times a Day., Disp: 60 tablet, Rfl: 2  •  vedolizumab (Entyvio) 300 MG injection, Infuse 300 mg into a venous catheter 1 (One) Time., Disp: , Rfl:   •  vitamin D (ERGOCALCIFEROL) 1.25 MG (36741 UT) capsule capsule, Take 50,000 Units by mouth 1 (One) Time Per Week., Disp: , Rfl:     ALLERGIES:  Patient has no known allergies.    VISIT VITALS:  Ht 188 cm (74\")   Wt 79.6 kg (175 lb 6.4 oz)   BMI 22.52 kg/m²   Physical Exam  Constitutional:       General: He is not in acute distress.     Appearance: Normal appearance. He is well-developed.   HENT:      Head: Normocephalic and atraumatic.   Eyes:      Pupils: Pupils are equal, round, and reactive to light.   Cardiovascular:      Rate and Rhythm: Normal rate and regular rhythm.      Heart sounds: Normal heart sounds.   Pulmonary:      Effort: Pulmonary effort is normal. No respiratory distress.      Breath sounds: Normal breath sounds. No wheezing, rhonchi or rales.   Abdominal:      General: Abdomen is flat. Bowel sounds are normal. There is no distension.      Palpations: Abdomen is soft. There is no mass.      Tenderness: There is no abdominal tenderness. There is no guarding or rebound.      Hernia: No hernia is present.   Musculoskeletal:         General: No swelling. Normal range of motion.      Cervical back: Normal range of motion and neck supple.      Right lower leg: No edema.      Left lower leg: No edema.   Skin:     General: Skin is warm and dry.   Neurological:      Mental Status: He is alert and oriented to person, place, and time.   Psychiatric:         " Attention and Perception: Attention normal.         Mood and Affect: Mood normal.         Speech: Speech normal.         Behavior: Behavior normal. Behavior is cooperative.         Thought Content: Thought content normal.         Assessment    I will go ahead and obtain a CBC, CMP, CRP, sed rate and acute hepatitis panel on patient.  Gold interferon TB test will also be ordered prior to faxing paperwork for patient to begin Humira.  Prescription refill sent on mesalamine.     Diagnosis Plan   1. Crohn's disease of small intestine without complication (HCC)  CBC (No Diff)    Comprehensive Metabolic Panel    Sedimentation Rate    C-reactive Protein    Hepatitis Panel, Acute          Return in about 4 weeks (around 11/7/2022).                   This document has been electronically signed by Akira Nettles PA-C  October 13, 2022 13:12 EDT    Part of this note may be an electronic transcription/translation of spoken language to printed text using the Dragon Dictation System.      Transcribed from ambient dictation for Akira Nettles PA-C by Angie Anderson.  10/10/22   13:49 EDT    Patient or patient representative verbalized consent to the visit recording.  I have personally performed the services described in this document as transcribed by the above individual, and it is both accurate and complete.

## 2022-10-11 ENCOUNTER — TELEPHONE (OUTPATIENT)
Dept: GASTROENTEROLOGY | Facility: CLINIC | Age: 47
End: 2022-10-11

## 2022-10-11 LAB
ALBUMIN SERPL-MCNC: 4.3 G/DL (ref 3.5–5.2)
ALBUMIN/GLOB SERPL: 1.8 G/DL
ALP SERPL-CCNC: 116 U/L (ref 39–117)
ALT SERPL W P-5'-P-CCNC: 17 U/L (ref 1–41)
ANION GAP SERPL CALCULATED.3IONS-SCNC: 11 MMOL/L (ref 5–15)
AST SERPL-CCNC: 11 U/L (ref 1–40)
BILIRUB SERPL-MCNC: 0.7 MG/DL (ref 0–1.2)
BUN SERPL-MCNC: 9 MG/DL (ref 6–20)
BUN/CREAT SERPL: 12.5 (ref 7–25)
CALCIUM SPEC-SCNC: 9.5 MG/DL (ref 8.6–10.5)
CHLORIDE SERPL-SCNC: 103 MMOL/L (ref 98–107)
CO2 SERPL-SCNC: 26 MMOL/L (ref 22–29)
CREAT SERPL-MCNC: 0.72 MG/DL (ref 0.76–1.27)
CRP SERPL-MCNC: 0.97 MG/DL (ref 0–0.5)
DEPRECATED RDW RBC AUTO: 47.7 FL (ref 37–54)
EGFRCR SERPLBLD CKD-EPI 2021: 112.7 ML/MIN/1.73
ERYTHROCYTE [DISTWIDTH] IN BLOOD BY AUTOMATED COUNT: 16.1 % (ref 12.3–15.4)
ERYTHROCYTE [SEDIMENTATION RATE] IN BLOOD: 17 MM/HR (ref 0–15)
GLOBULIN UR ELPH-MCNC: 2.4 GM/DL
GLUCOSE SERPL-MCNC: 51 MG/DL (ref 65–99)
HAV IGM SERPL QL IA: NORMAL
HBV CORE IGM SERPL QL IA: NORMAL
HBV SURFACE AG SERPL QL IA: NORMAL
HCT VFR BLD AUTO: 39.7 % (ref 37.5–51)
HCV AB SER DONR QL: NORMAL
HGB BLD-MCNC: 13 G/DL (ref 13–17.7)
MCH RBC QN AUTO: 26.9 PG (ref 26.6–33)
MCHC RBC AUTO-ENTMCNC: 32.7 G/DL (ref 31.5–35.7)
MCV RBC AUTO: 82.2 FL (ref 79–97)
PLATELET # BLD AUTO: 353 10*3/MM3 (ref 140–450)
PMV BLD AUTO: 11 FL (ref 6–12)
POTASSIUM SERPL-SCNC: 4.2 MMOL/L (ref 3.5–5.2)
PROT SERPL-MCNC: 6.7 G/DL (ref 6–8.5)
RBC # BLD AUTO: 4.83 10*6/MM3 (ref 4.14–5.8)
SODIUM SERPL-SCNC: 140 MMOL/L (ref 136–145)
WBC NRBC COR # BLD: 11.01 10*3/MM3 (ref 3.4–10.8)

## 2022-10-13 LAB
GAMMA INTERFERON BACKGROUND BLD IA-ACNC: 0.01 IU/ML
M TB IFN-G BLD-IMP: NEGATIVE
M TB IFN-G CD4+ BCKGRND COR BLD-ACNC: 0.01 IU/ML
M TB IFN-G CD4+CD8+ BCKGRND COR BLD-ACNC: 0.01 IU/ML
MITOGEN IGNF BLD-ACNC: >10 IU/ML
QUANTIFERON INCUBATION: NORMAL
SERVICE CMNT-IMP: NORMAL

## 2022-10-13 RX ORDER — ADALIMUMAB 80MG/0.8ML
80 KIT SUBCUTANEOUS ONCE
Qty: 0.8 ML | Refills: 0 | Status: CANCELLED | OUTPATIENT
Start: 2022-10-13 | End: 2022-10-13

## 2022-10-13 RX ORDER — ADALIMUMAB 80MG/0.8ML
80 KIT SUBCUTANEOUS ONCE
Qty: 0.8 ML | Refills: 0 | OUTPATIENT
Start: 2022-10-13 | End: 2022-10-13

## 2022-10-13 RX ORDER — ADALIMUMAB 80MG/0.8ML
160 KIT SUBCUTANEOUS ONCE
Qty: 2.4 ML | Refills: 0 | Status: CANCELLED | OUTPATIENT
Start: 2022-10-13 | End: 2022-10-13

## 2022-10-13 NOTE — TELEPHONE ENCOUNTER
Could not find [facility] pharmacy in [facility].      Phoned in verbal Rx to [facility].     Humira Starter and Maintenance with 5 refills.

## 2022-10-14 ENCOUNTER — HOSPITAL ENCOUNTER (OUTPATIENT)
Dept: CARDIOLOGY | Facility: HOSPITAL | Age: 47
Discharge: HOME | End: 2022-10-14

## 2022-10-14 DIAGNOSIS — M79.602 LEFT ARM PAIN: ICD-10-CM

## 2022-10-14 PROCEDURE — 93931 UPPER EXTREMITY STUDY: CPT

## 2022-10-14 PROCEDURE — 93931 UPPER EXTREMITY STUDY: CPT | Performed by: RADIOLOGY

## 2022-10-14 PROCEDURE — 93971 EXTREMITY STUDY: CPT

## 2022-10-14 PROCEDURE — 93971 EXTREMITY STUDY: CPT | Performed by: RADIOLOGY

## 2022-10-17 ENCOUNTER — TELEPHONE (OUTPATIENT)
Dept: CARDIOLOGY | Facility: CLINIC | Age: 47
End: 2022-10-17

## 2022-10-17 ENCOUNTER — NURSE NAVIGATOR (OUTPATIENT)
Dept: PULMONOLOGY | Facility: CLINIC | Age: 47
End: 2022-10-17

## 2022-10-17 ENCOUNTER — OFFICE VISIT (OUTPATIENT)
Dept: PULMONOLOGY | Facility: CLINIC | Age: 47
End: 2022-10-17

## 2022-10-17 VITALS
WEIGHT: 173 LBS | OXYGEN SATURATION: 99 % | BODY MASS INDEX: 22.2 KG/M2 | TEMPERATURE: 97.8 F | HEART RATE: 79 BPM | DIASTOLIC BLOOD PRESSURE: 72 MMHG | SYSTOLIC BLOOD PRESSURE: 122 MMHG | HEIGHT: 74 IN

## 2022-10-17 DIAGNOSIS — R91.8 MULTIPLE PULMONARY NODULES: Primary | ICD-10-CM

## 2022-10-17 DIAGNOSIS — Z87.891 FORMER SMOKER: ICD-10-CM

## 2022-10-17 DIAGNOSIS — R06.09 DOE (DYSPNEA ON EXERTION): ICD-10-CM

## 2022-10-17 PROCEDURE — 99204 OFFICE O/P NEW MOD 45 MIN: CPT | Performed by: INTERNAL MEDICINE

## 2022-10-17 RX ORDER — ADALIMUMAB 40MG/0.4ML
KIT SUBCUTANEOUS
COMMUNITY
Start: 2022-10-14 | End: 2023-02-27

## 2022-10-17 NOTE — PROGRESS NOTES
Nurse Navigator met with patient on this date.  Pt was referred to lung nodule clinic to be seen in new consultation with Dr. Gu following an abnormal abd/pel CT scan revealed multiple lung nodules.  PET scan on 5/11/22 resulted negative.  Dr. Gu reviewed and gave printed copies on lung nodule information and Fleischner society guidelines.  Dr. Gu's recommendation and plan is to repeat a 6 month chest CT scan (which will be due Nov 2022).  Scan will be evaluated, and if normal/stable, will follow up in another 6 months for repeat scan (May 2023).  The role of the NN introduced to patient and wife.  NN contact information provided and encouraged for pt/wife to call with any questions or concerns.  Pt verbalized understanding and is in agreement with plan of care.

## 2022-10-17 NOTE — TELEPHONE ENCOUNTER
----- Message from EDDIE Patricio sent at 10/17/2022  9:01 AM EDT -----  Tried to call him with no answer. Will you let him know his venous and arterial doppler were both normal.

## 2022-10-17 NOTE — PROGRESS NOTES
PULMONARY  NOTE    Chief Complaint     Pulmonary nodules, coronary artery disease, former smoker    History of Present Illness     47-year-old male referred for evaluation of an abnormal CT scan of the chest    He has a history of tobacco abuse of up to 2 packs of cigarettes per day  He stopped smoking in September 2022    He was admitted to the hospital in May 2022 with what was felt to be a spontaneous bowel perforation  Underwent chest and abdominal imaging  His GI issue resolved but he was found to have several bilateral pulmonary nodules, max 9-10 mm in size  He had both solid and groundglass densities  He underwent a PET CT scan about a week later which revealed no abnormal hypermetabolic activity    Since that hospital stay he was admitted last month with an acute myocardial infarction  He received a drug-eluting stent to the RCA    In addition, experienced COVID-19 infection about a month or so ago.    He comes in today for follow-up  Has no regular cough or sputum production  He has had no hemoptysis    He really did not experience shortness of breath until he had his myocardial infarction and has had some shortness of breath since that time  He is on no respiratory medications and has not had PFTs    Patient Active Problem List   Diagnosis   • Testosterone deficiency   • Other male erectile dysfunction   • Primary osteoarthritis involving multiple joints   • Vitamin D deficiency   • Colitis   • Generalized abdominal pain   • Abnormal CT scan, colon   • STEMI (ST elevation myocardial infarction) ([facility])   • Coronary artery disease involving native coronary artery of native heart without angina pectoris   • Former smoker (Stopped 9/2022)   • Multiple pulmonary nodules (Max 9mm)   • RESENDEZ (dyspnea on exertion)     No Known Allergies    Current Outpatient Medications:   •  aspirin 81 MG EC tablet, Take 1 tablet by mouth Daily., Disp: 90 tablet, Rfl: 0  •  atorvastatin (LIPITOR) 80 MG tablet, Take 1 tablet by  "mouth Every Night., Disp: 90 tablet, Rfl: 0  •  cyclobenzaprine (FLEXERIL) 5 MG tablet, Take 1 tablet by mouth 3 (Three) Times a Day As Needed for Muscle Spasms., Disp: 90 tablet, Rfl: 5  •  isosorbide mononitrate (IMDUR) 30 MG 24 hr tablet, Take 1 tablet by mouth Daily. Hold for SBP <110, Disp: 30 tablet, Rfl: 11  •  lisinopril (PRINIVIL,ZESTRIL) 10 MG tablet, Take 1 tablet by mouth Daily., Disp: 90 tablet, Rfl: 0  •  mesalamine (LIALDA) 1.2 g EC tablet, Take 1 tablet by mouth Daily With Breakfast., Disp: 30 tablet, Rfl: 11  •  ticagrelor (BRILINTA) 90 MG tablet tablet, Take 1 tablet by mouth 2 (Two) Times a Day., Disp: 60 tablet, Rfl: 2  •  vitamin D (ERGOCALCIFEROL) 1.25 MG (29939 UT) capsule capsule, Take 50,000 Units by mouth 1 (One) Time Per Week., Disp: , Rfl:   •  Humira Pen 40 MG/0.4ML Pen-injector Kit, , Disp: , Rfl:   MEDICATION LIST AND ALLERGIES REVIEWED.    Family History   Problem Relation Age of Onset   • Colon cancer Mother    • Cancer Father      Social History     Tobacco Use   • Smoking status: Former     Packs/day: 2.00     Years: 30.00     Pack years: 60.00     Types: Cigarettes     Quit date: 2022     Years since quittin.1   • Smokeless tobacco: Never   • Tobacco comments:     Smoking cessation encouraged   Vaping Use   • Vaping Use: Every day   • Substances: Nicotine   • Devices: Disposable   Substance Use Topics   • Alcohol use: No   • Drug use: No     Social History     Social History Narrative        Stopped smoking 2022    Previously smoked 2 packs/day for 36 years    Is a  for repairing commercial compressors     FAMILY AND SOCIAL HISTORY REVIEWED.    Review of Systems  ALSO REFER TO SCANNED ROS SHEET FROM SAME DATE.    /72 (BP Location: Left arm, Patient Position: Sitting)   Pulse 79   Temp 97.8 °F (36.6 °C)   Ht 188 cm (74\")   Wt 78.5 kg (173 lb)   SpO2 99%   BMI 22.21 kg/m²   Physical Exam  Vitals and nursing note reviewed. "   Constitutional:       General: He is not in acute distress.     Appearance: He is well-developed. He is not diaphoretic.   HENT:      Head: Normocephalic and atraumatic.   Neck:      Thyroid: No thyromegaly.   Cardiovascular:      Rate and Rhythm: Normal rate and regular rhythm.      Heart sounds: Normal heart sounds. No murmur heard.  Pulmonary:      Effort: Pulmonary effort is normal.      Breath sounds: Normal breath sounds. No stridor.   Lymphadenopathy:      Cervical: No cervical adenopathy.      Upper Body:      Right upper body: No supraclavicular or epitrochlear adenopathy.      Left upper body: No supraclavicular or epitrochlear adenopathy.   Skin:     General: Skin is warm and dry.   Neurological:      Mental Status: He is alert and oriented to person, place, and time.   Psychiatric:         Behavior: Behavior normal.         Results     CT scan of the chest from 5/7/2022 reviewed on PACS  PET CT scan from 5/11/2022 reviewed on PACS  Several pulmonary nodules including a groundglass nodule, 6 mm solid right sided nodule, 9-10 mm right-sided pulmonary nodule and a 7 mm left-sided pulmonary nodule  None of these nodules exhibit abnormal hypermetabolic activity on PET scan    Immunization History   Administered Date(s) Administered   • COVID-19 (PFIZER) PURPLE CAP 08/04/2021, 09/13/2021     Problem List       ICD-10-CM ICD-9-CM   1. Multiple pulmonary nodules (Max 9mm)  R91.8 793.19   2. Former smoker (Stopped 9/2022)  Z87.891 V15.82   3. RESENDEZ (dyspnea on exertion)  R06.09 786.09       Discussion     We reviewed his chest imaging  He has several noncalcified pulmonary nodules identified incidentally on imaging earlier this spring  A PET scan revealed no abnormal hypermetabolic activity    We discussed pulmonary nodules and pulmonary nodule management guidelines  I gave him patient literature on both  The negative PET scan is certainly encouraging  However, I recommended continued CT surveillance  I would  recommend 6-month CT scan of the chest which would be November 2022  Assuming radiographic stability, or improvement, would continue to follow any remaining nodules for a total of 2 years    Have encouraged to smoking cessation efforts    If his dyspnea persists we can consider getting pulmonary function test    Assuming his November CT scan is stable/improved I will plan to see him back in roughly 6 months with a 1 year imaging study    Moderate level of Medical Decision Making complexity based on 1 undiagnosed new problem, independent interpretation of tests, and/or prescription drug management     Devaughn Gu MD  Note electronically signed    CC: Asuncion Keyes APRN

## 2022-10-17 NOTE — TELEPHONE ENCOUNTER
----- Message from EDDIE Patricio sent at 10/17/2022  9:01 AM EDT -----  Tried to call him no answer, will you let him know his arterial and venous US were normal.

## 2022-10-19 ENCOUNTER — PROCEDURE VISIT (OUTPATIENT)
Dept: FAMILY MEDICINE CLINIC | Facility: CLINIC | Age: 47
End: 2022-10-19

## 2022-10-19 VITALS
DIASTOLIC BLOOD PRESSURE: 74 MMHG | BODY MASS INDEX: 22.56 KG/M2 | OXYGEN SATURATION: 100 % | RESPIRATION RATE: 20 BRPM | WEIGHT: 175.8 LBS | SYSTOLIC BLOOD PRESSURE: 122 MMHG | HEIGHT: 74 IN | TEMPERATURE: 97.2 F | HEART RATE: 77 BPM

## 2022-10-19 DIAGNOSIS — M75.82 TENDINITIS OF LEFT ROTATOR CUFF: Primary | ICD-10-CM

## 2022-10-19 PROCEDURE — 99213 OFFICE O/P EST LOW 20 MIN: CPT | Performed by: NURSE PRACTITIONER

## 2022-10-19 PROCEDURE — 20610 DRAIN/INJ JOINT/BURSA W/O US: CPT | Performed by: NURSE PRACTITIONER

## 2022-10-19 RX ORDER — TRIAMCINOLONE ACETONIDE 40 MG/ML
40 INJECTION, SUSPENSION INTRA-ARTICULAR; INTRAMUSCULAR ONCE
Status: COMPLETED | OUTPATIENT
Start: 2022-10-19 | End: 2022-10-19

## 2022-10-19 RX ORDER — LIDOCAINE HYDROCHLORIDE 10 MG/ML
3 INJECTION, SOLUTION INFILTRATION; PERINEURAL ONCE
Status: COMPLETED | OUTPATIENT
Start: 2022-10-19 | End: 2022-10-19

## 2022-10-19 RX ORDER — DEXAMETHASONE SODIUM PHOSPHATE 4 MG/ML
4 INJECTION, SOLUTION INTRA-ARTICULAR; INTRALESIONAL; INTRAMUSCULAR; INTRAVENOUS; SOFT TISSUE ONCE
Status: COMPLETED | OUTPATIENT
Start: 2022-10-19 | End: 2022-10-19

## 2022-10-19 RX ADMIN — DEXAMETHASONE SODIUM PHOSPHATE 4 MG: 4 INJECTION, SOLUTION INTRA-ARTICULAR; INTRALESIONAL; INTRAMUSCULAR; INTRAVENOUS; SOFT TISSUE at 17:47

## 2022-10-19 RX ADMIN — TRIAMCINOLONE ACETONIDE 40 MG: 40 INJECTION, SUSPENSION INTRA-ARTICULAR; INTRAMUSCULAR at 17:48

## 2022-10-19 RX ADMIN — LIDOCAINE HYDROCHLORIDE 3 ML: 10 INJECTION, SOLUTION INFILTRATION; PERINEURAL at 17:47

## 2022-10-19 NOTE — PROGRESS NOTES
Subjective   Scott Montes is a 47 y.o. male.     Chief Complaint   Patient presents with   • joint injection       History of Present Illness     Shoulder pain-on the left.  He reports that his shoulder has been painful for a while and he had xrays in 2021 due to his persistent pain.  No obvious findings on films.  Patient reports the day he had his MI he began to have left shoulder pain.  He reports that he has been cleared by cardio now and they do not see any reason for him to have shoulder pain.  He reports that he continues to have dull aching pain that radiates from his shoulder to his arm.  He reports difficulty raising his arm upward.  Numbness initially after his MI but that has resolved.  He reports he cannot lay on it in bed due to the pain.  Occasional crepitus.  He denies any swelling.      The following portions of the patient's history were reviewed and updated as appropriate: CC, ROS, allergies, current medications, past family history, past medical history, past social history, past surgical history and problem list.      Review of Systems   Constitutional: Positive for fatigue. Negative for activity change, appetite change and fever.   HENT: Negative for congestion, ear pain, facial swelling, nosebleeds, rhinorrhea, sinus pressure, sore throat and trouble swallowing.    Eyes: Negative for blurred vision, double vision and redness.   Respiratory: Negative for cough, chest tightness, shortness of breath and wheezing.    Cardiovascular: Negative for chest pain, palpitations and leg swelling.   Gastrointestinal: Negative for abdominal pain, blood in stool, constipation and diarrhea.   Endocrine: Negative.    Genitourinary: Negative for dysuria, flank pain, frequency, hematuria and urgency.   Musculoskeletal: Positive for arthralgias.   Skin: Negative.    Allergic/Immunologic: Negative.    Neurological: Negative for dizziness, weakness, light-headedness and headache.   Hematological: Negative.   "  Psychiatric/Behavioral: Negative for self-injury, sleep disturbance and suicidal ideas. The patient is not nervous/anxious.    All other systems reviewed and are negative.      Objective     /74 (BP Location: Left arm, Patient Position: Sitting, Cuff Size: Adult)   Pulse 77   Temp 97.2 °F (36.2 °C) (Temporal)   Resp 20   Ht 188 cm (74\")   Wt 79.7 kg (175 lb 12.8 oz)   SpO2 100%   BMI 22.57 kg/m²     Physical Exam  Vitals reviewed.   Constitutional:       General: He is not in acute distress.     Appearance: He is well-developed. He is not diaphoretic.   HENT:      Head: Normocephalic and atraumatic.      Jaw: No tenderness.      Comments: Oropharynx not examined.  Patient is presently wearing a face covering/mask due to COVID-19 pandemic.     Right Ear: Hearing, tympanic membrane, ear canal and external ear normal.      Left Ear: Hearing, tympanic membrane, ear canal and external ear normal.   Eyes:      General: Lids are normal. No scleral icterus.     Extraocular Movements:      Right eye: Normal extraocular motion and no nystagmus.      Left eye: Normal extraocular motion and no nystagmus.      Conjunctiva/sclera: Conjunctivae normal.      Pupils: Pupils are equal, round, and reactive to light.   Neck:      Thyroid: No thyromegaly or thyroid tenderness.      Vascular: No carotid bruit or JVD.      Trachea: No tracheal tenderness.   Cardiovascular:      Rate and Rhythm: Normal rate and regular rhythm.      Pulses:           Dorsalis pedis pulses are 2+ on the right side and 2+ on the left side.        Posterior tibial pulses are 2+ on the right side and 2+ on the left side.      Heart sounds: Normal heart sounds, S1 normal and S2 normal. No murmur heard.  Pulmonary:      Effort: Pulmonary effort is normal. No accessory muscle usage, prolonged expiration or respiratory distress.      Breath sounds: Normal breath sounds.   Chest:      Chest wall: No tenderness.   Abdominal:      General: Bowel sounds " are normal. There is no distension.      Palpations: Abdomen is soft. There is no mass.      Tenderness: There is generalized abdominal tenderness.   Musculoskeletal:      Right shoulder: Tenderness present.      Left shoulder: Tenderness present. Decreased range of motion. Decreased strength. Normal pulse.      Cervical back: Normal range of motion and neck supple. Tenderness present.      Thoracic back: Tenderness present.      Lumbar back: Tenderness present.      Right lower leg: No edema.      Left lower leg: No edema.      Comments: No muscular atrophy or flaccidity.  Multiple areas of generalized arthralgia and myalgia noted throughout spine, hips, knees, and shoulders.   Lymphadenopathy:      Head:      Right side of head: No submental or submandibular adenopathy.      Left side of head: No submental or submandibular adenopathy.      Cervical: No cervical adenopathy.      Right cervical: No superficial cervical adenopathy.     Left cervical: No superficial cervical adenopathy.   Skin:     General: Skin is warm and dry.      Capillary Refill: Capillary refill takes less than 2 seconds.      Coloration: Skin is not jaundiced or pale.      Findings: No erythema.      Nails: There is no clubbing.   Neurological:      Mental Status: He is alert and oriented to person, place, and time.      Cranial Nerves: No cranial nerve deficit or facial asymmetry.      Sensory: No sensory deficit.      Motor: No weakness, tremor, atrophy or abnormal muscle tone.      Coordination: Coordination normal.      Deep Tendon Reflexes: Reflexes are normal and symmetric.   Psychiatric:         Attention and Perception: He is attentive.         Mood and Affect: Mood normal.         Speech: Speech normal.         Behavior: Behavior normal. Behavior is cooperative.         Thought Content: Thought content normal.         Judgment: Judgment normal.           Diagnoses and all orders for this visit:    1. Tendinitis of left rotator cuff  (Primary)  -     dexamethasone (DECADRON) injection 4 mg  -     triamcinolone acetonide (KENALOG-40) injection 40 mg  -     lidocaine (XYLOCAINE) 1 % injection 3 mL         BMI is within normal parameters. No other follow-up for BMI required.       Understands disease processes and need for medications.  Understands reasons for urgent and emergent care.  Patient (& family) verbalized agreement for treatment plan.   Emotional support and active listening provided.  Patient provided time to verbalize feelings.    Risk and benefits explained of procedure including but not limited to:  Red man syndrome, blood sugar changes, jitteriness, injection site reaction, bleeding, risk of infection, nerve paralysis, and/or tendon rupture.  Patient wishes to proceed with procedure.  Verbal wound instructions provided to patient.  Questions answered.     RTC PRN 3-5 days for worsening or non resolving symptoms    PROCEDURE NOTE:    Procedure: Large joint injection of left shoulder intra-articularly today  The procedure risks and benefits were explained to patient and patient gave verbal consent to have the procedure performed.  Indication:  shoulder osteoarthritis  Provider: EDDIE Castle  Description: The left shoulder and was prepped and draped in sterile fashion.  An injection was given into the shoulder joint using 3 cc of 1% lidocaine, 1 cc Dexamthasone, and 1 cc of Kenalog.  Pressure was held and sterile dressings were placed.  No consultation  Assessment blood loss: Minimal  Patient tolerated the procedure well.     1% lidocaine 200 mg per 20 mL NDC #66894-263-63 LOT: [id number] EXP:02/2026    Triamcinolone acetate 40 mg per 1 mL NDC #[address]-3 LOT: [id number] EXP: 11/2023  Dexamethasone 120 mg per 30 mL NDC #37852-831-98 LOT: [id number] EXP: 02/2023          This document has been electronically signed by:  EDDIE Thurman, FNP-C    Dragon disclaimer:  Part of this note may be an electronic transcription/translation  of spoken language to printed text using the Dragon Dictation System.

## 2022-10-21 ENCOUNTER — PATIENT ROUNDING (BHMG ONLY) (OUTPATIENT)
Dept: PULMONOLOGY | Facility: CLINIC | Age: 47
End: 2022-10-21

## 2022-10-21 NOTE — PROGRESS NOTES
October 21, 2022    Hello, may I speak with Scott Montes?    My name is Angie      I am  with [facility]  [address]  [place] [address] [phone].    Before we get started may I verify your date of birth? 1975    I am calling to officially welcome you to our practice and ask about your recent visit. Is this a good time to talk? yes    Tell me about your visit with us. What things went well?  He () explained things very well and he was very nice. He seemed like a great doctor. You all aced it.        We're always looking for ways to make our patients' experiences even better. Do you have recommendations on ways we may improve?  no    Overall were you satisfied with your first visit to our practice? yes       I appreciate you taking the time to speak with me today. Is there anything else I can do for you? no      Thank you, and have a great day.

## 2022-11-06 ENCOUNTER — HOSPITAL ENCOUNTER (OUTPATIENT)
Dept: CT IMAGING | Facility: HOSPITAL | Age: 47
Discharge: HOME | End: 2022-11-06
Admitting: INTERNAL MEDICINE

## 2022-11-06 DIAGNOSIS — R91.8 MULTIPLE PULMONARY NODULES: ICD-10-CM

## 2022-11-06 DIAGNOSIS — R06.09 DOE (DYSPNEA ON EXERTION): ICD-10-CM

## 2022-11-06 DIAGNOSIS — Z87.891 FORMER SMOKER: ICD-10-CM

## 2022-11-06 PROCEDURE — 71250 CT THORAX DX C-: CPT | Performed by: RADIOLOGY

## 2022-11-06 PROCEDURE — 71250 CT THORAX DX C-: CPT

## 2022-11-11 ENCOUNTER — TELEPHONE (OUTPATIENT)
Dept: CARDIOLOGY | Facility: CLINIC | Age: 47
End: 2022-11-11

## 2022-11-11 RX ORDER — ASPIRIN 81 MG/1
81 TABLET ORAL DAILY
Qty: 90 TABLET | Refills: 0 | Status: SHIPPED | OUTPATIENT
Start: 2022-11-11 | End: 2022-11-15 | Stop reason: SDUPTHER

## 2022-11-11 RX ORDER — LISINOPRIL 10 MG/1
10 TABLET ORAL DAILY
Qty: 90 TABLET | Refills: 0 | Status: SHIPPED | OUTPATIENT
Start: 2022-11-11 | End: 2022-11-15 | Stop reason: SDUPTHER

## 2022-11-11 RX ORDER — ATORVASTATIN CALCIUM 80 MG/1
80 TABLET, FILM COATED ORAL NIGHTLY
Qty: 90 TABLET | Refills: 0 | Status: SHIPPED | OUTPATIENT
Start: 2022-11-11 | End: 2022-11-15 | Stop reason: SDUPTHER

## 2022-11-15 ENCOUNTER — OFFICE VISIT (OUTPATIENT)
Dept: CARDIOLOGY | Facility: CLINIC | Age: 47
End: 2022-11-15

## 2022-11-15 VITALS
WEIGHT: 176.2 LBS | DIASTOLIC BLOOD PRESSURE: 79 MMHG | OXYGEN SATURATION: 98 % | SYSTOLIC BLOOD PRESSURE: 121 MMHG | HEART RATE: 73 BPM | HEIGHT: 74 IN | BODY MASS INDEX: 22.61 KG/M2

## 2022-11-15 DIAGNOSIS — I25.10 CORONARY ARTERY DISEASE INVOLVING NATIVE CORONARY ARTERY OF NATIVE HEART WITHOUT ANGINA PECTORIS: Primary | ICD-10-CM

## 2022-11-15 PROCEDURE — 99213 OFFICE O/P EST LOW 20 MIN: CPT | Performed by: NURSE PRACTITIONER

## 2022-11-15 RX ORDER — METOPROLOL SUCCINATE 25 MG/1
12.5 TABLET, EXTENDED RELEASE ORAL DAILY
Qty: 90 TABLET | Refills: 0 | Status: SHIPPED | OUTPATIENT
Start: 2022-11-15 | End: 2023-02-20 | Stop reason: SDUPTHER

## 2022-11-15 RX ORDER — ATORVASTATIN CALCIUM 80 MG/1
80 TABLET, FILM COATED ORAL NIGHTLY
Qty: 90 TABLET | Refills: 0 | Status: SHIPPED | OUTPATIENT
Start: 2022-11-15 | End: 2023-02-20 | Stop reason: SDUPTHER

## 2022-11-15 RX ORDER — ISOSORBIDE MONONITRATE 30 MG/1
30 TABLET, EXTENDED RELEASE ORAL DAILY
Qty: 90 TABLET | Refills: 0 | Status: SHIPPED | OUTPATIENT
Start: 2022-11-15 | End: 2023-02-20 | Stop reason: SDUPTHER

## 2022-11-15 RX ORDER — ASPIRIN 81 MG/1
81 TABLET ORAL DAILY
Qty: 90 TABLET | Refills: 0 | Status: SHIPPED | OUTPATIENT
Start: 2022-11-15 | End: 2023-02-20 | Stop reason: SDUPTHER

## 2022-11-15 RX ORDER — LISINOPRIL 10 MG/1
10 TABLET ORAL DAILY
Qty: 90 TABLET | Refills: 0 | Status: SHIPPED | OUTPATIENT
Start: 2022-11-15 | End: 2023-02-20 | Stop reason: SDUPTHER

## 2022-11-15 NOTE — PROGRESS NOTES
[facility] CARDIOLOGY  [address] [address]  Phone: [phone]  Fax: [phone]    Name: Scott Montes    Date: 11/15/2022  MRN:  [id number]  :  1975      Encounter Date: 11/15/2022    Chief Complaint   Patient presents with   • Hyperlipidemia   • Follow-up     Intermittent CP,SOA on exertion  Bruises easily   • Med Management     verbally        History:     Scott Montes is a 47 y.o. male  patient who was admitted to [facility] on 2022-2022 for Acute Inferior STEMI. He did undergo a LHC showing a 100% occluded proximal RCA.  His past medical history prior to admission was Crohn's which was newly diagnosed, he had also recently had COVID infection.  He does smoke tobacco daily.    HPI:     Mr. Montes is here for follow up on his CAD. He has been doing well, he denies any chest pain, dyspnea or swelling. He reports compliance with his medications. He is not accompanied by anyone at his visit today.      Medical History:   Past Medical History:   Diagnosis Date   • Anemia    • Arthritis    • Elevated cholesterol    • Heart attack (HCC)    • Low back pain        Surgical History:   Past Surgical History:   Procedure Laterality Date   • CARDIAC CATHETERIZATION N/A 2022    Procedure: Left Heart Cath;  Surgeon: Matthew Hill MD;  Location: [facility] INVASIVE LOCATION;  Service: Cardiovascular;  Laterality: N/A;   • CARDIAC CATHETERIZATION N/A 2022    Procedure: Percutaneous Coronary Intervention;  Surgeon: Matthew Hill MD;  Location: [facility] INVASIVE LOCATION;  Service: Cardiovascular;  Laterality: N/A;   • COLONOSCOPY N/A 2022    Procedure: COLONOSCOPY;  Surgeon: Stella Aparicio MD;  Location: [facility];  Service: Gastroenterology;  Laterality: N/A;   • CYST REMOVAL     • VASECTOMY          Social History:  Social History     Socioeconomic History   • Marital status:    Tobacco Use   •  "Smoking status: Former     Packs/day: 2.00     Years: 30.00     Pack years: 60.00     Types: Cigarettes     Quit date: 2022     Years since quittin.1   • Smokeless tobacco: Never   • Tobacco comments:     Smoking cessation encouraged   Vaping Use   • Vaping Use: Every day   • Substances: Nicotine   • Devices: Disposable   Substance and Sexual Activity   • Alcohol use: No   • Drug use: No   • Sexual activity: Defer       Family History:  Family History   Problem Relation Age of Onset   • Colon cancer Mother    • Cancer Father        Current Meds:   Current Outpatient Medications   Medication Sig Dispense Refill   • aspirin 81 MG EC tablet Take 1 tablet by mouth Daily. 90 tablet 0   • atorvastatin (LIPITOR) 80 MG tablet Take 1 tablet by mouth Every Night. 90 tablet 0   • Humira Pen 40 MG/0.4ML Pen-injector Kit      • isosorbide mononitrate (IMDUR) 30 MG 24 hr tablet Take 1 tablet by mouth Daily. Hold for SBP <110 90 tablet 0   • lisinopril (PRINIVIL,ZESTRIL) 10 MG tablet Take 1 tablet by mouth Daily. 90 tablet 0   • mesalamine (LIALDA) 1.2 g EC tablet Take 1 tablet by mouth Daily With Breakfast. 30 tablet 11   • ticagrelor (BRILINTA) 90 MG tablet tablet Take 1 tablet by mouth 2 (Two) Times a Day. 180 tablet 0   • vitamin D (ERGOCALCIFEROL) 1.25 MG (37952 UT) capsule capsule Take 50,000 Units by mouth 1 (One) Time Per Week.     • cyclobenzaprine (FLEXERIL) 5 MG tablet Take 1 tablet by mouth 3 (Three) Times a Day As Needed for Muscle Spasms. 90 tablet 5   • metoprolol succinate XL (TOPROL-XL) 25 MG 24 hr tablet Take 0.5 tablets by mouth Daily. Hold for SBP <110 and/or HR <60 90 tablet 0     No current facility-administered medications for this visit.        Allergies:   No Known Allergies    Objective     Vital Signs:   /79   Pulse 73   Ht 188 cm (74\")   Wt 79.9 kg (176 lb 3.2 oz)   SpO2 98%   BMI 22.62 kg/m²     BMI:   BMI is within normal parameters. No other follow-up for BMI required.   "     Physical Exam:    Physical Exam  Cardiovascular:      Rate and Rhythm: Normal rate and regular rhythm.      Pulses: Normal pulses.      Heart sounds: Normal heart sounds. No murmur heard.  Pulmonary:      Effort: Pulmonary effort is normal.      Breath sounds: Normal breath sounds.   Skin:     General: Skin is warm and dry.      Capillary Refill: Capillary refill takes less than 2 seconds.   Neurological:      General: No focal deficit present.      Mental Status: He is alert and oriented to person, place, and time.   Psychiatric:         Mood and Affect: Mood normal.         Behavior: Behavior normal.         Thought Content: Thought content normal.         Judgment: Judgment normal.            DATA:   Results for orders placed during the hospital encounter of 09/06/22    Adult Transthoracic Echo Complete W/ Cont if Necessary Per Protocol    Interpretation Summary  · Left ventricular ejection fraction appears to be 51 - 55%. Left ventricular systolic function is normal, mild hypokinesia of basal inferolateral wall  · Left ventricular diastolic function was normal.  · There is no evidence of pericardial effusion     Results for orders placed during the hospital encounter of 09/06/22    Cardiac Catheterization/Vascular Study    Narrative  Images from the original result were not included.  CARDIAC CATHETERIZATION / INTERVENTION REPORT            DATE OF PROCEDURE: 9/6/2022      INDICATION FOR PROCEDURE: STEMI      PRE PROCEDURE DIAGNOSIS:  Inferior STEMI      POST PROCEDURE DIAGNOSIS:  % proximal RCA occlusion s/ pPCI with 3.5 x 28 BUZZ post dilated with 4.0 NC balloon    Face to face mdoerate conscious  sedation time :      COMPLICATIONS : None    Specimens collected : None    PROCEDURE PERFORMED:    1. Selective right and left Coronary Angiogram  2. Left heart catheretization  3. Left Ventriculography  4. PCI of RCA  5. IVUS of RCA  6. Manual aspiration thrombectomy    Description of the procedure:  Prior  to the procedure risk, benefits and possible alternative were discussed with the patient and informed consent was obtained. Patient was brought to cardiac cath lab table in post absorbtive state. Patient was prepped and drape in usual sterile fashion. IV Versed and Fentanyl was used for moderate sedation. 2% Lidocaine was used for topical anesthesia. R radial arterial site was prepped and a micropuncture needle was used to access the artery and a 5 F slender sheath was placed. 2.5 mg of Verapamil and 200 mcg of NTG was given through the sheath intra arterial and 5000 units of Heparin was given once the catheter crossed the aortic arch.    5 F TIG 4 catheters was used for right and left coronary angiogram and 5 F pigtail catheter was used for Left heart hemodynamics and left ventriculography. All the catheters were exchanged over 0.035 wire. The R radial arterial sheath was removed and TR band was applied and immediate and complete hemostasis was achieved. The patient tolerate the entire procedure well without any immediate known complications.    Coronary anatomy findings:    LM: Is a large calibre vessel , normal take off from left cusp, divides into LAD and Lcx. No stenosis    LAD: Mild luminal irregularities no stenosis    LCX: Moderate calibre vessel, mild luminal irregularities.    RCA: Large calibre, dominant artery, normal take off from right cusp.  100% occlusion in proximal.    Left Ventriculography:    LV systolic function was normal with visual estimated EF of 55%.Basal inferior wall hypokinesia  No significant mitral regurgitation noted.    LVEDP: 10 mmHg  No gradient across the aortic valve on pull back.      PERCUTANEOUS CORONARY INTERVENTION PROCEDURE NOTE:    Heparin monotherapy was used for anticoagulation.  Total of 10,000 Units was given IV.Single bolus integrelin was also given    6 F JR 4 guide was used to engage the RCA coaxially. Pronto V4 aspiration catheter was used for manual thrombectomy  was performed    0.014 Runthrough guidewire was used to cross the lesion and parked distally.    Used a 2.5 x15 Compliant TREK balloon to predilate the lesion at 10 nandini.    Prepped a 3.5 x 28 Shana [employer] eluting stent and position at the lesion and successfully deployed at 16 nandini. IVUS showed vessel diameter was 4.5 proximal and distal, used a 4.0 NC at high pressure to post dilate    Post stent deployment angio pictures showed good expansion with 0% residual stenosis, KELLY 3 flow in the distal vascular bed and no dissection or distal wire perforation.    Lesion length: 20 mm  Pre PCI Stenosis: 100 %  Post PCI stenosis: 0 %  Pre PCI KELLY flow: 0  Post PCI KELLY flow: 3    EBL: Less than 10cc      Final Impression:  % proximal RCA occlusion s/ pPCI with 3.5 x 28 BUZZ post dilated with 4.0 NC balloon      Recommendations:  Aggressive guideline directed medical management for CAD  Dual Anti platelet medication with Asa 81 mg qd and Brilinta 90 mg bid for minimum 1 year.            Matthew Hill MD, [facility]  Interventional Cardiology    09/06/22  12:34 EDT        Lab Results   Component Value Date    CHOL 174 09/07/2022    CHOL 161 04/19/2022    CHOL 194 03/25/2021     Lab Results   Component Value Date    TRIG 178 (H) 09/07/2022    TRIG 97 04/19/2022    TRIG 230 (H) 03/25/2021     Lab Results   Component Value Date    HDL 28 (L) 09/07/2022    HDL 28 (L) 04/19/2022    HDL 28 (L) 03/25/2021     Lab Results   Component Value Date     (H) 09/07/2022     (H) 04/19/2022     (H) 03/25/2021       Lab Results   Component Value Date    TSH 1.150 04/19/2022             Invalid input(s): LABALBU, PROT    Procedures       Assessment and Plan:   Visit Diagnosis:   Problem List Items Addressed This Visit        Cardiac and Vasculature    Coronary artery disease involving native coronary artery of native heart without angina pectoris - Primary    Relevant Medications    ticagrelor (BRILINTA) 90 MG tablet  tablet    isosorbide mononitrate (IMDUR) 30 MG 24 hr tablet    metoprolol succinate XL (TOPROL-XL) 25 MG 24 hr tablet        CAD   -s/p STEMI, PCI BUZZ RCA   -9/2022 TTE: EF 51-55%, LV diastolic function is normal, mild hypokinesia of basal inferolateral wall.  -had bradycardia in the hospital and wasn't able to tolerate Lopressor   -continue lisinopril  -Torpol XL 12.5mg PO Daily started today with holding parameters  -encouraged cardiac rehab  -continue brilinta and ASA     HLD  -9/7/2022 Lipid profile: total cholesterol 174, HDL 28, , Triglycerides 178  -Continue statin     Left arm pain  -patient does report pain with movement so very likely musculoskeletal. He does report a history of arthritis  -10/14/2022 left upper extremity arterial US with no hemodynamically significant stenosis.   -10/14/2022 left upper extremity doppler, negative for DVT  -he reports he got a steroid injection in his left shoulder which has now helped the shoulder pain.      Tobacco use  -patient has been vaping, encouraged him to stop smoking/vaping.     Meds Ordered During Visit:  New Medications Ordered This Visit   Medications   • ticagrelor (BRILINTA) 90 MG tablet tablet     Sig: Take 1 tablet by mouth 2 (Two) Times a Day.     Dispense:  180 tablet     Refill:  0     Order Specific Question:   Lot Number?     Answer:   [id number]     Order Specific Question:   Expiration Date?     Answer:   10/31/2024   • atorvastatin (LIPITOR) 80 MG tablet     Sig: Take 1 tablet by mouth Every Night.     Dispense:  90 tablet     Refill:  0   • aspirin 81 MG EC tablet     Sig: Take 1 tablet by mouth Daily.     Dispense:  90 tablet     Refill:  0   • isosorbide mononitrate (IMDUR) 30 MG 24 hr tablet     Sig: Take 1 tablet by mouth Daily. Hold for SBP <110     Dispense:  90 tablet     Refill:  0   • lisinopril (PRINIVIL,ZESTRIL) 10 MG tablet     Sig: Take 1 tablet by mouth Daily.     Dispense:  90 tablet     Refill:  0   • metoprolol succinate XL  (TOPROL-XL) 25 MG 24 hr tablet     Sig: Take 0.5 tablets by mouth Daily. Hold for SBP <110 and/or HR <60     Dispense:  90 tablet     Refill:  0       Discontinued Meds:   Medications Discontinued During This Encounter   Medication Reason   • isosorbide mononitrate (IMDUR) 30 MG 24 hr tablet Reorder   • ticagrelor (BRILINTA) 90 MG tablet tablet Reorder   • lisinopril (PRINIVIL,ZESTRIL) 10 MG tablet Reorder   • atorvastatin (LIPITOR) 80 MG tablet Reorder   • aspirin 81 MG EC tablet Reorder          Follow Up:   No follow-ups on file.           Thank you for allowing me to participate in the care of Scott Montes. Feel free to contact me directly with any further questions or concerns.    This document has been electronically signed by EDDIE Patricio  November 15, 2022 13:10 EST      Dictated Utilizing Dragon Dictation: Part of this note may be an electronic transcription/translation of spoken language to printed text using the Dragon Dictation System.

## 2022-11-16 ENCOUNTER — OFFICE VISIT (OUTPATIENT)
Dept: GASTROENTEROLOGY | Facility: CLINIC | Age: 47
End: 2022-11-16

## 2022-11-16 VITALS — OXYGEN SATURATION: 99 % | WEIGHT: 177 LBS | HEART RATE: 74 BPM | HEIGHT: 74 IN | BODY MASS INDEX: 22.72 KG/M2

## 2022-11-16 DIAGNOSIS — K50.00 CROHN'S DISEASE OF SMALL INTESTINE WITHOUT COMPLICATION: Primary | ICD-10-CM

## 2022-11-16 PROCEDURE — 99213 OFFICE O/P EST LOW 20 MIN: CPT | Performed by: PHYSICIAN ASSISTANT

## 2022-11-16 RX ORDER — MONTELUKAST SODIUM 4 MG/1
1 TABLET, CHEWABLE ORAL DAILY
Qty: 90 TABLET | Refills: 3 | Status: SHIPPED | OUTPATIENT
Start: 2022-11-16 | End: 2023-02-20 | Stop reason: SDUPTHER

## 2022-11-16 RX ORDER — MESALAMINE 1.2 G/1
1200 TABLET, DELAYED RELEASE ORAL
Qty: 90 TABLET | Refills: 3 | Status: SHIPPED | OUTPATIENT
Start: 2022-11-16 | End: 2023-02-08 | Stop reason: SDUPTHER

## 2022-11-16 NOTE — PROGRESS NOTES
Chief Complaint   Patient presents with   • Crohn's Disease       Scott Montes is a 47 y.o. male who presents to the office today for evaluation of Crohn's Disease  .    HPI     Scott Montes is a 47 y.o. male who presents to the office today for a follow-up evaluation of crohn's disease. He reports he was last seen in the clinic about 8 weeks ago. He states he was started on Humira. He reports he is 177 lbs. He denies having any rectal bleeding or cramping. He states he does not plan on going back to work because all of the tugging and pulling is to strenuous. He states he has no energy at all. He reports he can drink water and he states it seems like it gets him gassy and he states the water seems to go straight through him. He states he can not sleep the first 3 days after taking the Humira.       Review of Systems   Constitutional: Positive for unexpected weight change.   HENT: Negative for sore throat and trouble swallowing.    Eyes: Negative.    Respiratory: Negative for chest tightness.    Cardiovascular: Negative for chest pain.   Gastrointestinal: Positive for abdominal distention, abdominal pain, constipation, diarrhea, nausea and vomiting. Negative for anal bleeding, blood in stool and rectal pain.   Endocrine: Negative.    Genitourinary: Positive for decreased urine volume, difficulty urinating and urgency.   Musculoskeletal: Positive for back pain. Negative for neck pain.   Skin: Negative.    Allergic/Immunologic: Negative for environmental allergies and food allergies.   Neurological: Negative for dizziness and headaches.   Hematological: Bruises/bleeds easily.   Psychiatric/Behavioral: Positive for sleep disturbance. Negative for agitation. The patient is not nervous/anxious.        ACTIVE PROBLEMS:   Specialty Problems        Gastroenterology Problems    Colitis           PAST MEDICAL HISTORY:  Past Medical History:   Diagnosis Date   • Anemia    • Arthritis    • Elevated cholesterol    • Heart  attack (HCC)    • Low back pain        SURGICAL HISTORY:  Past Surgical History:   Procedure Laterality Date   • CARDIAC CATHETERIZATION N/A 2022    Procedure: Left Heart Cath;  Surgeon: Matthew Hill MD;  [facility] INVASIVE LOCATION;  Service: Cardiovascular;  Laterality: N/A;   • CARDIAC CATHETERIZATION N/A 2022    Procedure: Percutaneous Coronary Intervention;  Surgeon: Matthew Hill MD;  Location:  [facility]ATH INVASIVE LOCATION;  Service: Cardiovascular;  Laterality: N/A;   • COLONOSCOPY N/A 2022    Procedure: COLONOSCOPY;  Surgeon: Stella Aparicio MD;  Location: [facility];  Service: Gastroenterology;  Laterality: N/A;   • CYST REMOVAL     • VASECTOMY         FAMILY HISTORY:  Family History   Problem Relation Age of Onset   • Colon cancer Mother    • Cancer Father        SOCIAL HISTORY:  Social History     Tobacco Use   • Smoking status: Former     Packs/day: 2.00     Years: 30.00     Pack years: 60.00     Types: Cigarettes     Quit date: 2022     Years since quittin.2   • Smokeless tobacco: Never   • Tobacco comments:     Smoking cessation encouraged   Substance Use Topics   • Alcohol use: No       CURRENT MEDICATION:    Current Outpatient Medications:   •  aspirin 81 MG EC tablet, Take 1 tablet by mouth Daily., Disp: 90 tablet, Rfl: 0  •  atorvastatin (LIPITOR) 80 MG tablet, Take 1 tablet by mouth Every Night., Disp: 90 tablet, Rfl: 0  •  cyclobenzaprine (FLEXERIL) 5 MG tablet, Take 1 tablet by mouth 3 (Three) Times a Day As Needed for Muscle Spasms., Disp: 90 tablet, Rfl: 5  •  Humira Pen 40 MG/0.4ML Pen-injector Kit, , Disp: , Rfl:   •  isosorbide mononitrate (IMDUR) 30 MG 24 hr tablet, Take 1 tablet by mouth Daily. Hold for SBP <110, Disp: 90 tablet, Rfl: 0  •  lisinopril (PRINIVIL,ZESTRIL) 10 MG tablet, Take 1 tablet by mouth Daily., Disp: 90 tablet, Rfl: 0  •  mesalamine (LIALDA) 1.2 g EC tablet, Take 1 tablet by mouth Daily With  "Breakfast., Disp: 90 tablet, Rfl: 3  •  metoprolol succinate XL (TOPROL-XL) 25 MG 24 hr tablet, Take 0.5 tablets by mouth Daily. Hold for SBP <110 and/or HR <60, Disp: 90 tablet, Rfl: 0  •  ticagrelor (BRILINTA) 90 MG tablet tablet, Take 1 tablet by mouth 2 (Two) Times a Day., Disp: 180 tablet, Rfl: 0  •  vitamin D (ERGOCALCIFEROL) 1.25 MG (62718 UT) capsule capsule, Take 50,000 Units by mouth 1 (One) Time Per Week., Disp: , Rfl:   •  colestipol (COLESTID) 1 g tablet, Take 1 tablet by mouth Daily., Disp: 90 tablet, Rfl: 3    ALLERGIES:  Patient has no known allergies.    VISIT VITALS:  Pulse 74   Ht 188 cm (74\")   Wt 80.3 kg (177 lb)   SpO2 99%   BMI 22.73 kg/m²   Physical Exam  Constitutional:       General: He is not in acute distress.     Appearance: Normal appearance. He is well-developed.   HENT:      Head: Normocephalic and atraumatic.   Eyes:      Pupils: Pupils are equal, round, and reactive to light.   Cardiovascular:      Rate and Rhythm: Normal rate and regular rhythm.      Heart sounds: Normal heart sounds.   Pulmonary:      Effort: Pulmonary effort is normal. No respiratory distress.      Breath sounds: Normal breath sounds. No wheezing, rhonchi or rales.   Abdominal:      General: Abdomen is flat. Bowel sounds are normal. There is no distension.      Palpations: Abdomen is soft. There is no mass.      Tenderness: There is no abdominal tenderness. There is no guarding or rebound.      Hernia: No hernia is present.   Musculoskeletal:         General: No swelling. Normal range of motion.      Cervical back: Normal range of motion and neck supple.      Right lower leg: No edema.      Left lower leg: No edema.   Skin:     General: Skin is warm and dry.   Neurological:      Mental Status: He is alert and oriented to person, place, and time.   Psychiatric:         Attention and Perception: Attention normal.         Mood and Affect: Mood normal.         Speech: Speech normal.         Behavior: Behavior " normal. Behavior is cooperative.         Thought Content: Thought content normal.         Assessment      1.)Crohn's Disease     Diagnosis Plan   1. Crohn's disease of small intestine without complication (HCC)  mesalamine (LIALDA) 1.2 g EC tablet    colestipol (COLESTID) 1 g tablet    Comprehensive Metabolic Panel    CBC & Differential    C-reactive Protein    Sedimentation Rate          Return in about 8 weeks (around 1/11/2023).                   This document has been electronically signed by Akira Nettles PA-C  November 27, 2022 19:47 EST    Part of this note may be an electronic transcription/translation of spoken language to printed text using the Dragon Dictation System.    Transcribed from ambient dictation for Akira Nettles PA-C by Nieves Jose.  11/16/22   17:03 EST    Patient or patient representative verbalized consent to the visit recording.

## 2022-11-18 ENCOUNTER — TELEPHONE (OUTPATIENT)
Dept: UROLOGY | Facility: CLINIC | Age: 47
End: 2022-11-18

## 2022-11-18 NOTE — TELEPHONE ENCOUNTER
His pharmacy stated we need to call in his Humara Shot. He is taking this shot every two weeks so he will need this called in for that time.    [facility]  is what he uses. [phone]    His last prescription was sent to [facility]

## 2022-11-21 ENCOUNTER — TELEPHONE (OUTPATIENT)
Dept: CARDIAC REHAB | Facility: HOSPITAL | Age: 47
End: 2022-11-21

## 2022-11-21 NOTE — TELEPHONE ENCOUNTER
Staff spoke with pt regarding cardiac rehab program. Pt stated that he would like to think about the program and that if he decides he wants to attend then he will give us a call back to get scheduled.

## 2022-12-12 ENCOUNTER — TELEPHONE (OUTPATIENT)
Dept: CARDIOLOGY | Facility: CLINIC | Age: 47
End: 2022-12-12

## 2022-12-12 ENCOUNTER — OFFICE VISIT (OUTPATIENT)
Dept: UROLOGY | Facility: CLINIC | Age: 47
End: 2022-12-12

## 2022-12-12 VITALS
DIASTOLIC BLOOD PRESSURE: 80 MMHG | HEIGHT: 74 IN | WEIGHT: 177 LBS | BODY MASS INDEX: 22.72 KG/M2 | OXYGEN SATURATION: 98 % | SYSTOLIC BLOOD PRESSURE: 133 MMHG | HEART RATE: 73 BPM

## 2022-12-12 DIAGNOSIS — R35.1 BENIGN PROSTATIC HYPERPLASIA WITH NOCTURIA: ICD-10-CM

## 2022-12-12 DIAGNOSIS — N40.1 BENIGN PROSTATIC HYPERPLASIA WITH NOCTURIA: ICD-10-CM

## 2022-12-12 DIAGNOSIS — R35.0 FREQUENCY OF URINATION: Primary | ICD-10-CM

## 2022-12-12 LAB
BILIRUB BLD-MCNC: NEGATIVE MG/DL
CLARITY, POC: CLEAR
COLOR UR: YELLOW
EXPIRATION DATE: NORMAL
GLUCOSE UR STRIP-MCNC: NEGATIVE MG/DL
KETONES UR QL: NEGATIVE
LEUKOCYTE EST, POC: NEGATIVE
Lab: NORMAL
NITRITE UR-MCNC: NEGATIVE MG/ML
PH UR: 6.5 [PH] (ref 5–8)
PROT UR STRIP-MCNC: NEGATIVE MG/DL
RBC # UR STRIP: NEGATIVE /UL
SP GR UR: 1.01 (ref 1–1.03)
UROBILINOGEN UR QL: NORMAL

## 2022-12-12 PROCEDURE — 84153 ASSAY OF PSA TOTAL: CPT | Performed by: UROLOGY

## 2022-12-12 PROCEDURE — 81003 URINALYSIS AUTO W/O SCOPE: CPT | Performed by: UROLOGY

## 2022-12-12 PROCEDURE — 84403 ASSAY OF TOTAL TESTOSTERONE: CPT | Performed by: UROLOGY

## 2022-12-12 PROCEDURE — 99203 OFFICE O/P NEW LOW 30 MIN: CPT | Performed by: UROLOGY

## 2022-12-12 PROCEDURE — 82670 ASSAY OF TOTAL ESTRADIOL: CPT | Performed by: UROLOGY

## 2022-12-12 PROCEDURE — 85027 COMPLETE CBC AUTOMATED: CPT | Performed by: UROLOGY

## 2022-12-12 RX ORDER — TAMSULOSIN HYDROCHLORIDE 0.4 MG/1
1 CAPSULE ORAL NIGHTLY
Qty: 30 CAPSULE | Refills: 5 | Status: SHIPPED | OUTPATIENT
Start: 2022-12-12

## 2022-12-12 NOTE — TELEPHONE ENCOUNTER
Caller: [facility]    Relationship: Provider    Best call back number: [phone]- OR LEAVE VM    What is the best time to reach you:ANY     Who are you requesting to speak with (clinical staff, provider,  specific staff member): ANY         What was the call regarding: CAN THE OFFICE SEND [facility] NEW PT AUTH PHOTO AS THE OLD ONE WAS VERY FAINT. PLEASE CALL BACK TO DISCUSS. THANKS. ALSO, CAN [facility] BE NOTIFIED OF ANY RESTRICTIONS PLACED ON THE PT.     Do you require a callback: YES

## 2022-12-12 NOTE — PROGRESS NOTES
"Chief Complaint:      Chief Complaint   Patient presents with   • Dysuria       HPI:   47 y.o. male is a new patient referred for frequency.  He has no dysuria.  His urine is negative.  He has no treatment.  He was diagnosed with Crohn's disease.  He was diagnosed with low testosterone but it was stopped by his nurse practitioner because of \"heart\" he has been on no medication.  His prostate is unremarkable Selene start him on alpha blockade check appropriate laboratory parameters and see him back in 3 months  Past Medical History:     Past Medical History:   Diagnosis Date   • Anemia    • Arthritis    • Elevated cholesterol    • Heart attack (HCC)    • Low back pain        Current Meds:     Current Outpatient Medications   Medication Sig Dispense Refill   • aspirin 81 MG EC tablet Take 1 tablet by mouth Daily. 90 tablet 0   • atorvastatin (LIPITOR) 80 MG tablet Take 1 tablet by mouth Every Night. 90 tablet 0   • colestipol (COLESTID) 1 g tablet Take 1 tablet by mouth Daily. 90 tablet 3   • cyclobenzaprine (FLEXERIL) 5 MG tablet Take 1 tablet by mouth 3 (Three) Times a Day As Needed for Muscle Spasms. 90 tablet 5   • Humira Pen 40 MG/0.4ML Pen-injector Kit      • isosorbide mononitrate (IMDUR) 30 MG 24 hr tablet Take 1 tablet by mouth Daily. Hold for SBP <110 90 tablet 0   • lisinopril (PRINIVIL,ZESTRIL) 10 MG tablet Take 1 tablet by mouth Daily. 90 tablet 0   • mesalamine (LIALDA) 1.2 g EC tablet Take 1 tablet by mouth Daily With Breakfast. 90 tablet 3   • metoprolol succinate XL (TOPROL-XL) 25 MG 24 hr tablet Take 0.5 tablets by mouth Daily. Hold for SBP <110 and/or HR <60 90 tablet 0   • ticagrelor (BRILINTA) 90 MG tablet tablet Take 1 tablet by mouth 2 (Two) Times a Day. 180 tablet 0   • vitamin D (ERGOCALCIFEROL) 1.25 MG (89926 UT) capsule capsule Take 50,000 Units by mouth 1 (One) Time Per Week.       No current facility-administered medications for this visit.        Allergies:      No Known Allergies     Past " Surgical History:     Past Surgical History:   Procedure Laterality Date   • CARDIAC CATHETERIZATION N/A 2022    Procedure: Left Heart Cath;  Surgeon: Matthew Hill MD;  Location: [facility] CATH INVASIVE LOCATION;  Service: Cardiovascular;  Laterality: N/A;   • CARDIAC CATHETERIZATION N/A 2022    Procedure: Percutaneous Coronary Intervention;  Surgeon: Matthew Hill MD;  Location: [facility] CATH INVASIVE LOCATION;  Service: Cardiovascular;  Laterality: N/A;   • COLONOSCOPY N/A 2022    Procedure: COLONOSCOPY;  Surgeon: Stella Aparicio MD;  Location: [facility] OR;  Service: Gastroenterology;  Laterality: N/A;   • CYST REMOVAL     • VASECTOMY         Social History:     Social History     Socioeconomic History   • Marital status:    Tobacco Use   • Smoking status: Former     Packs/day: 2.00     Years: 30.00     Pack years: 60.00     Types: Cigarettes     Quit date: 2022     Years since quittin.2   • Smokeless tobacco: Never   • Tobacco comments:     Smoking cessation encouraged   Vaping Use   • Vaping Use: Every day   • Substances: Nicotine   • Devices: Disposable   Substance and Sexual Activity   • Alcohol use: No   • Drug use: No   • Sexual activity: Defer       Family History:     Family History   Problem Relation Age of Onset   • Colon cancer Mother    • Cancer Father        Review of Systems:     Review of Systems   Constitutional: Negative.    HENT: Negative.    Eyes: Negative.    Respiratory: Negative.    Cardiovascular: Negative.    Gastrointestinal: Negative.    Endocrine: Negative.    Musculoskeletal: Negative.    Allergic/Immunologic: Negative.    Neurological: Negative.    Hematological: Negative.    Psychiatric/Behavioral: Negative.        Physical Exam:     Physical Exam  Vitals and nursing note reviewed.   Constitutional:       Appearance: He is well-developed.   HENT:      Head: Normocephalic and atraumatic.   Eyes:      Conjunctiva/sclera:  Conjunctivae normal.      Pupils: Pupils are equal, round, and reactive to light.   Cardiovascular:      Rate and Rhythm: Normal rate and regular rhythm.      Heart sounds: Normal heart sounds.   Pulmonary:      Effort: Pulmonary effort is normal.      Breath sounds: Normal breath sounds.   Abdominal:      General: Bowel sounds are normal.      Palpations: Abdomen is soft.   Musculoskeletal:         General: Normal range of motion.      Cervical back: Normal range of motion.   Skin:     General: Skin is warm and dry.   Neurological:      Mental Status: He is alert and oriented to person, place, and time.      Deep Tendon Reflexes: Reflexes are normal and symmetric.   Psychiatric:         Behavior: Behavior normal.         Thought Content: Thought content normal.         Judgment: Judgment normal.         I have reviewed the following portions of the patient's history: Allergies, current medications, past family history, past medical history, past social history, past surgical history, problem list, and ROS and confirm it is accurate.    Recent Image (CT and/or KUB):      CT Abdomen and Pelvis: Results for orders placed during the hospital encounter of 06/07/22    CT Abdomen Pelvis With & Without Contrast    Narrative  EXAM:  CT Abdomen and Pelvis Without and With Intravenous Contrast    EXAM DATE:  6/7/2022 8:20 AM    CLINICAL HISTORY:  Abdominal pain, acute, nonlocalized; R10.84-Generalized abdominal pain    TECHNIQUE:  Axial computed tomography images of the abdomen and pelvis without and  with intravenous contrast.  Sagittal and coronal reformatted images were  created and reviewed.  This CT exam was performed using one or more of  the following dose reduction techniques:  automated exposure control,  adjustment of the mA and/or kV according to patient size, and/or use of  iterative reconstruction technique.    COMPARISON:  05/06/2022    FINDINGS:  LUNG BASES:  Right lower lobe pulmonary nodule measuring 9 mm  in  maximum transverse dimension.    ABDOMEN:  LIVER:  Unremarkable.  No mass.  GALLBLADDER AND BILE DUCTS:  Unremarkable.  No calcified stones.  No  ductal dilation.  PANCREAS:  Unremarkable.  No mass.  No ductal dilation.  SPLEEN:  Unremarkable.  No splenomegaly.  ADRENALS:  Unremarkable.  No mass.  KIDNEYS AND URETERS:  Unremarkable.  No solid mass.  No obstructing  stones.  No hydronephrosis.  STOMACH AND BOWEL:  Again there is extensive abnormal thickening of  the sigmoid colon and terminal ileum that has not changed drastically in  appearance since previous study.  No obstruction.    PELVIS:  APPENDIX:  No findings to suggest acute appendicitis.  BLADDER:  Unremarkable.  No mass.  No stones.  REPRODUCTIVE:  Unremarkable as visualized.    ABDOMEN and PELVIS:  INTRAPERITONEAL SPACE:  Small free fluid in the pelvis is again noted.  No free air.  BONES/JOINTS:  No acute fracture.  No dislocation.  SOFT TISSUES:  Unremarkable.  VASCULATURE:  Unremarkable.  No abdominal aortic aneurysm.  LYMPH NODES:  Unremarkable.  No enlarged lymph nodes.    Impression  1.  Again there is extensive abnormal thickening of the sigmoid colon  and terminal ileum that has not changed drastically in appearance since  previous study.  Mesenteric fat stranding again noted.  2.  Small free fluid in the pelvis is again noted.  3.  Right lower lobe pulmonary nodule measuring 9 mm in maximum  transverse dimension.  For low-risk or high-risk patients consider a  follow-up chest CT at 3 months.  If unchanged consider an additional  follow-up CT at 18-24 months.  Alternatively (or additionally) PET/CT or  tissue sampling could be performed.    This report was finalized on 6/7/2022 9:15 AM by Dr. Jed Laguerre MD.       CT Stone Protocol: No results found for this or any previous visit.       KUB: No results found for this or any previous visit.       Labs (past 3 months):      Lab on 10/10/2022   Component Date Value Ref Range Status   •  QuantiFERON Incubation 10/10/2022 Incubation performed.   Final   • QUANTIFERON-TB GOLD PLUS 10/10/2022 Negative  Negative Final    No response to M tuberculosis antigens detected.  Infection with M tuberculosis is unlikely, but high risk  individuals should be considered for additional testing  (ATS/IDSA/CDC Clinical Practice Guidelines, 2017). The  reference range is an Antigen minus Nil result of <0.35 IU/mL.  Chemiluminescence immunoassay methodology   • QuantiFERON Criteria 10/10/2022 Comment   Final    QuantiFERON-TB Gold Plus is a qualitative indirect test for  M tuberculosis infection (including disease) and is intended for use  in conjunction with risk assessment, radiography, and other medical  and diagnostic evaluations. The QuantiFERON-TB Gold Plus result is  determined by subtracting the Nil value from either TB antigen (Ag)  value. The Mitogen tube serves as a control for the test.   • QUANTIFERON TB1 AG VALUE 10/10/2022 0.01  IU/mL Final   • QUANTIFERON TB2 AG VALUE 10/10/2022 0.01  IU/mL Final   • QuantiFERON Nil Value 10/10/2022 0.01  IU/mL Final   • QuantiFERON Mitogen Value 10/10/2022 >10.00  IU/mL Final   Office Visit on 10/10/2022   Component Date Value Ref Range Status   • WBC 10/10/2022 11.01 (H)  3.40 - 10.80 10*3/mm3 Final   • RBC 10/10/2022 4.83  4.14 - 5.80 10*6/mm3 Final   • Hemoglobin 10/10/2022 13.0  13.0 - 17.7 g/dL Final   • Hematocrit 10/10/2022 39.7  37.5 - 51.0 % Final   • MCV 10/10/2022 82.2  79.0 - 97.0 fL Final   • MCH 10/10/2022 26.9  26.6 - 33.0 pg Final   • MCHC 10/10/2022 32.7  31.5 - 35.7 g/dL Final   • RDW 10/10/2022 16.1 (H)  12.3 - 15.4 % Final   • RDW-SD 10/10/2022 47.7  37.0 - 54.0 fl Final   • MPV 10/10/2022 11.0  6.0 - 12.0 fL Final   • Platelets 10/10/2022 353  140 - 450 10*3/mm3 Final   • Glucose 10/10/2022 51 (L)  65 - 99 mg/dL Final   • BUN 10/10/2022 9  6 - 20 mg/dL Final   • Creatinine 10/10/2022 0.72 (L)  0.76 - 1.27 mg/dL Final   • Sodium 10/10/2022 140  136  - 145 mmol/L Final   • Potassium 10/10/2022 4.2  3.5 - 5.2 mmol/L Final   • Chloride 10/10/2022 103  98 - 107 mmol/L Final   • CO2 10/10/2022 26.0  22.0 - 29.0 mmol/L Final   • Calcium 10/10/2022 9.5  8.6 - 10.5 mg/dL Final   • Total Protein 10/10/2022 6.7  6.0 - 8.5 g/dL Final   • Albumin 10/10/2022 4.30  3.50 - 5.20 g/dL Final   • ALT (SGPT) 10/10/2022 17  1 - 41 U/L Final   • AST (SGOT) 10/10/2022 11  1 - 40 U/L Final   • Alkaline Phosphatase 10/10/2022 116  39 - 117 U/L Final   • Total Bilirubin 10/10/2022 0.7  0.0 - 1.2 mg/dL Final   • Globulin 10/10/2022 2.4  gm/dL Final   • A/G Ratio 10/10/2022 1.8  g/dL Final   • BUN/Creatinine Ratio 10/10/2022 12.5  7.0 - 25.0 Final   • Anion Gap 10/10/2022 11.0  5.0 - 15.0 mmol/L Final   • eGFR 10/10/2022 112.7  >60.0 mL/min/1.73 Final    National Kidney Foundation and American Society of Nephrology (ASN) Task Force recommended calculation based on the Chronic Kidney Disease Epidemiology Collaboration (CKD-EPI) equation refit without adjustment for race.   • Sed Rate 10/10/2022 17 (H)  0 - 15 mm/hr Final   • C-Reactive Protein 10/10/2022 0.97 (H)  0.00 - 0.50 mg/dL Final   • Hepatitis B Surface Ag 10/10/2022 Non-Reactive  Non-Reactive Final   • Hep A IgM 10/10/2022 Non-Reactive  Non-Reactive Final   • Hep B C IgM 10/10/2022 Non-Reactive  Non-Reactive Final   • Hepatitis C Ab 10/10/2022 Non-Reactive  Non-Reactive Final   Office Visit on 10/03/2022   Component Date Value Ref Range Status   • Glucose 10/03/2022 65  65 - 99 mg/dL Final   • BUN 10/03/2022 7  6 - 20 mg/dL Final   • Creatinine 10/03/2022 0.76  0.76 - 1.27 mg/dL Final   • Sodium 10/03/2022 140  136 - 145 mmol/L Final   • Potassium 10/03/2022 4.3  3.5 - 5.2 mmol/L Final   • Chloride 10/03/2022 102  98 - 107 mmol/L Final   • CO2 10/03/2022 26.4  22.0 - 29.0 mmol/L Final   • Calcium 10/03/2022 9.4  8.6 - 10.5 mg/dL Final   • Total Protein 10/03/2022 6.9  6.0 - 8.5 g/dL Final   • Albumin 10/03/2022 4.00  3.50 - 5.20  g/dL Final   • ALT (SGPT) 10/03/2022 15  1 - 41 U/L Final   • AST (SGOT) 10/03/2022 13  1 - 40 U/L Final   • Alkaline Phosphatase 10/03/2022 115  39 - 117 U/L Final   • Total Bilirubin 10/03/2022 0.6  0.0 - 1.2 mg/dL Final   • Globulin 10/03/2022 2.9  gm/dL Final   • A/G Ratio 10/03/2022 1.4  g/dL Final   • BUN/Creatinine Ratio 10/03/2022 9.2  7.0 - 25.0 Final   • Anion Gap 10/03/2022 11.6  5.0 - 15.0 mmol/L Final   • eGFR 10/03/2022 110.9  >60.0 mL/min/1.73 Final    National Kidney Foundation and American Society of Nephrology (ASN) Task Force recommended calculation based on the Chronic Kidney Disease Epidemiology Collaboration (CKD-EPI) equation refit without adjustment for race.   • Creatine Kinase 10/03/2022 44  20 - 200 U/L Final        Procedure:       Assessment/Plan:   BPH: Discussed the pathophysiology of BPH and obstruction.  We discussed the static and dynamic effects of BPH as well as using 5 alpha reductase inhibitors versus alpha blockade.  We discussed the indications for transurethral surgery as well and/ or other therapeutic options available including all of the newer techniques.  Start alpha-blocker  PSA testing-I am recommending a PSA blood test that stands for prostate specific antigen.  I discussed the pathophysiology of PSA testing indicating its use in the diagnosis and management of prostate cancer.  I discussed the normal range being 0 to 4, but more appropriately being much closer to 0 to 2 in a normal male.  I discussed the fact that after a certain age we don't recommend PSA testing especially in view of numerous comorbidities, that this will not be a useful test.  I discussed many of the things that can artificially raise PSA including a recent infection, urinary tract infection, and recent sexual intercourse, or even the type of movement such as manipulation of the prostate from riding a bicycle.  After all this is taken into account when the test is reviewed, the most important use  of PSA is the velocity measurement.  In other words, the change of PSA with time is a very important factor in the use and that we look for greater than 20% rise over a year to help us make the prediction of prostate cancer.  I also discussed that the use with prostate cancer indicating that after a radical prostatectomy, the PSA should be 0 and any rise indicates an early biochemical recurrence.            This document has been electronically signed by LIZETH BURCH MD December 12, 2022 15:20 EST    Dictated Utilizing Dragon Dictation: Part of this note may be an electronic transcription/translation of spoken language to printed text using the Dragon Dictation System.

## 2022-12-12 NOTE — TELEPHONE ENCOUNTER
Spoke to patient and they state his insurance carrier , [facility] will be faxing a form that needs to be filled out about his restrictions if any.

## 2022-12-13 ENCOUNTER — OFFICE VISIT (OUTPATIENT)
Dept: FAMILY MEDICINE CLINIC | Facility: CLINIC | Age: 47
End: 2022-12-13

## 2022-12-13 VITALS
TEMPERATURE: 98.2 F | HEIGHT: 74 IN | OXYGEN SATURATION: 98 % | BODY MASS INDEX: 23.36 KG/M2 | HEART RATE: 76 BPM | RESPIRATION RATE: 14 BRPM | SYSTOLIC BLOOD PRESSURE: 122 MMHG | WEIGHT: 182 LBS | DIASTOLIC BLOOD PRESSURE: 78 MMHG

## 2022-12-13 DIAGNOSIS — K52.9 COLITIS: ICD-10-CM

## 2022-12-13 DIAGNOSIS — Z76.89 RETURN TO WORK EVALUATION: ICD-10-CM

## 2022-12-13 DIAGNOSIS — M15.9 PRIMARY OSTEOARTHRITIS INVOLVING MULTIPLE JOINTS: ICD-10-CM

## 2022-12-13 DIAGNOSIS — I25.10 CORONARY ARTERY DISEASE INVOLVING NATIVE CORONARY ARTERY OF NATIVE HEART WITHOUT ANGINA PECTORIS: Primary | ICD-10-CM

## 2022-12-13 LAB
DEPRECATED RDW RBC AUTO: 48.7 FL (ref 37–54)
ERYTHROCYTE [DISTWIDTH] IN BLOOD BY AUTOMATED COUNT: 15.1 % (ref 12.3–15.4)
ESTRADIOL SERPL HS-MCNC: 20.1 PG/ML
HCT VFR BLD AUTO: 40.8 % (ref 37.5–51)
HGB BLD-MCNC: 13.3 G/DL (ref 13–17.7)
MCH RBC QN AUTO: 29 PG (ref 26.6–33)
MCHC RBC AUTO-ENTMCNC: 32.6 G/DL (ref 31.5–35.7)
MCV RBC AUTO: 88.9 FL (ref 79–97)
PLATELET # BLD AUTO: 297 10*3/MM3 (ref 140–450)
PMV BLD AUTO: 11.5 FL (ref 6–12)
PSA SERPL-MCNC: 0.6 NG/ML (ref 0–4)
RBC # BLD AUTO: 4.59 10*6/MM3 (ref 4.14–5.8)
TESTOST SERPL-MCNC: 334 NG/DL (ref 249–836)
WBC NRBC COR # BLD: 11.33 10*3/MM3 (ref 3.4–10.8)

## 2022-12-13 PROCEDURE — 99214 OFFICE O/P EST MOD 30 MIN: CPT | Performed by: NURSE PRACTITIONER

## 2022-12-13 NOTE — PROGRESS NOTES
"Subjective   Scott Montes is a 47 y.o. male.     Chief Complaint   Patient presents with   • Arthritis       History of Present Illness     Arthritis pain-ongoing.  Present in multiple areas.  Patient is no longer a candidate for NSAID therapy due to his recent MI.  He is receiving Flexeril 5 mg as needed for muscle spasms.  He reports that he is not interested in any medications that are controlled to help his joint pain at this time.  He would like to try to continue to manage conservatively as he has had multiple health conditions occur this year.  Crohn's disease-under the care of specialty.  He is currently on Colestid 1 g to help slow down his diarrhea and mesalamine 1.2 g.  He receives a Humira injection.  He reports he continues to be fatigued and has intermittent abdomen symptoms.    Recent MI-patient is currently on lisinopril 10 mg, metoprolol 25 mg half tab daily atorvastatin 80 mg, Brilinta 90 mg twice daily and aspirin 81 mg.  He is also on isosorbide 30 mg.  He is followed by cardiology.  Work-wishes to discuss about when he can go back to work.  He continues to fatigue easily and feels \"weak\".  He continues to not be able to eat as much as before and at times he has to have frequent bowel movements and have access to the bathroom.  He reports there are times he cannot even leave home due to this new bowel problem.  He reports that his previous job being able to stop as much as he would need to would be extremely difficult.  He continues under the care of gastroenterology for multiple med changes.    The following portions of the patient's history were reviewed and updated as appropriate: CC, ROS, allergies, current medications, past family history, past medical history, past social history, past surgical history and problem list.      Review of Systems   Constitutional: Positive for fatigue. Negative for activity change, appetite change and fever.   HENT: Negative for congestion, ear pain, facial " "swelling, nosebleeds, rhinorrhea, sinus pressure, sore throat and trouble swallowing.    Eyes: Negative for blurred vision, double vision and redness.   Respiratory: Negative for cough, chest tightness, shortness of breath and wheezing.    Cardiovascular: Negative for chest pain, palpitations and leg swelling.   Gastrointestinal: Negative for abdominal pain, blood in stool, constipation and diarrhea.   Endocrine: Negative.    Genitourinary: Negative for dysuria, flank pain, frequency, hematuria and urgency.   Musculoskeletal: Positive for arthralgias.   Skin: Negative.    Allergic/Immunologic: Negative.    Neurological: Negative for dizziness, weakness, light-headedness and headache.   Hematological: Negative.    Psychiatric/Behavioral: Negative for self-injury, sleep disturbance and suicidal ideas. The patient is not nervous/anxious.    All other systems reviewed and are negative.      Objective     /78   Pulse 76   Temp 98.2 °F (36.8 °C)   Resp 14   Ht 188 cm (74.02\")   Wt 82.6 kg (182 lb)   SpO2 98%   BMI 23.36 kg/m²     Physical Exam  Vitals reviewed.   Constitutional:       General: He is not in acute distress.     Appearance: He is well-developed. He is not diaphoretic.   HENT:      Head: Normocephalic and atraumatic.      Jaw: No tenderness.      Comments: Oropharynx not examined.  Patient is presently wearing a face covering/mask due to COVID-19 pandemic.     Right Ear: Hearing, tympanic membrane, ear canal and external ear normal.      Left Ear: Hearing, tympanic membrane, ear canal and external ear normal.   Eyes:      General: Lids are normal. No scleral icterus.     Extraocular Movements:      Right eye: Normal extraocular motion and no nystagmus.      Left eye: Normal extraocular motion and no nystagmus.      Conjunctiva/sclera: Conjunctivae normal.      Pupils: Pupils are equal, round, and reactive to light.   Neck:      Thyroid: No thyromegaly or thyroid tenderness.      Vascular: No " carotid bruit or JVD.      Trachea: No tracheal tenderness.   Cardiovascular:      Rate and Rhythm: Normal rate and regular rhythm.      Pulses:           Dorsalis pedis pulses are 2+ on the right side and 2+ on the left side.        Posterior tibial pulses are 2+ on the right side and 2+ on the left side.      Heart sounds: Normal heart sounds, S1 normal and S2 normal. No murmur heard.  Pulmonary:      Effort: Pulmonary effort is normal. No accessory muscle usage, prolonged expiration or respiratory distress.      Breath sounds: Normal breath sounds.   Chest:      Chest wall: No tenderness.   Abdominal:      General: Bowel sounds are normal. There is no distension.      Palpations: Abdomen is soft. There is no mass.      Tenderness: There is no abdominal tenderness.   Musculoskeletal:         General: Tenderness present.      Cervical back: Normal range of motion and neck supple.      Right lower leg: No edema.      Left lower leg: No edema.      Comments: No muscular atrophy or flaccidity.  Multiple areas of generalized arthralgia and myalgia noted throughout spine, hips, knees, and shoulders.     Lymphadenopathy:      Head:      Right side of head: No submental or submandibular adenopathy.      Left side of head: No submental or submandibular adenopathy.      Cervical: No cervical adenopathy.      Right cervical: No superficial cervical adenopathy.     Left cervical: No superficial cervical adenopathy.   Skin:     General: Skin is warm and dry.      Capillary Refill: Capillary refill takes less than 2 seconds.      Coloration: Skin is not jaundiced or pale.      Findings: No erythema.      Nails: There is no clubbing.   Neurological:      Mental Status: He is alert and oriented to person, place, and time.      Cranial Nerves: No cranial nerve deficit or facial asymmetry.      Sensory: No sensory deficit.      Motor: No weakness, tremor, atrophy or abnormal muscle tone.      Coordination: Coordination normal.       Gait: Gait abnormal (mildly antalgic).      Deep Tendon Reflexes: Reflexes are normal and symmetric.   Psychiatric:         Attention and Perception: He is attentive.         Mood and Affect: Mood normal.         Speech: Speech normal.         Behavior: Behavior normal. Behavior is cooperative.         Thought Content: Thought content normal.         Judgment: Judgment normal.           Diagnoses and all orders for this visit:    1. Coronary artery disease involving native coronary artery of native heart without angina pectoris (Primary)  Assessment & Plan:  Continue aspirin 81 mg, atorvastatin 80 mg, and isosorbide 30 mg.  Continue lisinopril 10 mg and metoprolol 25 mg half tab daily.  Continue under the care of cardiology.  Continue Brilinta 90 mg twice daily for anticoagulation.      2. Colitis  Assessment & Plan:  Continue under the care of GI.  Continue Humira and mesalamine as directed.      3. Primary osteoarthritis involving multiple joints  Assessment & Plan:  We will continue conservative measures at this time.  Patient to report if pain becomes intolerable.  Continue as needed Tylenol at home and other conservative measures including heat, ice, OTC muscle creams or rubs.      4. Return to work evaluation  Assessment & Plan:  Discussed with patient that due to his health status this provider opinion is that he may not return to work at this time.  Discussed with patient need for stability with colitis as well as consistent medication without needing adjustments.  Discussed with patient that due to the nature of his past work he may not be able to return to such a manner of work environment.  Discussed with patient his need for multiple bathroom breaks and how this will affect any job position.         BMI is within normal parameters. No other follow-up for BMI required.     Understands disease processes and need for medications.  Understands reasons for urgent and emergent care.  Patient (& family)  verbalized agreement for treatment plan.   Emotional support and active listening provided.  Patient provided time to verbalize feelings.    RTC 2 months, sooner if needed.           This document has been electronically signed by:  EDDIE Thurman FNP-C Dragon disclaimer:  Part of this note may be an electronic transcription/translation of spoken language to printed text using the Dragon Dictation System.

## 2022-12-14 ENCOUNTER — PATIENT ROUNDING (BHMG ONLY) (OUTPATIENT)
Dept: UROLOGY | Facility: CLINIC | Age: 47
End: 2022-12-14

## 2022-12-14 PROBLEM — N40.1 BENIGN PROSTATIC HYPERPLASIA WITH NOCTURIA: Status: ACTIVE | Noted: 2022-12-14

## 2022-12-14 PROBLEM — R35.1 BENIGN PROSTATIC HYPERPLASIA WITH NOCTURIA: Status: ACTIVE | Noted: 2022-12-14

## 2022-12-20 NOTE — TELEPHONE ENCOUNTER
Patient [facility]   High Dose Vitamin A Pregnancy And Lactation Text: High dose vitamin A therapy is contraindicated during pregnancy and breast feeding.

## 2022-12-26 PROBLEM — Z76.89 RETURN TO WORK EVALUATION: Status: ACTIVE | Noted: 2022-12-26

## 2022-12-26 NOTE — ASSESSMENT & PLAN NOTE
We will continue conservative measures at this time.  Patient to report if pain becomes intolerable.  Continue as needed Tylenol at home and other conservative measures including heat, ice, OTC muscle creams or rubs.

## 2022-12-26 NOTE — ASSESSMENT & PLAN NOTE
Discussed with patient that due to his health status this provider opinion is that he may not return to work at this time.  Discussed with patient need for stability with colitis as well as consistent medication without needing adjustments.  Discussed with patient that due to the nature of his past work he may not be able to return to such a manner of work environment.  Discussed with patient his need for multiple bathroom breaks and how this will affect any job position.

## 2022-12-26 NOTE — ASSESSMENT & PLAN NOTE
Continue aspirin 81 mg, atorvastatin 80 mg, and isosorbide 30 mg.  Continue lisinopril 10 mg and metoprolol 25 mg half tab daily.  Continue under the care of cardiology.  Continue Brilinta 90 mg twice daily for anticoagulation.

## 2023-01-10 ENCOUNTER — OFFICE VISIT (OUTPATIENT)
Dept: GASTROENTEROLOGY | Facility: CLINIC | Age: 48
End: 2023-01-10
Payer: MEDICAID

## 2023-01-10 ENCOUNTER — LAB (OUTPATIENT)
Dept: LAB | Facility: HOSPITAL | Age: 48
End: 2023-01-10
Payer: MEDICAID

## 2023-01-10 VITALS — WEIGHT: 191 LBS | BODY MASS INDEX: 24.51 KG/M2 | HEIGHT: 74 IN

## 2023-01-10 DIAGNOSIS — D75.839 THROMBOCYTOSIS, UNSPECIFIED: ICD-10-CM

## 2023-01-10 DIAGNOSIS — D64.9 ANEMIA, UNSPECIFIED TYPE: ICD-10-CM

## 2023-01-10 DIAGNOSIS — D72.829 LEUKOCYTOSIS, UNSPECIFIED TYPE: ICD-10-CM

## 2023-01-10 DIAGNOSIS — K50.00 CROHN'S DISEASE OF SMALL INTESTINE WITHOUT COMPLICATION: Primary | ICD-10-CM

## 2023-01-10 LAB
ALBUMIN SERPL-MCNC: 4 G/DL (ref 3.5–5.2)
ALBUMIN/GLOB SERPL: 1.5 G/DL
ALP SERPL-CCNC: 108 U/L (ref 39–117)
ALT SERPL W P-5'-P-CCNC: 31 U/L (ref 1–41)
ANION GAP SERPL CALCULATED.3IONS-SCNC: 9.9 MMOL/L (ref 5–15)
AST SERPL-CCNC: 19 U/L (ref 1–40)
BASOPHILS # BLD AUTO: 0.12 10*3/MM3 (ref 0–0.2)
BASOPHILS NFR BLD AUTO: 1.2 % (ref 0–1.5)
BILIRUB SERPL-MCNC: 0.6 MG/DL (ref 0–1.2)
BUN SERPL-MCNC: 12 MG/DL (ref 6–20)
BUN/CREAT SERPL: 12 (ref 7–25)
CALCIUM SPEC-SCNC: 9.2 MG/DL (ref 8.6–10.5)
CHLORIDE SERPL-SCNC: 104 MMOL/L (ref 98–107)
CO2 SERPL-SCNC: 26.1 MMOL/L (ref 22–29)
CREAT SERPL-MCNC: 1 MG/DL (ref 0.76–1.27)
CRP SERPL-MCNC: <0.3 MG/DL (ref 0–0.5)
DEPRECATED RDW RBC AUTO: 41.3 FL (ref 37–54)
EGFRCR SERPLBLD CKD-EPI 2021: 92.8 ML/MIN/1.73
EOSINOPHIL # BLD AUTO: 0.11 10*3/MM3 (ref 0–0.4)
EOSINOPHIL NFR BLD AUTO: 1.1 % (ref 0.3–6.2)
ERYTHROCYTE [DISTWIDTH] IN BLOOD BY AUTOMATED COUNT: 13.2 % (ref 12.3–15.4)
ERYTHROCYTE [SEDIMENTATION RATE] IN BLOOD: <1 MM/HR (ref 0–15)
GLOBULIN UR ELPH-MCNC: 2.6 GM/DL
GLUCOSE SERPL-MCNC: 78 MG/DL (ref 65–99)
HCT VFR BLD AUTO: 41.6 % (ref 37.5–51)
HGB BLD-MCNC: 14.1 G/DL (ref 13–17.7)
IMM GRANULOCYTES # BLD AUTO: 0.03 10*3/MM3 (ref 0–0.05)
IMM GRANULOCYTES NFR BLD AUTO: 0.3 % (ref 0–0.5)
LYMPHOCYTES # BLD AUTO: 2.5 10*3/MM3 (ref 0.7–3.1)
LYMPHOCYTES NFR BLD AUTO: 25.3 % (ref 19.6–45.3)
MCH RBC QN AUTO: 29.3 PG (ref 26.6–33)
MCHC RBC AUTO-ENTMCNC: 33.9 G/DL (ref 31.5–35.7)
MCV RBC AUTO: 86.3 FL (ref 79–97)
MONOCYTES # BLD AUTO: 0.79 10*3/MM3 (ref 0.1–0.9)
MONOCYTES NFR BLD AUTO: 8 % (ref 5–12)
NEUTROPHILS NFR BLD AUTO: 6.34 10*3/MM3 (ref 1.7–7)
NEUTROPHILS NFR BLD AUTO: 64.1 % (ref 42.7–76)
NRBC BLD AUTO-RTO: 0 /100 WBC (ref 0–0.2)
PLATELET # BLD AUTO: 263 10*3/MM3 (ref 140–450)
PMV BLD AUTO: 11 FL (ref 6–12)
POTASSIUM SERPL-SCNC: 3.9 MMOL/L (ref 3.5–5.2)
PROT SERPL-MCNC: 6.6 G/DL (ref 6–8.5)
RBC # BLD AUTO: 4.82 10*6/MM3 (ref 4.14–5.8)
SODIUM SERPL-SCNC: 140 MMOL/L (ref 136–145)
WBC NRBC COR # BLD: 9.89 10*3/MM3 (ref 3.4–10.8)

## 2023-01-10 PROCEDURE — 85025 COMPLETE CBC W/AUTO DIFF WBC: CPT | Performed by: PHYSICIAN ASSISTANT

## 2023-01-10 PROCEDURE — 80053 COMPREHEN METABOLIC PANEL: CPT | Performed by: PHYSICIAN ASSISTANT

## 2023-01-10 PROCEDURE — 86140 C-REACTIVE PROTEIN: CPT | Performed by: PHYSICIAN ASSISTANT

## 2023-01-10 PROCEDURE — 85652 RBC SED RATE AUTOMATED: CPT

## 2023-01-10 PROCEDURE — 99213 OFFICE O/P EST LOW 20 MIN: CPT | Performed by: PHYSICIAN ASSISTANT

## 2023-01-11 ENCOUNTER — TELEPHONE (OUTPATIENT)
Dept: GASTROENTEROLOGY | Facility: CLINIC | Age: 48
End: 2023-01-11
Payer: MEDICAID

## 2023-01-11 NOTE — TELEPHONE ENCOUNTER
Labs have significantly improved from the last time they were drawn.  Inflammatory markers are now within normal limits

## 2023-02-08 ENCOUNTER — TELEPHONE (OUTPATIENT)
Dept: UROLOGY | Facility: CLINIC | Age: 48
End: 2023-02-08
Payer: MEDICAID

## 2023-02-08 DIAGNOSIS — K50.00 CROHN'S DISEASE OF SMALL INTESTINE WITHOUT COMPLICATION: ICD-10-CM

## 2023-02-08 RX ORDER — MESALAMINE 1.2 G/1
1200 TABLET, DELAYED RELEASE ORAL
Qty: 90 TABLET | Refills: 3 | Status: SHIPPED | OUTPATIENT
Start: 2023-02-08

## 2023-02-08 NOTE — TELEPHONE ENCOUNTER
Patient called in requesting Humera pens, and a refill on mesalamine 1.2 g tablets sent to [facility].

## 2023-02-09 NOTE — TELEPHONE ENCOUNTER
Akira I don't see that his Humira was sent in, Do we need to send in a refill or is this taken care of by meds management?

## 2023-02-10 NOTE — TELEPHONE ENCOUNTER
Notified patient by phone that his mesalamine was sent to the pharmacy. Referral sheet faxed to med management. Patient stated that med management has already contacted him regarding his Humira.

## 2023-02-14 ENCOUNTER — OFFICE VISIT (OUTPATIENT)
Dept: FAMILY MEDICINE CLINIC | Facility: CLINIC | Age: 48
End: 2023-02-14
Payer: MEDICAID

## 2023-02-14 VITALS
DIASTOLIC BLOOD PRESSURE: 80 MMHG | TEMPERATURE: 98 F | RESPIRATION RATE: 14 BRPM | HEART RATE: 70 BPM | OXYGEN SATURATION: 98 % | SYSTOLIC BLOOD PRESSURE: 114 MMHG | BODY MASS INDEX: 24.67 KG/M2 | WEIGHT: 192.2 LBS | HEIGHT: 74 IN

## 2023-02-14 DIAGNOSIS — Z13.1 DIABETES MELLITUS SCREENING: ICD-10-CM

## 2023-02-14 DIAGNOSIS — M15.9 PRIMARY OSTEOARTHRITIS INVOLVING MULTIPLE JOINTS: Primary | ICD-10-CM

## 2023-02-14 DIAGNOSIS — K52.9 COLITIS: ICD-10-CM

## 2023-02-14 DIAGNOSIS — R53.83 OTHER FATIGUE: ICD-10-CM

## 2023-02-14 DIAGNOSIS — Z13.220 SCREENING CHOLESTEROL LEVEL: ICD-10-CM

## 2023-02-14 DIAGNOSIS — E55.9 VITAMIN D DEFICIENCY: ICD-10-CM

## 2023-02-14 DIAGNOSIS — I25.10 CORONARY ARTERY DISEASE INVOLVING NATIVE CORONARY ARTERY OF NATIVE HEART WITHOUT ANGINA PECTORIS: ICD-10-CM

## 2023-02-14 DIAGNOSIS — Z13.29 THYROID DISORDER SCREEN: ICD-10-CM

## 2023-02-14 LAB
25(OH)D3 SERPL-MCNC: 18 NG/ML (ref 30–100)
ALBUMIN SERPL-MCNC: 4.5 G/DL (ref 3.5–5.2)
ALBUMIN/GLOB SERPL: 2 G/DL
ALP SERPL-CCNC: 112 U/L (ref 39–117)
ALT SERPL W P-5'-P-CCNC: 41 U/L (ref 1–41)
ANION GAP SERPL CALCULATED.3IONS-SCNC: 10.3 MMOL/L (ref 5–15)
AST SERPL-CCNC: 24 U/L (ref 1–40)
BASOPHILS # BLD AUTO: 0.1 10*3/MM3 (ref 0–0.2)
BASOPHILS NFR BLD AUTO: 1.3 % (ref 0–1.5)
BILIRUB SERPL-MCNC: 0.9 MG/DL (ref 0–1.2)
BUN SERPL-MCNC: 11 MG/DL (ref 6–20)
BUN/CREAT SERPL: 12.5 (ref 7–25)
CALCIUM SPEC-SCNC: 9.3 MG/DL (ref 8.6–10.5)
CHLORIDE SERPL-SCNC: 105 MMOL/L (ref 98–107)
CHOLEST SERPL-MCNC: 112 MG/DL (ref 0–200)
CO2 SERPL-SCNC: 26.7 MMOL/L (ref 22–29)
CREAT SERPL-MCNC: 0.88 MG/DL (ref 0.76–1.27)
DEPRECATED RDW RBC AUTO: 37.8 FL (ref 37–54)
EGFRCR SERPLBLD CKD-EPI 2021: 106.1 ML/MIN/1.73
EOSINOPHIL # BLD AUTO: 0.07 10*3/MM3 (ref 0–0.4)
EOSINOPHIL NFR BLD AUTO: 0.9 % (ref 0.3–6.2)
ERYTHROCYTE [DISTWIDTH] IN BLOOD BY AUTOMATED COUNT: 12.2 % (ref 12.3–15.4)
GLOBULIN UR ELPH-MCNC: 2.3 GM/DL
GLUCOSE SERPL-MCNC: 89 MG/DL (ref 65–99)
HBA1C MFR BLD: 5.2 % (ref 4.8–5.6)
HCT VFR BLD AUTO: 41.2 % (ref 37.5–51)
HDLC SERPL-MCNC: 40 MG/DL (ref 40–60)
HGB BLD-MCNC: 14.1 G/DL (ref 13–17.7)
IMM GRANULOCYTES # BLD AUTO: 0.02 10*3/MM3 (ref 0–0.05)
IMM GRANULOCYTES NFR BLD AUTO: 0.3 % (ref 0–0.5)
LDLC SERPL CALC-MCNC: 54 MG/DL (ref 0–100)
LDLC/HDLC SERPL: 1.35 {RATIO}
LYMPHOCYTES # BLD AUTO: 2.26 10*3/MM3 (ref 0.7–3.1)
LYMPHOCYTES NFR BLD AUTO: 28.6 % (ref 19.6–45.3)
MCH RBC QN AUTO: 29.4 PG (ref 26.6–33)
MCHC RBC AUTO-ENTMCNC: 34.2 G/DL (ref 31.5–35.7)
MCV RBC AUTO: 85.8 FL (ref 79–97)
MONOCYTES # BLD AUTO: 0.73 10*3/MM3 (ref 0.1–0.9)
MONOCYTES NFR BLD AUTO: 9.2 % (ref 5–12)
NEUTROPHILS NFR BLD AUTO: 4.72 10*3/MM3 (ref 1.7–7)
NEUTROPHILS NFR BLD AUTO: 59.7 % (ref 42.7–76)
NRBC BLD AUTO-RTO: 0 /100 WBC (ref 0–0.2)
PLATELET # BLD AUTO: 258 10*3/MM3 (ref 140–450)
PMV BLD AUTO: 11.3 FL (ref 6–12)
POTASSIUM SERPL-SCNC: 4.3 MMOL/L (ref 3.5–5.2)
PROT SERPL-MCNC: 6.8 G/DL (ref 6–8.5)
RBC # BLD AUTO: 4.8 10*6/MM3 (ref 4.14–5.8)
SODIUM SERPL-SCNC: 142 MMOL/L (ref 136–145)
T4 FREE SERPL-MCNC: 1.35 NG/DL (ref 0.93–1.7)
TRIGL SERPL-MCNC: 91 MG/DL (ref 0–150)
TSH SERPL DL<=0.05 MIU/L-ACNC: 1.55 UIU/ML (ref 0.27–4.2)
VIT B12 BLD-MCNC: 364 PG/ML (ref 211–946)
VLDLC SERPL-MCNC: 18 MG/DL (ref 5–40)
WBC NRBC COR # BLD: 7.9 10*3/MM3 (ref 3.4–10.8)

## 2023-02-14 PROCEDURE — 84443 ASSAY THYROID STIM HORMONE: CPT | Performed by: NURSE PRACTITIONER

## 2023-02-14 PROCEDURE — 85025 COMPLETE CBC W/AUTO DIFF WBC: CPT | Performed by: NURSE PRACTITIONER

## 2023-02-14 PROCEDURE — 82607 VITAMIN B-12: CPT | Performed by: NURSE PRACTITIONER

## 2023-02-14 PROCEDURE — 80061 LIPID PANEL: CPT | Performed by: NURSE PRACTITIONER

## 2023-02-14 PROCEDURE — 80053 COMPREHEN METABOLIC PANEL: CPT | Performed by: NURSE PRACTITIONER

## 2023-02-14 PROCEDURE — 83036 HEMOGLOBIN GLYCOSYLATED A1C: CPT | Performed by: NURSE PRACTITIONER

## 2023-02-14 PROCEDURE — 96372 THER/PROPH/DIAG INJ SC/IM: CPT | Performed by: NURSE PRACTITIONER

## 2023-02-14 PROCEDURE — 84439 ASSAY OF FREE THYROXINE: CPT | Performed by: NURSE PRACTITIONER

## 2023-02-14 PROCEDURE — 82306 VITAMIN D 25 HYDROXY: CPT | Performed by: NURSE PRACTITIONER

## 2023-02-14 PROCEDURE — 99214 OFFICE O/P EST MOD 30 MIN: CPT | Performed by: NURSE PRACTITIONER

## 2023-02-14 RX ORDER — METHYLPREDNISOLONE ACETATE 80 MG/ML
80 INJECTION, SUSPENSION INTRA-ARTICULAR; INTRALESIONAL; INTRAMUSCULAR; SOFT TISSUE ONCE
Status: DISCONTINUED | OUTPATIENT
Start: 2023-02-14 | End: 2023-02-14

## 2023-02-14 RX ORDER — PREDNISONE 20 MG/1
TABLET ORAL
Qty: 20 TABLET | Refills: 0 | Status: SHIPPED | OUTPATIENT
Start: 2023-02-14 | End: 2023-02-27

## 2023-02-14 RX ADMIN — METHYLPREDNISOLONE ACETATE 80 MG: 80 INJECTION, SUSPENSION INTRA-ARTICULAR; INTRALESIONAL; INTRAMUSCULAR; SOFT TISSUE at 09:08

## 2023-02-14 NOTE — PROGRESS NOTES
"Subjective   Scott Montes is a 47 y.o. male.     Chief Complaint   Patient presents with   • Joint Pain       History of Present Illness     Joint pain-Ongoing.  Patient continues to attempt conservative measures.  He is taking as needed Flexeril 5 mg when needed.  He reports muscle and joint pain of multiple areas.   He reports that mostly aching in quality. It is worsened by activity.      Chron's disease-under the care of GI.  He is currently on methylamine and Humira.  He is also on Colestid 1 g tablet.  He reports he has noted some increased dyspepsia and flatulence.  He denies any obvious blood in stool but \"don't look\".    Past MI- is doing well at this time.  He is currently on metoprolol 25 mg half tablet daily.  He is also on atorvastatin 80 mg, aspirin 81 mg, and Brilinta 90 mg twice daily.  He takes Imdur 30 mg daily.  He is under the care of cardiology.  Patient had a STEMI in September 2022.   He notes some SOA related to Brilinta use.  He denies any wheezing.  No palpitations.   Vitamin D deficiency-chronic and ongoing.  Patient is presently taking vitamin D 50,000 units supplement.  No negative side effects of medication are reported.        The following portions of the patient's history were reviewed and updated as appropriate: CC, ROS, allergies, current medications, past family history, past medical history, past social history, past surgical history and problem list.    Review of Systems   Constitutional: Negative for activity change, appetite change, fatigue and fever.   HENT: Negative for congestion, ear pain, facial swelling, nosebleeds, rhinorrhea, sinus pressure, sore throat and trouble swallowing.    Eyes: Negative for blurred vision, double vision and redness.   Respiratory: Negative for cough, chest tightness, shortness of breath and wheezing.    Cardiovascular: Negative for chest pain, palpitations and leg swelling.   Gastrointestinal: Positive for abdominal pain. Negative for blood " "in stool, constipation and diarrhea.   Endocrine: Negative.    Genitourinary: Negative for dysuria, flank pain, frequency, hematuria and urgency.   Musculoskeletal: Positive for arthralgias and back pain.   Skin: Negative.    Allergic/Immunologic: Negative.    Neurological: Positive for headache. Negative for dizziness, weakness and light-headedness.   Hematological: Negative.    Psychiatric/Behavioral: Negative for self-injury, sleep disturbance and suicidal ideas. The patient is not nervous/anxious.    All other systems reviewed and are negative.      Objective     /80   Pulse 70   Temp 98 °F (36.7 °C)   Resp 14   Ht 188 cm (74.02\")   Wt 87.2 kg (192 lb 3.2 oz)   SpO2 98%   BMI 24.67 kg/m²     Physical Exam  Vitals reviewed.   Constitutional:       General: He is not in acute distress.     Appearance: He is well-developed. He is not diaphoretic.   HENT:      Head: Normocephalic and atraumatic.      Jaw: No tenderness.      Comments: Oropharynx not examined.  Patient is presently wearing a face covering/mask due to COVID-19 pandemic.     Right Ear: Hearing, tympanic membrane, ear canal and external ear normal.      Left Ear: Hearing, tympanic membrane, ear canal and external ear normal.   Eyes:      General: Lids are normal. No scleral icterus.     Extraocular Movements:      Right eye: Normal extraocular motion and no nystagmus.      Left eye: Normal extraocular motion and no nystagmus.      Conjunctiva/sclera: Conjunctivae normal.      Pupils: Pupils are equal, round, and reactive to light.   Neck:      Thyroid: No thyromegaly or thyroid tenderness.      Vascular: No carotid bruit or JVD.      Trachea: No tracheal tenderness.   Cardiovascular:      Rate and Rhythm: Normal rate and regular rhythm.      Pulses:           Dorsalis pedis pulses are 2+ on the right side and 2+ on the left side.        Posterior tibial pulses are 2+ on the right side and 2+ on the left side.      Heart sounds: Normal heart " sounds, S1 normal and S2 normal. No murmur heard.  Pulmonary:      Effort: Pulmonary effort is normal. No accessory muscle usage, prolonged expiration or respiratory distress.      Breath sounds: Normal breath sounds.   Chest:      Chest wall: No tenderness.   Abdominal:      General: Bowel sounds are normal. There is no distension.      Palpations: Abdomen is soft. There is no mass.      Tenderness: There is no abdominal tenderness.   Musculoskeletal:         General: Tenderness present.      Cervical back: Normal range of motion and neck supple.      Right lower leg: No edema.      Left lower leg: No edema.      Comments: No muscular atrophy or flaccidity.  Multiple areas of generalized arthralgia and myalgia noted throughout spine, hips, knees, and shoulders.     Lymphadenopathy:      Head:      Right side of head: No submental or submandibular adenopathy.      Left side of head: No submental or submandibular adenopathy.      Cervical: No cervical adenopathy.      Right cervical: No superficial cervical adenopathy.     Left cervical: No superficial cervical adenopathy.   Skin:     General: Skin is warm and dry.      Capillary Refill: Capillary refill takes less than 2 seconds.      Coloration: Skin is not jaundiced or pale.      Findings: No erythema.      Nails: There is no clubbing.   Neurological:      Mental Status: He is alert and oriented to person, place, and time.      Cranial Nerves: No cranial nerve deficit or facial asymmetry.      Sensory: No sensory deficit.      Motor: No weakness, tremor, atrophy or abnormal muscle tone.      Coordination: Coordination normal.      Gait: Gait abnormal (mildly antalgic).      Deep Tendon Reflexes: Reflexes are normal and symmetric.   Psychiatric:         Attention and Perception: He is attentive.         Mood and Affect: Mood normal.         Speech: Speech normal.         Behavior: Behavior normal. Behavior is cooperative.         Thought Content: Thought content  normal.         Judgment: Judgment normal.           Diagnoses and all orders for this visit:    1. Primary osteoarthritis involving multiple joints (Primary)  -     methylPREDNISolone acetate (DEPO-medrol) injection 80 mg  -     CBC & Differential  -     Comprehensive Metabolic Panel    2. Colitis  Assessment & Plan:  Continue under the care of GI.  Continue Humira and mesalamine as directed.    Orders:  -     predniSONE (DELTASONE) 20 MG tablet; 2 daily  Dispense: 20 tablet; Refill: 0  -     methylPREDNISolone acetate (DEPO-medrol) injection 80 mg    3. Coronary artery disease involving native coronary artery of native heart without angina pectoris  Assessment & Plan:  Continue aspirin 81 mg, atorvastatin 80 mg, and isosorbide 30 mg.  Continue lisinopril 10 mg and metoprolol 25 mg half tab daily.  Continue under the care of cardiology.  Continue Brilinta 90 mg twice daily for anticoagulation.    Orders:  -     CBC & Differential  -     Comprehensive Metabolic Panel    4. Vitamin D deficiency  -     Vitamin D,25-Hydroxy    5. Thyroid disorder screen  -     TSH  -     T4, Free    6. Screening cholesterol level  -     Lipid Panel    7. Diabetes mellitus screening  -     Hemoglobin A1c    8. Other fatigue  -     Vitamin B12       BMI is within normal parameters. No other follow-up for BMI required.     Understands disease processes and need for medications.  Understands reasons for urgent and emergent care.  Patient (& family) verbalized agreement for treatment plan.   Emotional support and active listening provided.  Patient provided time to verbalize feelings.    Steroids for home.  Understands risk for lower immune response and to avoid others who are sick.   Understands may have facial flushing and possible jitteriness.  Take early in the AM.     Labs today.  We will notify patient of results.   Continue under care of GI and cardiology specialist     Advised to continue to remain off work at this time     RTC 2  months, sooner if needed.             This document has been electronically signed by:  EDDIE Thurman, FNP-C    Dragon disclaimer:  Part of this note may be an electronic transcription/translation of spoken language to printed text using the Dragon Dictation System.

## 2023-02-16 RX ORDER — ERGOCALCIFEROL 1.25 MG/1
50000 CAPSULE ORAL WEEKLY
Qty: 4 CAPSULE | Refills: 5 | Status: SHIPPED | OUTPATIENT
Start: 2023-02-16

## 2023-02-16 NOTE — PROGRESS NOTES
Patient notified of labs via [facility].  Patient message is as follows:      Vitamin D is low.  I will be resending her supplement.  Cholesterol and vitamin levels are good.

## 2023-02-20 ENCOUNTER — OFFICE VISIT (OUTPATIENT)
Dept: CARDIOLOGY | Facility: CLINIC | Age: 48
End: 2023-02-20
Payer: MEDICAID

## 2023-02-20 VITALS
DIASTOLIC BLOOD PRESSURE: 85 MMHG | HEART RATE: 99 BPM | SYSTOLIC BLOOD PRESSURE: 123 MMHG | OXYGEN SATURATION: 99 % | BODY MASS INDEX: 37.69 KG/M2 | HEIGHT: 60 IN | WEIGHT: 192 LBS

## 2023-02-20 DIAGNOSIS — K50.00 CROHN'S DISEASE OF SMALL INTESTINE WITHOUT COMPLICATION: ICD-10-CM

## 2023-02-20 DIAGNOSIS — I10 ESSENTIAL HYPERTENSION: Chronic | ICD-10-CM

## 2023-02-20 DIAGNOSIS — E78.5 DYSLIPIDEMIA, GOAL LDL BELOW 70: Chronic | ICD-10-CM

## 2023-02-20 DIAGNOSIS — I25.10 CORONARY ARTERY DISEASE INVOLVING NATIVE CORONARY ARTERY OF NATIVE HEART WITHOUT ANGINA PECTORIS: Primary | Chronic | ICD-10-CM

## 2023-02-20 PROCEDURE — 99214 OFFICE O/P EST MOD 30 MIN: CPT | Performed by: NURSE PRACTITIONER

## 2023-02-20 RX ORDER — ISOSORBIDE MONONITRATE 30 MG/1
30 TABLET, EXTENDED RELEASE ORAL DAILY
Qty: 90 TABLET | Refills: 2 | Status: SHIPPED | OUTPATIENT
Start: 2023-02-20

## 2023-02-20 RX ORDER — MONTELUKAST SODIUM 4 MG/1
1 TABLET, CHEWABLE ORAL DAILY
Qty: 90 TABLET | Refills: 2 | Status: SHIPPED | OUTPATIENT
Start: 2023-02-20

## 2023-02-20 RX ORDER — LISINOPRIL 10 MG/1
10 TABLET ORAL DAILY
Qty: 90 TABLET | Refills: 2 | Status: SHIPPED | OUTPATIENT
Start: 2023-02-20 | End: 2023-03-06

## 2023-02-20 RX ORDER — ASPIRIN 81 MG/1
81 TABLET ORAL DAILY
Qty: 90 TABLET | Refills: 2 | Status: SHIPPED | OUTPATIENT
Start: 2023-02-20

## 2023-02-20 RX ORDER — ATORVASTATIN CALCIUM 80 MG/1
80 TABLET, FILM COATED ORAL NIGHTLY
Qty: 90 TABLET | Refills: 2 | Status: SHIPPED | OUTPATIENT
Start: 2023-02-20

## 2023-02-20 RX ORDER — NITROGLYCERIN 0.4 MG/1
TABLET SUBLINGUAL
Qty: 100 TABLET | Refills: 11 | Status: SHIPPED | OUTPATIENT
Start: 2023-02-20

## 2023-02-20 RX ORDER — METOPROLOL SUCCINATE 25 MG/1
12.5 TABLET, EXTENDED RELEASE ORAL DAILY
Qty: 90 TABLET | Refills: 2 | Status: SHIPPED | OUTPATIENT
Start: 2023-02-20

## 2023-02-20 NOTE — PROGRESS NOTES
"Chief Complaint  Follow-up (3 mo follow up) and Med Management (Patient is tolerating medications well. Verbally reviewed with patient. )    Subjective          Scott Montes presents to [facility] for follow up.    History of Present Illness    Mr. Montes was last seen in clinic on 11/15/2022 by EDDIE Hall.  Patient was placed on Toprol-XL at that visit.    At today's visit the patient reports that he has been doing okay.  He does report some sensation of a dull ache in the left side of his chest.  He states this happens at rest and can last up to a couple of hours.  He says its not in relation to activity.  There are no associated symptoms of nausea, radiation of pain, or diaphoresis.  He states the discomfort resolves on its own.    Objective     Vital Signs:   /85 (BP Location: Left arm, Patient Position: Sitting, Cuff Size: Adult)   Pulse 99   Ht 74 cm (29.14\")   Wt 87.1 kg (192 lb)   SpO2 99%   .95 kg/m²       Physical Exam  Constitutional:       Appearance: Normal appearance. He is well-developed.   Cardiovascular:      Rate and Rhythm: Normal rate and regular rhythm.      Heart sounds: No murmur heard.    No friction rub. No gallop.   Pulmonary:      Effort: Pulmonary effort is normal. No respiratory distress.      Breath sounds: Normal breath sounds. No wheezing or rales.   Skin:     General: Skin is warm and dry.   Neurological:      Mental Status: He is alert and oriented to person, place, and time.   Psychiatric:         Mood and Affect: Mood normal.         Behavior: Behavior normal.          Result Review :     CMP    CMP 10/10/22 1/10/23 2/14/23   Glucose 51 (A) 78 89   BUN 9 12 11   Creatinine 0.72 (A) 1.00 0.88   eGFR 112.7 92.8 106.1   Sodium 140 140 142   Potassium 4.2 3.9 4.3   Chloride 103 104 105   Calcium 9.5 9.2 9.3   Total Protein 6.7 6.6 6.8   Albumin 4.30 4.0 4.5   Globulin 2.4 2.6 2.3   Total Bilirubin 0.7 0.6 0.9   Alkaline " Phosphatase 116 108 112   AST (SGOT) 11 19 24   ALT (SGPT) 17 31 41   Albumin/Globulin Ratio 1.8 1.5 2.0   BUN/Creatinine Ratio 12.5 12.0 12.5   Anion Gap 11.0 9.9 10.3   (A) Abnormal value       Comments are available for some flowsheets but are not being displayed.           CBC    CBC 12/12/22 1/10/23 2/14/23   WBC 11.33 (A) 9.89 7.90   RBC 4.59 4.82 4.80   Hemoglobin 13.3 14.1 14.1   Hematocrit 40.8 41.6 41.2   MCV 88.9 86.3 85.8   MCH 29.0 29.3 29.4   MCHC 32.6 33.9 34.2   RDW 15.1 13.2 12.2 (A)   Platelets 297 263 258   (A) Abnormal value            Lipid Panel    Lipid Panel 4/19/22 9/7/22 2/14/23   Total Cholesterol 161 174 112   Triglycerides 97 178 (A) 91   HDL Cholesterol 28 (A) 28 (A) 40   VLDL Cholesterol 18 32 18   LDL Cholesterol  115 (A) 114 (A) 54   LDL/HDL Ratio 4.06 3.94 1.35   (A) Abnormal value                     Current Outpatient Medications   Medication Sig Dispense Refill   • aspirin 81 MG EC tablet Take 1 tablet by mouth Daily. 90 tablet 2   • atorvastatin (LIPITOR) 80 MG tablet Take 1 tablet by mouth Every Night. 90 tablet 2   • colestipol (COLESTID) 1 g tablet Take 1 tablet by mouth Daily. 90 tablet 2   • cyclobenzaprine (FLEXERIL) 5 MG tablet Take 1 tablet by mouth 3 (Three) Times a Day As Needed for Muscle Spasms. 90 tablet 5   • Humira Pen 40 MG/0.4ML Pen-injector Kit      • isosorbide mononitrate (IMDUR) 30 MG 24 hr tablet Take 1 tablet by mouth Daily. Hold for SBP <110 90 tablet 2   • lisinopril (PRINIVIL,ZESTRIL) 10 MG tablet Take 1 tablet by mouth Daily. 90 tablet 2   • mesalamine (LIALDA) 1.2 g EC tablet Take 1 tablet by mouth Daily With Breakfast. 90 tablet 3   • metoprolol succinate XL (TOPROL-XL) 25 MG 24 hr tablet Take 0.5 tablets by mouth Daily. Hold for SBP <110 and/or HR <60 90 tablet 2   • predniSONE (DELTASONE) 20 MG tablet 2 daily 20 tablet 0   • tamsulosin (Flomax) 0.4 MG capsule 24 hr capsule Take 1 capsule by mouth Every Night. 30 capsule 5   • ticagrelor (BRILINTA)  90 MG tablet tablet Take 1 tablet by mouth 2 (Two) Times a Day. 180 tablet 2   • vitamin D (ERGOCALCIFEROL) 1.25 MG (66475 UT) capsule capsule Take 1 capsule by mouth 1 (One) Time Per Week. 4 capsule 5   • nitroglycerin (NITROSTAT) 0.4 MG SL tablet 1 under the tongue as needed for angina, may repeat q5mins for up three doses 100 tablet 11     No current facility-administered medications for this visit.            Assessment and Plan    Problem List Items Addressed This Visit        Cardiac and Vasculature    Coronary artery disease involving native coronary artery of native heart without angina pectoris - Primary (Chronic)    Overview     · 9/6/2022 [facility] for STEMI: PCI BUZZ to the RCA  · 9/6/2022 [facility]: LVEF 51 to 55%         Relevant Medications    aspirin 81 MG EC tablet    isosorbide mononitrate (IMDUR) 30 MG 24 hr tablet    metoprolol succinate XL (TOPROL-XL) 25 MG 24 hr tablet    ticagrelor (BRILINTA) 90 MG tablet tablet    nitroglycerin (NITROSTAT) 0.4 MG SL tablet    Essential hypertension (Chronic)    Relevant Medications    lisinopril (PRINIVIL,ZESTRIL) 10 MG tablet    metoprolol succinate XL (TOPROL-XL) 25 MG 24 hr tablet    Dyslipidemia, goal LDL below 70 (Chronic)    Relevant Medications    atorvastatin (LIPITOR) 80 MG tablet    colestipol (COLESTID) 1 g tablet   Other Visit Diagnoses     Crohn's disease of small intestine without complication (HCC)        Relevant Medications    colestipol (COLESTID) 1 g tablet              Follow Up     Medications were reviewed with the patient.    Coronary artery disease is stable.  Continue aspirin, Brilinta, atorvastatin, lisinopril, and metoprolol.  Continue isosorbide mononitrate.  With regard to patient's reported chest pain, I am uncertain whether this is anginal pain or not.  I have given him a prescription today for sublingual nitro.    Hypertension is controlled.  Continue current medications.    With regard to dyslipidemia, LDL is at goal.  LDL is 54.    Return  in about 6 months (around 8/20/2023).    Patient was given instructions and counseling regarding his condition or for health maintenance advice. Please see specific information pulled into the AVS if appropriate.

## 2023-02-22 ENCOUNTER — TELEPHONE (OUTPATIENT)
Dept: CARDIOLOGY | Facility: CLINIC | Age: 48
End: 2023-02-22
Payer: MEDICAID

## 2023-02-22 NOTE — TELEPHONE ENCOUNTER
I have contacted this patient in regards to his disability forms, patient states he does not need anything signed from our office. Gastro is taking care of these forms.

## 2023-02-27 ENCOUNTER — DISEASE STATE MANAGEMENT VISIT (OUTPATIENT)
Dept: PHARMACY | Facility: HOSPITAL | Age: 48
End: 2023-02-27
Payer: MEDICAID

## 2023-02-27 ENCOUNTER — SPECIALTY PHARMACY (OUTPATIENT)
Dept: PHARMACY | Facility: HOSPITAL | Age: 48
End: 2023-02-27
Payer: MEDICAID

## 2023-02-27 RX ORDER — ADALIMUMAB 40MG/0.4ML
40 KIT SUBCUTANEOUS
Qty: 2 EACH | Refills: 5 | Status: SHIPPED | OUTPATIENT
Start: 2023-02-27

## 2023-02-27 NOTE — PROGRESS NOTES
[facility]  Inflammatory Bowel Disease Management       Scott Montes is a 47 y.o. male referred by Gastroenterology, Lula Nettles, to the [facility] for Crohn Disease..  The patient's current IBD regimen includes: colestid, Mesalamine, Humira.     Patient reports that he has been on Humira for several months and reports tolerating well.     Past Medical History:   Diagnosis Date   • Anemia    • Arthritis    • Elevated cholesterol    • Heart attack (HCC)    • Low back pain      Social History     Socioeconomic History   • Marital status:    Tobacco Use   • Smoking status: Former     Packs/day: 2.00     Years: 30.00     Pack years: 60.00     Types: Cigarettes     Quit date: 2022     Years since quittin.4   • Smokeless tobacco: Never   • Tobacco comments:     Smoking cessation encouraged   Vaping Use   • Vaping Use: Every day   • Substances: Nicotine   • Devices: Disposable   Substance and Sexual Activity   • Alcohol use: No   • Drug use: No   • Sexual activity: Defer     Patient has no known allergies.    Current Outpatient Medications:   •  Adalimumab (Humira Pen) 40 MG/0.4ML Pen-injector Kit, Inject 40 mg (0.4 ml) under the skin Every 14 (Fourteen) Days., Disp: 2 each, Rfl: 5  •  aspirin 81 MG EC tablet, Take 1 tablet by mouth Daily., Disp: 90 tablet, Rfl: 2  •  atorvastatin (LIPITOR) 80 MG tablet, Take 1 tablet by mouth Every Night., Disp: 90 tablet, Rfl: 2  •  colestipol (COLESTID) 1 g tablet, Take 1 tablet by mouth Daily., Disp: 90 tablet, Rfl: 2  •  cyclobenzaprine (FLEXERIL) 5 MG tablet, Take 1 tablet by mouth 3 (Three) Times a Day As Needed for Muscle Spasms., Disp: 90 tablet, Rfl: 5  •  isosorbide mononitrate (IMDUR) 30 MG 24 hr tablet, Take 1 tablet by mouth Daily. Hold for SBP <110, Disp: 90 tablet, Rfl: 2  •  lisinopril (PRINIVIL,ZESTRIL) 10 MG tablet, Take 1 tablet by mouth Daily., Disp: 90 tablet, Rfl: 2  •  mesalamine (LIALDA) 1.2 g EC tablet,  Take 1 tablet by mouth Daily With Breakfast., Disp: 90 tablet, Rfl: 3  •  metoprolol succinate XL (TOPROL-XL) 25 MG 24 hr tablet, Take 0.5 tablets by mouth Daily. Hold for SBP <110 and/or HR <60, Disp: 90 tablet, Rfl: 2  •  nitroglycerin (NITROSTAT) 0.4 MG SL tablet, 1 under the tongue as needed for angina, may repeat q5mins for up three doses, Disp: 100 tablet, Rfl: 11  •  tamsulosin (Flomax) 0.4 MG capsule 24 hr capsule, Take 1 capsule by mouth Every Night., Disp: 30 capsule, Rfl: 5  •  ticagrelor (BRILINTA) 90 MG tablet tablet, Take 1 tablet by mouth 2 (Two) Times a Day., Disp: 180 tablet, Rfl: 2  •  vitamin D (ERGOCALCIFEROL) 1.25 MG (64558 UT) capsule capsule, Take 1 capsule by mouth 1 (One) Time Per Week., Disp: 4 capsule, Rfl: 5  No current facility-administered medications for this visit.    There were no vitals filed for this visit.      Labs  No results for input(s): CMP, BMP, CBC in the last 72 hours.    Pretreatment Screening  TB: 10/10/22: Negative   HBV: 10/10/22: Non-reactive      Immunizations Screening   (Live Vaccines Should not be given while on therapy)  COVID 19: Primary series complete; pt declines any further doses   Influenza: Declines   Pneumococcal: Declines   Zoster: Declines at this time.     Drug-Drug Interactions:    No drug-drug interactions expected with Humira according to literature     Initial Education Provided for Specialty Medication  The patient has been provided with the following education and any applicable administration techniques (i.e. self-injection) have been demonstrated for the therapies indicated. All questions and concerns have been addressed prior to the patient receiving the medication, and the patient has verbalized understanding of the education and any materials provided.  Additional patient education shall be provided and documented upon request by the patient, provider or payer.               Humira (adalimumab)           Medication Expectations   Why am I  taking this medication? You are taking this medication for Crohn's disease,ulcerative colitis, rheumatoid or psoriatic arthritis.   What should I expect while on this medication? You should expect a decrease in the frequency and severity of symptoms.   How does the medication work? Adalimumab binds to TNF-alpha therefore interfering with binding to a TNFa receptor site.   How long will I be on this medication for? The amount of time you will be on this medication will be determined by your doctor and your response to the medication.    How do I take this medication? Take as directed on your prescription label.  This medication is a subcutaneous injection given in the fatty part of the skin on the top of the thigh or stomach area. May leave at room temperature for 15-30 minutes prior to injection.   What are some possible side effects? Injection site reactions and hypersensitivity reactions, headache, signs of a common cold, stomach pain, upset stomach, or back pain.   What happens if I miss a dose? If you miss a dose, take it as soon as you remember. If it is close to the time for your next dose, skip the missed dose and go back to your normal time.                    Medication Safety   What are things I should warn my doctor immediately about? Allergic reaction such has hives or trouble breathing. If you develop symptoms such as a cough that does not go away, weight loss, changes in how often you urinate, numbness or tingling in extremities, rash on cheeks or other body parts, unusual bleeding or bruising.     What are things that I should be cautious of? Injection site reaction, back pain, and headache. You may have more chance of getting an infection.  Wash your hands often and stay away from people with infections, colds, or flu.   What are some medications that can interact with this one? Immunosuppressants and vaccines.            Medication Storage/Handling   How should I handle this medication? Keep this  medication our of reach of pets/children in original container.  Store in the original container to protect from light. Do not inject where the skin is tender, bruised, red, hard, or affected by psoriasis.  Rotate injection sites.   How does this medication need to be stored? Store in refrigerator and keep dry. If needed, you may store at room temperature for up to 14 days.   How should I dispose of this medication? You can dispose of the empty syringe in a sharps container, and if this is not available you may use an empty hard plastic container such as a milk jug or tide container.            Resources/Support   How can I remind myself to take this medication? You can download a reminder dylon on your phone or use a calandar  to help with your injection.   Is financial support available?  Yes, [employer] can provide co-pay cards if you have commercial insurance or patient assistance if you have Medicare or no insurance.    Which vaccines are recommended for me? Talk to your doctor about these vaccines: Flu, Coronavirus (COVID-19), Pneumococcal (pneumonia), Tdap, Hepatitis B, Zoster (shingles).            Adherence and Self-Administration  • Barriers to Patient Adherence and/or Self-Administration: None  • Methods for Supporting Patient Adherence and/or Self-Administration: None Required     Goals of Therapy  • Patient Goals of Therapy: To not miss any doses    • Clinical Goals or Therapeutic Targets, If Applicable: Improved Quality of Life/ Decrease flares      Medication Assessment & Plan:   1. Will order Humira 40 mg/0.4 mL every 14 days for continuation of maintenance therapy per GI referral.   2. Patient administered Humira into right side of abdomen. Pt asked to remain on campus 15 minutes following injection and reported feeling well when he left.   3. Will follow-up 6 months or sooner if needed. Patient to keep GI follow up as appropriate and as scheduled.   4. Patient would like to receive specialty mail out  services. Care Coordinator will set up fills:       Please mail to :            [address] [place] [address]    Angie Shrestha. Giuliano, Mario Alberto  2/27/2023  11:22 EST

## 2023-02-27 NOTE — PROGRESS NOTES
[facility]  Inflammatory Bowel Disease Management       Scott Montes is a 47 y.o. male referred by Gastroenterology, Lula Nettles, to the [facility] for Crohn Disease..  The patient's current IBD regimen includes: colestid, Mesalamine, Humira.     Patient reports that he has been on Humira for several months and reports tolerating well.     Past Medical History:   Diagnosis Date   • Anemia    • Arthritis    • Elevated cholesterol    • Heart attack (HCC)    • Low back pain      Social History     Socioeconomic History   • Marital status:    Tobacco Use   • Smoking status: Former     Packs/day: 2.00     Years: 30.00     Pack years: 60.00     Types: Cigarettes     Quit date: 2022     Years since quittin.4   • Smokeless tobacco: Never   • Tobacco comments:     Smoking cessation encouraged   Vaping Use   • Vaping Use: Every day   • Substances: Nicotine   • Devices: Disposable   Substance and Sexual Activity   • Alcohol use: No   • Drug use: No   • Sexual activity: Defer     Patient has no known allergies.    Current Outpatient Medications:   •  aspirin 81 MG EC tablet, Take 1 tablet by mouth Daily., Disp: 90 tablet, Rfl: 2  •  atorvastatin (LIPITOR) 80 MG tablet, Take 1 tablet by mouth Every Night., Disp: 90 tablet, Rfl: 2  •  colestipol (COLESTID) 1 g tablet, Take 1 tablet by mouth Daily., Disp: 90 tablet, Rfl: 2  •  cyclobenzaprine (FLEXERIL) 5 MG tablet, Take 1 tablet by mouth 3 (Three) Times a Day As Needed for Muscle Spasms., Disp: 90 tablet, Rfl: 5  •  isosorbide mononitrate (IMDUR) 30 MG 24 hr tablet, Take 1 tablet by mouth Daily. Hold for SBP <110, Disp: 90 tablet, Rfl: 2  •  lisinopril (PRINIVIL,ZESTRIL) 10 MG tablet, Take 1 tablet by mouth Daily., Disp: 90 tablet, Rfl: 2  •  mesalamine (LIALDA) 1.2 g EC tablet, Take 1 tablet by mouth Daily With Breakfast., Disp: 90 tablet, Rfl: 3  •  metoprolol succinate XL (TOPROL-XL) 25 MG 24 hr tablet, Take 0.5  tablets by mouth Daily. Hold for SBP <110 and/or HR <60, Disp: 90 tablet, Rfl: 2  •  nitroglycerin (NITROSTAT) 0.4 MG SL tablet, 1 under the tongue as needed for angina, may repeat q5mins for up three doses, Disp: 100 tablet, Rfl: 11  •  tamsulosin (Flomax) 0.4 MG capsule 24 hr capsule, Take 1 capsule by mouth Every Night., Disp: 30 capsule, Rfl: 5  •  ticagrelor (BRILINTA) 90 MG tablet tablet, Take 1 tablet by mouth 2 (Two) Times a Day., Disp: 180 tablet, Rfl: 2  •  vitamin D (ERGOCALCIFEROL) 1.25 MG (58053 UT) capsule capsule, Take 1 capsule by mouth 1 (One) Time Per Week., Disp: 4 capsule, Rfl: 5  •  Adalimumab (Humira Pen) 40 MG/0.4ML Pen-injector Kit, Inject 40 mg (0.4 ml) under the skin Every 14 (Fourteen) Days., Disp: 2 each, Rfl: 5  No current facility-administered medications for this visit.    There were no vitals filed for this visit.      Labs  No results for input(s): CMP, BMP, CBC in the last 72 hours.    Pretreatment Screening  TB: 10/10/22: Negative   HBV: 10/10/22: Non-reactive      Immunizations Screening   (Live Vaccines Should not be given while on therapy)  COVID 19: Primary series complete; pt declines any further doses   Influenza: Declines   Pneumococcal: Declines   Zoster: Declines at this time.     Drug-Drug Interactions:    No drug-drug interactions expected with Humira according to literature     Initial Education Provided for Specialty Medication  The patient has been provided with the following education and any applicable administration techniques (i.e. self-injection) have been demonstrated for the therapies indicated. All questions and concerns have been addressed prior to the patient receiving the medication, and the patient has verbalized understanding of the education and any materials provided.  Additional patient education shall be provided and documented upon request by the patient, provider or payer.               Humira (adalimumab)           Medication Expectations   Why am  I taking this medication? You are taking this medication for Crohn's disease,ulcerative colitis, rheumatoid or psoriatic arthritis.   What should I expect while on this medication? You should expect a decrease in the frequency and severity of symptoms.   How does the medication work? Adalimumab binds to TNF-alpha therefore interfering with binding to a TNFa receptor site.   How long will I be on this medication for? The amount of time you will be on this medication will be determined by your doctor and your response to the medication.    How do I take this medication? Take as directed on your prescription label.  This medication is a subcutaneous injection given in the fatty part of the skin on the top of the thigh or stomach area. May leave at room temperature for 15-30 minutes prior to injection.   What are some possible side effects? Injection site reactions and hypersensitivity reactions, headache, signs of a common cold, stomach pain, upset stomach, or back pain.   What happens if I miss a dose? If you miss a dose, take it as soon as you remember. If it is close to the time for your next dose, skip the missed dose and go back to your normal time.                    Medication Safety   What are things I should warn my doctor immediately about? Allergic reaction such has hives or trouble breathing. If you develop symptoms such as a cough that does not go away, weight loss, changes in how often you urinate, numbness or tingling in extremities, rash on cheeks or other body parts, unusual bleeding or bruising.     What are things that I should be cautious of? Injection site reaction, back pain, and headache. You may have more chance of getting an infection.  Wash your hands often and stay away from people with infections, colds, or flu.   What are some medications that can interact with this one? Immunosuppressants and vaccines.            Medication Storage/Handling   How should I handle this medication? Keep this  medication our of reach of pets/children in original container.  Store in the original container to protect from light. Do not inject where the skin is tender, bruised, red, hard, or affected by psoriasis.  Rotate injection sites.   How does this medication need to be stored? Store in refrigerator and keep dry. If needed, you may store at room temperature for up to 14 days.   How should I dispose of this medication? You can dispose of the empty syringe in a sharps container, and if this is not available you may use an empty hard plastic container such as a milk jug or tide container.            Resources/Support   How can I remind myself to take this medication? You can download a reminder dylon on your phone or use a calandar  to help with your injection.   Is financial support available?  Yes, [employer] can provide co-pay cards if you have commercial insurance or patient assistance if you have Medicare or no insurance.    Which vaccines are recommended for me? Talk to your doctor about these vaccines: Flu, Coronavirus (COVID-19), Pneumococcal (pneumonia), Tdap, Hepatitis B, Zoster (shingles).            Adherence and Self-Administration  • Barriers to Patient Adherence and/or Self-Administration: None  • Methods for Supporting Patient Adherence and/or Self-Administration: None Required     Goals of Therapy  • Patient Goals of Therapy: To not miss any doses    • Clinical Goals or Therapeutic Targets, If Applicable: Improved Quality of Life/ Decrease flares      Medication Assessment & Plan:   1. Will order Humira 40 mg/0.4 mL every 14 days for continuation of maintenance therapy per GI referral.   2. Patient administered Humira into right side of abdomen. Pt asked to remain on campus 15 minutes following injection and reported feeling well when he left.   3. Will follow-up 6 months or sooner if needed. Patient to keep GI follow up as appropriate and as scheduled.   4. Patient would like to receive specialty mail out  services. Care Coordinator will set up fills:       Please mail to :            [address] [facility], [place] [address]    Angie Shrestha. Giuliano, BrianD  2/27/2023  09:51 EST

## 2023-03-02 ENCOUNTER — OFFICE VISIT (OUTPATIENT)
Dept: ONCOLOGY | Facility: CLINIC | Age: 48
End: 2023-03-02
Payer: MEDICAID

## 2023-03-02 ENCOUNTER — LAB (OUTPATIENT)
Dept: ONCOLOGY | Facility: CLINIC | Age: 48
End: 2023-03-02
Payer: MEDICAID

## 2023-03-02 VITALS
TEMPERATURE: 98 F | DIASTOLIC BLOOD PRESSURE: 77 MMHG | SYSTOLIC BLOOD PRESSURE: 115 MMHG | HEART RATE: 70 BPM | WEIGHT: 191 LBS | OXYGEN SATURATION: 98 % | RESPIRATION RATE: 18 BRPM | BODY MASS INDEX: 158.12 KG/M2

## 2023-03-02 DIAGNOSIS — D75.839 THROMBOCYTOSIS, UNSPECIFIED: ICD-10-CM

## 2023-03-02 DIAGNOSIS — D72.829 LEUKOCYTOSIS, UNSPECIFIED TYPE: ICD-10-CM

## 2023-03-02 DIAGNOSIS — D72.829 LEUKOCYTOSIS, UNSPECIFIED TYPE: Primary | ICD-10-CM

## 2023-03-02 DIAGNOSIS — K50.10 CROHN'S DISEASE OF COLON WITHOUT COMPLICATION: ICD-10-CM

## 2023-03-02 DIAGNOSIS — D64.9 ANEMIA, UNSPECIFIED TYPE: ICD-10-CM

## 2023-03-02 LAB
BASOPHILS # BLD AUTO: 0.11 10*3/MM3 (ref 0–0.2)
BASOPHILS NFR BLD AUTO: 1 % (ref 0–1.5)
CRP SERPL-MCNC: <0.3 MG/DL (ref 0–0.5)
DEPRECATED RDW RBC AUTO: 44.2 FL (ref 37–54)
EOSINOPHIL # BLD AUTO: 0.16 10*3/MM3 (ref 0–0.4)
EOSINOPHIL NFR BLD AUTO: 1.5 % (ref 0.3–6.2)
ERYTHROCYTE [DISTWIDTH] IN BLOOD BY AUTOMATED COUNT: 13.2 % (ref 12.3–15.4)
FERRITIN SERPL-MCNC: 369.9 NG/ML (ref 30–400)
HCT VFR BLD AUTO: 43.2 % (ref 37.5–51)
HGB BLD-MCNC: 14.3 G/DL (ref 13–17.7)
IMM GRANULOCYTES # BLD AUTO: 0.03 10*3/MM3 (ref 0–0.05)
IMM GRANULOCYTES NFR BLD AUTO: 0.3 % (ref 0–0.5)
IRON 24H UR-MRATE: 116 MCG/DL (ref 59–158)
IRON SATN MFR SERPL: 26 % (ref 20–50)
LYMPHOCYTES # BLD AUTO: 3.02 10*3/MM3 (ref 0.7–3.1)
LYMPHOCYTES NFR BLD AUTO: 28.4 % (ref 19.6–45.3)
MCH RBC QN AUTO: 30.2 PG (ref 26.6–33)
MCHC RBC AUTO-ENTMCNC: 33.1 G/DL (ref 31.5–35.7)
MCV RBC AUTO: 91.3 FL (ref 79–97)
MONOCYTES # BLD AUTO: 0.84 10*3/MM3 (ref 0.1–0.9)
MONOCYTES NFR BLD AUTO: 7.9 % (ref 5–12)
NEUTROPHILS NFR BLD AUTO: 6.48 10*3/MM3 (ref 1.7–7)
NEUTROPHILS NFR BLD AUTO: 60.9 % (ref 42.7–76)
NRBC BLD AUTO-RTO: 0 /100 WBC (ref 0–0.2)
PLATELET # BLD AUTO: 299 10*3/MM3 (ref 140–450)
PMV BLD AUTO: 10.5 FL (ref 6–12)
RBC # BLD AUTO: 4.73 10*6/MM3 (ref 4.14–5.8)
TIBC SERPL-MCNC: 448 MCG/DL (ref 298–536)
TRANSFERRIN SERPL-MCNC: 301 MG/DL (ref 200–360)
WBC NRBC COR # BLD: 10.64 10*3/MM3 (ref 3.4–10.8)

## 2023-03-02 PROCEDURE — 99214 OFFICE O/P EST MOD 30 MIN: CPT | Performed by: NURSE PRACTITIONER

## 2023-03-02 PROCEDURE — 84466 ASSAY OF TRANSFERRIN: CPT | Performed by: NURSE PRACTITIONER

## 2023-03-02 PROCEDURE — 85025 COMPLETE CBC W/AUTO DIFF WBC: CPT | Performed by: NURSE PRACTITIONER

## 2023-03-02 PROCEDURE — 82728 ASSAY OF FERRITIN: CPT | Performed by: NURSE PRACTITIONER

## 2023-03-02 PROCEDURE — 83540 ASSAY OF IRON: CPT | Performed by: NURSE PRACTITIONER

## 2023-03-02 PROCEDURE — 86140 C-REACTIVE PROTEIN: CPT | Performed by: NURSE PRACTITIONER

## 2023-03-02 NOTE — PROGRESS NOTES
Venipuncture Blood Specimen Collection  Venipuncture performed in left arm by Nicolasa Reagan MA with good hemostasis. Patient tolerated the procedure well without complications.   03/02/23   Nicolasa Reagan MA

## 2023-03-02 NOTE — PROGRESS NOTES
DATE:  3/2/2023    DIAGNOSIS:   Leukocytosis, Thrombocytosis, Anemia    CHIEF COMPLAINT:  None.    TREATMENT HISTORY:  Ferrous Sulfate 325 mg daily- discontinued     HISTORY OF PRESENT ILLNESS:   Scott Montes is a very pleasant 47 y.o. male who is being seen today at the request of EDDIE Thurman for evaluation and treatment of leukocytosis and thrombocytosis. He reports that he follows with PCP on an as needed basis. However, he recently developed pain in the abdomen and groin area and was evaluated by PCP. At that time, she obtained lab work which showed abnormal counts. Previous available labs were reviewed and patient had elevated WBC (15.05) and elevated platelet count 538,000 on 04/19/2022. CBC from one year ago showed elevated WBC (12.34) with normal H/H and platelet count. CBC from 2018 was normal. Unfortunately, there are no other CBCs available to review for comparison of trend. He is a chronic, heavy smoker 1 PPD for the last 30 years. He will occasionally drink 2-3 beers on the weekend. He reports a poor appetite and has had a 12 pound weight loss within the last year. He denies any fever/chills or drenching night sweats. Denies tender/enlarged LNs. Denies any recent infections. He does report worsening fatigue and generalized weakness. He also reports chronic arthritic pain in his back.  Denies headache or any focal weakness. Denies any abnormal bleeding. He reports having EGD/colonoscopy many years ago. His PCP has arranged CT Chest, AP on 05/18/2022. He denies any other specific complaints today.     Interval History:  Mr. Montes presents today for follow up. Overall, he reports that he has been doing well since his last visit. much better since his last visit. He is taking Humira and mesalamine for Crohn's per GI which has been helpful. He reports a good appetite and weight is stable.He denies any abdominal pain today. Denies weakness/fatigue or headache. He denies any obvious or abnormal  bleeding. He continues to smoke 1 PPD.  He denies any other specific complaints today.     The following portions of the patient's history were reviewed and updated as appropriate: allergies, current medications, past family history, past medical history, past social history, past surgical history and problem list.    PAST MEDICAL HISTORY:  Past Medical History:   Diagnosis Date   • Anemia    • Arthritis    • Elevated cholesterol    • Heart attack (HCC)    • Low back pain        PAST SURGICAL HISTORY:  Past Surgical History:   Procedure Laterality Date   • CARDIAC CATHETERIZATION N/A 2022    Procedure: Left Heart Cath;  Surgeon: Matthew Hill MD;  Location: [facility] CATH INVASIVE LOCATION;  Service: Cardiovascular;  Laterality: N/A;   • CARDIAC CATHETERIZATION N/A 2022    Procedure: Percutaneous Coronary Intervention;  Surgeon: Matthew Hill MD;  Location: [facility] CATH INVASIVE LOCATION;  Service: Cardiovascular;  Laterality: N/A;   • COLONOSCOPY N/A 2022    Procedure: COLONOSCOPY;  Surgeon: Stella Aparicio MD;  Location: [facility];  Service: Gastroenterology;  Laterality: N/A;   • CYST REMOVAL     • VASECTOMY         SOCIAL HISTORY:  Social History     Socioeconomic History   • Marital status:    Tobacco Use   • Smoking status: Former     Packs/day: 2.00     Years: 30.00     Pack years: 60.00     Types: Cigarettes     Quit date: 2022     Years since quittin.4   • Smokeless tobacco: Never   • Tobacco comments:     Smoking cessation encouraged   Vaping Use   • Vaping Use: Every day   • Substances: Nicotine   • Devices: Disposable   Substance and Sexual Activity   • Alcohol use: No   • Drug use: No   • Sexual activity: Defer           FAMILY HISTORY:  Family History   Problem Relation Age of Onset   • Colon cancer Mother    • Cancer Father          MEDICATIONS:  The current medication list was reviewed in the EMR    Current Outpatient Medications:   •   Adalimumab (Humira Pen) 40 MG/0.4ML Pen-injector Kit, Inject 40 mg (0.4 ml) under the skin Every 14 (Fourteen) Days., Disp: 2 each, Rfl: 5  •  aspirin 81 MG EC tablet, Take 1 tablet by mouth Daily., Disp: 90 tablet, Rfl: 2  •  atorvastatin (LIPITOR) 80 MG tablet, Take 1 tablet by mouth Every Night., Disp: 90 tablet, Rfl: 2  •  colestipol (COLESTID) 1 g tablet, Take 1 tablet by mouth Daily., Disp: 90 tablet, Rfl: 2  •  cyclobenzaprine (FLEXERIL) 5 MG tablet, Take 1 tablet by mouth 3 (Three) Times a Day As Needed for Muscle Spasms., Disp: 90 tablet, Rfl: 5  •  isosorbide mononitrate (IMDUR) 30 MG 24 hr tablet, Take 1 tablet by mouth Daily. Hold for SBP <110, Disp: 90 tablet, Rfl: 2  •  lisinopril (PRINIVIL,ZESTRIL) 10 MG tablet, Take 1 tablet by mouth Daily., Disp: 90 tablet, Rfl: 2  •  mesalamine (LIALDA) 1.2 g EC tablet, Take 1 tablet by mouth Daily With Breakfast., Disp: 90 tablet, Rfl: 3  •  metoprolol succinate XL (TOPROL-XL) 25 MG 24 hr tablet, Take 0.5 tablets by mouth Daily. Hold for SBP <110 and/or HR <60, Disp: 90 tablet, Rfl: 2  •  nitroglycerin (NITROSTAT) 0.4 MG SL tablet, 1 under the tongue as needed for angina, may repeat q5mins for up three doses, Disp: 100 tablet, Rfl: 11  •  tamsulosin (Flomax) 0.4 MG capsule 24 hr capsule, Take 1 capsule by mouth Every Night., Disp: 30 capsule, Rfl: 5  •  ticagrelor (BRILINTA) 90 MG tablet tablet, Take 1 tablet by mouth 2 (Two) Times a Day., Disp: 180 tablet, Rfl: 2  •  vitamin D (ERGOCALCIFEROL) 1.25 MG (76730 UT) capsule capsule, Take 1 capsule by mouth 1 (One) Time Per Week., Disp: 4 capsule, Rfl: 5    ALLERGIES:  No Known Allergies      REVIEW OF SYSTEMS:    A comprehensive 14 point review of systems was performed.  Significant findings as mentioned above.  All other systems reviewed and are negative.        Physical Exam   Vital Signs:   Vitals:    03/02/23 1437   BP: 115/77   Pulse: 70   Resp: 18   Temp: 98 °F (36.7 °C)   SpO2: 98%    BMI is within normal  parameters. No other follow-up for BMI required.    ECOG score: 0   General: Well developed, well nourished, alert and oriented x 3, in no acute distress.   Head: ATNC   Eyes: PERRL, No evidence of conjunctivitis.   Nose: No nasal discharge.   Mouth: Oral mucosal membranes moist. No oral ulceration or hemorrhages.   Neck: Neck supple. No thyromegaly. No JVD.   Lungs: Clear in all fields to A&P without rales, rhonchi or wheezing.   Heart: S1, S2. Regular rate and rhythm. No murmurs, rubs, or gallops.   Abdomen: Soft. Bowel sounds are normoactive. Mild tenderness with palpation.  Extremities: No clubbing, cyanosis or edema bilaterally.    Integumentary: No rash, sores, erythema or nodules. No blistering, bruising, or dry skin.   Lymph Nodes: No palpable cervical, submandibular, supraclavicular, axillary lymphadenopathy noted. No petechiae, purpura or ecchymosis noted.   Neurologic: Grossly non-focal exam.     Pain Score:  Pain Score    03/02/23 1437   PainSc: 0-No pain       PHQ-Score Total:  PHQ-9 Total Score:         PATHOLOGY:        ENDOSCOPY:  Colonoscopy 07/12/2022- No procedure note available.       IMAGING:  CT Chest with Contrast 05/06/2022  IMPRESSION:  1. Parenchymal nodules bilaterally. The largest is in the right lung and  measures above 8 mm. Therefore, this may be visible at PET/CT.  2. No effusions or adenopathy.  3. Recommend PET/CT or short interval 3-month follow-up CT of the chest.    CT AP with Contrast 05/06/2022  IMPRESSION:   1. Extensive abnormal thickening in the sigmoid colon. Small adjacent  fluid collections concerning for small adjacent abscesses versus less  likely necrotic masses. Contained perforation is noted as well. Trace  free fluid in the pelvis. I favor this representing inflammatory change  with resulting small adjacent abscesses. However, direct visualization  is suggested after resolution of the acute inflammatory event to exclude  any underlying mass.  2.Thickening of the  urinary bladder wall.  3. Cholelithiasis.    NM PET-CT 05/11/2022  IMPRESSION:  1.  Bilateral pulmonary nodules detailed above which show no FDG  hypermetabolism in the range of malignancy. Some of the nodules are  below size threshold for adequate characterization with PET/CT.  2.  Wall thickening with inflammation and small extraluminal fluid, air,  and retraction type changes in the rectosigmoid region overall minimally  improved from the previous exam and with intense FDG hypermetabolism  noted. Maximum SUV: 10.7. This may represent infection or inflammation.  Malignancy not excluded given location and other findings detailed  above. Continued surveillance and follow-up imaging is warranted.  3.  Cholelithiasis.  4.  Other nonacute findings as detailed above.      RECENT LABS:  Lab Results   Component Value Date    WBC 10.64 03/02/2023    HGB 14.3 03/02/2023    HCT 43.2 03/02/2023    MCV 91.3 03/02/2023    RDW 13.2 03/02/2023     03/02/2023    NEUTRORELPCT 60.9 03/02/2023    LYMPHORELPCT 28.4 03/02/2023    MONORELPCT 7.9 03/02/2023    EOSRELPCT 1.5 03/02/2023    BASORELPCT 1.0 03/02/2023    NEUTROABS 6.48 03/02/2023    LYMPHSABS 3.02 03/02/2023       Lab Results   Component Value Date     02/14/2023    K 4.3 02/14/2023    CO2 26.7 02/14/2023     02/14/2023    BUN 11 02/14/2023    CREATININE 0.88 02/14/2023    EGFRIFNONA 98 03/25/2021    GLUCOSE 89 02/14/2023    CALCIUM 9.3 02/14/2023    ALKPHOS 112 02/14/2023    AST 24 02/14/2023    ALT 41 02/14/2023    BILITOT 0.9 02/14/2023    ALBUMIN 4.5 02/14/2023    PROTEINTOT 6.8 02/14/2023           Lab Results   Component Value Date    FERRITIN 325.30 08/23/2022    IRON 40 (L) 08/23/2022    TIBC 404 08/23/2022    LABIRON 10 (L) 08/23/2022    [id number] 364 02/14/2023      Work up 05/04/2022      Sed Rate   0 - 15 mm/hr 118 High      C-Reactive Protein   0.00 - 0.50 mg/dL 11.50 High      Hepatitis B Surface Ag   Non-Reactive Non-Reactive    Hep A IgM    Non-Reactive Non-Reactive    Hep B C IgM   Non-Reactive Non-Reactive    Hepatitis C Ab   Non-Reactive Non-Reactive      HIV-1/ HIV-2   Non-Reactive Non-Reactive      CADY Direct   Negative Negative      Rheumatoid Factor Quantitative   0.0 - 14.0 IU/mL 12.2         BCR/ABL: Negative  JAK2 V617 Mutation Qnt Result Comment    Comment: NEGATIVE      JAK2 Exons 12-15 Mut Det PCR Comment    Comment: NEGATIVE        ASSESSMENT & PLAN:  Scott Montes is a very pleasant 47 y.o. male with    1. Leukocytosis  2. Thrombocytosis  3. Microcytic Anemia/MICHELLE  - Previous available labs were reviewed and patient had elevated WBC (15.05) and elevated platelet count 538,000 on 04/19/2022. CBC from one year ago showed elevated WBC (12.34) with normal H/H and platelet count. CBC from 2018 was normal. Unfortunately, there are no other CBCs available to review for comparison of trend.  - He is a chronic, heavy smoker 1 PPD for the last 30 years. Other than worsening fatigue, he has no concerning B-symptoms.   - Based on history, smoking could be contributing to his elevated WBC along with underlying inflammation.  - CBC from initial consultation showed ongoing leukocytosis with WBC (11.27) which had improved with mildly low Hg (12.9) and elevated platelet count 559,000.   - Sent additional labs to further evaluate including PBS (summarized above). ESR and CRP significantly elevated and suggestive of chronic, underlying inflammation. Acute Hepatitis panel and HIV panel non-reactive. CADY and RF negative. Iron panel was low with elevated ferritin which is likely reactive. Therefore, started him on Ferrous Sulfate daily which he discontinued due to worsening constipation and nausea. Low Iron panel was also likely contributing to thrombocytosis. Also sent Flow cytometry, BCR/ABL, ELOY II V617F and ELOY II Exon 12-15 to evaluate for possible underlying myeloproliferative disorder which were negative.    - CT imaging was performed (summarized  above) and was concerning for colitis with contained perforation. PET-CT was also obtained which showed bilateral pulmonary nodules which showed no FDG hypermetabolism in the range of malignancy, wall thickening with inflammation and small extraluminal fluid, air and retraction type changes in the rectosigmoid region minimally improved from previous exam and with intense FDG hypermetabolism noted which may represent infection or inflammation, malignancy not excluded, cholelithiasis.  - Repeat CBC from today shows normalized blood counts. Iron panel and Ferritin are replete. CRP is normal.  - Discussed with patient that previous abnormal CBCs were likely related to chronic underlying inflammation (Crohn's). With current control of Crohn's disease patients blood counts have now normalized. Recommended conservative follow up with PCP with routine lab testing. In the interim, if there is any changes in blood counts will be happy to see him back in follow up.   - Strongly advised patient to cut back and quit smoking.     4. Crohn's Disease  - Recently diagnosed and following with GI. He is currently on Humira and Mesalamine and symptoms have significantly improved.   - Ongoing management per GI.        ACO / MATIAS/Other  Quality measures  -  Scott Montes did not receive 2022 flu vaccine. This was recommended, he declined.   -  Scott Montes reports a pain score of 0.    -  Current outpatient and discharge medications have been reconciled for the patient.  Reviewed by: EDDIE Gray              A total of 30 minutes were spent coordinating this patient’s care in clinic today; more than 50% of this time was face-to-face with the patient, reviewing his interim medical history and counseling on the current treatment and followup plan. All questions were answered to his satisfaction.          Electronically Signed by: EDDIE Desai APRN March 2, 2023 14:42 EST       CC:   No ref. provider  found  Asuncion Keyes, APRN

## 2023-03-03 DIAGNOSIS — M15.9 PRIMARY OSTEOARTHRITIS INVOLVING MULTIPLE JOINTS: ICD-10-CM

## 2023-03-03 DIAGNOSIS — I10 ESSENTIAL HYPERTENSION: Chronic | ICD-10-CM

## 2023-03-05 RX ORDER — CYCLOBENZAPRINE HCL 5 MG
TABLET ORAL
Qty: 90 TABLET | Refills: 5 | Status: SHIPPED | OUTPATIENT
Start: 2023-03-05

## 2023-03-06 RX ORDER — LISINOPRIL 10 MG/1
TABLET ORAL
Qty: 90 TABLET | Refills: 3 | Status: SHIPPED | OUTPATIENT
Start: 2023-03-06

## 2023-04-03 ENCOUNTER — LAB (OUTPATIENT)
Dept: LAB | Facility: HOSPITAL | Age: 48
End: 2023-04-03
Payer: MEDICAID

## 2023-04-03 DIAGNOSIS — K50.00 CROHN'S DISEASE OF SMALL INTESTINE WITHOUT COMPLICATION: Primary | ICD-10-CM

## 2023-04-03 DIAGNOSIS — K50.00 CROHN'S DISEASE OF SMALL INTESTINE WITHOUT COMPLICATION: ICD-10-CM

## 2023-04-03 PROCEDURE — 83993 ASSAY FOR CALPROTECTIN FECAL: CPT

## 2023-04-06 ENCOUNTER — TELEPHONE (OUTPATIENT)
Dept: GASTROENTEROLOGY | Facility: CLINIC | Age: 48
End: 2023-04-06
Payer: MEDICAID

## 2023-04-06 DIAGNOSIS — I25.10 CORONARY ARTERY DISEASE INVOLVING NATIVE CORONARY ARTERY OF NATIVE HEART WITHOUT ANGINA PECTORIS: Chronic | ICD-10-CM

## 2023-04-06 LAB — CALPROTECTIN STL-MCNT: 166 UG/G (ref 0–120)

## 2023-04-06 RX ORDER — TICAGRELOR 90 MG/1
TABLET ORAL
Qty: 60 TABLET | Refills: 2 | Status: SHIPPED | OUTPATIENT
Start: 2023-04-06

## 2023-04-06 NOTE — TELEPHONE ENCOUNTER
Please let patient know his fecal calprotectin levels are improving significantly. His level is 166 and normal is 120. The medication we have him on is working to improve the inflammation.

## 2023-04-13 ENCOUNTER — OFFICE VISIT (OUTPATIENT)
Dept: FAMILY MEDICINE CLINIC | Facility: CLINIC | Age: 48
End: 2023-04-13
Payer: MEDICAID

## 2023-04-13 VITALS
OXYGEN SATURATION: 98 % | HEIGHT: 74 IN | SYSTOLIC BLOOD PRESSURE: 116 MMHG | DIASTOLIC BLOOD PRESSURE: 82 MMHG | WEIGHT: 195.4 LBS | RESPIRATION RATE: 14 BRPM | BODY MASS INDEX: 25.08 KG/M2 | TEMPERATURE: 98.4 F | HEART RATE: 74 BPM

## 2023-04-13 DIAGNOSIS — M15.9 PRIMARY OSTEOARTHRITIS INVOLVING MULTIPLE JOINTS: ICD-10-CM

## 2023-04-13 DIAGNOSIS — K52.9 COLITIS: ICD-10-CM

## 2023-04-13 DIAGNOSIS — I10 ESSENTIAL HYPERTENSION: Primary | Chronic | ICD-10-CM

## 2023-04-13 DIAGNOSIS — R35.0 FREQUENCY OF URINATION: ICD-10-CM

## 2023-04-13 PROCEDURE — 1159F MED LIST DOCD IN RCRD: CPT | Performed by: NURSE PRACTITIONER

## 2023-04-13 PROCEDURE — 99214 OFFICE O/P EST MOD 30 MIN: CPT | Performed by: NURSE PRACTITIONER

## 2023-04-13 PROCEDURE — 1160F RVW MEDS BY RX/DR IN RCRD: CPT | Performed by: NURSE PRACTITIONER

## 2023-04-13 PROCEDURE — 3079F DIAST BP 80-89 MM HG: CPT | Performed by: NURSE PRACTITIONER

## 2023-04-13 PROCEDURE — 3074F SYST BP LT 130 MM HG: CPT | Performed by: NURSE PRACTITIONER

## 2023-04-13 RX ORDER — TAMSULOSIN HYDROCHLORIDE 0.4 MG/1
1 CAPSULE ORAL NIGHTLY
Qty: 30 CAPSULE | Refills: 5 | Status: SHIPPED | OUTPATIENT
Start: 2023-04-13

## 2023-04-13 NOTE — PROGRESS NOTES
"Subjective   Scott Montes is a 48 y.o. male.     Chief Complaint   Patient presents with   • Hypertension       History of Present Illness     HTN-chronic and ongoing.  He is presently on Lisinopril 10 mg and Imdur 30 mg.  No negative side effects.   He is on 6 month follow up.  No med changes from his last visit.  He continues on Brilinta 90 mg BID.  He is also on Metoprolol 25 mg 1/2 tab daily.  No CP.  He continues to have a \"weird\" sensation in his arm.  His cardiologist is aware of the symptoms and it has been ongoing since his MI  Crohn-continues under the care of GI.  He is on Mesalamine and Humira.  He takes his injection Q 2 weeks.  He reports that diarrhea has decreased in frequency but does still occur.   He reports that his bowel sounds are always very loud and active.   He reports no significant side effects of meds.  Labs were more stable.   He is trying to be diet aware and reports that certain foods trigger his symptoms.  He has had to start avoiding certain snacks and spices.  He is ordered Colestid to help with bowel disorder as well asked to help with his dyslipidemia.   Multiple areas of joint pain- due to patient's cardiac history he is no longer a candidate for NSAID therapy.  He does not wish to be on any controlled medications at this time to help with pain.  He has not noted any joint swelling or erythema.  Just \"the pain\".  Symptoms do vary based on his activity.  Urination frequency-has been ordered tamsulosin 0.4 mg.  He denies any negative side effects at this time.  No urine concerns today.    The following portions of the patient's history were reviewed and updated as appropriate: CC, ROS, allergies, current medications, past family history, past medical history, past social history, past surgical history and problem list.      Review of Systems   Constitutional: Positive for fatigue. Negative for activity change, appetite change and fever.   HENT: Negative for congestion, ear pain, " "facial swelling, nosebleeds, rhinorrhea, sinus pressure, sore throat and trouble swallowing.    Eyes: Negative for blurred vision, double vision and redness.   Respiratory: Negative for cough, chest tightness, shortness of breath and wheezing.    Cardiovascular: Negative for chest pain, palpitations and leg swelling.   Gastrointestinal: Positive for abdominal pain (Intermittent) and diarrhea (at times with certain foods). Negative for blood in stool, constipation and vomiting.   Endocrine: Negative.    Genitourinary: Negative for dysuria, flank pain, frequency, hematuria and urgency.   Musculoskeletal: Positive for arthralgias, back pain and myalgias.   Skin: Negative.    Allergic/Immunologic: Negative.    Neurological: Positive for headache. Negative for dizziness, weakness and light-headedness.   Hematological: Negative.    Psychiatric/Behavioral: Positive for stress. Negative for dysphoric mood, self-injury, sleep disturbance and suicidal ideas. The patient is not nervous/anxious.    All other systems reviewed and are negative.      Objective     /82   Pulse 74   Temp 98.4 °F (36.9 °C)   Resp 14   Ht 188 cm (74\")   Wt 88.6 kg (195 lb 6.4 oz)   SpO2 98%   BMI 25.09 kg/m²     Physical Exam  Vitals reviewed.   Constitutional:       General: He is not in acute distress.     Appearance: He is well-developed. He is not diaphoretic.   HENT:      Head: Normocephalic and atraumatic.      Jaw: No tenderness.      Comments: Oropharynx not examined.  Patient is presently wearing a face covering/mask due to COVID-19 pandemic.     Right Ear: Hearing, tympanic membrane, ear canal and external ear normal.      Left Ear: Hearing, tympanic membrane, ear canal and external ear normal.   Eyes:      General: Lids are normal. No scleral icterus.     Extraocular Movements:      Right eye: Normal extraocular motion and no nystagmus.      Left eye: Normal extraocular motion and no nystagmus.      Conjunctiva/sclera: " Conjunctivae normal.      Pupils: Pupils are equal, round, and reactive to light.   Neck:      Thyroid: No thyromegaly or thyroid tenderness.      Vascular: No carotid bruit or JVD.      Trachea: No tracheal tenderness.   Cardiovascular:      Rate and Rhythm: Normal rate and regular rhythm.      Pulses:           Dorsalis pedis pulses are 2+ on the right side and 2+ on the left side.        Posterior tibial pulses are 2+ on the right side and 2+ on the left side.      Heart sounds: Normal heart sounds, S1 normal and S2 normal. No murmur heard.  Pulmonary:      Effort: Pulmonary effort is normal. No accessory muscle usage, prolonged expiration or respiratory distress.      Breath sounds: Normal breath sounds.   Chest:      Chest wall: No tenderness.   Abdominal:      General: Bowel sounds are normal. There is no distension.      Palpations: Abdomen is soft. There is no mass.      Tenderness: There is no abdominal tenderness.   Musculoskeletal:         General: Tenderness present.      Cervical back: Normal range of motion and neck supple. Tenderness present.      Thoracic back: Tenderness present.      Lumbar back: Tenderness present.      Right lower leg: No edema.      Left lower leg: No edema.      Comments: No muscular atrophy or flaccidity.  Multiple areas of generalized arthralgia and myalgia noted throughout spine, hips, knees, and shoulders.     Lymphadenopathy:      Head:      Right side of head: No submental or submandibular adenopathy.      Left side of head: No submental or submandibular adenopathy.      Cervical: No cervical adenopathy.      Right cervical: No superficial cervical adenopathy.     Left cervical: No superficial cervical adenopathy.   Skin:     General: Skin is warm and dry.      Capillary Refill: Capillary refill takes less than 2 seconds.      Coloration: Skin is not jaundiced or pale.      Findings: No erythema.      Nails: There is no clubbing.   Neurological:      Mental Status: He is  alert and oriented to person, place, and time.      Cranial Nerves: No cranial nerve deficit or facial asymmetry.      Sensory: No sensory deficit.      Motor: No weakness, tremor, atrophy or abnormal muscle tone.      Coordination: Coordination normal.      Gait: Gait abnormal (mildly antalgic).      Deep Tendon Reflexes: Reflexes are normal and symmetric.   Psychiatric:         Attention and Perception: He is attentive.         Mood and Affect: Mood normal.         Speech: Speech normal.         Behavior: Behavior normal. Behavior is cooperative.         Thought Content: Thought content normal.         Judgment: Judgment normal.         Diagnoses and all orders for this visit:    1. Essential hypertension (Primary)  Overview:  With MI September 2022    Assessment & Plan:  Continue under the care of cardiology.  Continue aspirin 81, Brilinta 90 mg twice daily, atorvastatin 80 mg, isosorbide 30 mg, and lisinopril 10 mg.  Continue metoprolol 25 mg.  As needed nitroglycerin if needed for chest pain.  Ambulatory BP monitoring either at home or random community checks.  Patient to report continued elevations >140/90.  Patient may come by office for checks if needed.       2. Frequency of urination  -     tamsulosin (Flomax) 0.4 MG capsule 24 hr capsule; Take 1 capsule by mouth Every Night.  Dispense: 30 capsule; Refill: 5    3. Primary osteoarthritis involving multiple joints  Assessment & Plan:  Continue conservative measures for now as needed heat and ice, rest, and avoid overuse activities.  No NSAIDs      4. Colitis  Overview:  Crohn's disease under the care of GI    Assessment & Plan:  Continue Humira 40 mg every 2 weeks injection and Mesalamine 1.2 g daily with breakfast.  Continue to monitor food choices and avoid foods that trigger GI symptoms         BMI is >= 25 and <30. (Overweight) The following options were offered after discussion;: nutrition counseling/recommendations     Understands disease processes and  need for medications.  Understands reasons for urgent and emergent care.  Patient (& family) verbalized agreement for treatment plan.   Emotional support and active listening provided.  Patient provided time to verbalize feelings.    Again discussed with patient the possibility of returning back to work.  This provider at this time does not feel the patient may go back to the work that he was doing previously before his diagnosis.  He requires a lot of lifting and bending as well as the long hours would not allow him to rest adequately.  Discussed with him that he ate lots of fast food when he was working.  That will no longer be an option due to his Crohn's and his ongoing diarrhea.  He would also need the ability to stop for restroom frequently should he have an exacerbation of diarrhea.  Because of his Crohn's therapy his immune system would also be somewhat compromised Is the Humira is going to lower his immune response.    Reviewed labs from 2/14/2023.    RTC 5 to 6 weeks, sooner if needed for problems or concerns          This document has been electronically signed by:  EDDIE Thurman, FNP-C    Dragon disclaimer:  Part of this note may be an electronic transcription/translation of spoken language to printed text using the Dragon Dictation System.

## 2023-04-17 ENCOUNTER — SPECIALTY PHARMACY (OUTPATIENT)
Dept: PHARMACY | Facility: HOSPITAL | Age: 48
End: 2023-04-17
Payer: MEDICAID

## 2023-04-17 RX ORDER — SPINOSAD 9 MG/ML
1 SUSPENSION TOPICAL ONCE
Qty: 120 ML | Refills: 0 | Status: SHIPPED | OUTPATIENT
Start: 2023-04-17 | End: 2023-04-17

## 2023-04-17 NOTE — PROGRESS NOTES
Specialty Pharmacy Refill Coordination Note      Name:  Scott Montes  :  1975  Date:  2023         Past Medical History:   Diagnosis Date   • Anemia    • Arthritis    • Elevated cholesterol    • Heart attack    • Low back pain        Past Surgical History:   Procedure Laterality Date   • CARDIAC CATHETERIZATION N/A 2022    Procedure: Left Heart Cath;  Surgeon: Matthew Hill MD;  Location: [facility] CATH INVASIVE LOCATION;  Service: Cardiovascular;  Laterality: N/A;   • CARDIAC CATHETERIZATION N/A 2022    Procedure: Percutaneous Coronary Intervention;  Surgeon: Matthew Hill MD;  Location:  [facility] CATH INVASIVE LOC[facility];  Service: Cardiovascular;  Laterality: N/A;   • COLONOSCOPY N/A 2022    Procedure: COLONOSCOPY;  Surgeon: Stella Aparicio MD;  Location: [facility] OR;  Service: Gastroenterology;  Laterality: N/A;   • CYST REMOVAL     • VASECTOMY         Social History     Socioeconomic History   • Marital status:    Tobacco Use   • Smoking status: Former     Packs/day: 2.00     Years: 30.00     Pack years: 60.00     Types: Cigarettes     Quit date: 2022     Years since quittin.6   • Smokeless tobacco: Never   • Tobacco comments:     Smoking cessation encouraged   Vaping Use   • Vaping Use: Every day   • Substances: Nicotine   • Devices: Disposable   Substance and Sexual Activity   • Alcohol use: No   • Drug use: No   • Sexual activity: Defer       Family History   Problem Relation Age of Onset   • Colon cancer Mother    • Cancer Father        No Known Allergies    Current Outpatient Medications   Medication Sig Dispense Refill   • Adalimumab (Humira Pen) 40 MG/0.4ML Pen-injector Kit Inject 40 mg (0.4 ml) under the skin Every 14 (Fourteen) Days. 2 each 5   • aspirin 81 MG EC tablet Take 1 tablet by mouth Daily. 90 tablet 2   • atorvastatin (LIPITOR) 80 MG tablet Take 1 tablet by mouth Every Night. 90 tablet 2   • Brilinta 90 MG tablet  tablet TAKE ONE TABLET BY MOUTH TWICE A DAY 60 tablet 2   • colestipol (COLESTID) 1 g tablet Take 1 tablet by mouth Daily. 90 tablet 2   • cyclobenzaprine (FLEXERIL) 5 MG tablet TAKE ONE (1) TABLET BY MOUTH THREE (3) (THREE) TIMES A DAY AS NEEDED FOR MUSCLE SPASMS. 90 tablet 5   • isosorbide mononitrate (IMDUR) 30 MG 24 hr tablet Take 1 tablet by mouth Daily. Hold for SBP <110 90 tablet 2   • lisinopril (PRINIVIL,ZESTRIL) 10 MG tablet TAKE ONE TABLET BY MOUTH ONCE DAILY 90 tablet 3   • mesalamine (LIALDA) 1.2 g EC tablet Take 1 tablet by mouth Daily With Breakfast. 90 tablet 3   • metoprolol succinate XL (TOPROL-XL) 25 MG 24 hr tablet Take 0.5 tablets by mouth Daily. Hold for SBP <110 and/or HR <60 90 tablet 2   • nitroglycerin (NITROSTAT) 0.4 MG SL tablet 1 under the tongue as needed for angina, may repeat q5mins for up three doses 100 tablet 11   • Spinosad (Natroba) 0.9 % suspension Apply 1 application topically 1 (One) Time for 1 dose. Leave on 10 minutes then rinse 120 mL 0   • tamsulosin (Flomax) 0.4 MG capsule 24 hr capsule Take 1 capsule by mouth Every Night. 30 capsule 5   • vitamin D (ERGOCALCIFEROL) 1.25 MG (82243 UT) capsule capsule Take 1 capsule by mouth 1 (One) Time Per Week. 4 capsule 5     No current facility-administered medications for this visit.         ASSESSMENT/PLAN:      Scott is a 47 y.o. male contacted today regarding refills of  humira 40mg/0.4ml pen injector specialty medication(s).    Reviewed and verified with patient:       Specialty medication(s) and dose(s) confirmed: yes    Refill Questions    Flowsheet Row Most Recent Value   Changes to allergies? No   Changes to medications? No   New conditions since last clinic visit No   Unplanned office visit, urgent care, ED, or hospital admission in the last 4 weeks  No   How does patient/caregiver feel medication is working? Very good   Financial problems or insurance changes  No   Since the previous refill, were any specialty medication  doses or scheduled injections missed or delayed?  No   Does this patient require a clinical escalation to a pharmacist? No                     Follow-up: 84 day(s)     Juliana Howard, Pharmacy Technician  Specialty Pharmacy Technician

## 2023-04-30 NOTE — ASSESSMENT & PLAN NOTE
Continue Humira 40 mg every 2 weeks injection and Mesalamine 1.2 g daily with breakfast.  Continue to monitor food choices and avoid foods that trigger GI symptoms

## 2023-04-30 NOTE — ASSESSMENT & PLAN NOTE
Continue under the care of cardiology.  Continue aspirin 81, Brilinta 90 mg twice daily, atorvastatin 80 mg, isosorbide 30 mg, and lisinopril 10 mg.  Continue metoprolol 25 mg.  As needed nitroglycerin if needed for chest pain.  Ambulatory BP monitoring either at home or random community checks.  Patient to report continued elevations >140/90.  Patient may come by office for checks if needed.

## 2023-04-30 NOTE — ASSESSMENT & PLAN NOTE
Continue conservative measures for now as needed heat and ice, rest, and avoid overuse activities.  No NSAIDs

## 2023-05-09 ENCOUNTER — TELEPHONE (OUTPATIENT)
Dept: FAMILY MEDICINE CLINIC | Facility: CLINIC | Age: 48
End: 2023-05-09

## 2023-05-09 NOTE — TELEPHONE ENCOUNTER
Caller: LASHELL    Relationship: Other    Best call back number: [phone]    What was the call regarding: LASHELL IS REQUESTING THAT THE DISABILITY FORM THAT WAS FAXED ON 5/1/23 BE FILLED OUT AND FAXED BACK ASAP    Do you require a callback: YES IF ANY QUESTIONS

## 2023-05-10 ENCOUNTER — TELEPHONE (OUTPATIENT)
Dept: CARDIOLOGY | Facility: CLINIC | Age: 48
End: 2023-05-10
Payer: MEDICAID

## 2023-05-15 ENCOUNTER — OFFICE VISIT (OUTPATIENT)
Dept: FAMILY MEDICINE CLINIC | Facility: CLINIC | Age: 48
End: 2023-05-15
Payer: MEDICAID

## 2023-05-15 VITALS
DIASTOLIC BLOOD PRESSURE: 80 MMHG | BODY MASS INDEX: 25.41 KG/M2 | TEMPERATURE: 98.6 F | SYSTOLIC BLOOD PRESSURE: 110 MMHG | WEIGHT: 198 LBS | HEIGHT: 74 IN | RESPIRATION RATE: 16 BRPM | OXYGEN SATURATION: 98 % | HEART RATE: 84 BPM

## 2023-05-15 DIAGNOSIS — E78.5 DYSLIPIDEMIA, GOAL LDL BELOW 70: Chronic | ICD-10-CM

## 2023-05-15 DIAGNOSIS — I10 ESSENTIAL HYPERTENSION: Chronic | ICD-10-CM

## 2023-05-15 DIAGNOSIS — K52.9 COLITIS: Primary | ICD-10-CM

## 2023-05-15 DIAGNOSIS — M15.9 PRIMARY OSTEOARTHRITIS INVOLVING MULTIPLE JOINTS: ICD-10-CM

## 2023-05-15 PROCEDURE — 1160F RVW MEDS BY RX/DR IN RCRD: CPT | Performed by: NURSE PRACTITIONER

## 2023-05-15 PROCEDURE — 3074F SYST BP LT 130 MM HG: CPT | Performed by: NURSE PRACTITIONER

## 2023-05-15 PROCEDURE — 1159F MED LIST DOCD IN RCRD: CPT | Performed by: NURSE PRACTITIONER

## 2023-05-15 PROCEDURE — 99214 OFFICE O/P EST MOD 30 MIN: CPT | Performed by: NURSE PRACTITIONER

## 2023-05-15 PROCEDURE — 3079F DIAST BP 80-89 MM HG: CPT | Performed by: NURSE PRACTITIONER

## 2023-05-15 NOTE — PROGRESS NOTES
Subjective   Scott Montes is a 48 y.o. male.     Chief Complaint   Patient presents with   • Hypertension       History of Present Illness     HTN-ongoing.  Stable today.  He is taking Lisinopril 10 mg and Imdur 30 mg.  No negative side effects.  He is followed by Cardiology and will follow up in August.  He is on chronic anticoagulation with Brilinta 90 mg BID and ASA 81 mg.    Crohn's disease-reports he ate [employer] for a meal and had GI upset for more than a week.  He reports that he had abdomen pain, diarrhea, and urge to vomit often.  He reports that he had not ate any.  His last appt got rescheduled due to the provider needing to be out of the office.  He now has an appt June 2.    Back and muscle spasm-on Flexeril.  He tries to remain active.  Due to his CVD he is not a candidate for NSAID's.  He is not interested in controlled substances.  He tries to avoid overuse activities.   Hyperlipidemia-ongoing.  On Atorvastatin 80 mg since his MI.  No negative side effects. Patient denies any negative side effects of cholesterol medication.  No reported myalgia or myopathies.      The following portions of the patient's history were reviewed and updated as appropriate: CC, ROS, allergies, current medications, past family history, past medical history, past social history, past surgical history and problem list.      Review of Systems   Constitutional: Positive for fatigue. Negative for appetite change, unexpected weight gain and unexpected weight loss.   HENT: Negative for congestion, ear pain, postnasal drip, rhinorrhea, sore throat, swollen glands, trouble swallowing and voice change.    Eyes: Negative for pain and visual disturbance.   Respiratory: Negative for cough, chest tightness, shortness of breath and wheezing.    Cardiovascular: Negative for chest pain, palpitations and leg swelling.   Gastrointestinal: Positive for abdominal pain (intermittent). Negative for blood in stool, constipation,  "diarrhea, nausea, vomiting and indigestion.   Genitourinary: Negative for dysuria, hematuria and urgency.   Musculoskeletal: Positive for arthralgias and myalgias. Negative for back pain, gait problem and joint swelling.   Skin: Negative for color change and skin lesions.   Allergic/Immunologic: Negative.    Neurological: Negative for numbness, headache and confusion.   Hematological: Negative.    Psychiatric/Behavioral: Positive for stress. Negative for dysphoric mood, sleep disturbance and suicidal ideas. The patient is not nervous/anxious.    All other systems reviewed and are negative.      Objective     /80   Pulse 84   Temp 98.6 °F (37 °C)   Resp 16   Ht 188 cm (74.02\")   Wt 89.8 kg (198 lb)   SpO2 98%   BMI 25.41 kg/m²     Physical Exam  Vitals reviewed.   Constitutional:       General: He is not in acute distress.     Appearance: He is well-developed. He is not diaphoretic.   HENT:      Head: Normocephalic and atraumatic.      Jaw: No tenderness.      Right Ear: Hearing, tympanic membrane, ear canal and external ear normal.      Left Ear: Hearing, tympanic membrane, ear canal and external ear normal.      Nose: Nose normal. No nasal tenderness or congestion.      Right Sinus: No maxillary sinus tenderness or frontal sinus tenderness.      Left Sinus: No maxillary sinus tenderness or frontal sinus tenderness.      Mouth/Throat:      Lips: Pink.      Mouth: Mucous membranes are moist.      Pharynx: Oropharynx is clear. Uvula midline.   Eyes:      General: Lids are normal. No scleral icterus.     Extraocular Movements:      Right eye: Normal extraocular motion and no nystagmus.      Left eye: Normal extraocular motion and no nystagmus.      Conjunctiva/sclera: Conjunctivae normal.      Pupils: Pupils are equal, round, and reactive to light.   Neck:      Thyroid: No thyromegaly or thyroid tenderness.      Vascular: No carotid bruit or JVD.      Trachea: No tracheal tenderness.   Cardiovascular:      " Rate and Rhythm: Normal rate and regular rhythm.      Pulses:           Dorsalis pedis pulses are 2+ on the right side and 2+ on the left side.        Posterior tibial pulses are 2+ on the right side and 2+ on the left side.      Heart sounds: Normal heart sounds, S1 normal and S2 normal. No murmur heard.  Pulmonary:      Effort: Pulmonary effort is normal. No accessory muscle usage, prolonged expiration or respiratory distress.      Breath sounds: Normal breath sounds.   Chest:      Chest wall: No tenderness.   Abdominal:      General: Bowel sounds are normal. There is no distension.      Palpations: Abdomen is soft. There is no hepatomegaly, splenomegaly or mass.      Tenderness: There is generalized abdominal tenderness.   Musculoskeletal:         General: Tenderness present.      Cervical back: Neck supple. Tenderness and bony tenderness present. Pain with movement present. Decreased range of motion.      Thoracic back: Tenderness present. Decreased range of motion.      Lumbar back: Tenderness present. Decreased range of motion.      Right lower leg: No edema.      Left lower leg: No edema.      Comments: No muscular atrophy or flaccidity.  Multiple areas of generalized arthralgia and myalgia noted throughout spine, hips, knees, and shoulders.     Lymphadenopathy:      Head:      Right side of head: No submental or submandibular adenopathy.      Left side of head: No submental or submandibular adenopathy.      Cervical: No cervical adenopathy.      Right cervical: No superficial cervical adenopathy.     Left cervical: No superficial cervical adenopathy.   Skin:     General: Skin is warm and dry.      Capillary Refill: Capillary refill takes less than 2 seconds.      Coloration: Skin is not jaundiced or pale.      Findings: No erythema.      Nails: There is no clubbing.   Neurological:      Mental Status: He is alert and oriented to person, place, and time.      Cranial Nerves: No cranial nerve deficit or facial  asymmetry.      Sensory: No sensory deficit.      Motor: No weakness, tremor, atrophy or abnormal muscle tone.      Coordination: Coordination normal.      Deep Tendon Reflexes: Reflexes are normal and symmetric.   Psychiatric:         Attention and Perception: He is attentive.         Mood and Affect: Mood normal. Mood is not anxious or depressed.         Speech: Speech normal.         Behavior: Behavior normal. Behavior is cooperative.         Thought Content: Thought content normal.         Cognition and Memory: Cognition normal.         Judgment: Judgment normal.         Diagnoses and all orders for this visit:    1. Colitis (Primary)  Overview:  Crohn's disease under the care of GI    Assessment & Plan:  Will follow up with GI to see if there are recommendations for him regarding his RTW.  Continue Humira injections as directed.   Continue Mesalamine as directed  Avoid food/GI triggers      2. Primary osteoarthritis involving multiple joints  Assessment & Plan:  Continue PRN Flexeril. Continue conservative measures at home.   Avoid overuse activities.  Will continue to evaluate and treat PRN      3. Essential hypertension  Overview:  With MI September 2022    Assessment & Plan:  Continue under the care of cardiology.  Continue aspirin 81, Brilinta 90 mg twice daily, atorvastatin 80 mg, isosorbide 30 mg, and lisinopril 10 mg.  Continue metoprolol 25 mg.  As needed nitroglycerin if needed for chest pain.  Ambulatory BP monitoring either at home or random community checks.  Patient to report continued elevations >140/90.  Patient may come by office for checks if needed.       4. Dyslipidemia, goal LDL below 70  Overview:  · 19 2022 total cholesterol 161, triglycerides 97, HDL 28, and   · 9/7/2022 total cholesterol 174, triglycerides 178, HDL 28, and   · 2/14/2023 total cholesterol 112, triglycerides 91, HDL 40 and LDL 54    Assessment & Plan:  Continue Atorvastatin 80 mg.  Encouraged to pursue a  low-cholesterol diet including limited fried foods and low-fat foods.  Be active as physically able.         BMI is >= 25 and <30. (Overweight) The following options were offered after discussion;: nutrition counseling/recommendations       Understands disease processes and need for medications.  Understands reasons for urgent and emergent care.  Patient (& family) verbalized agreement for treatment plan.   Emotional support and active listening provided.  Patient provided time to verbalize feelings.    Continue under the care of multiple speciality providers.     RTC 1 month, sooner if needed.             This document has been electronically signed by:  EDDIE Thurman, FNP-C    Dragon disclaimer:  Part of this note may be an electronic transcription/translation of spoken language to printed text using the Dragon Dictation System.

## 2023-05-18 RX ORDER — EPINEPHRINE 0.3 MG/.3ML
0.3 INJECTION SUBCUTANEOUS ONCE
Qty: 1 EACH | Refills: 0 | Status: SHIPPED | OUTPATIENT
Start: 2023-05-18 | End: 2023-05-18

## 2023-05-21 NOTE — ASSESSMENT & PLAN NOTE
Continue PRN Flexeril. Continue conservative measures at home.   Avoid overuse activities.  Will continue to evaluate and treat PRN

## 2023-05-21 NOTE — ASSESSMENT & PLAN NOTE
Will follow up with GI to see if there are recommendations for him regarding his RTW.  Continue Humira injections as directed.   Continue Mesalamine as directed  Avoid food/GI triggers

## 2023-05-21 NOTE — ASSESSMENT & PLAN NOTE
Continue Atorvastatin 80 mg.  Encouraged to pursue a low-cholesterol diet including limited fried foods and low-fat foods.  Be active as physically able.

## 2023-06-02 ENCOUNTER — OFFICE VISIT (OUTPATIENT)
Dept: GASTROENTEROLOGY | Facility: CLINIC | Age: 48
End: 2023-06-02

## 2023-06-02 VITALS
DIASTOLIC BLOOD PRESSURE: 71 MMHG | HEART RATE: 66 BPM | SYSTOLIC BLOOD PRESSURE: 124 MMHG | WEIGHT: 197 LBS | BODY MASS INDEX: 25.28 KG/M2 | HEIGHT: 74 IN

## 2023-06-02 DIAGNOSIS — K50.00 CROHN'S DISEASE OF SMALL INTESTINE WITHOUT COMPLICATION: ICD-10-CM

## 2023-06-02 DIAGNOSIS — K21.9 GASTROESOPHAGEAL REFLUX DISEASE, UNSPECIFIED WHETHER ESOPHAGITIS PRESENT: Primary | ICD-10-CM

## 2023-06-02 DIAGNOSIS — R19.7 DIARRHEA, UNSPECIFIED TYPE: ICD-10-CM

## 2023-06-02 LAB
ALBUMIN SERPL-MCNC: 4.4 G/DL (ref 3.5–5.2)
ALBUMIN/GLOB SERPL: 1.8 G/DL
ALP SERPL-CCNC: 116 U/L (ref 39–117)
ALT SERPL W P-5'-P-CCNC: 41 U/L (ref 1–41)
ANION GAP SERPL CALCULATED.3IONS-SCNC: 13 MMOL/L (ref 5–15)
AST SERPL-CCNC: 20 U/L (ref 1–40)
BASOPHILS # BLD AUTO: 0.08 10*3/MM3 (ref 0–0.2)
BASOPHILS NFR BLD AUTO: 1 % (ref 0–1.5)
BILIRUB SERPL-MCNC: 1 MG/DL (ref 0–1.2)
BUN SERPL-MCNC: 16 MG/DL (ref 6–20)
BUN/CREAT SERPL: 17.6 (ref 7–25)
CALCIUM SPEC-SCNC: 9.3 MG/DL (ref 8.6–10.5)
CHLORIDE SERPL-SCNC: 104 MMOL/L (ref 98–107)
CO2 SERPL-SCNC: 23 MMOL/L (ref 22–29)
CREAT SERPL-MCNC: 0.91 MG/DL (ref 0.76–1.27)
CRP SERPL-MCNC: <0.3 MG/DL (ref 0–0.5)
DEPRECATED RDW RBC AUTO: 41.2 FL (ref 37–54)
EGFRCR SERPLBLD CKD-EPI 2021: 103.3 ML/MIN/1.73
EOSINOPHIL # BLD AUTO: 0.12 10*3/MM3 (ref 0–0.4)
EOSINOPHIL NFR BLD AUTO: 1.4 % (ref 0.3–6.2)
ERYTHROCYTE [DISTWIDTH] IN BLOOD BY AUTOMATED COUNT: 12.7 % (ref 12.3–15.4)
GLOBULIN UR ELPH-MCNC: 2.5 GM/DL
GLUCOSE SERPL-MCNC: 88 MG/DL (ref 65–99)
HCT VFR BLD AUTO: 41.8 % (ref 37.5–51)
HGB BLD-MCNC: 13.8 G/DL (ref 13–17.7)
IMM GRANULOCYTES # BLD AUTO: 0.03 10*3/MM3 (ref 0–0.05)
IMM GRANULOCYTES NFR BLD AUTO: 0.4 % (ref 0–0.5)
LYMPHOCYTES # BLD AUTO: 2.37 10*3/MM3 (ref 0.7–3.1)
LYMPHOCYTES NFR BLD AUTO: 28.2 % (ref 19.6–45.3)
MCH RBC QN AUTO: 29.5 PG (ref 26.6–33)
MCHC RBC AUTO-ENTMCNC: 33 G/DL (ref 31.5–35.7)
MCV RBC AUTO: 89.3 FL (ref 79–97)
MONOCYTES # BLD AUTO: 0.83 10*3/MM3 (ref 0.1–0.9)
MONOCYTES NFR BLD AUTO: 9.9 % (ref 5–12)
NEUTROPHILS NFR BLD AUTO: 4.98 10*3/MM3 (ref 1.7–7)
NEUTROPHILS NFR BLD AUTO: 59.1 % (ref 42.7–76)
NRBC BLD AUTO-RTO: 0 /100 WBC (ref 0–0.2)
PLATELET # BLD AUTO: 294 10*3/MM3 (ref 140–450)
PMV BLD AUTO: 10.5 FL (ref 6–12)
POTASSIUM SERPL-SCNC: 4.4 MMOL/L (ref 3.5–5.2)
PROT SERPL-MCNC: 6.9 G/DL (ref 6–8.5)
RBC # BLD AUTO: 4.68 10*6/MM3 (ref 4.14–5.8)
SODIUM SERPL-SCNC: 140 MMOL/L (ref 136–145)
WBC NRBC COR # BLD: 8.41 10*3/MM3 (ref 3.4–10.8)

## 2023-06-02 PROCEDURE — 86140 C-REACTIVE PROTEIN: CPT | Performed by: NURSE PRACTITIONER

## 2023-06-02 PROCEDURE — 85025 COMPLETE CBC W/AUTO DIFF WBC: CPT | Performed by: NURSE PRACTITIONER

## 2023-06-02 PROCEDURE — 80053 COMPREHEN METABOLIC PANEL: CPT | Performed by: NURSE PRACTITIONER

## 2023-06-02 RX ORDER — MONTELUKAST SODIUM 4 MG/1
1 TABLET, CHEWABLE ORAL DAILY
Qty: 90 TABLET | Refills: 2 | Status: SHIPPED | OUTPATIENT
Start: 2023-06-02

## 2023-06-02 RX ORDER — ADALIMUMAB 40MG/0.4ML
40 KIT SUBCUTANEOUS
Qty: 2 EACH | Refills: 2 | Status: SHIPPED | OUTPATIENT
Start: 2023-06-02

## 2023-06-02 RX ORDER — PANTOPRAZOLE SODIUM 40 MG/1
40 TABLET, DELAYED RELEASE ORAL DAILY
Qty: 30 TABLET | Refills: 5 | Status: SHIPPED | OUTPATIENT
Start: 2023-06-02

## 2023-06-02 NOTE — PROGRESS NOTES
"DATE:  6/2/2023    DIAGNOSIS: Crohn's Disease    CHIEF COMPLAINT:  Chief Complaint   Patient presents with   • Crohn's Disease     Interval History:  Mr. Montes presents today for follow up. Patient has been following with Akira Nettles PA-C and was last seen in April 2023. Since his last visit, overall, he has been doing well. He remains on Humira and mesalamine which he is tolerating well. At present he reports his bowels are moving daily. He will have occasional diarrhea and takes colestid as needed. Denies having bloody stool or abdominal pain. Repeat fecal calprotectin from April showed significant improvement, now 166. He has recently noticed burning in his chest/throat ~4-5 times/week. He has noticed spicy, greasy and acidic foods make his symptoms worse. He also says he stomach will sometimes \"go crazy\" after eating.  He retains his gallbladder. He also has occasional nausea without vomiting. He has no other complaints today.     PAST MEDICAL HISTORY:  Past Medical History:   Diagnosis Date   • Anemia    • Arthritis    • Elevated cholesterol    • Heart attack    • Low back pain        PAST SURGICAL HISTORY:  Past Surgical History:   Procedure Laterality Date   • CARDIAC CATHETERIZATION N/A 9/6/2022    Procedure: Left Heart Cath;  Surgeon: Matthew Hill MD;  Location: [facility] INVASIVE LOCATION;  Service: Cardiovascular;  Laterality: N/A;   • CARDIAC CATHETERIZATION N/A 9/6/2022    Procedure: Percutaneous Coronary Intervention;  Surgeon: Matthew Hill MD;  Location: [facility] INVASIVE LOCATION;  Service: Cardiovascular;  Laterality: N/A;   • COLONOSCOPY N/A 7/12/2022    Procedure: COLONOSCOPY;  Surgeon: Stella Aparicio MD;  Location: [facility];  Service: Gastroenterology;  Laterality: N/A;   • CYST REMOVAL     • VASECTOMY         SOCIAL HISTORY:  Social History     Socioeconomic History   • Marital status:    Tobacco Use   • Smoking status: Former     " Packs/day: 2.00     Years: 30.00     Pack years: 60.00     Types: Cigarettes     Quit date: 2022     Years since quittin.7   • Smokeless tobacco: Never   • Tobacco comments:     Smoking cessation encouraged   Vaping Use   • Vaping Use: Every day   • Substances: Nicotine   • Devices: Disposable   Substance and Sexual Activity   • Alcohol use: No   • Drug use: No   • Sexual activity: Defer     FAMILY HISTORY:  Family History   Problem Relation Age of Onset   • Colon cancer Mother    • Cancer Father        MEDICATIONS:  The current medication list was reviewed in the EMR    Current Outpatient Medications:   •  Adalimumab (Humira Pen) 40 MG/0.4ML Pen-injector Kit, Inject 40 mg (0.4 ml) under the skin Every 14 (Fourteen) Days., Disp: 2 each, Rfl: 5  •  aspirin 81 MG EC tablet, Take 1 tablet by mouth Daily., Disp: 90 tablet, Rfl: 2  •  atorvastatin (LIPITOR) 80 MG tablet, Take 1 tablet by mouth Every Night., Disp: 90 tablet, Rfl: 2  •  Brilinta 90 MG tablet tablet, TAKE ONE TABLET BY MOUTH TWICE A DAY, Disp: 60 tablet, Rfl: 2  •  colestipol (COLESTID) 1 g tablet, Take 1 tablet by mouth Daily., Disp: 90 tablet, Rfl: 2  •  cyclobenzaprine (FLEXERIL) 5 MG tablet, TAKE ONE (1) TABLET BY MOUTH THREE (3) (THREE) TIMES A DAY AS NEEDED FOR MUSCLE SPASMS., Disp: 90 tablet, Rfl: 5  •  isosorbide mononitrate (IMDUR) 30 MG 24 hr tablet, Take 1 tablet by mouth Daily. Hold for SBP <110, Disp: 90 tablet, Rfl: 2  •  lisinopril (PRINIVIL,ZESTRIL) 10 MG tablet, TAKE ONE TABLET BY MOUTH ONCE DAILY, Disp: 90 tablet, Rfl: 3  •  mesalamine (LIALDA) 1.2 g EC tablet, Take 1 tablet by mouth Daily With Breakfast., Disp: 90 tablet, Rfl: 3  •  metoprolol succinate XL (TOPROL-XL) 25 MG 24 hr tablet, Take 0.5 tablets by mouth Daily. Hold for SBP <110 and/or HR <60, Disp: 90 tablet, Rfl: 2  •  nitroglycerin (NITROSTAT) 0.4 MG SL tablet, 1 under the tongue as needed for angina, may repeat q5mins for up three doses, Disp: 100 tablet, Rfl: 11  •   tamsulosin (Flomax) 0.4 MG capsule 24 hr capsule, Take 1 capsule by mouth Every Night., Disp: 30 capsule, Rfl: 5  •  vitamin D (ERGOCALCIFEROL) 1.25 MG (44618 UT) capsule capsule, Take 1 capsule by mouth 1 (One) Time Per Week., Disp: 4 capsule, Rfl: 5    ALLERGIES:  No Known Allergies      REVIEW OF SYSTEMS:    A comprehensive 14 point review of systems was performed.  Significant findings as mentioned above.  All other systems reviewed and are negative.      Physical Exam   Vital Signs:   Vitals:    06/02/23 0916   BP: 124/71   Pulse: 66    General: Well developed, well nourished, alert and oriented x 3, in no acute distress.   Head: ATNC   Eyes: PERRL, No evidence of conjunctivitis.   Nose: No nasal discharge.   Mouth: Oral mucosal membranes moist. No oral ulceration or hemorrhages.   Neck: Neck supple. No thyromegaly. No JVD.   Lungs: Clear in all fields to A&P without rales, rhonchi or wheezing.   Heart: Regular rate and rhythm. No murmurs, rubs, or gallops.   Abdomen: Soft. Bowel sounds are normoactive. Nontender with palpation.   Extremities: No cyanosis or edema.   Neurologic: MS as above. Grossly non focal exam.    RECENT LABS:  Lab Results   Component Value Date    WBC 10.64 03/02/2023    HGB 14.3 03/02/2023    HCT 43.2 03/02/2023    MCV 91.3 03/02/2023    RDW 13.2 03/02/2023     03/02/2023    NEUTRORELPCT 60.9 03/02/2023    LYMPHORELPCT 28.4 03/02/2023    MONORELPCT 7.9 03/02/2023    EOSRELPCT 1.5 03/02/2023    BASORELPCT 1.0 03/02/2023    NEUTROABS 6.48 03/02/2023    LYMPHSABS 3.02 03/02/2023       Lab Results   Component Value Date     02/14/2023    K 4.3 02/14/2023    CO2 26.7 02/14/2023     02/14/2023    BUN 11 02/14/2023    CREATININE 0.88 02/14/2023    EGFRIFNONA 98 03/25/2021    GLUCOSE 89 02/14/2023    CALCIUM 9.3 02/14/2023    ALKPHOS 112 02/14/2023    AST 24 02/14/2023    ALT 41 02/14/2023    BILITOT 0.9 02/14/2023    ALBUMIN 4.5 02/14/2023    PROTEINTOT 6.8 02/14/2023        ASSESSMENT & PLAN:  Scott Montes is a very pleasant 48 y.o. male with    1.  Crohn's Disease:  -Clinically he continues to do very well with Humira and mesalamine. He also takes colestid as needed. Repeat fecal calprotectin from 4/3/23 showed significant improvement (166) down from 895.   -Will check CBC, CMP and CRP today.   -Will continue with the above regimen. Refills provided.   -He will RTC in 3 months with Akira.     2. GERD:  -Not currently on PPI therapy. Will start Protonix 40 mg PO daily. We also discussed dietary modifications to help control his symptoms. Will monitor.         Electronically Signed by: EDDIE Moralez ,  June 2, 2023 09:40 EDT       CC:   Asuncion Keyes APRN

## 2023-06-05 ENCOUNTER — TELEPHONE (OUTPATIENT)
Dept: FAMILY MEDICINE CLINIC | Facility: CLINIC | Age: 48
End: 2023-06-05

## 2023-06-05 NOTE — TELEPHONE ENCOUNTER
Caller: [facility]    Relationship:     Best call back number: [phone]     What form or medical record are you requesting: OFFICE NOTES FOR [id number]    Who is requesting this form or medical record from you: [facility]    How would you like to receive the form or medical records (pick-up, mail, fax): FAX  If fax, what is the fax number: [phone]    Timeframe paperwork needed: ASAP

## 2023-06-07 NOTE — TELEPHONE ENCOUNTER
SAY WITH [facility] CALLED TO CLARIFY PT WORK RESTRICTIONS.    PLEASE ADVISE.CALL BACK:[phone]  EXT [id number]

## 2023-06-12 ENCOUNTER — OFFICE VISIT (OUTPATIENT)
Dept: PULMONOLOGY | Facility: CLINIC | Age: 48
End: 2023-06-12
Payer: MEDICAID

## 2023-06-12 VITALS
WEIGHT: 198 LBS | HEIGHT: 74 IN | OXYGEN SATURATION: 97 % | DIASTOLIC BLOOD PRESSURE: 92 MMHG | TEMPERATURE: 97.7 F | HEART RATE: 76 BPM | SYSTOLIC BLOOD PRESSURE: 122 MMHG | BODY MASS INDEX: 25.41 KG/M2

## 2023-06-12 DIAGNOSIS — Z87.891 FORMER SMOKER: ICD-10-CM

## 2023-06-12 DIAGNOSIS — R91.8 MULTIPLE PULMONARY NODULES: Primary | ICD-10-CM

## 2023-06-12 DIAGNOSIS — R06.09 DOE (DYSPNEA ON EXERTION): ICD-10-CM

## 2023-06-12 PROCEDURE — 1160F RVW MEDS BY RX/DR IN RCRD: CPT | Performed by: INTERNAL MEDICINE

## 2023-06-12 PROCEDURE — 3080F DIAST BP >= 90 MM HG: CPT | Performed by: INTERNAL MEDICINE

## 2023-06-12 PROCEDURE — 99214 OFFICE O/P EST MOD 30 MIN: CPT | Performed by: INTERNAL MEDICINE

## 2023-06-12 PROCEDURE — 1159F MED LIST DOCD IN RCRD: CPT | Performed by: INTERNAL MEDICINE

## 2023-06-12 PROCEDURE — 3074F SYST BP LT 130 MM HG: CPT | Performed by: INTERNAL MEDICINE

## 2023-06-12 NOTE — PROGRESS NOTES
PULMONARY  NOTE    Chief Complaint     Pulmonary nodules, former smoker, coronary artery disease, dyspnea on exertion    History of Present Illness     48-year-old male returns today for follow-up  I initially saw him 10/17/2022    He was found incidentally to have several bilateral pulmonary nodules, max 9-10 mm in size  He had both solid and groundglass opacities  PET CT scan done after that revealed no abnormal hypermetabolic activity    When I saw him our plan was to follow with serial chest imaging  He has not had his follow-up CAT scan, yet    Since I saw him he has had no acute exacerbation of respiratory symptoms  Specifically no increased cough or sputum production  He is never had hemoptysis    He did have some shortness of breath up until the time of his myocardial infarction  Shortness of breath somewhat better since that time  He is on no respiratory medications    Patient Active Problem List   Diagnosis   • Testosterone deficiency   • Other male erectile dysfunction   • Primary osteoarthritis involving multiple joints   • Vitamin D deficiency   • Colitis   • Generalized abdominal pain   • Abnormal CT scan, colon   • STEMI (ST elevation myocardial infarction)   • Coronary artery disease involving native coronary artery of native heart without angina pectoris   • Former smoker (Stopped 9/2022)   • Multiple pulmonary nodules (Max 9mm)   • RESENDEZ (dyspnea on exertion)   • Benign prostatic hyperplasia with nocturia   • Return to work evaluation   • Essential hypertension   • Dyslipidemia, goal LDL below 70     No Known Allergies    Current Outpatient Medications:   •  Adalimumab (Humira Pen) 40 MG/0.4ML Pen-injector Kit, Inject 40 mg (0.4 ml) under the skin Every 14 (Fourteen) Days., Disp: 2 each, Rfl: 2  •  aspirin 81 MG EC tablet, Take 1 tablet by mouth Daily., Disp: 90 tablet, Rfl: 2  •  atorvastatin (LIPITOR) 80 MG tablet, Take 1 tablet by mouth Every Night., Disp: 90 tablet, Rfl: 2  •  Brilinta 90 MG  tablet tablet, TAKE ONE TABLET BY MOUTH TWICE A DAY, Disp: 60 tablet, Rfl: 2  •  colestipol (COLESTID) 1 g tablet, Take 1 tablet by mouth Daily., Disp: 90 tablet, Rfl: 2  •  cyclobenzaprine (FLEXERIL) 5 MG tablet, TAKE ONE (1) TABLET BY MOUTH THREE (3) (THREE) TIMES A DAY AS NEEDED FOR MUSCLE SPASMS., Disp: 90 tablet, Rfl: 5  •  isosorbide mononitrate (IMDUR) 30 MG 24 hr tablet, Take 1 tablet by mouth Daily. Hold for SBP <110, Disp: 90 tablet, Rfl: 2  •  lisinopril (PRINIVIL,ZESTRIL) 10 MG tablet, TAKE ONE TABLET BY MOUTH ONCE DAILY, Disp: 90 tablet, Rfl: 3  •  mesalamine (LIALDA) 1.2 g EC tablet, Take 1 tablet by mouth Daily With Breakfast., Disp: 90 tablet, Rfl: 3  •  metoprolol succinate XL (TOPROL-XL) 25 MG 24 hr tablet, Take 0.5 tablets by mouth Daily. Hold for SBP <110 and/or HR <60, Disp: 90 tablet, Rfl: 2  •  nitroglycerin (NITROSTAT) 0.4 MG SL tablet, 1 under the tongue as needed for angina, may repeat q5mins for up three doses, Disp: 100 tablet, Rfl: 11  •  pantoprazole (PROTONIX) 40 MG EC tablet, Take 1 tablet by mouth Daily., Disp: 30 tablet, Rfl: 5  •  tamsulosin (Flomax) 0.4 MG capsule 24 hr capsule, Take 1 capsule by mouth Every Night., Disp: 30 capsule, Rfl: 5  •  vitamin D (ERGOCALCIFEROL) 1.25 MG (42621 UT) capsule capsule, Take 1 capsule by mouth 1 (One) Time Per Week., Disp: 4 capsule, Rfl: 5  MEDICATION LIST AND ALLERGIES REVIEWED.    Family History   Problem Relation Age of Onset   • Colon cancer Mother    • Cancer Father      Social History     Tobacco Use   • Smoking status: Former     Packs/day: 2.00     Types: Cigarettes     Quit date: 2022     Years since quittin.7     Passive exposure: Past   • Smokeless tobacco: Never   • Tobacco comments:     Smoking cessation encouraged   Vaping Use   • Vaping Use: Every day   • Substances: Nicotine   • Devices: Disposable   Substance Use Topics   • Alcohol use: No   • Drug use: No     Social History     Social History Narrative         "Stopped smoking September 2022    Previously smoked 2 packs/day for 36 years    Is a  for repairing commercial compressors     FAMILY AND SOCIAL HISTORY REVIEWED.    Review of Systems  IF PRESENT REFER TO SCANNED ROS SHEET FROM SAME DATE  OTHERWISE ROS OBTAINED AND NON-CONTRIBUTORY OVER HPI.    /92   Pulse 76   Temp 97.7 °F (36.5 °C) (Temporal)   Ht 188 cm (74\")   Wt 89.8 kg (198 lb)   SpO2 97%   BMI 25.42 kg/m²   Physical Exam  Vitals and nursing note reviewed.   Constitutional:       General: He is not in acute distress.     Appearance: He is well-developed. He is not diaphoretic.   HENT:      Head: Normocephalic and atraumatic.   Neck:      Thyroid: No thyromegaly.   Cardiovascular:      Rate and Rhythm: Normal rate and regular rhythm.      Heart sounds: Normal heart sounds. No murmur heard.  Pulmonary:      Effort: Pulmonary effort is normal.      Breath sounds: Normal breath sounds. No stridor.   Lymphadenopathy:      Cervical: No cervical adenopathy.      Upper Body:      Right upper body: No supraclavicular or epitrochlear adenopathy.      Left upper body: No supraclavicular or epitrochlear adenopathy.   Skin:     General: Skin is warm and dry.   Neurological:      Mental Status: He is alert and oriented to person, place, and time.   Psychiatric:         Behavior: Behavior normal.         Results     Most recent CT scan of the chest again reviewed on PACS  Stability in the pulmonary nodules and no abnormal hypermetabolic activity on the PET scan    Immunization History   Administered Date(s) Administered   • COVID-19 (PFIZER) Purple Cap Monovalent 08/04/2021, 09/13/2021     Problem List       ICD-10-CM ICD-9-CM   1. Multiple pulmonary nodules  R91.8 793.19   2. Former smoker  Z87.891 V15.82   3. RESENDEZ (dyspnea on exertion)  R06.09 786.09       Discussion     He has had no exacerbation of respiratory symptoms since I last saw him  Complaints of dyspnea on exertion are stable    We " had planned on getting a follow-up CT scan of the chest but there was some confusion because there are 2 orders in the computer so he never got contacted about rescheduling the scan  We will go ahead and get what was to be his 6-month CT scan of the chest now and then assuming that stable we will get another 1 in December 2023  I will see him back in December 2023 after the follow-up scan is obtained, assuming this scan that organ to do now is stable    We will get PFTs in the future if he has worsening dyspnea    I will plan to see him back in December    Moderate level of Medical Decision Making complexity based on 1 undiagnosed new problem or 2 stable chronic conditions, independent interpretation of tests, and/or prescription drug management     Devaughn Gu MD  Note electronically signed    CC: Asuncion Keyes APRN

## 2023-06-19 ENCOUNTER — OFFICE VISIT (OUTPATIENT)
Dept: FAMILY MEDICINE CLINIC | Facility: CLINIC | Age: 48
End: 2023-06-19
Payer: MEDICAID

## 2023-06-19 VITALS
OXYGEN SATURATION: 98 % | RESPIRATION RATE: 16 BRPM | TEMPERATURE: 98.6 F | BODY MASS INDEX: 26.05 KG/M2 | SYSTOLIC BLOOD PRESSURE: 122 MMHG | HEART RATE: 80 BPM | HEIGHT: 74 IN | WEIGHT: 203 LBS | DIASTOLIC BLOOD PRESSURE: 80 MMHG

## 2023-06-19 DIAGNOSIS — I10 ESSENTIAL HYPERTENSION: Primary | Chronic | ICD-10-CM

## 2023-06-19 DIAGNOSIS — M15.9 PRIMARY OSTEOARTHRITIS INVOLVING MULTIPLE JOINTS: ICD-10-CM

## 2023-06-19 DIAGNOSIS — R91.8 MULTIPLE PULMONARY NODULES: ICD-10-CM

## 2023-06-19 DIAGNOSIS — Z76.89 RETURN TO WORK EVALUATION: ICD-10-CM

## 2023-06-19 DIAGNOSIS — R10.84 GENERALIZED ABDOMINAL PAIN: ICD-10-CM

## 2023-06-19 PROCEDURE — 3074F SYST BP LT 130 MM HG: CPT | Performed by: NURSE PRACTITIONER

## 2023-06-19 PROCEDURE — 1160F RVW MEDS BY RX/DR IN RCRD: CPT | Performed by: NURSE PRACTITIONER

## 2023-06-19 PROCEDURE — 99214 OFFICE O/P EST MOD 30 MIN: CPT | Performed by: NURSE PRACTITIONER

## 2023-06-19 PROCEDURE — 1159F MED LIST DOCD IN RCRD: CPT | Performed by: NURSE PRACTITIONER

## 2023-06-19 PROCEDURE — 3079F DIAST BP 80-89 MM HG: CPT | Performed by: NURSE PRACTITIONER

## 2023-06-19 NOTE — PROGRESS NOTES
"Subjective   Scott Montes is a 48 y.o. male.     Chief Complaint   Patient presents with    Hypertension       Hypertension  Associated symptoms include neck pain. Pertinent negatives include no chest pain, palpitations or shortness of breath.      HTN-chronic and ongoing.  On metoprolol 25 mg half a tablet daily.  He is also on isosorbide 30 mg for CAD.  Patient does not have any current cardiac restrictions.  Crohn's disease-patient is currently on Humira injection q. 14 days and mesalamine 1.2 g daily.  He is considered to be stable from his GI provider.  He does continue to have intermittent episodes of diarrhea.   He continues to avoid spicy foods.  He is avoiding other foods that cause him GI distress.  He is eating a lot of \"baked chicken\" or \"something grilled\".    GERD-currently been prescribed Protonix 40 mg for some concerns with reflux.  He is also on Colestid 1 g daily to help with diarrhea related to his Crohn's disease.  He reports that he took protonix for a few doses.  He reports that he noted increased abdomen pain and cramping when he took the medication.  He has not taken again.  He has had to avoid spicy foods.    History of MI-patient is currently on aspirin 81 mg, Brilinta 90 mg twice daily and atorvastatin 80 mg.  No negative side effects.  He has noted some bruising on his arms but not on his Torso.  Bruising is only occurring if he bumps or hits his extremity.  No blood in urine or stool.  No epistaxis  Neck pain-has noted some increased neck pain that is causing headache.  He reports that when he is sedentary position he noted some stiffness then he has a sensation of his neck locking and then radiating pain into his left shoulder then a Headache occurs.  No vision changes with HA.    Pulmonary nodules-patient is under the care of the pulmonary nodule clinic.  He will have an updated scan June 29 for evaluation of his nodules.  He denies any respiratory changes.  He continues to not " "smoke.  He tries to be active but reports that he does fatigue easily.    The following portions of the patient's history were reviewed and updated as appropriate: CC, ROS, allergies, current medications, past family history, past medical history, past social history, past surgical history and problem list.      Review of Systems   Constitutional:  Positive for fatigue. Negative for appetite change, unexpected weight gain and unexpected weight loss.   HENT:  Negative for congestion, ear pain, postnasal drip, rhinorrhea, sore throat, swollen glands, trouble swallowing and voice change.    Eyes:  Negative for pain and visual disturbance.   Respiratory:  Negative for cough, chest tightness, shortness of breath and wheezing.    Cardiovascular:  Negative for chest pain, palpitations and leg swelling.   Gastrointestinal:  Positive for abdominal pain and diarrhea (last episode about 2 weeks ago). Negative for blood in stool, constipation, nausea and indigestion.   Genitourinary:  Negative for dysuria, hematuria and urgency.   Musculoskeletal:  Positive for arthralgias, neck pain and neck stiffness. Negative for back pain, gait problem, joint swelling and myalgias.   Skin:  Negative for color change and skin lesions.   Allergic/Immunologic: Negative.    Neurological:  Positive for dizziness and headache. Negative for numbness.        Increased tingling in LUE   Hematological:  Positive for adenopathy (neck).   Psychiatric/Behavioral:  Negative for dysphoric mood, sleep disturbance and suicidal ideas. The patient is not nervous/anxious.    All other systems reviewed and are negative.    Objective     /80   Pulse 80   Temp 98.6 °F (37 °C)   Resp 16   Ht 188 cm (74.02\")   Wt 92.1 kg (203 lb)   SpO2 98%   BMI 26.05 kg/m²     Physical Exam  Vitals reviewed.   Constitutional:       General: He is not in acute distress.     Appearance: He is well-developed. He is not diaphoretic.   HENT:      Head: Normocephalic and " atraumatic.      Jaw: No tenderness.      Right Ear: Hearing, tympanic membrane, ear canal and external ear normal.      Left Ear: Hearing, tympanic membrane, ear canal and external ear normal.      Nose: Nose normal. No nasal tenderness or congestion.      Right Sinus: No maxillary sinus tenderness or frontal sinus tenderness.      Left Sinus: No maxillary sinus tenderness or frontal sinus tenderness.      Mouth/Throat:      Lips: Pink.      Mouth: Mucous membranes are moist.      Pharynx: Oropharynx is clear. Uvula midline.   Eyes:      General: Lids are normal. No scleral icterus.     Extraocular Movements:      Right eye: Normal extraocular motion and no nystagmus.      Left eye: Normal extraocular motion and no nystagmus.      Conjunctiva/sclera: Conjunctivae normal.      Pupils: Pupils are equal, round, and reactive to light.   Neck:      Thyroid: No thyromegaly or thyroid tenderness.      Vascular: No carotid bruit or JVD.      Trachea: No tracheal tenderness.   Cardiovascular:      Rate and Rhythm: Normal rate and regular rhythm.      Pulses:           Dorsalis pedis pulses are 2+ on the right side and 2+ on the left side.        Posterior tibial pulses are 2+ on the right side and 2+ on the left side.      Heart sounds: Normal heart sounds, S1 normal and S2 normal. No murmur heard.  Pulmonary:      Effort: Pulmonary effort is normal. No accessory muscle usage, prolonged expiration or respiratory distress.      Breath sounds: Normal breath sounds.   Chest:      Chest wall: No tenderness.   Abdominal:      General: Bowel sounds are normal. There is no distension.      Palpations: Abdomen is soft. There is no hepatomegaly, splenomegaly or mass.      Tenderness: There is abdominal tenderness in the right upper quadrant and periumbilical area.      Comments: Bowel sounds very active.  Loud and gurgling quality.  Audible without use of stethoscope   Musculoskeletal:      Left shoulder: Tenderness present.       Left upper arm: Tenderness present. No edema.      Cervical back: Normal range of motion and neck supple. Tenderness and bony tenderness present. Pain with movement present.      Right lower leg: No edema.      Left lower leg: No edema.      Comments: No muscular atrophy or flaccidity.   Lymphadenopathy:      Head:      Right side of head: No submental or submandibular adenopathy.      Left side of head: No submental or submandibular adenopathy.      Cervical: No cervical adenopathy.      Right cervical: No superficial cervical adenopathy.     Left cervical: No superficial cervical adenopathy.   Skin:     General: Skin is warm and dry.      Capillary Refill: Capillary refill takes less than 2 seconds.      Coloration: Skin is not jaundiced or pale.      Findings: No erythema.      Nails: There is no clubbing.   Neurological:      Mental Status: He is alert and oriented to person, place, and time.      Cranial Nerves: No cranial nerve deficit or facial asymmetry.      Sensory: No sensory deficit.      Motor: No weakness, tremor, atrophy or abnormal muscle tone.      Coordination: Coordination normal.      Deep Tendon Reflexes: Reflexes are normal and symmetric.   Psychiatric:         Attention and Perception: He is attentive.         Mood and Affect: Mood normal. Mood is not anxious or depressed.         Speech: Speech normal.         Behavior: Behavior normal. Behavior is cooperative.         Thought Content: Thought content normal.         Cognition and Memory: Cognition normal.         Judgment: Judgment normal.       Diagnoses and all orders for this visit:    1. Essential hypertension (Primary)  Overview:  With MI September 2022    Assessment & Plan:  Continue under the care of cardiology.  Continue aspirin 81, Brilinta 90 mg twice daily, atorvastatin 80 mg, isosorbide 30 mg, and lisinopril 10 mg.  Continue metoprolol 25 mg.  As needed nitroglycerin if needed for chest pain.  Ambulatory BP monitoring either at  home or random community checks.  Patient to report continued elevations >140/90.  Patient may come by office for checks if needed.       2. Generalized abdominal pain  Overview:  Added automatically from request for surgery [id number]    Assessment & Plan:  Discussed Protonix.  Advised patient he may attempt another trial of Protonix as it may not have been the cause of his GI upset.  Patient will consider.  Encouraged to try variety of diet to see how GI effects will be.  Discussed with patient this may be a way to determine foods he can tolerate that are not home-cooked      3. Multiple pulmonary nodules (Max 9mm)  Assessment & Plan:  Keep appointment June 29 for repeat scanning  Continue under the care of pulmonary nodule clinic      4. Primary osteoarthritis involving multiple joints  Assessment & Plan:  Continue PRN Flexeril. Continue conservative measures at home.   Avoid overuse activities.  No NSAIDs.  Will continue to evaluate and treat PRN      5. 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  Discussed with patient his need for multiple bathroom breaks and how this will affect any job position.  Patient will need to try different dietary foods to determine how his GI status will be affected in uncontrolled environment           Understands disease processes and need for medications.  Understands reasons for urgent and emergent care.  Patient (& family) verbalized agreement for treatment plan.   Emotional support and active listening provided.  Patient provided time to verbalize feelings.      RTC 6 to 8 weeks, sooner if needed          This document has been electronically signed by:  EDDIE Thurman, FNP-C    Dragon disclaimer:  Part of this note may be an electronic transcription/translation of spoken language to printed text using  the Dragon Dictation System.

## 2023-06-20 NOTE — ASSESSMENT & PLAN NOTE
Discussed Protonix.  Advised patient he may attempt another trial of Protonix as it may not have been the cause of his GI upset.  Patient will consider.  Encouraged to try variety of diet to see how GI effects will be.  Discussed with patient this may be a way to determine foods he can tolerate that are not home-cooked

## 2023-06-20 NOTE — ASSESSMENT & PLAN NOTE
Continue PRN Flexeril. Continue conservative measures at home.   Avoid overuse activities.  No NSAIDs.  Will continue to evaluate and treat PRN

## 2023-06-20 NOTE — ASSESSMENT & PLAN NOTE
Discussed with patient that due to the nature of his past work [place]he may not be able to return to such a manner of work environment.  Discussed with patient his need for multiple bathroom breaks and how this will affect any job position.  Patient will need to try different dietary foods to determine how his GI status will be affected in uncontrolled environment

## 2023-06-21 NOTE — TELEPHONE ENCOUNTER
Caller: [place]    Relationship: [facility] call back number:[phone]    What form or medical record are you requesting: FMLA PAPERWORK FOR DISABILITY    Who is requesting this form or medical record from you: N/A    How would you like to receive the form or medical records (pick-up, mail, fax): FAX  [phone]  [facility]       Timeframe paperwork needed: ASAP    Additional notes: [place] WAS CALLING TO CHECK STATUS OF FMLA FOR PATIENT'S DISABILITY; HAS PROVIDER FILLED OUT AND SIGNED AND SENT BACK TO FAX NUMBER ABOVE PLEASE

## 2023-06-22 NOTE — TELEPHONE ENCOUNTER
Left a message for nba to inform him that we have not received any new paperwork and asked him to call us back.  Hub to read.

## 2023-07-31 ENCOUNTER — OFFICE VISIT (OUTPATIENT)
Dept: FAMILY MEDICINE CLINIC | Facility: CLINIC | Age: 48
End: 2023-07-31
Payer: MEDICAID

## 2023-07-31 VITALS
HEIGHT: 74 IN | BODY MASS INDEX: 24.38 KG/M2 | SYSTOLIC BLOOD PRESSURE: 118 MMHG | RESPIRATION RATE: 18 BRPM | HEART RATE: 94 BPM | TEMPERATURE: 97.8 F | DIASTOLIC BLOOD PRESSURE: 70 MMHG | WEIGHT: 190 LBS | OXYGEN SATURATION: 98 %

## 2023-07-31 DIAGNOSIS — R10.9 PAIN IN ABDOMEN ON PALPATION: Primary | ICD-10-CM

## 2023-07-31 PROCEDURE — 99213 OFFICE O/P EST LOW 20 MIN: CPT | Performed by: NURSE PRACTITIONER

## 2023-07-31 PROCEDURE — 3078F DIAST BP <80 MM HG: CPT | Performed by: NURSE PRACTITIONER

## 2023-07-31 PROCEDURE — 3074F SYST BP LT 130 MM HG: CPT | Performed by: NURSE PRACTITIONER

## 2023-07-31 NOTE — PROGRESS NOTES
"Subjective   Scott Montes is a 48 y.o. male.     Chief Complaint   Patient presents with    Abdominal Pain       History of Present Illness     Abdomen Pain/GERD-reports he got up Friday morning he woke with reflux and abdomen pain.  He reports that his stomach was sore to touch.  He reports pressure, increased gurgling and \"hurting\".  Lasted for the whole weekend and he had no appetite.   He reports that he only felt like laying around all weekend.  No one at home has been sick with any illness.  He did not have vomiting.  He did have nausea.  No diarrhea until today.  He has had 2 episodes today.  He did not have a BM since Friday morning until today.   He did not note any obvious blood in his stool.  He reports that he has ate bland today.  He did not have any fever.        The following portions of the patient's history were reviewed and updated as appropriate: CC, ROS, allergies, current medications, past family history, past medical history, past social history, past surgical history and problem list.      Review of Systems   Constitutional:  Positive for appetite change (continues to be decreased) and fatigue. Negative for unexpected weight gain and unexpected weight loss.   HENT:  Negative for congestion, ear pain, postnasal drip, rhinorrhea, sore throat, swollen glands, trouble swallowing and voice change.    Eyes:  Negative for pain and visual disturbance.   Respiratory:  Negative for cough, chest tightness, shortness of breath and wheezing.    Cardiovascular:  Negative for chest pain, palpitations and leg swelling.   Gastrointestinal:  Positive for abdominal pain, diarrhea, nausea and GERD. Negative for blood in stool, constipation, vomiting and indigestion.   Genitourinary:  Negative for dysuria, hematuria and urgency.   Musculoskeletal:  Positive for arthralgias, back pain and gait problem. Negative for joint swelling and myalgias.   Skin:  Negative for color change and skin lesions. " "  Allergic/Immunologic: Negative.    Neurological:  Negative for dizziness, numbness and headache.   Hematological: Negative.    Psychiatric/Behavioral:  Negative for dysphoric mood, sleep disturbance and suicidal ideas. The patient is not nervous/anxious.    All other systems reviewed and are negative.    Objective     /70   Pulse 94   Temp 97.8 øF (36.6 øC)   Resp 18   Ht 188 cm (74.02\")   Wt 86.2 kg (190 lb)   SpO2 98%   BMI 24.38 kg/mý     Physical Exam  Vitals reviewed.   Constitutional:       General: He is not in acute distress.     Appearance: He is well-developed. He is not diaphoretic.   HENT:      Head: Normocephalic and atraumatic.      Jaw: No tenderness.      Right Ear: Hearing, tympanic membrane, ear canal and external ear normal.      Left Ear: Hearing, tympanic membrane, ear canal and external ear normal.      Nose: Nose normal. No nasal tenderness or congestion.      Right Sinus: No maxillary sinus tenderness or frontal sinus tenderness.      Left Sinus: No maxillary sinus tenderness or frontal sinus tenderness.      Mouth/Throat:      Lips: Pink.      Mouth: Mucous membranes are moist.      Pharynx: Oropharynx is clear. Uvula midline.   Eyes:      General: Lids are normal. No scleral icterus.     Extraocular Movements:      Right eye: Normal extraocular motion and no nystagmus.      Left eye: Normal extraocular motion and no nystagmus.      Conjunctiva/sclera: Conjunctivae normal.      Pupils: Pupils are equal, round, and reactive to light.   Neck:      Thyroid: No thyromegaly or thyroid tenderness.      Vascular: No carotid bruit or JVD.      Trachea: No tracheal tenderness.   Cardiovascular:      Rate and Rhythm: Normal rate and regular rhythm.      Pulses:           Dorsalis pedis pulses are 2+ on the right side and 2+ on the left side.        Posterior tibial pulses are 2+ on the right side and 2+ on the left side.      Heart sounds: Normal heart sounds, S1 normal and S2 normal. " No murmur heard.  Pulmonary:      Effort: Pulmonary effort is normal. No accessory muscle usage, prolonged expiration or respiratory distress.      Breath sounds: Normal breath sounds.   Chest:      Chest wall: No tenderness.   Abdominal:      General: Bowel sounds are normal. There is no distension.      Palpations: Abdomen is soft. There is no hepatomegaly, splenomegaly or mass.      Tenderness: There is no abdominal tenderness.   Musculoskeletal:         General: No tenderness.      Cervical back: Normal range of motion and neck supple.      Right lower leg: No edema.      Left lower leg: No edema.      Comments: No muscular atrophy or flaccidity.   Lymphadenopathy:      Head:      Right side of head: No submental or submandibular adenopathy.      Left side of head: No submental or submandibular adenopathy.      Cervical: No cervical adenopathy.      Right cervical: No superficial cervical adenopathy.     Left cervical: No superficial cervical adenopathy.   Skin:     General: Skin is warm and dry.      Capillary Refill: Capillary refill takes less than 2 seconds.      Coloration: Skin is not jaundiced or pale.      Findings: No erythema.      Nails: There is no clubbing.   Neurological:      Mental Status: He is alert and oriented to person, place, and time.      Cranial Nerves: No cranial nerve deficit or facial asymmetry.      Sensory: No sensory deficit.      Motor: No weakness, tremor, atrophy or abnormal muscle tone.      Coordination: Coordination normal.      Deep Tendon Reflexes: Reflexes are normal and symmetric.   Psychiatric:         Attention and Perception: He is attentive.         Mood and Affect: Mood normal. Mood is not anxious or depressed.         Speech: Speech normal.         Behavior: Behavior normal. Behavior is cooperative.         Thought Content: Thought content normal.         Cognition and Memory: Cognition normal.         Judgment: Judgment normal.         Diagnoses and all orders for  this visit:    1. Pain in abdomen on palpation (Primary)  Comments:  isra umbilical and bilateral lower quads  if not improved will consider abd series       BMI is within normal parameters. No other follow-up for BMI required.    Understands disease processes and need for medications.  Understands reasons for urgent and emergent care.  Patient (& family) verbalized agreement for treatment plan.   Emotional support and active listening provided.  Patient provided time to verbalize feelings.    Continue bland diet.   Weight monitoring.   If not improved in next 2-3 days report back to the office.     RTC PRN 3-5 days for worsening or non resolving symptoms              This document has been electronically signed by:  EDDIE Thurman, FNP-C    Dragon disclaimer:  Part of this note may be an electronic transcription/translation of spoken language to printed text using the Dragon Dictation System.

## 2023-08-02 ENCOUNTER — SPECIALTY PHARMACY (OUTPATIENT)
Dept: PHARMACY | Facility: HOSPITAL | Age: 48
End: 2023-08-02
Payer: MEDICAID

## 2023-08-04 ENCOUNTER — SPECIALTY PHARMACY (OUTPATIENT)
Dept: PHARMACY | Facility: HOSPITAL | Age: 48
End: 2023-08-04
Payer: MEDICAID

## 2023-08-04 RX ORDER — ADALIMUMAB 40MG/0.4ML
40 KIT SUBCUTANEOUS
Qty: 2 EACH | Refills: 5 | Status: SHIPPED | OUTPATIENT
Start: 2023-08-04

## 2023-08-07 DIAGNOSIS — K51.80 OTHER ULCERATIVE COLITIS WITHOUT COMPLICATION: ICD-10-CM

## 2023-08-07 DIAGNOSIS — R10.9 PAIN IN ABDOMEN ON PALPATION: Primary | ICD-10-CM

## 2023-08-23 ENCOUNTER — OFFICE VISIT (OUTPATIENT)
Dept: FAMILY MEDICINE CLINIC | Facility: CLINIC | Age: 48
End: 2023-08-23
Payer: MEDICAID

## 2023-08-23 VITALS
HEART RATE: 73 BPM | DIASTOLIC BLOOD PRESSURE: 60 MMHG | HEIGHT: 74 IN | SYSTOLIC BLOOD PRESSURE: 122 MMHG | BODY MASS INDEX: 25.41 KG/M2 | RESPIRATION RATE: 14 BRPM | WEIGHT: 198 LBS | OXYGEN SATURATION: 98 % | TEMPERATURE: 97.2 F

## 2023-08-23 DIAGNOSIS — L30.9 DERMATITIS: Primary | ICD-10-CM

## 2023-08-23 RX ORDER — METHYLPREDNISOLONE ACETATE 80 MG/ML
80 INJECTION, SUSPENSION INTRA-ARTICULAR; INTRALESIONAL; INTRAMUSCULAR; SOFT TISSUE ONCE
Status: COMPLETED | OUTPATIENT
Start: 2023-08-23 | End: 2023-08-23

## 2023-08-23 RX ORDER — TRIAMCINOLONE ACETONIDE 1 MG/G
1 CREAM TOPICAL 2 TIMES DAILY
Qty: 80 G | Refills: 3 | Status: SHIPPED | OUTPATIENT
Start: 2023-08-23

## 2023-08-23 RX ADMIN — METHYLPREDNISOLONE ACETATE 80 MG: 80 INJECTION, SUSPENSION INTRA-ARTICULAR; INTRALESIONAL; INTRAMUSCULAR; SOFT TISSUE at 10:11

## 2023-08-23 NOTE — PROGRESS NOTES
History of Present Illness  Scott Montes is a 48 y.o. male who presents to the clinic today complaining of a rash which started a couple of weeks ago after he had done yard work.  The rash resolved until he did another round of yard work 3 days ago..  Scott has been diagnosed with ulcerative colitis and is taking Humira.    Rash  This is a new problem. The current episode started in the past 7 days. The problem has been gradually worsening since onset. The affected locations include the left hand and right hand. The rash is characterized by blistering, itchiness, peeling and redness. He was exposed to plant contact. Pertinent negatives include no congestion, cough, facial edema, fatigue, fever, rhinorrhea or shortness of breath.     The following portions of the patient's history were reviewed and updated as appropriate: allergies, current medications, past family history, past medical history, past social history, past surgical history and problem list.    Review of Systems   Constitutional:  Negative for activity change, appetite change, chills, fatigue, fever and unexpected weight change.   HENT: Negative.  Negative for congestion and rhinorrhea.    Eyes:  Negative for visual disturbance.   Respiratory:  Negative for cough, chest tightness, shortness of breath and wheezing.    Cardiovascular:  Negative for chest pain, palpitations and leg swelling.   Gastrointestinal:  Negative for abdominal pain, constipation and nausea.   Endocrine: Negative for cold intolerance, heat intolerance, polydipsia, polyphagia and polyuria.   Skin:  Positive for rash. Negative for color change.   Neurological:  Negative for dizziness, tremors, speech difficulty, weakness, light-headedness and headaches.   Hematological:  Negative for adenopathy.   Psychiatric/Behavioral:  Negative for confusion, decreased concentration and suicidal ideas. The patient is not nervous/anxious.    All other systems reviewed and are negative.    Vital  "signs:  /60 (BP Location: Right arm, Patient Position: Sitting, Cuff Size: Adult)   Pulse 73   Temp 97.2 øF (36.2 øC) (Temporal)   Resp 14   Ht 188 cm (74.02\")   Wt 89.8 kg (198 lb)   SpO2 98%   BMI 25.41 kg/mý     Physical Exam  Vitals and nursing note reviewed.   Constitutional:       Appearance: He is well-developed.   HENT:      Head: Normocephalic.      Nose: Nose normal.   Eyes:      General:         Right eye: No discharge.         Left eye: No discharge.      Conjunctiva/sclera: Conjunctivae normal.   Neck:      Thyroid: No thyromegaly.      Vascular: No JVD.   Cardiovascular:      Rate and Rhythm: Normal rate and regular rhythm.      Heart sounds: Normal heart sounds. No murmur heard.    No friction rub.   Pulmonary:      Effort: Pulmonary effort is normal. No respiratory distress.      Breath sounds: Normal breath sounds. No wheezing or rales.   Abdominal:      General: There is no distension.      Palpations: Abdomen is soft.      Tenderness: There is no abdominal tenderness.   Musculoskeletal:         General: No tenderness.      Cervical back: Neck supple.   Lymphadenopathy:      Cervical: No cervical adenopathy.   Skin:     General: Skin is warm and dry.      Capillary Refill: Capillary refill takes less than 2 seconds.      Findings: Erythema and rash present.      Comments: Bilateral erythematous vesicular lesions on his hands with scaling in areas.    Neurological:      Mental Status: He is alert and oriented to person, place, and time.   Psychiatric:         Behavior: Behavior normal.         Thought Content: Thought content normal.         Judgment: Judgment normal.     BMI is >= 25 and <30. (Overweight) The following options were offered after discussion;: none (medical contraindication)      Assessment & Plan     Diagnoses and all orders for this visit:    1. Dermatitis (Primary)  -     methylPREDNISolone acetate (DEPO-medrol) injection 80 mg  -     triamcinolone (KENALOG) 0.1 % " cream; Apply 1 application  topically to the appropriate area as directed 2 (Two) Times a Day.  Dispense: 80 g; Refill: 3    Follow Up If symptomw worsen or do not improve  Findings and recommendations discussed with Scott. Reviewed treatment options. Counseled regarding supportive care measures.  Signs and symptoms of concern reviewed and if occur to seek further evaluation or if symptoms worsen or do not improve.  Lifestyle modifications reinforced including nutrition and activity recommendations.   Scott was given instructions and counseling regarding his condition or for health maintenance advice. Please see specific information pulled into the AVS if appropriate    This document has been electronically signed by:

## 2023-08-28 ENCOUNTER — SPECIALTY PHARMACY (OUTPATIENT)
Dept: PHARMACY | Facility: HOSPITAL | Age: 48
End: 2023-08-28
Payer: MEDICAID

## 2023-08-28 NOTE — PROGRESS NOTES
Specialty Pharmacy Refill Coordination Note      Name:  Scott Montes  :  1975  Date:  2023         Past Medical History:   Diagnosis Date    Anemia     Arthritis     Elevated cholesterol     Heart attack     Low back pain        Past Surgical History:   Procedure Laterality Date    CARDIAC CATHETERIZATION N/A 2022    Procedure: Left Heart Cath;  Surgeon: Matthew Hill MD;  Location: [facility] CATH INVASIVE LOCATION;  Service: Cardiovascular;  Laterality: N/A;    CARDIAC CATHETERIZATION N/A 2022    Procedure: Percutaneous Coronary Intervention;  Surgeon: Matthew Hill MD;  Location: [facility] CATH INVASIVE LOCATION;  Service: Cardiovascular;  Laterality: N/A;    COLONOSCOPY N/A 2022    Procedure: COLONOSCOPY;  Surgeon: Stella Aparicio MD;  Location: [facility] OR;  Service: Gastroenterology;  Laterality: N/A;    CYST REMOVAL      VASECTOMY         Social History     Socioeconomic History    Marital status:    Tobacco Use    Smoking status: Former     Packs/day: 2.00     Years: 35.00     Pack years: 70.00     Types: Cigarettes     Quit date: 2022     Years since quittin.9     Passive exposure: Past    Smokeless tobacco: Never    Tobacco comments:     Smoking cessation encouraged   Vaping Use    Vaping Use: Every day    Substances: Nicotine    Devices: Disposable   Substance and Sexual Activity    Alcohol use: No    Drug use: No    Sexual activity: Defer       Family History   Problem Relation Age of Onset    Colon cancer Mother     Cancer Father        No Known Allergies    Current Outpatient Medications   Medication Sig Dispense Refill    Adalimumab (Humira Pen) 40 MG/0.4ML Pen-injector Kit Inject 40 mg (0.4 ml) under the skin Every 14 (Fourteen) Days. 2 each 5    aspirin 81 MG EC tablet Take 1 tablet by mouth Daily. 90 tablet 2    atorvastatin (LIPITOR) 80 MG tablet Take 1 tablet by mouth Every Night. 90 tablet 2    Brilinta 90 MG tablet  tablet TAKE ONE TABLET BY MOUTH TWICE A DAY 60 tablet 2    colestipol (COLESTID) 1 g tablet Take 1 tablet by mouth Daily. 90 tablet 2    cyclobenzaprine (FLEXERIL) 5 MG tablet TAKE ONE (1) TABLET BY MOUTH THREE (3) (THREE) TIMES A DAY AS NEEDED FOR MUSCLE SPASMS. 90 tablet 5    isosorbide mononitrate (IMDUR) 30 MG 24 hr tablet Take 1 tablet by mouth Daily. Hold for SBP <110 90 tablet 2    lisinopril (PRINIVIL,ZESTRIL) 10 MG tablet TAKE ONE TABLET BY MOUTH ONCE DAILY 90 tablet 3    mesalamine (LIALDA) 1.2 g EC tablet Take 1 tablet by mouth Daily With Breakfast. 90 tablet 3    metoprolol succinate XL (TOPROL-XL) 25 MG 24 hr tablet Take 0.5 tablets by mouth Daily. Hold for SBP <110 and/or HR <60 90 tablet 2    nitroglycerin (NITROSTAT) 0.4 MG SL tablet 1 under the tongue as needed for angina, may repeat q5mins for up three doses 100 tablet 11    pantoprazole (PROTONIX) 40 MG EC tablet Take 1 tablet by mouth Daily. 30 tablet 5    tamsulosin (Flomax) 0.4 MG capsule 24 hr capsule Take 1 capsule by mouth Every Night. 30 capsule 5    triamcinolone (KENALOG) 0.1 % cream Apply 1 application  topically to the appropriate area as directed 2 (Two) Times a Day. 80 g 3    vitamin D (ERGOCALCIFEROL) 1.25 MG (07783 UT) capsule capsule Take 1 capsule by mouth 1 (One) Time Per Week. 4 capsule 5     No current facility-administered medications for this visit.         ASSESSMENT/PLAN:      Scott is a 48 y.o. male contacted today regarding refills of  Humira specialty medication(s).    Reviewed and verified with patient:       Specialty medication(s) and dose(s) confirmed: yes    Refill Questions      Flowsheet Row Most Recent Value   Changes to allergies? No   Changes to medications? No   New conditions since last clinic visit No   Unplanned office visit, urgent care, ED, or hospital admission in the last 4 weeks  No   How does patient/caregiver feel medication is working? Very good   Financial problems or insurance changes  No   Since  the previous refill, were any specialty medication doses or scheduled injections missed or delayed?  No   Does this patient require a clinical escalation to a pharmacist? No                       Follow-up: 30 day(s)     Deborah Correia Pharmacy Technician  Specialty Pharmacy Technician

## 2023-09-05 ENCOUNTER — OFFICE VISIT (OUTPATIENT)
Dept: GASTROENTEROLOGY | Facility: CLINIC | Age: 48
End: 2023-09-05
Payer: MEDICAID

## 2023-09-05 VITALS
HEIGHT: 74 IN | SYSTOLIC BLOOD PRESSURE: 124 MMHG | WEIGHT: 196 LBS | HEART RATE: 71 BPM | BODY MASS INDEX: 25.15 KG/M2 | DIASTOLIC BLOOD PRESSURE: 84 MMHG

## 2023-09-05 DIAGNOSIS — K50.00 CROHN'S DISEASE OF SMALL INTESTINE WITHOUT COMPLICATION: Primary | ICD-10-CM

## 2023-09-05 DIAGNOSIS — K21.9 GASTROESOPHAGEAL REFLUX DISEASE, UNSPECIFIED WHETHER ESOPHAGITIS PRESENT: ICD-10-CM

## 2023-09-05 RX ORDER — LANSOPRAZOLE 30 MG/1
30 CAPSULE, DELAYED RELEASE ORAL DAILY
Qty: 90 CAPSULE | Refills: 3 | Status: SHIPPED | OUTPATIENT
Start: 2023-09-05

## 2023-09-05 NOTE — PROGRESS NOTES
"DATE:  9/5/2023    DIAGNOSIS: Crohn's disease    CHIEF COMPLAINT:  Chief Complaint   Patient presents with    Crohn's Disease     HPI: Joselin MORGAN  Mr. Montes presents today for follow up. Patient has been following with Akira Nettles PA-C and was last seen in April 2023. Since his last visit, overall, he has been doing well. He remains on Humira and mesalamine which he is tolerating well. At present he reports his bowels are moving daily. He will have occasional diarrhea and takes colestid as needed. Denies having bloody stool or abdominal pain. Repeat fecal calprotectin from April showed significant improvement, now 166. He has recently noticed burning in his chest/throat ~4-5 times/week. He has noticed spicy, greasy and acidic foods make his symptoms worse. He also says he stomach will sometimes \"go crazy\" after eating.  He retains his gallbladder. He also has occasional nausea without vomiting. He has no other complaints today.       Interval History:  Patient reports that he has been doing fairly well since he was last seen in clinic.  He continues to take Humira as well as mesalamine with good symptom relief from Crohn's disease.  He states that he does continue to have bad days however they are hit or miss.  He states that his bowels move daily and are typically type IV-V on the [place] stool scale.  He will have occasional issues with diarrhea but not frequent.  He had labs performed at his last visit roughly 3 months ago that revealed a normal CRP.  He was also placed on Protonix 40 mg once daily which she has not been able to take due to issues with stomach upset.  He continues to have heartburn and reflux several times weekly.    The following portions of the patient's history were reviewed and updated as appropriate: allergies, current medications, past family history, past medical history, past social history, past surgical history and problem list.  REVIEW OF SYSTEMS:   Review of Systems "   Constitutional:  Positive for unexpected weight change.   HENT:  Negative for sore throat and trouble swallowing.    Eyes: Negative.    Respiratory:  Negative for chest tightness.    Cardiovascular:  Negative for chest pain.   Gastrointestinal:  Positive for abdominal distention, abdominal pain, constipation and diarrhea. Negative for anal bleeding, blood in stool, nausea, rectal pain and vomiting.   Endocrine: Negative.    Genitourinary:  Positive for decreased urine volume, difficulty urinating and urgency.   Musculoskeletal:  Positive for back pain. Negative for neck pain.   Skin: Negative.    Allergic/Immunologic: Negative for environmental allergies and food allergies.   Neurological:  Negative for dizziness and headaches.   Hematological:  Bruises/bleeds easily.   Psychiatric/Behavioral:  Positive for sleep disturbance. Negative for agitation. The patient is not nervous/anxious.      PAST MEDICAL HISTORY:  Past Medical History:   Diagnosis Date    Anemia     Arthritis     Elevated cholesterol     Heart attack     Low back pain        PAST SURGICAL HISTORY:  Past Surgical History:   Procedure Laterality Date    CARDIAC CATHETERIZATION N/A 9/6/2022    Procedure: Left Heart Cath;  Surgeon: Matthew Hill MD;  Location: [facility] INVASIVE LOCATION;  Service: Cardiovascular;  Laterality: N/A;    CARDIAC CATHETERIZATION N/A 9/6/2022    Procedure: Percutaneous Coronary Intervention;  Surgeon: Matthew Hill MD;  Location: [facility] INVASIVE LOCATION;  Service: Cardiovascular;  Laterality: N/A;    COLONOSCOPY N/A 7/12/2022    Procedure: COLONOSCOPY;  Surgeon: Stella Aparicio MD;  Location: [facility] OR;  Service: Gastroenterology;  Laterality: N/A;    CYST REMOVAL      VASECTOMY         SOCIAL HISTORY:  Social History     Socioeconomic History    Marital status:    Tobacco Use    Smoking status: Former     Packs/day: 2.00     Years: 35.00     Pack years: 70.00      Types: Cigarettes     Quit date: 2022     Years since quittin.9     Passive exposure: Past    Smokeless tobacco: Never    Tobacco comments:     Smoking cessation encouraged   Vaping Use    Vaping Use: Every day    Substances: Nicotine    Devices: Disposable   Substance and Sexual Activity    Alcohol use: No    Drug use: No    Sexual activity: Defer       FAMILY HISTORY:  Family History   Problem Relation Age of Onset    Colon cancer Mother     Cancer Father        MEDICATIONS:      Current Outpatient Medications:     Adalimumab (Humira Pen) 40 MG/0.4ML Pen-injector Kit, Inject 40 mg (0.4 ml) under the skin Every 14 (Fourteen) Days., Disp: 2 each, Rfl: 5    aspirin 81 MG EC tablet, Take 1 tablet by mouth Daily., Disp: 90 tablet, Rfl: 2    atorvastatin (LIPITOR) 80 MG tablet, Take 1 tablet by mouth Every Night., Disp: 90 tablet, Rfl: 2    Brilinta 90 MG tablet tablet, TAKE ONE TABLET BY MOUTH TWICE A DAY, Disp: 60 tablet, Rfl: 2    colestipol (COLESTID) 1 g tablet, Take 1 tablet by mouth Daily., Disp: 90 tablet, Rfl: 2    cyclobenzaprine (FLEXERIL) 5 MG tablet, TAKE ONE (1) TABLET BY MOUTH THREE (3) (THREE) TIMES A DAY AS NEEDED FOR MUSCLE SPASMS., Disp: 90 tablet, Rfl: 5    isosorbide mononitrate (IMDUR) 30 MG 24 hr tablet, Take 1 tablet by mouth Daily. Hold for SBP <110, Disp: 90 tablet, Rfl: 2    lisinopril (PRINIVIL,ZESTRIL) 10 MG tablet, TAKE ONE TABLET BY MOUTH ONCE DAILY, Disp: 90 tablet, Rfl: 3    mesalamine (LIALDA) 1.2 g EC tablet, Take 1 tablet by mouth Daily With Breakfast., Disp: 90 tablet, Rfl: 3    metoprolol succinate XL (TOPROL-XL) 25 MG 24 hr tablet, Take 0.5 tablets by mouth Daily. Hold for SBP <110 and/or HR <60, Disp: 90 tablet, Rfl: 2    nitroglycerin (NITROSTAT) 0.4 MG SL tablet, 1 under the tongue as needed for angina, may repeat q5mins for up three doses, Disp: 100 tablet, Rfl: 11    tamsulosin (Flomax) 0.4 MG capsule 24 hr capsule, Take 1 capsule by mouth Every Night., Disp: 30  "capsule, Rfl: 5    triamcinolone (KENALOG) 0.1 % cream, Apply 1 application  topically to the appropriate area as directed 2 (Two) Times a Day., Disp: 80 g, Rfl: 3    vitamin D (ERGOCALCIFEROL) 1.25 MG (62512 UT) capsule capsule, Take 1 capsule by mouth 1 (One) Time Per Week., Disp: 4 capsule, Rfl: 5    lansoprazole (PREVACID) 30 MG capsule, Take 1 capsule by mouth Daily., Disp: 90 capsule, Rfl: 3    ALLERGIES:  No Known Allergies    VIST VITALS/PHYSICAL EXAM:  /84   Pulse 71   Ht 188 cm (74\")   Wt 88.9 kg (196 lb)   BMI 25.16 kg/m²   Physical Exam  Constitutional:       General: He is not in acute distress.     Appearance: Normal appearance. He is well-developed.   HENT:      Head: Normocephalic and atraumatic.   Eyes:      Pupils: Pupils are equal, round, and reactive to light.   Pulmonary:      Effort: Pulmonary effort is normal. No respiratory distress.   Abdominal:      General: Abdomen is flat. There is no distension.      Palpations: Abdomen is soft. There is no mass.      Tenderness: There is abdominal tenderness (Generalized). There is no guarding or rebound.      Hernia: No hernia is present.   Musculoskeletal:         General: No swelling. Normal range of motion.      Cervical back: Normal range of motion.   Skin:     General: Skin is warm and dry.   Neurological:      Mental Status: He is alert and oriented to person, place, and time.   Psychiatric:         Attention and Perception: Attention normal.         Mood and Affect: Mood normal.         Speech: Speech normal.         Behavior: Behavior normal. Behavior is cooperative.         Thought Content: Thought content normal.         PATHOLOGY:  TISSUE EXAM, [facility] ([facility]) (07/12/2022 11:16)       ENDOSCOPY:  COLONOSCOPY (07/12/2022 11:05)       IMAGING:  No Images in the past 120 days found..        RECENT LABS:  Lab Results   Component Value Date    WBC 8.41 06/02/2023    HGB 13.8 06/02/2023    HCT 41.8 06/02/2023    MCV 89.3 " 06/02/2023    RDW 12.7 06/02/2023     06/02/2023    NEUTRORELPCT 59.1 06/02/2023    LYMPHORELPCT 28.2 06/02/2023    MONORELPCT 9.9 06/02/2023    EOSRELPCT 1.4 06/02/2023    BASORELPCT 1.0 06/02/2023    NEUTROABS 4.98 06/02/2023    LYMPHSABS 2.37 06/02/2023       Lab Results   Component Value Date     06/02/2023    K 4.4 06/02/2023    CO2 23.0 06/02/2023     06/02/2023    BUN 16 06/02/2023    CREATININE 0.91 06/02/2023    EGFRIFNONA 98 03/25/2021    GLUCOSE 88 06/02/2023    CALCIUM 9.3 06/02/2023    ALKPHOS 116 06/02/2023    AST 20 06/02/2023    ALT 41 06/02/2023    BILITOT 1.0 06/02/2023    ALBUMIN 4.4 06/02/2023    PROTEINTOT 6.9 06/02/2023             ASSESSMENT & PLAN:  Patient is currently doing well on current regimen of Humira and mesalamine therefore he will continue taking as prescribed.  His blood work from last visit was reviewed with him.  We discussed performing repeat labs as well as for Competact and level at next appointment in April 2024.  Patient will be due for a colonoscopy around that time and will be scheduled.  He was instructed to contact the clinic if he has any issues arise.   Diagnosis Plan   1. Crohn's disease of small intestine without complication        2. Gastroesophageal reflux disease, unspecified whether esophagitis present  lansoprazole (PREVACID) 30 MG capsule          Return in about 6 months (around 3/5/2024).        Electronically Signed by: Akira Nettles PA-C , September 5, 2023 09:37 EDT       CC:   Asuncion Keyes APRN

## 2023-09-06 ENCOUNTER — HOSPITAL ENCOUNTER (OUTPATIENT)
Dept: ULTRASOUND IMAGING | Facility: HOSPITAL | Age: 48
Discharge: HOME | End: 2023-09-06
Admitting: NURSE PRACTITIONER
Payer: MEDICAID

## 2023-09-06 DIAGNOSIS — R10.9 PAIN IN ABDOMEN ON PALPATION: ICD-10-CM

## 2023-09-06 DIAGNOSIS — K51.80 OTHER ULCERATIVE COLITIS WITHOUT COMPLICATION: ICD-10-CM

## 2023-09-06 PROCEDURE — 76700 US EXAM ABDOM COMPLETE: CPT

## 2023-09-06 NOTE — PROGRESS NOTES
He has some stones in his gallbladder.  We can do a HIDA scan to see what the function and output is.

## 2023-09-07 ENCOUNTER — OFFICE VISIT (OUTPATIENT)
Dept: FAMILY MEDICINE CLINIC | Facility: CLINIC | Age: 48
End: 2023-09-07
Payer: MEDICAID

## 2023-09-07 VITALS
HEART RATE: 74 BPM | OXYGEN SATURATION: 97 % | DIASTOLIC BLOOD PRESSURE: 80 MMHG | BODY MASS INDEX: 25.03 KG/M2 | TEMPERATURE: 97.5 F | HEIGHT: 74 IN | SYSTOLIC BLOOD PRESSURE: 106 MMHG | WEIGHT: 195 LBS

## 2023-09-07 DIAGNOSIS — K80.20 CALCULUS OF GALLBLADDER WITHOUT CHOLECYSTITIS WITHOUT OBSTRUCTION: Primary | ICD-10-CM

## 2023-09-07 PROCEDURE — 99213 OFFICE O/P EST LOW 20 MIN: CPT | Performed by: NURSE PRACTITIONER

## 2023-09-07 PROCEDURE — 1159F MED LIST DOCD IN RCRD: CPT | Performed by: NURSE PRACTITIONER

## 2023-09-07 PROCEDURE — 3074F SYST BP LT 130 MM HG: CPT | Performed by: NURSE PRACTITIONER

## 2023-09-07 PROCEDURE — 3079F DIAST BP 80-89 MM HG: CPT | Performed by: NURSE PRACTITIONER

## 2023-09-07 PROCEDURE — 1160F RVW MEDS BY RX/DR IN RCRD: CPT | Performed by: NURSE PRACTITIONER

## 2023-09-07 NOTE — PROGRESS NOTES
"Subjective   Scott Montes is a 48 y.o. male.     Chief Complaint   Patient presents with    Hypertension       History of Present Illness     Abdomen Pain-recent US of abodmen due to persistent abdomen pain.  Ultrasound did show cholelithiasis but no other abnormalities.  Liver pancreas and kidneys are all unremarkable.  He was changed from Protonix to Prevacid.  Bowel consistency continues to vary.  He reports that nausea is more intermittent and abdomen pain is also intermittent.      The following portions of the patient's history were reviewed and updated as appropriate: CC, ROS, allergies, current medications, past family history, past medical history, past social history, past surgical history and problem list.      Review of Systems   Constitutional:  Positive for appetite change (still some decrease but is improving) and fatigue. Negative for unexpected weight gain and unexpected weight loss.   HENT:  Negative for congestion, ear pain, postnasal drip, rhinorrhea, sore throat, swollen glands, trouble swallowing and voice change.    Eyes: Negative.    Respiratory:  Negative for cough, chest tightness, shortness of breath and wheezing.    Cardiovascular:  Negative for chest pain and palpitations.   Gastrointestinal:  Positive for abdominal pain (more intermittent than last visit). Negative for blood in stool, constipation, diarrhea, nausea and indigestion.   Genitourinary:  Negative for dysuria, hematuria and urgency.   Musculoskeletal:  Positive for arthralgias and myalgias. Negative for back pain.   Skin:  Negative for color change and skin lesions.   Allergic/Immunologic: Negative.    Neurological:  Negative for dizziness, numbness, headache and confusion.   Hematological: Negative.    Psychiatric/Behavioral:  Negative for sleep disturbance and suicidal ideas.    All other systems reviewed and are negative.    Objective     /80   Pulse 74   Temp 97.5 °F (36.4 °C) (Temporal)   Ht 188 cm (74\")   Wt " 88.5 kg (195 lb)   SpO2 97%   BMI 25.04 kg/m²     Physical Exam  Vitals reviewed.   Constitutional:       General: He is not in acute distress.     Appearance: He is well-developed. He is not diaphoretic.   HENT:      Head: Normocephalic and atraumatic.      Jaw: No tenderness.      Right Ear: Hearing, tympanic membrane, ear canal and external ear normal.      Left Ear: Hearing, tympanic membrane, ear canal and external ear normal.      Nose: Nose normal. No nasal tenderness or congestion.      Right Sinus: No maxillary sinus tenderness or frontal sinus tenderness.      Left Sinus: No maxillary sinus tenderness or frontal sinus tenderness.      Mouth/Throat:      Lips: Pink.      Mouth: Mucous membranes are moist.      Pharynx: Oropharynx is clear. Uvula midline.   Eyes:      General: Lids are normal. No scleral icterus.     Extraocular Movements:      Right eye: Normal extraocular motion and no nystagmus.      Left eye: Normal extraocular motion and no nystagmus.      Conjunctiva/sclera: Conjunctivae normal.      Pupils: Pupils are equal, round, and reactive to light.   Neck:      Thyroid: No thyromegaly or thyroid tenderness.      Vascular: No carotid bruit or JVD.      Trachea: No tracheal tenderness.   Cardiovascular:      Rate and Rhythm: Normal rate and regular rhythm.      Pulses:           Dorsalis pedis pulses are 2+ on the right side and 2+ on the left side.        Posterior tibial pulses are 2+ on the right side and 2+ on the left side.      Heart sounds: Normal heart sounds, S1 normal and S2 normal. No murmur heard.  Pulmonary:      Effort: Pulmonary effort is normal. No accessory muscle usage, prolonged expiration or respiratory distress.      Breath sounds: Normal breath sounds.   Chest:      Chest wall: No tenderness.   Abdominal:      General: Bowel sounds are normal. There is no distension.      Palpations: Abdomen is soft. There is no hepatomegaly, splenomegaly or mass.      Tenderness: There is  generalized abdominal tenderness.   Musculoskeletal:         General: Tenderness (generalized areas of stiffness) present.      Cervical back: Normal range of motion and neck supple.      Right lower leg: No edema.      Left lower leg: No edema.      Comments: No muscular atrophy or flaccidity.   Lymphadenopathy:      Head:      Right side of head: No submental or submandibular adenopathy.      Left side of head: No submental or submandibular adenopathy.      Cervical: No cervical adenopathy.      Right cervical: No superficial cervical adenopathy.     Left cervical: No superficial cervical adenopathy.   Skin:     General: Skin is warm and dry.      Capillary Refill: Capillary refill takes less than 2 seconds.      Coloration: Skin is not jaundiced or pale.      Findings: No erythema.      Nails: There is no clubbing.   Neurological:      Mental Status: He is alert and oriented to person, place, and time.      Cranial Nerves: No cranial nerve deficit or facial asymmetry.      Sensory: No sensory deficit.      Motor: No weakness, tremor, atrophy or abnormal muscle tone.      Coordination: Coordination normal.      Deep Tendon Reflexes: Reflexes are normal and symmetric.   Psychiatric:         Attention and Perception: He is attentive.         Mood and Affect: Mood normal. Mood is not anxious or depressed.         Speech: Speech normal.         Behavior: Behavior normal. Behavior is cooperative.         Thought Content: Thought content normal.         Cognition and Memory: Cognition normal.         Judgment: Judgment normal.       Diagnoses and all orders for this visit:    1. Calculus of gallbladder without cholecystitis without obstruction (Primary)  -     NM HIDA SCAN WITH PHARMACOLOGICAL INTERVENTION       Reviewed US from 09/06/2023.  Will plan for HIDA scan.     Understands disease processes and need for medications.  Understands reasons for urgent and emergent care.  Patient (& family) verbalized agreement for  treatment plan.   Emotional support and active listening provided.  Patient provided time to verbalize feelings.    Will plan for follow up if warrented and/or general surgery referral if needed.           This document has been electronically signed by:  EDDIE Thurman FNP-C Dragon disclaimer:  Part of this note may be an electronic transcription/translation of spoken language to printed text using the Dragon Dictation System.

## 2023-09-19 DIAGNOSIS — M15.9 PRIMARY OSTEOARTHRITIS INVOLVING MULTIPLE JOINTS: ICD-10-CM

## 2023-09-19 RX ORDER — CYCLOBENZAPRINE HCL 5 MG
TABLET ORAL
Qty: 90 TABLET | Refills: 5 | Status: SHIPPED | OUTPATIENT
Start: 2023-09-19

## 2023-09-28 ENCOUNTER — SPECIALTY PHARMACY (OUTPATIENT)
Dept: PHARMACY | Facility: HOSPITAL | Age: 48
End: 2023-09-28
Payer: MEDICAID

## 2023-09-28 NOTE — PROGRESS NOTES
Specialty Pharmacy Refill Coordination Note     Scott is a 48 y.o. male contacted today regarding refills of  Humira specialty medication(s).    Reviewed and verified with patient:       Specialty medication(s) and dose(s) confirmed: yes    Refill Questions      Flowsheet Row Most Recent Value   Changes to allergies? No   Changes to medications? No   New conditions since last clinic visit No   Unplanned office visit, urgent care, ED, or hospital admission in the last 4 weeks  No   How does patient/caregiver feel medication is working? Very good   Financial problems or insurance changes  No   Since the previous refill, were any specialty medication doses or scheduled injections missed or delayed?  No   Does this patient require a clinical escalation to a pharmacist? No                       Follow-up: 30 day(s)     Deborah Correia, Pharmacy Technician  Specialty Pharmacy Technician

## 2023-10-04 ENCOUNTER — TELEPHONE (OUTPATIENT)
Dept: CARDIOLOGY | Facility: CLINIC | Age: 48
End: 2023-10-04

## 2023-10-04 NOTE — TELEPHONE ENCOUNTER
Caller: Jeanette Montes    Relationship to patient: Emergency Contact    Best call back number: [phone]    Chief complaint:     Type of visit: FOLLOW UP    Requested date: SOON     If rescheduling, when is the original appointment: NONE     Additional notes:9.8.2023 WAS CANCELED HUB COULD NOT GET A CURRENT DATE TO RESCHEDULE ON BOOK IT. PLEASE CALL THE ABOVE TO SCHEDULE

## 2023-10-05 ENCOUNTER — LAB (OUTPATIENT)
Dept: LAB | Facility: HOSPITAL | Age: 48
End: 2023-10-05
Payer: MEDICAID

## 2023-10-05 ENCOUNTER — OFFICE VISIT (OUTPATIENT)
Dept: CARDIOLOGY | Facility: CLINIC | Age: 48
End: 2023-10-05
Payer: MEDICAID

## 2023-10-05 VITALS
DIASTOLIC BLOOD PRESSURE: 85 MMHG | WEIGHT: 198 LBS | HEIGHT: 74 IN | OXYGEN SATURATION: 100 % | BODY MASS INDEX: 25.41 KG/M2 | HEART RATE: 79 BPM | SYSTOLIC BLOOD PRESSURE: 126 MMHG

## 2023-10-05 DIAGNOSIS — E78.5 DYSLIPIDEMIA, GOAL LDL BELOW 70: Chronic | ICD-10-CM

## 2023-10-05 DIAGNOSIS — I25.10 CORONARY ARTERY DISEASE INVOLVING NATIVE CORONARY ARTERY OF NATIVE HEART WITHOUT ANGINA PECTORIS: Chronic | ICD-10-CM

## 2023-10-05 DIAGNOSIS — I10 ESSENTIAL HYPERTENSION: Chronic | ICD-10-CM

## 2023-10-05 PROBLEM — I21.3 STEMI (ST ELEVATION MYOCARDIAL INFARCTION): Status: RESOLVED | Noted: 2022-09-06 | Resolved: 2023-10-05

## 2023-10-05 PROBLEM — Z76.89 RETURN TO WORK EVALUATION: Status: RESOLVED | Noted: 2022-12-26 | Resolved: 2023-10-05

## 2023-10-05 LAB
ALBUMIN SERPL-MCNC: 4.5 G/DL (ref 3.5–5.2)
ALBUMIN/GLOB SERPL: 1.7 G/DL
ALP SERPL-CCNC: 117 U/L (ref 39–117)
ALT SERPL W P-5'-P-CCNC: 30 U/L (ref 1–41)
ANION GAP SERPL CALCULATED.3IONS-SCNC: 10.6 MMOL/L (ref 5–15)
AST SERPL-CCNC: 19 U/L (ref 1–40)
BILIRUB SERPL-MCNC: 0.8 MG/DL (ref 0–1.2)
BUN SERPL-MCNC: 10 MG/DL (ref 6–20)
BUN/CREAT SERPL: 13.3 (ref 7–25)
CALCIUM SPEC-SCNC: 9.3 MG/DL (ref 8.6–10.5)
CHLORIDE SERPL-SCNC: 104 MMOL/L (ref 98–107)
CHOLEST SERPL-MCNC: 133 MG/DL (ref 0–200)
CO2 SERPL-SCNC: 24.4 MMOL/L (ref 22–29)
CREAT SERPL-MCNC: 0.75 MG/DL (ref 0.76–1.27)
DEPRECATED RDW RBC AUTO: 44.1 FL (ref 37–54)
EGFRCR SERPLBLD CKD-EPI 2021: 110.6 ML/MIN/1.73
ERYTHROCYTE [DISTWIDTH] IN BLOOD BY AUTOMATED COUNT: 13.7 % (ref 12.3–15.4)
GLOBULIN UR ELPH-MCNC: 2.6 GM/DL
GLUCOSE SERPL-MCNC: 82 MG/DL (ref 65–99)
HCT VFR BLD AUTO: 43 % (ref 37.5–51)
HDLC SERPL-MCNC: 36 MG/DL (ref 40–60)
HGB BLD-MCNC: 14.3 G/DL (ref 13–17.7)
LDLC SERPL CALC-MCNC: 76 MG/DL (ref 0–100)
LDLC/HDLC SERPL: 2.06 {RATIO}
MCH RBC QN AUTO: 29.7 PG (ref 26.6–33)
MCHC RBC AUTO-ENTMCNC: 33.3 G/DL (ref 31.5–35.7)
MCV RBC AUTO: 89.2 FL (ref 79–97)
PLATELET # BLD AUTO: 333 10*3/MM3 (ref 140–450)
PMV BLD AUTO: 11 FL (ref 6–12)
POTASSIUM SERPL-SCNC: 4.2 MMOL/L (ref 3.5–5.2)
PROT SERPL-MCNC: 7.1 G/DL (ref 6–8.5)
RBC # BLD AUTO: 4.82 10*6/MM3 (ref 4.14–5.8)
SODIUM SERPL-SCNC: 139 MMOL/L (ref 136–145)
TRIGL SERPL-MCNC: 115 MG/DL (ref 0–150)
VLDLC SERPL-MCNC: 21 MG/DL (ref 5–40)
WBC NRBC COR # BLD: 10.86 10*3/MM3 (ref 3.4–10.8)

## 2023-10-05 PROCEDURE — 80061 LIPID PANEL: CPT

## 2023-10-05 PROCEDURE — 36415 COLL VENOUS BLD VENIPUNCTURE: CPT

## 2023-10-05 PROCEDURE — 85027 COMPLETE CBC AUTOMATED: CPT

## 2023-10-05 PROCEDURE — 80053 COMPREHEN METABOLIC PANEL: CPT

## 2023-10-05 RX ORDER — METOPROLOL SUCCINATE 25 MG/1
12.5 TABLET, EXTENDED RELEASE ORAL DAILY
Qty: 90 TABLET | Refills: 2 | Status: SHIPPED | OUTPATIENT
Start: 2023-10-05

## 2023-10-05 RX ORDER — LISINOPRIL 10 MG/1
10 TABLET ORAL DAILY
Qty: 90 TABLET | Refills: 3 | Status: SHIPPED | OUTPATIENT
Start: 2023-10-05 | End: 2024-09-29

## 2023-10-05 RX ORDER — ISOSORBIDE MONONITRATE 30 MG/1
30 TABLET, EXTENDED RELEASE ORAL DAILY
Qty: 90 TABLET | Refills: 2 | Status: SHIPPED | OUTPATIENT
Start: 2023-10-05

## 2023-10-05 RX ORDER — ATORVASTATIN CALCIUM 80 MG/1
80 TABLET, FILM COATED ORAL NIGHTLY
Qty: 90 TABLET | Refills: 2 | Status: SHIPPED | OUTPATIENT
Start: 2023-10-05

## 2023-10-05 NOTE — PROGRESS NOTES
"Chief Complaint  Follow-up (Intermittent CP, SOA,chronic fatigue) and Med Review (Concerns Brilinta may be causing SOA.)    Subjective          Scott Montes presents to [facility] CARDIOLOGY for follow up.    History of Present Illness  Patient presents for routine follow-up of coronary artery disease status post stent.  His last visit to the clinic was 02/20/2023 with EDDIE Canales.  At that time there were no changes to his medications.    He reports that he continues to have intermittent brief chest discomfort approximately once a week.  It can happen with activity or at rest.  It lasts only seconds and does not cause nausea or diaphoresis.  He denies having palpitations or swelling in his legs.  He has not used any of the nitroglycerin that was prescribed to him.  He does not want to increase or add medications for this symptom.    He is due to recheck lipids and is agreeable to do that today.  His blood pressure is at goal.  He is compliant with his medications.  As it has been more than 1 year since his stent placement, will reduce Brilinta to 60 mg.  Tobacco Use: Medium Risk    Smoking Tobacco Use: Former    Smokeless Tobacco Use: Never    Passive Exposure: Past       Objective     Vital Signs:   /85   Pulse 79   Ht 188 cm (74\")   Wt 89.8 kg (198 lb)   SpO2 100%   BMI 25.42 kg/m²       Physical Exam  Vitals and nursing note reviewed.   Constitutional:       General: He is not in acute distress.     Appearance: Normal appearance.   Neck:      Vascular: No carotid bruit.   Cardiovascular:      Rate and Rhythm: Normal rate and regular rhythm.      Heart sounds: No murmur heard.  Pulmonary:      Effort: Pulmonary effort is normal.      Breath sounds: Normal breath sounds.   Musculoskeletal:      Right lower leg: No edema.      Left lower leg: No edema.   Skin:     General: Skin is warm and dry.   Neurological:      General: No focal deficit present.      Mental Status: He is " alert.   Psychiatric:         Mood and Affect: Mood normal.        Result Review :   The following data was reviewed by: EDDIE Stout on 10/05/2023:  Common labs          2/14/2023    09:08 3/2/2023    14:35 6/2/2023    09:54   Common Labs   Glucose 89   88    BUN 11   16    Creatinine 0.88   0.91    Sodium 142   140    Potassium 4.3   4.4    Chloride 105   104    Calcium 9.3   9.3    Albumin 4.5   4.4    Total Bilirubin 0.9   1.0    Alkaline Phosphatase 112   116    AST (SGOT) 24   20    ALT (SGPT) 41   41    WBC 7.90  10.64  8.41    Hemoglobin 14.1  14.3  13.8    Hematocrit 41.2  43.2  41.8    Platelets 258  299  294    Total Cholesterol 112      Triglycerides 91      HDL Cholesterol 40      LDL Cholesterol  54      Hemoglobin A1C 5.20            Data reviewed : Cardiology studies as detailed below      Last Cardiac Cath 09/06/2022 Cardiac Catheterization/Vascular Study (09/06/2022 12:19)   Final Impression:  % proximal RCA occlusion s/ pPCI with 3.5 x 28 BUZZ post dilated with 4.0 NC balloon    Last Echo 09/07/2022Adult Transthoracic Echo Complete W/ Cont if Necessary Per Protocol (09/07/2022 13:12)   Interpretation Summary  Left ventricular ejection fraction appears to be 51 - 55%. Left ventricular systolic function is normal, mild hypokinesia of basal inferolateral wall  Left ventricular diastolic function was normal.  There is no evidence of pericardial effusion        ECG 12 Lead    Date/Time: 10/5/2023 10:58 AM  Performed by: Ilnaa Diehl APRN  Authorized by: Ilana Diehl APRN   Comparison: compared with previous ECG from 10/3/2022  Comparison to previous ECG: Sinus rhythm at 80 bpm with T wave inversions in II, III, aVF, V5 and V6  Rhythm: sinus rhythm  Rate: normal  BPM: 73  Conduction: conduction normal  QRS axis: normal    Clinical impression: normal ECG         Current Outpatient Medications   Medication Sig Dispense Refill    Adalimumab (Humira Pen) 40 MG/0.4ML Pen-injector Kit  Inject 40 mg (0.4 ml) under the skin Every 14 (Fourteen) Days. 2 each 5    aspirin 81 MG EC tablet Take 1 tablet by mouth Daily. 90 tablet 2    atorvastatin (LIPITOR) 80 MG tablet Take 1 tablet by mouth Every Night. 90 tablet 2    colestipol (COLESTID) 1 g tablet Take 1 tablet by mouth Daily. 90 tablet 2    cyclobenzaprine (FLEXERIL) 5 MG tablet TAKE ONE (1) TABLET BY MOUTH THREE TIMES DAILY AS NEEDED FOR MUSCLE SPASMS. 90 tablet 5    isosorbide mononitrate (IMDUR) 30 MG 24 hr tablet Take 1 tablet by mouth Daily. Hold for SBP <110 90 tablet 2    lansoprazole (PREVACID) 30 MG capsule Take 1 capsule by mouth Daily. 90 capsule 3    lisinopril (PRINIVIL,ZESTRIL) 10 MG tablet Take 1 tablet by mouth Daily for 360 days. 90 tablet 3    mesalamine (LIALDA) 1.2 g EC tablet Take 1 tablet by mouth Daily With Breakfast. 90 tablet 3    metoprolol succinate XL (TOPROL-XL) 25 MG 24 hr tablet Take 0.5 tablets by mouth Daily. Hold for SBP <110 and/or HR <60 90 tablet 2    nitroglycerin (NITROSTAT) 0.4 MG SL tablet 1 under the tongue as needed for angina, may repeat q5mins for up three doses 100 tablet 11    tamsulosin (Flomax) 0.4 MG capsule 24 hr capsule Take 1 capsule by mouth Every Night. 30 capsule 5    ticagrelor (Brilinta) 60 MG tablet tablet Take 1 tablet by mouth 2 (Two) Times a Day. 180 tablet 3    triamcinolone (KENALOG) 0.1 % cream Apply 1 application  topically to the appropriate area as directed 2 (Two) Times a Day. 80 g 3    vitamin D (ERGOCALCIFEROL) 1.25 MG (13393 UT) capsule capsule Take 1 capsule by mouth 1 (One) Time Per Week. 4 capsule 5     No current facility-administered medications for this visit.            Assessment and Plan    Problem List Items Addressed This Visit       Coronary artery disease involving native coronary artery of native heart without angina pectoris (Chronic)    Overview     9/6/2022 [facility] for STEMI: PCI BUZZ to the RCA  9/6/2022 TTE: LVEF 51 to 55%         Relevant Medications    ticagrelor  (Brilinta) 60 MG tablet tablet    isosorbide mononitrate (IMDUR) 30 MG 24 hr tablet    metoprolol succinate XL (TOPROL-XL) 25 MG 24 hr tablet    Other Relevant Orders    Comprehensive Metabolic Panel    Essential hypertension (Chronic)    Overview     With MI September 2022         Relevant Medications    lisinopril (PRINIVIL,ZESTRIL) 10 MG tablet    metoprolol succinate XL (TOPROL-XL) 25 MG 24 hr tablet    Dyslipidemia, goal LDL below 70 (Chronic)    Overview     19 2022 total cholesterol 161, triglycerides 97, HDL 28, and   9/7/2022 total cholesterol 174, triglycerides 178, HDL 28, and   2/14/2023 total cholesterol 112, triglycerides 91, HDL 40 and LDL 54         Relevant Medications    atorvastatin (LIPITOR) 80 MG tablet    Other Relevant Orders    CBC (No Diff)    Lipid Panel     Diagnoses and all orders for this visit:    1. Coronary artery disease involving native coronary artery of native heart without angina pectoris  -     Comprehensive Metabolic Panel; Future  -     ticagrelor (Brilinta) 60 MG tablet tablet; Take 1 tablet by mouth 2 (Two) Times a Day.  Dispense: 180 tablet; Refill: 3  -     isosorbide mononitrate (IMDUR) 30 MG 24 hr tablet; Take 1 tablet by mouth Daily. Hold for SBP <110  Dispense: 90 tablet; Refill: 2    2. Dyslipidemia, goal LDL below 70  -     CBC (No Diff); Future  -     Lipid Panel; Future  -     atorvastatin (LIPITOR) 80 MG tablet; Take 1 tablet by mouth Every Night.  Dispense: 90 tablet; Refill: 2    3. Essential hypertension  -     lisinopril (PRINIVIL,ZESTRIL) 10 MG tablet; Take 1 tablet by mouth Daily for 360 days.  Dispense: 90 tablet; Refill: 3  -     metoprolol succinate XL (TOPROL-XL) 25 MG 24 hr tablet; Take 0.5 tablets by mouth Daily. Hold for SBP <110 and/or HR <60  Dispense: 90 tablet; Refill: 2            Follow Up     Return in about 6 months (around 4/5/2024).    Patient was given instructions and counseling regarding his condition or for health  maintenance advice. Please see specific information pulled into the AVS if appropriate.         Electronically signed by EDDIE Stout, 10/05/23, 11:07 AM EDT.    Dictated Utilizing Dragon Dictation: Part of this note may be an electronic transcription/translation of spoken language to printed text using the Dragon Dictation System

## 2023-10-06 ENCOUNTER — HOSPITAL ENCOUNTER (OUTPATIENT)
Dept: NUCLEAR MEDICINE | Facility: HOSPITAL | Age: 48
Discharge: HOME | End: 2023-10-06
Payer: MEDICAID

## 2023-10-06 PROCEDURE — 25010000002 SINCALIDE PER 5 MCG: Performed by: NURSE PRACTITIONER

## 2023-10-06 PROCEDURE — 0 TECHNETIUM TC 99M MEBROFENIN KIT: Performed by: NURSE PRACTITIONER

## 2023-10-06 PROCEDURE — A9537 TC99M MEBROFENIN: HCPCS | Performed by: NURSE PRACTITIONER

## 2023-10-06 PROCEDURE — 78227 HEPATOBIL SYST IMAGE W/DRUG: CPT

## 2023-10-06 RX ORDER — KIT FOR THE PREPARATION OF TECHNETIUM TC 99M MEBROFENIN 45 MG/10ML
1 INJECTION, POWDER, LYOPHILIZED, FOR SOLUTION INTRAVENOUS
Status: COMPLETED | OUTPATIENT
Start: 2023-10-06 | End: 2023-10-06

## 2023-10-06 RX ORDER — SINCALIDE 5 UG/5ML
0.04 INJECTION, POWDER, LYOPHILIZED, FOR SOLUTION INTRAVENOUS
Status: COMPLETED | OUTPATIENT
Start: 2023-10-06 | End: 2023-10-06

## 2023-10-06 RX ADMIN — SINCALIDE 3.6 MCG: 5 INJECTION, POWDER, LYOPHILIZED, FOR SOLUTION INTRAVENOUS at 15:14

## 2023-10-06 RX ADMIN — MEBROFENIN 1 DOSE: 45 INJECTION, POWDER, LYOPHILIZED, FOR SOLUTION INTRAVENOUS at 14:12

## 2023-10-09 DIAGNOSIS — K80.20 CALCULUS OF GALLBLADDER WITHOUT CHOLECYSTITIS WITHOUT OBSTRUCTION: Primary | ICD-10-CM

## 2023-10-09 DIAGNOSIS — K82.8 DYSFUNCTIONAL GALLBLADDER: ICD-10-CM

## 2023-10-17 DIAGNOSIS — K50.00 CROHN'S DISEASE OF SMALL INTESTINE WITHOUT COMPLICATION: ICD-10-CM

## 2023-10-17 DIAGNOSIS — R35.0 FREQUENCY OF URINATION: ICD-10-CM

## 2023-10-18 RX ORDER — TAMSULOSIN HYDROCHLORIDE 0.4 MG/1
CAPSULE ORAL
Qty: 30 CAPSULE | Refills: 5 | Status: SHIPPED | OUTPATIENT
Start: 2023-10-18

## 2023-10-18 RX ORDER — MESALAMINE 1.2 G/1
1.2 TABLET, DELAYED RELEASE ORAL
Qty: 30 TABLET | Refills: 11 | Status: SHIPPED | OUTPATIENT
Start: 2023-10-18

## 2023-10-19 ENCOUNTER — OFFICE VISIT (OUTPATIENT)
Dept: SURGERY | Facility: CLINIC | Age: 48
End: 2023-10-19
Payer: MEDICAID

## 2023-10-19 VITALS
BODY MASS INDEX: 25.54 KG/M2 | WEIGHT: 199 LBS | HEIGHT: 74 IN | SYSTOLIC BLOOD PRESSURE: 142 MMHG | DIASTOLIC BLOOD PRESSURE: 104 MMHG

## 2023-10-19 DIAGNOSIS — R10.84 GENERALIZED ABDOMINAL PAIN: Primary | ICD-10-CM

## 2023-10-20 ENCOUNTER — TELEPHONE (OUTPATIENT)
Dept: CARDIOLOGY | Facility: CLINIC | Age: 48
End: 2023-10-20

## 2023-10-20 NOTE — PROGRESS NOTES
Date of Service: 10/20/2023    Subjective   Scott Montes is a 48 y.o. male is being seen for consultation for abnormal HIDA scan today at the request of Asuncion Keyes APRN    Chief complaint: Abnormal HIDA scan    Scott Montes is a 48 y.o.  male, with Crohn's disease currently maintained on Humira every 2 weeks as well as history of a STEMI in September 2022 requiring PCI with drug-eluting stent placement, now on aspirin and Brilinta, who presents to my office due to an abnormal HIDA scan.  The patient denies abdominal pain, nausea or vomiting.  He had prior imaging that had demonstrated cholelithiasis.  HIDA scan was obtained due to the concern that if the patient were to develop cholecystitis, he would have a bad outcome given his immunosuppression, per patient.  States his bowel habits alternate but are normally 1-4 bowel movements per day  On aspirin and Brilinta.  History of STEMI in September 2022 requiring catheterization with PCI, thrombectomy and drug-eluting stent placement  For his Crohn's disease, he is on Humira injections every 2 weeks.  His next injection is 10/28    Past Medical History:   Diagnosis Date    Anemia     Arthritis     Elevated cholesterol     Heart attack     Low back pain     STEMI (ST elevation myocardial infarction) 09/06/2022       Past Surgical History:   Procedure Laterality Date    CARDIAC CATHETERIZATION N/A 9/6/2022    Procedure: Left Heart Cath;  Surgeon: Matthew Hill MD;  Location: [facility] CATH INVASIVE LOCATION;  Service: Cardiovascular;  Laterality: N/A;    CARDIAC CATHETERIZATION N/A 9/6/2022    Procedure: Percutaneous Coronary Intervention;  Surgeon: Matthew Hill MD;  Location: [facility] CATH INVASIVE LOCATION;  Service: Cardiovascular;  Laterality: N/A;    COLONOSCOPY N/A 7/12/2022    Procedure: COLONOSCOPY;  Surgeon: Stella Aparicio MD;  Location: [facility] OR;  Service: Gastroenterology;  Laterality: N/A;    CYST REMOVAL       "VASECTOMY           Family History   Problem Relation Age of Onset    Colon cancer Mother     Cancer Father          Social History     Socioeconomic History    Marital status:    Tobacco Use    Smoking status: Every Day     Packs/day: 0.50     Years: 35.00     Additional pack years: 0.00     Total pack years: 17.50     Types: Cigarettes     Passive exposure: Current    Smokeless tobacco: Never    Tobacco comments:     Smoking cessation encouraged   Vaping Use    Vaping Use: Every day    Substances: Nicotine    Devices: Disposable   Substance and Sexual Activity    Alcohol use: No    Drug use: No    Sexual activity: Defer                Review of Systems     14 point review of systems negative except for what is noted in HPI        Objective     Physical Exam:      10/19/23  1354   Weight: 90.3 kg (199 lb)    Body mass index is 25.55 kg/m².  Constitution: BP (!) 142/104 (BP Location: Left arm)   Ht 188 cm (74\")   Wt 90.3 kg (199 lb)   BMI 25.55 kg/m²  . No acute distress   Head: Normocephalic, atraumatic.   Eyes: Aligned without strabismus. Conjunctivae noninjected   Ears, Nose, Mouth: , no lesions appreciated   CV: Rhythm  and rate regular   Respiratory: Symmetric chest expansion. No respiratory distress.   Gastrointestinal:  Soft, nontender, nondistended  Skin:  No cyanosis, clubbing or edema bilaterally    Lymphatics: No abnormal cervical or supraclavicular adenopathy  Neurologic: No gross deficits.  Alert and oriented x3  Psychiatric: Normal mood        Results:    Ultrasound from September 2023 with cholelithiasis    HIDA scan with ejection fraction of 12%      Scott Montes is a 48 y.o.  male with Crohn's disease, coronary artery disease status post drug-eluting stent placement in September 2022, and biliary dyskinesia    We discussed the risks and benefits of conservative management given his minimal symptoms versus upfront cholecystectomy to avoid possible future cholecystitis.  The patient favors " cholecystectomy.  I will need cardiac clearance to hold his Brilinta for 7 days preop to mitigate risks of bleeding  We will need to time surgery 2 weeks out from a Humira injection, and skip the following injection to mitigate the risk of wound healing issues with Humira                   Gonzalo Rudolph MD  [facility]

## 2023-10-20 NOTE — H&P (VIEW-ONLY)
Date of Service: 10/20/2023    Subjective   Scott Montes is a 48 y.o. male is being seen for consultation for abnormal HIDA scan today at the request of Asuncion Keyes APRN    Chief complaint: Abnormal HIDA scan    Scott Montes is a 48 y.o.  male, with Crohn's disease currently maintained on Humira every 2 weeks as well as history of a STEMI in September 2022 requiring PCI with drug-eluting stent placement, now on aspirin and Brilinta, who presents to my office due to an abnormal HIDA scan.  The patient denies abdominal pain, nausea or vomiting.  He had prior imaging that had demonstrated cholelithiasis.  HIDA scan was obtained due to the concern that if the patient were to develop cholecystitis, he would have a bad outcome given his immunosuppression, per patient.  States his bowel habits alternate but are normally 1-4 bowel movements per day  On aspirin and Brilinta.  History of STEMI in September 2022 requiring catheterization with PCI, thrombectomy and drug-eluting stent placement  For his Crohn's disease, he is on Humira injections every 2 weeks.  His next injection is 10/28    Past Medical History:   Diagnosis Date    Anemia     Arthritis     Elevated cholesterol     Heart attack     Low back pain     STEMI (ST elevation myocardial infarction) 09/06/2022       Past Surgical History:   Procedure Laterality Date    CARDIAC CATHETERIZATION N/A 9/6/2022    Procedure: Left Heart Cath;  Surgeon: Matthew Hill MD;  Location: [facility] CATH INVASIVE LOCATION;  Service: Cardiovascular;  Laterality: N/A;    CARDIAC CATHETERIZATION N/A 9/6/2022    Procedure: Percutaneous Coronary Intervention;  Surgeon: Matthew Hill MD;  Location: [facility] CATH INVASIVE LOCATION;  Service: Cardiovascular;  Laterality: N/A;    COLONOSCOPY N/A 7/12/2022    Procedure: COLONOSCOPY;  Surgeon: Stella Aparicio MD;  Location: [facility] OR;  Service: Gastroenterology;  Laterality: N/A;    CYST REMOVAL       "VASECTOMY           Family History   Problem Relation Age of Onset    Colon cancer Mother     Cancer Father          Social History     Socioeconomic History    Marital status:    Tobacco Use    Smoking status: Every Day     Packs/day: 0.50     Years: 35.00     Additional pack years: 0.00     Total pack years: 17.50     Types: Cigarettes     Passive exposure: Current    Smokeless tobacco: Never    Tobacco comments:     Smoking cessation encouraged   Vaping Use    Vaping Use: Every day    Substances: Nicotine    Devices: Disposable   Substance and Sexual Activity    Alcohol use: No    Drug use: No    Sexual activity: Defer                Review of Systems     14 point review of systems negative except for what is noted in HPI        Objective     Physical Exam:      10/19/23  1354   Weight: 90.3 kg (199 lb)    Body mass index is 25.55 kg/m².  Constitution: BP (!) 142/104 (BP Location: Left arm)   Ht 188 cm (74\")   Wt 90.3 kg (199 lb)   BMI 25.55 kg/m²  . No acute distress   Head: Normocephalic, atraumatic.   Eyes: Aligned without strabismus. Conjunctivae noninjected   Ears, Nose, Mouth: , no lesions appreciated   CV: Rhythm  and rate regular   Respiratory: Symmetric chest expansion. No respiratory distress.   Gastrointestinal:  Soft, nontender, nondistended  Skin:  No cyanosis, clubbing or edema bilaterally    Lymphatics: No abnormal cervical or supraclavicular adenopathy  Neurologic: No gross deficits.  Alert and oriented x3  Psychiatric: Normal mood        Results:    Ultrasound from September 2023 with cholelithiasis    HIDA scan with ejection fraction of 12%      Scott Montes is a 48 y.o.  male with Crohn's disease, coronary artery disease status post drug-eluting stent placement in September 2022, and biliary dyskinesia    We discussed the risks and benefits of conservative management given his minimal symptoms versus upfront cholecystectomy to avoid possible future cholecystitis.  The patient favors " cholecystectomy.  I will need cardiac clearance to hold his Brilinta for 7 days preop to mitigate risks of bleeding  We will need to time surgery 2 weeks out from a Humira injection, and skip the following injection to mitigate the risk of wound healing issues with Humira                   Gonzalo Rudolph MD  [facility]

## 2023-10-20 NOTE — TELEPHONE ENCOUNTER
Name of person calling: DR. KRAMER OFFICE    Surgeon's name: DR. KRAMER    Type of planned surgery: ROBOTIC CHOLECYSTECTOMY     Date of planned surgery: 11.13.23    Type of anesthesia: GENERAL    Have you been experiencing chest pain or shortness of breath? N/A    Is your doctor requesting for you to stop any of your medications prior to your surgery? SABAS 7 DAYS PRIOR     Where should we fax the clearance to?    [phone]

## 2023-10-23 DIAGNOSIS — K82.8 BILIARY DYSKINESIA: Primary | ICD-10-CM

## 2023-10-23 RX ORDER — SODIUM CHLORIDE 0.9 % (FLUSH) 0.9 %
3 SYRINGE (ML) INJECTION EVERY 12 HOURS SCHEDULED
OUTPATIENT
Start: 2023-10-23

## 2023-10-23 RX ORDER — SODIUM CHLORIDE 0.9 % (FLUSH) 0.9 %
10 SYRINGE (ML) INJECTION AS NEEDED
OUTPATIENT
Start: 2023-10-23

## 2023-10-23 RX ORDER — SODIUM CHLORIDE 9 MG/ML
40 INJECTION, SOLUTION INTRAVENOUS AS NEEDED
OUTPATIENT
Start: 2023-10-23

## 2023-10-23 RX ORDER — INDOCYANINE GREEN AND WATER 25 MG
2.5 KIT INJECTION ONCE
OUTPATIENT
Start: 2023-10-23 | End: 2023-10-23

## 2023-10-24 PROBLEM — K82.8 BILIARY DYSKINESIA: Status: ACTIVE | Noted: 2023-10-23

## 2023-10-25 ENCOUNTER — SPECIALTY PHARMACY (OUTPATIENT)
Dept: PHARMACY | Facility: HOSPITAL | Age: 48
End: 2023-10-25
Payer: MEDICAID

## 2023-11-08 ENCOUNTER — TELEPHONE (OUTPATIENT)
Dept: FAMILY MEDICINE CLINIC | Facility: CLINIC | Age: 48
End: 2023-11-08

## 2023-11-08 NOTE — TELEPHONE ENCOUNTER
Caller: [facility]    Relationship:     Best call back number: [phone]     What form or medical record are you requesting: [facility] LONG TERM DISABILITY AND MEDICAL RECORDS REQUEST    Who is requesting this form or medical record from you: [facility]    How would you like to receive the form or medical records (pick-up, mail, fax): FAX    If fax, what is the fax number: [phone]      Timeframe paperwork needed: ASAP    Additional notes: [facility] WAS CALLING ON STATUS OF PAPERWORK AS THEY HAD NOT RECEIVED ANYTHING BACK YET. IT WAS SENT ON 10/12/23 AND 10/27/23. NEEDS ASAP

## 2023-11-08 NOTE — DISCHARGE INSTRUCTIONS
11/13/23  ARRIVAL TIME PER DR KRAMER'S OFFICE  TAKE the following medications the morning of surgery:  All heart or blood pressure medications    HOLD all diabetic medications the morning of surgery as ordered by physician.    Please discontinue all blood thinners and anticoagulants (except aspirin) prior to surgery as per your surgeon and cardiologist instructions.  Aspirin may be continued up to the day prior to surgery.     CHLORHEXIDINE CLOTHS GIVEN WITH INSTRUCTIONS AND FORM TO RETURN TO HOSPITAL, IF APPLICABLE.    General Instructions:  Do not eat or drink after midnight: includes water, mints, or gum. You may brush your teeth.  Dental appliances that are removable must be taken out day of surgery.  Do not smoke, chew tobacco, or drink alcohol.  Bring medications in original bottles, any inhalers and if applicable your C-PAP/BI-PAP machine.  Bring any papers given to you in the doctor's office.  Wear clean comfortable clothes and socks.  Do not wear contact lenses or make-up. Bring a case for your glasses if applicable.  Bring crutches or walker if applicable.  Leave all other valuables and jewelry at home.    If you were given a blood bank ID arm band remember to bring it with you the day of surgery.    Preventing a Surgical Site Infection:  Shower the night before surgery (unless instructed other wise) using a fresh bar of anti-bacterial soap (such as Dial) and clean washcloth. Dry with a clean towel and dress in clean clothing.  For 2 to 3 days before surgery, avoid shaving with a razor near where you will have surgery because the razor can irritate skin and make it easier to develop an infection. Ask your surgeon if you will be receiving antibiotics prior to surgery.  Make sure you, your family, and all healthcare providers clear their hands with soap and water or an alcohol-based hand  before caring for you or your wound.  If at all possible, quit smoking as many days before surgery as you  can.    Day of surgery:  Upon arrival, a Pre-op nurse and Anesthesiologist will review your health history, obtain vital signs, and answer questions you may have. The only belongings needed at this time will be your home medications and if applicable your C-PAP/BI-PAP machine. If you are staying overnight your family can leave the rest of your belongings in the car and bring them to your room later. A Pre-op nurse will start an IV and you may receive medication in preparation for surgery, including something to help you relax. Your family will be able to see you in the Pre-op area. While you are in surgery your family should notify the waiting room  if they leave the waiting room area and provide a contact phone number.    Please be aware that surgery does come with discomfort. We want to make every effort to control your discomfort so please discuss any uncontrolled symptoms with your nurse. Your doctor will most likely have prescribed pain medications.    If you are going home after surgery you will receive individualized written care instructions before being discharged. A responsible adult must drive you to and from the hospital on the day of surgery and stay with you for 24 hours.    If you are staying overnight following surgery, you will be transported to your hospital room following the recovery period.  [facility] has all private rooms.    If you have any questions please call Pre-Admission Testing at [phone].  Deductibles and co-payments are collected on the day of service. Please be prepared to pay the required co-pay, deductible or deposit on the day of service as defined by your plan.    A RESPONSIBLE PERSON MUST REMAIN IN THE WAITING ROOM DURING YOUR PROCEDURE AND A RESPONSIBLE  MUST BE AVAILABLE UPON YOUR DISCHARGE.

## 2023-11-08 NOTE — TELEPHONE ENCOUNTER
That was not sent to me.  It was sent to the GI provider.  That is why I have not seen it.  Are they wanting PCP info or the speciality Info?  I have not seen the patient since September.

## 2023-11-10 ENCOUNTER — PRE-ADMISSION TESTING (OUTPATIENT)
Dept: PREADMISSION TESTING | Facility: HOSPITAL | Age: 48
End: 2023-11-10
Payer: MEDICAID

## 2023-11-10 LAB
ANION GAP SERPL CALCULATED.3IONS-SCNC: 8.7 MMOL/L (ref 5–15)
BUN SERPL-MCNC: 10 MG/DL (ref 6–20)
BUN/CREAT SERPL: 13.7 (ref 7–25)
CALCIUM SPEC-SCNC: 9.2 MG/DL (ref 8.6–10.5)
CHLORIDE SERPL-SCNC: 106 MMOL/L (ref 98–107)
CO2 SERPL-SCNC: 25.3 MMOL/L (ref 22–29)
CREAT SERPL-MCNC: 0.73 MG/DL (ref 0.76–1.27)
DEPRECATED RDW RBC AUTO: 47.7 FL (ref 37–54)
EGFRCR SERPLBLD CKD-EPI 2021: 111.5 ML/MIN/1.73
ERYTHROCYTE [DISTWIDTH] IN BLOOD BY AUTOMATED COUNT: 14.1 % (ref 12.3–15.4)
GLUCOSE SERPL-MCNC: 86 MG/DL (ref 65–99)
HCT VFR BLD AUTO: 41 % (ref 37.5–51)
HGB BLD-MCNC: 13.2 G/DL (ref 13–17.7)
MCH RBC QN AUTO: 29.3 PG (ref 26.6–33)
MCHC RBC AUTO-ENTMCNC: 32.2 G/DL (ref 31.5–35.7)
MCV RBC AUTO: 91.1 FL (ref 79–97)
PLATELET # BLD AUTO: 293 10*3/MM3 (ref 140–450)
PMV BLD AUTO: 11.1 FL (ref 6–12)
POTASSIUM SERPL-SCNC: 3.7 MMOL/L (ref 3.5–5.2)
RBC # BLD AUTO: 4.5 10*6/MM3 (ref 4.14–5.8)
SODIUM SERPL-SCNC: 140 MMOL/L (ref 136–145)
WBC NRBC COR # BLD: 10.15 10*3/MM3 (ref 3.4–10.8)

## 2023-11-10 PROCEDURE — 85027 COMPLETE CBC AUTOMATED: CPT

## 2023-11-10 PROCEDURE — 80048 BASIC METABOLIC PNL TOTAL CA: CPT

## 2023-11-10 PROCEDURE — 36415 COLL VENOUS BLD VENIPUNCTURE: CPT

## 2023-11-13 ENCOUNTER — APPOINTMENT (OUTPATIENT)
Dept: GENERAL RADIOLOGY | Facility: HOSPITAL | Age: 48
End: 2023-11-13
Payer: MEDICAID

## 2023-11-13 ENCOUNTER — HOSPITAL ENCOUNTER (OUTPATIENT)
Facility: HOSPITAL | Age: 48
Setting detail: HOSPITAL OUTPATIENT SURGERY
Discharge: HOME | End: 2023-11-13
Attending: SURGERY | Admitting: SURGERY
Payer: MEDICAID

## 2023-11-13 ENCOUNTER — ANESTHESIA (OUTPATIENT)
Dept: PERIOP | Facility: HOSPITAL | Age: 48
End: 2023-11-13
Payer: MEDICAID

## 2023-11-13 ENCOUNTER — ANESTHESIA EVENT (OUTPATIENT)
Dept: PERIOP | Facility: HOSPITAL | Age: 48
End: 2023-11-13
Payer: MEDICAID

## 2023-11-13 VITALS
OXYGEN SATURATION: 97 % | WEIGHT: 200 LBS | TEMPERATURE: 97.2 F | RESPIRATION RATE: 18 BRPM | SYSTOLIC BLOOD PRESSURE: 116 MMHG | HEIGHT: 74 IN | BODY MASS INDEX: 25.67 KG/M2 | DIASTOLIC BLOOD PRESSURE: 83 MMHG | HEART RATE: 64 BPM

## 2023-11-13 DIAGNOSIS — K82.8 BILIARY DYSKINESIA: ICD-10-CM

## 2023-11-13 DIAGNOSIS — K80.70 CALCULUS OF GALLBLADDER AND BILE DUCT WITHOUT CHOLECYSTITIS OR OBSTRUCTION: Primary | ICD-10-CM

## 2023-11-13 PROCEDURE — 25010000002 PROPOFOL 10 MG/ML EMULSION: Performed by: NURSE ANESTHETIST, CERTIFIED REGISTERED

## 2023-11-13 PROCEDURE — 25010000002 KETOROLAC TROMETHAMINE PER 15 MG: Performed by: NURSE ANESTHETIST, CERTIFIED REGISTERED

## 2023-11-13 PROCEDURE — 47562 LAPAROSCOPIC CHOLECYSTECTOMY: CPT | Performed by: SURGERY

## 2023-11-13 PROCEDURE — 25810000003 LACTATED RINGERS PER 1000 ML: Performed by: NURSE ANESTHETIST, CERTIFIED REGISTERED

## 2023-11-13 PROCEDURE — 25010000002 INDOCYANINE GREEN 25 MG RECONSTITUTED SOLUTION: Performed by: SURGERY

## 2023-11-13 PROCEDURE — 25010000002 FENTANYL CITRATE (PF) 50 MCG/ML SOLUTION: Performed by: NURSE ANESTHETIST, CERTIFIED REGISTERED

## 2023-11-13 PROCEDURE — 25010000002 ONDANSETRON PER 1 MG: Performed by: NURSE ANESTHETIST, CERTIFIED REGISTERED

## 2023-11-13 PROCEDURE — 25010000002 ROPIVACAINE PER 1 MG: Performed by: NURSE ANESTHETIST, CERTIFIED REGISTERED

## 2023-11-13 PROCEDURE — 25010000002 DEXAMETHASONE PER 1 MG: Performed by: NURSE ANESTHETIST, CERTIFIED REGISTERED

## 2023-11-13 PROCEDURE — 25010000002 AMPICILLIN-SULBACTAM PER 1.5 G: Performed by: NURSE ANESTHETIST, CERTIFIED REGISTERED

## 2023-11-13 PROCEDURE — 25010000002 NEOSTIGMINE 10 MG/10ML SOLUTION: Performed by: NURSE ANESTHETIST, CERTIFIED REGISTERED

## 2023-11-13 PROCEDURE — 25010000002 MIDAZOLAM PER 1 MG: Performed by: NURSE ANESTHETIST, CERTIFIED REGISTERED

## 2023-11-13 PROCEDURE — 25810000003 LACTATED RINGERS PER 1000 ML: Performed by: ANESTHESIOLOGY

## 2023-11-13 DEVICE — HEMOLOK L 6 CLIPS/CART
Type: IMPLANTABLE DEVICE | Site: ABDOMEN | Status: FUNCTIONAL
Brand: WECK

## 2023-11-13 RX ORDER — IBUPROFEN 600 MG/1
600 TABLET ORAL EVERY 6 HOURS PRN
Qty: 20 TABLET | Refills: 0 | Status: SHIPPED | OUTPATIENT
Start: 2023-11-13 | End: 2024-11-12

## 2023-11-13 RX ORDER — INDOCYANINE GREEN AND WATER 25 MG
2.5 KIT INJECTION ONCE
Status: COMPLETED | OUTPATIENT
Start: 2023-11-13 | End: 2023-11-13

## 2023-11-13 RX ORDER — DEXAMETHASONE SODIUM PHOSPHATE 4 MG/ML
INJECTION, SOLUTION INTRA-ARTICULAR; INTRALESIONAL; INTRAMUSCULAR; INTRAVENOUS; SOFT TISSUE AS NEEDED
Status: DISCONTINUED | OUTPATIENT
Start: 2023-11-13 | End: 2023-11-13 | Stop reason: SURG

## 2023-11-13 RX ORDER — GLYCOPYRROLATE 0.2 MG/ML
INJECTION INTRAMUSCULAR; INTRAVENOUS AS NEEDED
Status: DISCONTINUED | OUTPATIENT
Start: 2023-11-13 | End: 2023-11-13 | Stop reason: SURG

## 2023-11-13 RX ORDER — FENTANYL CITRATE 50 UG/ML
INJECTION, SOLUTION INTRAMUSCULAR; INTRAVENOUS AS NEEDED
Status: DISCONTINUED | OUTPATIENT
Start: 2023-11-13 | End: 2023-11-13 | Stop reason: SURG

## 2023-11-13 RX ORDER — ROPIVACAINE HYDROCHLORIDE 5 MG/ML
INJECTION, SOLUTION EPIDURAL; INFILTRATION; PERINEURAL
Status: COMPLETED | OUTPATIENT
Start: 2023-11-13 | End: 2023-11-13

## 2023-11-13 RX ORDER — SODIUM CHLORIDE 9 MG/ML
40 INJECTION, SOLUTION INTRAVENOUS AS NEEDED
Status: DISCONTINUED | OUTPATIENT
Start: 2023-11-13 | End: 2023-11-13 | Stop reason: HOSPADM

## 2023-11-13 RX ORDER — MAGNESIUM HYDROXIDE 1200 MG/15ML
LIQUID ORAL AS NEEDED
Status: DISCONTINUED | OUTPATIENT
Start: 2023-11-13 | End: 2023-11-13 | Stop reason: HOSPADM

## 2023-11-13 RX ORDER — TRAMADOL HYDROCHLORIDE 50 MG/1
50 TABLET ORAL EVERY 6 HOURS PRN
Qty: 12 TABLET | Refills: 0 | Status: SHIPPED | OUTPATIENT
Start: 2023-11-13

## 2023-11-13 RX ORDER — AMPICILLIN AND SULBACTAM 2; 1 G/1; G/1
INJECTION, POWDER, FOR SOLUTION INTRAMUSCULAR; INTRAVENOUS AS NEEDED
Status: DISCONTINUED | OUTPATIENT
Start: 2023-11-13 | End: 2023-11-13 | Stop reason: SURG

## 2023-11-13 RX ORDER — SODIUM CHLORIDE, SODIUM LACTATE, POTASSIUM CHLORIDE, CALCIUM CHLORIDE 600; 310; 30; 20 MG/100ML; MG/100ML; MG/100ML; MG/100ML
INJECTION, SOLUTION INTRAVENOUS CONTINUOUS PRN
Status: DISCONTINUED | OUTPATIENT
Start: 2023-11-13 | End: 2023-11-13 | Stop reason: SURG

## 2023-11-13 RX ORDER — KETOROLAC TROMETHAMINE 30 MG/ML
INJECTION, SOLUTION INTRAMUSCULAR; INTRAVENOUS AS NEEDED
Status: DISCONTINUED | OUTPATIENT
Start: 2023-11-13 | End: 2023-11-13 | Stop reason: SURG

## 2023-11-13 RX ORDER — IPRATROPIUM BROMIDE AND ALBUTEROL SULFATE 2.5; .5 MG/3ML; MG/3ML
3 SOLUTION RESPIRATORY (INHALATION) ONCE AS NEEDED
Status: DISCONTINUED | OUTPATIENT
Start: 2023-11-13 | End: 2023-11-13 | Stop reason: HOSPADM

## 2023-11-13 RX ORDER — ONDANSETRON 2 MG/ML
INJECTION INTRAMUSCULAR; INTRAVENOUS AS NEEDED
Status: DISCONTINUED | OUTPATIENT
Start: 2023-11-13 | End: 2023-11-13 | Stop reason: SURG

## 2023-11-13 RX ORDER — OXYCODONE HYDROCHLORIDE AND ACETAMINOPHEN 5; 325 MG/1; MG/1
1 TABLET ORAL ONCE AS NEEDED
Status: COMPLETED | OUTPATIENT
Start: 2023-11-13 | End: 2023-11-13

## 2023-11-13 RX ORDER — IBUPROFEN 600 MG/1
600 TABLET ORAL EVERY 6 HOURS PRN
Qty: 20 TABLET | Refills: 0 | Status: SHIPPED | OUTPATIENT
Start: 2023-11-13

## 2023-11-13 RX ORDER — SODIUM CHLORIDE, SODIUM LACTATE, POTASSIUM CHLORIDE, CALCIUM CHLORIDE 600; 310; 30; 20 MG/100ML; MG/100ML; MG/100ML; MG/100ML
125 INJECTION, SOLUTION INTRAVENOUS ONCE
Status: COMPLETED | OUTPATIENT
Start: 2023-11-13 | End: 2023-11-13

## 2023-11-13 RX ORDER — PROPOFOL 10 MG/ML
VIAL (ML) INTRAVENOUS AS NEEDED
Status: DISCONTINUED | OUTPATIENT
Start: 2023-11-13 | End: 2023-11-13 | Stop reason: SURG

## 2023-11-13 RX ORDER — NEOSTIGMINE METHYLSULFATE 1 MG/ML
INJECTION, SOLUTION INTRAVENOUS AS NEEDED
Status: DISCONTINUED | OUTPATIENT
Start: 2023-11-13 | End: 2023-11-13 | Stop reason: SURG

## 2023-11-13 RX ORDER — FENTANYL CITRATE 50 UG/ML
50 INJECTION, SOLUTION INTRAMUSCULAR; INTRAVENOUS
Status: DISCONTINUED | OUTPATIENT
Start: 2023-11-13 | End: 2023-11-13 | Stop reason: HOSPADM

## 2023-11-13 RX ORDER — FAMOTIDINE 10 MG/ML
INJECTION, SOLUTION INTRAVENOUS AS NEEDED
Status: DISCONTINUED | OUTPATIENT
Start: 2023-11-13 | End: 2023-11-13 | Stop reason: SURG

## 2023-11-13 RX ORDER — MEPERIDINE HYDROCHLORIDE 25 MG/ML
12.5 INJECTION INTRAMUSCULAR; INTRAVENOUS; SUBCUTANEOUS
Status: DISCONTINUED | OUTPATIENT
Start: 2023-11-13 | End: 2023-11-13 | Stop reason: HOSPADM

## 2023-11-13 RX ORDER — SODIUM CHLORIDE 0.9 % (FLUSH) 0.9 %
10 SYRINGE (ML) INJECTION EVERY 12 HOURS SCHEDULED
Status: DISCONTINUED | OUTPATIENT
Start: 2023-11-13 | End: 2023-11-13 | Stop reason: HOSPADM

## 2023-11-13 RX ORDER — ROCURONIUM BROMIDE 10 MG/ML
INJECTION, SOLUTION INTRAVENOUS AS NEEDED
Status: DISCONTINUED | OUTPATIENT
Start: 2023-11-13 | End: 2023-11-13 | Stop reason: SURG

## 2023-11-13 RX ORDER — SODIUM CHLORIDE, SODIUM LACTATE, POTASSIUM CHLORIDE, CALCIUM CHLORIDE 600; 310; 30; 20 MG/100ML; MG/100ML; MG/100ML; MG/100ML
100 INJECTION, SOLUTION INTRAVENOUS ONCE AS NEEDED
Status: DISCONTINUED | OUTPATIENT
Start: 2023-11-13 | End: 2023-11-13 | Stop reason: HOSPADM

## 2023-11-13 RX ORDER — DEXAMETHASONE SODIUM PHOSPHATE 4 MG/ML
INJECTION, SOLUTION INTRA-ARTICULAR; INTRALESIONAL; INTRAMUSCULAR; INTRAVENOUS; SOFT TISSUE
Status: COMPLETED | OUTPATIENT
Start: 2023-11-13 | End: 2023-11-13

## 2023-11-13 RX ORDER — MIDAZOLAM HYDROCHLORIDE 1 MG/ML
INJECTION INTRAMUSCULAR; INTRAVENOUS AS NEEDED
Status: DISCONTINUED | OUTPATIENT
Start: 2023-11-13 | End: 2023-11-13 | Stop reason: SURG

## 2023-11-13 RX ORDER — LIDOCAINE HYDROCHLORIDE 20 MG/ML
INJECTION, SOLUTION EPIDURAL; INFILTRATION; INTRACAUDAL; PERINEURAL AS NEEDED
Status: DISCONTINUED | OUTPATIENT
Start: 2023-11-13 | End: 2023-11-13 | Stop reason: SURG

## 2023-11-13 RX ORDER — SODIUM CHLORIDE 0.9 % (FLUSH) 0.9 %
10 SYRINGE (ML) INJECTION AS NEEDED
Status: DISCONTINUED | OUTPATIENT
Start: 2023-11-13 | End: 2023-11-13 | Stop reason: HOSPADM

## 2023-11-13 RX ORDER — MIDAZOLAM HYDROCHLORIDE 1 MG/ML
1 INJECTION INTRAMUSCULAR; INTRAVENOUS
Status: DISCONTINUED | OUTPATIENT
Start: 2023-11-13 | End: 2023-11-13 | Stop reason: HOSPADM

## 2023-11-13 RX ORDER — SODIUM CHLORIDE 0.9 % (FLUSH) 0.9 %
3 SYRINGE (ML) INJECTION EVERY 12 HOURS SCHEDULED
Status: DISCONTINUED | OUTPATIENT
Start: 2023-11-13 | End: 2023-11-13 | Stop reason: HOSPADM

## 2023-11-13 RX ORDER — ONDANSETRON 2 MG/ML
4 INJECTION INTRAMUSCULAR; INTRAVENOUS AS NEEDED
Status: DISCONTINUED | OUTPATIENT
Start: 2023-11-13 | End: 2023-11-13 | Stop reason: HOSPADM

## 2023-11-13 RX ADMIN — AMPICILLIN SODIUM AND SULBACTAM SODIUM 3 G: 2; 1 INJECTION, POWDER, FOR SOLUTION INTRAMUSCULAR; INTRAVENOUS at 07:27

## 2023-11-13 RX ADMIN — INDOCYANINE GREEN AND WATER 2.5 MG: KIT at 07:25

## 2023-11-13 RX ADMIN — NEOSTIGMINE METHYLSULFATE 2 MG: 0.5 INJECTION INTRAVENOUS at 08:18

## 2023-11-13 RX ADMIN — SODIUM CHLORIDE, POTASSIUM CHLORIDE, SODIUM LACTATE AND CALCIUM CHLORIDE 125 ML/HR: 600; 310; 30; 20 INJECTION, SOLUTION INTRAVENOUS at 07:26

## 2023-11-13 RX ADMIN — GLYCOPYRROLATE 0.4 MG: 0.4 INJECTION INTRAMUSCULAR; INTRAVENOUS at 08:18

## 2023-11-13 RX ADMIN — PROPOFOL 200 MG: 10 INJECTION, EMULSION INTRAVENOUS at 07:33

## 2023-11-13 RX ADMIN — KETOROLAC TROMETHAMINE 30 MG: 30 INJECTION, SOLUTION INTRAMUSCULAR; INTRAVENOUS at 08:04

## 2023-11-13 RX ADMIN — ROPIVACAINE HYDROCHLORIDE 270 MG: 5 INJECTION, SOLUTION EPIDURAL; INFILTRATION; PERINEURAL at 07:38

## 2023-11-13 RX ADMIN — OXYCODONE HYDROCHLORIDE AND ACETAMINOPHEN 1 TABLET: 5; 325 TABLET ORAL at 09:02

## 2023-11-13 RX ADMIN — MIDAZOLAM HYDROCHLORIDE 2 MG: 1 INJECTION, SOLUTION INTRAMUSCULAR; INTRAVENOUS at 07:27

## 2023-11-13 RX ADMIN — LIDOCAINE HYDROCHLORIDE 100 MG: 20 INJECTION, SOLUTION EPIDURAL; INFILTRATION; INTRACAUDAL; PERINEURAL at 07:33

## 2023-11-13 RX ADMIN — FAMOTIDINE 20 MG: 10 INJECTION, SOLUTION INTRAVENOUS at 07:27

## 2023-11-13 RX ADMIN — SODIUM CHLORIDE, POTASSIUM CHLORIDE, SODIUM LACTATE AND CALCIUM CHLORIDE: 600; 310; 30; 20 INJECTION, SOLUTION INTRAVENOUS at 07:27

## 2023-11-13 RX ADMIN — DEXAMETHASONE SODIUM PHOSPHATE 4 MG: 4 INJECTION, SOLUTION INTRA-ARTICULAR; INTRALESIONAL; INTRAMUSCULAR; INTRAVENOUS; SOFT TISSUE at 07:33

## 2023-11-13 RX ADMIN — DEXAMETHASONE SODIUM PHOSPHATE 8 MG: 4 INJECTION, SOLUTION INTRA-ARTICULAR; INTRALESIONAL; INTRAMUSCULAR; INTRAVENOUS; SOFT TISSUE at 07:38

## 2023-11-13 RX ADMIN — ONDANSETRON 4 MG: 2 INJECTION INTRAMUSCULAR; INTRAVENOUS at 08:15

## 2023-11-13 RX ADMIN — ROCURONIUM BROMIDE 40 MG: 10 SOLUTION INTRAVENOUS at 07:33

## 2023-11-13 RX ADMIN — FENTANYL CITRATE 100 MCG: 50 INJECTION INTRAMUSCULAR; INTRAVENOUS at 07:33

## 2023-11-13 RX ADMIN — FENTANYL CITRATE 50 MCG: 50 INJECTION, SOLUTION INTRAMUSCULAR; INTRAVENOUS at 08:32

## 2023-11-13 RX ADMIN — FENTANYL CITRATE 50 MCG: 50 INJECTION, SOLUTION INTRAMUSCULAR; INTRAVENOUS at 08:40

## 2023-11-13 NOTE — OP NOTE
Operative Report    Patient Name:  Scott Montes  YOB: 1975  [id number]  11/13/2023      PREOPERATIVE DIAGNOSIS: Cholelithiasis      POSTOPERATIVE DIAGNOSIS: Same with possible chronic cholecystitis       PROCEDURE PERFORMED:     Robotic assisted laparoscopic cholecystectomy        SURGEON: Gonzalo Rudolph MD         SPECIMENS: Gallbladder and contents        ANESTHESIA: General. TAP block        FINDINGS:     1. Gallbladder in standard positioning.  Omental adhesions to the gallbladder consistent with possible prior inflammation.  Cholelithiasis.            INDICATIONS:      The patient is a 48 y.o. male with a history of Crohn's disease on immunosuppressants who had been diagnosed with cholelithiasis.  Due to the risk of possible cholecystitis in the future, patient desired cholecystectomy. The risks and benefits of robotic assisted laparoscopic cholecystectomy with possible cholangiography were discussed with the patient and their family and they agree to proceed.        DESCRIPTION OF PROCEDURE:    After obtaining informed consent, the patient was taken to the operating room and placed in the supine position. After appropriate DVT and antibiotic prophylaxis, general anesthesia was induced. The abdomen was prepped and draped in standard sterile fashion.  A proper timeout was performed  Abdomen was entered in the left hemiabdomen utilizing an 8 mm robotic trocar gasless.  The abdomen was insufflated to 15 mmHg.              Additional 8 mm robotic trocars were placed in the right paramedian and right lateral hemiabdomen, just above the level of the umbilicus.  An 8 mm assistant trocar was placed in the right upper quadrant.  All under direct laparoscopic visualization.  Robot was docked.                 The gallbladder was grasped and elevated cephalad.  Adhesions to the gallbladder were taken down with cautery.  Medial and lateral peritoneotomy was created with cautery.  Using meticulous  "electrocautery dissection , the cystic duct and cystic artery were bluntly dissected free of other structures and clearly identified using the \"Critical View\" technique with only two structures entering the gallbladder, hepatocystic triangle cleared of fat and fibrous tissue, lower third of posterior gallbladder wall dissected off of the cystic plate.  The cystic artery was then clipped once proximally and divided with cautery.              The cystic duct was then clipped twice distally and once at the junction of the duct and infundibulum, followed by transection with cautery.                            The gallbladder was then dissected free of the gallbladder fossa using a combination of electrocautery and blunt dissection. The gallbladder was then placed in an Endo Catch bag, and removed from the left abdominal trocar.  The fascia of this trocar site was then closed with a figure-of-eight 0 Vicryl suture utilizing a Savage Mayito device.                 The right upper quadrant was then inspected. The cystic duct and cystic artery stumps were intact without bleeding or biliary leak.  Remaining trocars were removed. The wounds were closed in using absorbable subcuticular suture.  Skin Affix placed on the incisions. The patient recovered from anesthesia, was extubated in the operating room, and transferred to the PACU in stable condition.  All sponge and needle counts were correct times two at the completion of the procedure. There were no immediate complications.      Estimated blood loss: Negligible    Gonzalo Rudolph MD  11/13/2023  08:24 EST   "

## 2023-11-13 NOTE — ANESTHESIA PROCEDURE NOTES
Airway  Urgency: elective    Date/Time: 11/13/2023 7:33 AM  Airway not difficult    General Information and Staff    Patient location during procedure: OR  CRNA/CAA: Susie Gamez APRN    Indications and Patient Condition  Indications for airway management: airway protection    Preoxygenated: yes  MILS not maintained throughout  Mask difficulty assessment: 0 - not attempted    Final Airway Details  Final airway type: endotracheal airway      Successful airway: ETT  Cuffed: yes   Successful intubation technique: direct laryngoscopy  Endotracheal tube insertion site: oral  Blade: Nathan  Blade size: 4  ETT size (mm): 7.5  Cormack-Lehane Classification: grade IIa - partial view of glottis  Placement verified by: chest auscultation, capnometry and palpation of cuff   Measured from: lips  ETT/EBT  to lips (cm): 22  Number of attempts at approach: 1  Assessment: lips, teeth, and gum same as pre-op and atraumatic intubation    Additional Comments  Atraumatic ETT placement, dentition unchanged.

## 2023-11-13 NOTE — ANESTHESIA POSTPROCEDURE EVALUATION
Patient: Scott Montes    Procedure Summary       Date: 11/13/23 Room / Location: [facility] OR [address] [place] [address] COR OR    Anesthesia Start: 0727 Anesthesia Stop: 0826    Procedure: CHOLECYSTECTOMY LAPAROSCOPIC WITH DAVINCI ROBOT (Abdomen) Diagnosis:       Biliary dyskinesia      (Biliary dyskinesia [K82.8])    Surgeons: Gonzalo Rudolph MD Provider: Tony Norris MD    Anesthesia Type: general with block ASA Status: 3            Anesthesia Type: general with block    Vitals  Vitals Value Taken Time   /87 11/13/23 0856   Temp 97.3 °F (36.3 °C) 11/13/23 0826   Pulse 74 11/13/23 0856   Resp 20 11/13/23 0856   SpO2 95 % 11/13/23 0856           Post Anesthesia Care and Evaluation    Patient location during evaluation: PHASE II  Patient participation: complete - patient participated  Level of consciousness: awake and alert  Pain score: 0  Pain management: adequate    Airway patency: patent  Anesthetic complications: No anesthetic complications    Cardiovascular status: acceptable  Respiratory status: acceptable  Hydration status: acceptable

## 2023-11-13 NOTE — ANESTHESIA PREPROCEDURE EVALUATION
Anesthesia Evaluation     Patient summary reviewed and Nursing notes reviewed   no history of anesthetic complications:   NPO Solid Status: > 8 hours  NPO Liquid Status: > 8 hours           Airway   Mallampati: II  TM distance: >3 FB  Neck ROM: full  Dental - normal exam     Pulmonary     breath sounds clear to auscultation  (+) ,shortness of breath  Cardiovascular     ECG reviewed  Rhythm: regular  Rate: normal    (+) hypertension, past MI  >12 months, CAD, RESENDEZ, hyperlipidemia      Neuro/Psych- negative ROS  GI/Hepatic/Renal/Endo - negative ROS     Musculoskeletal     Abdominal     Abdomen: soft.   Substance History - negative use     OB/GYN negative ob/gyn ROS         Other   arthritis,     ROS/Med Hx Other:    Scott Montes [[id number]] - 48 y.o. Male     ECG (Last 10 results in 3 years)     10/05/23 1058 ECG 12 Lead ekg  10/04/22 1438 ECG 12 Lead EKG  09/07/22 0735 ECG 12 Lead ECG 12-LEAD  05/06/22 1501 ECG 12 Lead ECG 12-LEAD         Chest X-ray (Last 10 results in 3 years)     09/06/22 1435  Impression:       XR Chest 1 View    Stable chest. No acute cardiopulmonary findings.     This report was finalized on 9/6/2022 2:05 PM by Dr. Tarun Chavira MD.     View Image  05/06/22 1632  Impression:       XR Chest AP    Unremarkable exam. No acute cardiopulmonary findings identified.     This report was finalized on 5/6/2022 4:12 PM by Dr. Tarun Chavira MD.     View Image  03/25/21 1540  Impression:           XR Spine Cervical 2 View  Very mild degenerative disc change in the lower 2 cervical  disc spaces.     This report was finalized on 3/26/2021 8:46 AM by Dr. Scott Martinez II, MD.     View Image  03/25/21 1540  Impression:         XR Spine Thoracic 2 View  Negative AP lateral views of the thoracic spine     This report was finalized on 3/26/2021 8:12 AM by Dr. Scott Martinez II, MD.     View Image  03/25/21 1540  Impression:           XR Spine Lumbar 2 or 3 View  Older-appearing minimal compression  fracture of L1. There is  moderate arthritic change with sclerosis in both of the SI joints.     This report was finalized on 3/26/2021 8:30 AM by Dr. Scott Martinez II, MD.     View Image  03/25/21 1540  Impression:         XR Shoulder 2+ View Left  Negative plain films of the left shoulder     This report was finalized on 3/26/2021 8:10 AM by Dr. Scott Martinez II, MD.     View Image  03/25/21 1540  Impression:         XR Hips Bilateral With or Without Pelvis 5 View  Mild osteoarthritis in both hips.     This report was finalized on 3/26/2021 8:46 AM by Dr. Scott Martinez II, MD.     View Image         Echo (Last 10 results in 3 years)     09/07/22 1312 Adult Transthoracic Echo Complete W/ Cont if Necessary Per Protocol  View Image  09/06/22 1219  Addendum:                                           Cardiac Catheterization/Vascular Study  Images from the original result were not included.                  CARDIAC CATHETERIZATION / INTERVENTION REPORT             DATE OF PROCEDURE: 9/6/2022       INDICATION FOR PROCEDURE: STEMI      PRE PROCEDURE DIAGNOSIS:  Inferior STEMI      POST PROCEDURE DIAGNOSIS:  % proximal RCA occlusion s/ pPCI with 3.5 x 28 BUZZ post dilated  with 4.0 NC balloon  ...  Signed by Matthew Hill MD on 12/14/22 0898 CARDIAC CATH HEMO DATA - SCAN                      Anesthesia Plan    ASA 3     general with block     intravenous induction     Anesthetic plan, risks, benefits, and alternatives have been provided, discussed and informed consent has been obtained with: patient.  Pre-procedure education provided  Use of blood products discussed with  Consented to blood products.    Plan discussed with CRNA.    CODE STATUS:

## 2023-11-13 NOTE — ANESTHESIA PROCEDURE NOTES
"Peripheral Block      Patient reassessed immediately prior to procedure    Patient location during procedure: OR  Start time: 11/13/2023 7:38 AM  Stop time: 11/13/2023 7:40 AM  Reason for block: at surgeon's request and post-op pain management  Performed by  CRNA/CAA: Susie Gamez APRN  Preanesthetic Checklist  Completed: patient identified, IV checked, site marked, risks and benefits discussed, surgical consent, monitors and equipment checked, pre-op evaluation and timeout performed  Prep:  Pt Position: supine  Sterile barriers:cap, gloves, sterile barriers and mask  Prep: ChloraPrep  Patient monitoring: blood pressure monitoring, continuous pulse oximetry and EKG  Procedure    Nursing cardiac assessment comments yes: Sedation, GA, Spinal,Epidural   Performed under: general  Guidance:ultrasound guided    ULTRASOUND INTERPRETATION.  Using ultrasound guidance a 20 G (20g 4\" Stimuplex) gauge needle was placed in close proximity to the nerve, at which point, under ultrasound guidance anesthetic was injected in the area of the nerve and spread of the anesthesia was seen on ultrasound in close proximity thereto.  There were no abnormalities seen on ultrasound; a digital image was taken; and the patient tolerated the procedure with no complications. Images:still images obtained    Laterality:Bilateral  Block Type:TAP  Injection Technique:single-shot  Needle Type:short-bevel  Needle Gauge:20 G  Resistance on Injection: none    Medications Used: ropivacaine (NAROPIN) injection 0.5 % - Peripheral Nerve   270 mg - 11/13/2023 7:38:00 AM  dexamethasone (DECADRON) injection - Injection   8 mg - 11/13/2023 7:38:00 AM      Medications  Comment:Block Injection:  Total volume divided equally between Right and Left block      Post Assessment  Injection Assessment: negative aspiration for heme, incremental injection and no paresthesia on injection  Patient Tolerance:comfortable throughout block  Complications:no  Additional " Notes  The pt was in the supine position under general anesthesia    Under Ultrasound guidance, a Saleh 4inch 360 degree needle was advanced with Normal Saline hydro dissection of tissue.  The Rectus and Transversus Abdominus muscles where visualized.  The needle tip was placed between the Transversus Abdominus and rectus abdominus, local anesthetic spread was visualized in the Transversus Abdominus Plane. Injection was made incrementally with aspiration every 5 mls.  There was no  intravascular injection,  injection pressure was normal, there was no neural injection, and the procedure was completed without difficulty. The same procedure was completed for left and right sided subcostal tap blocks. Thank You.

## 2023-11-15 LAB — REF LAB TEST METHOD: NORMAL

## 2023-11-16 ENCOUNTER — SPECIALTY PHARMACY (OUTPATIENT)
Dept: PHARMACY | Facility: HOSPITAL | Age: 48
End: 2023-11-16
Payer: MEDICAID

## 2023-11-16 NOTE — PROGRESS NOTES
Specialty Pharmacy Refill Coordination Note     Scott is a 48 y.o. male contacted today regarding refills of  HUMIRA 40 MG INJECTION  specialty medication(s).    Reviewed and verified with patient:       Specialty medication(s) and dose(s) confirmed: yes    Refill Questions      Flowsheet Row Most Recent Value   Changes to allergies? No   Changes to medications? No   New conditions since last clinic visit No   Unplanned office visit, urgent care, ED, or hospital admission in the last 4 weeks  No   How does patient/caregiver feel medication is working? Very good   Financial problems or insurance changes  No   Since the previous refill, were any specialty medication doses or scheduled injections missed or delayed?  No   Does this patient require a clinical escalation to a pharmacist? No                       Follow-up: 84 day(s)     Juliana Howard, Pharmacy Technician  Specialty Pharmacy Technician

## 2023-11-30 ENCOUNTER — OFFICE VISIT (OUTPATIENT)
Dept: SURGERY | Facility: CLINIC | Age: 48
End: 2023-11-30
Payer: MEDICAID

## 2023-11-30 VITALS
SYSTOLIC BLOOD PRESSURE: 107 MMHG | HEIGHT: 74 IN | BODY MASS INDEX: 25.64 KG/M2 | DIASTOLIC BLOOD PRESSURE: 64 MMHG | WEIGHT: 199.8 LBS | HEART RATE: 89 BPM

## 2023-11-30 DIAGNOSIS — K80.70 CALCULUS OF GALLBLADDER AND BILE DUCT WITHOUT CHOLECYSTITIS OR OBSTRUCTION: Primary | ICD-10-CM

## 2023-11-30 PROCEDURE — 99024 POSTOP FOLLOW-UP VISIT: CPT | Performed by: SURGERY

## 2023-11-30 PROCEDURE — 1159F MED LIST DOCD IN RCRD: CPT | Performed by: SURGERY

## 2023-11-30 PROCEDURE — 1160F RVW MEDS BY RX/DR IN RCRD: CPT | Performed by: SURGERY

## 2023-11-30 PROCEDURE — 3078F DIAST BP <80 MM HG: CPT | Performed by: SURGERY

## 2023-11-30 PROCEDURE — 3074F SYST BP LT 130 MM HG: CPT | Performed by: SURGERY

## 2023-11-30 NOTE — PROGRESS NOTES
Patient is status post robotic assisted laparoscopic cholecystectomy for symptomatic cholelithiasis.  Patient has been doing well.  He is eating well and having regular bowel function.  Surgical pathology demonstrated mild chronic cholecystitis    Incisions are clean, dry and intact without erythema or drainage    We discussed lifting restrictions  Follow-up as needed

## 2023-12-04 ENCOUNTER — HOSPITAL ENCOUNTER (OUTPATIENT)
Dept: CT IMAGING | Facility: HOSPITAL | Age: 48
Discharge: HOME | End: 2023-12-04
Admitting: INTERNAL MEDICINE
Payer: MEDICAID

## 2023-12-04 DIAGNOSIS — Z87.891 FORMER SMOKER: ICD-10-CM

## 2023-12-04 DIAGNOSIS — R91.8 MULTIPLE PULMONARY NODULES: ICD-10-CM

## 2023-12-04 PROCEDURE — 71250 CT THORAX DX C-: CPT

## 2023-12-07 ENCOUNTER — OFFICE VISIT (OUTPATIENT)
Dept: GASTROENTEROLOGY | Facility: CLINIC | Age: 48
End: 2023-12-07
Payer: MEDICAID

## 2023-12-07 ENCOUNTER — OFFICE VISIT (OUTPATIENT)
Dept: FAMILY MEDICINE CLINIC | Facility: CLINIC | Age: 48
End: 2023-12-07
Payer: MEDICAID

## 2023-12-07 VITALS
WEIGHT: 202 LBS | OXYGEN SATURATION: 99 % | BODY MASS INDEX: 25.93 KG/M2 | HEART RATE: 90 BPM | TEMPERATURE: 98.2 F | DIASTOLIC BLOOD PRESSURE: 80 MMHG | HEIGHT: 74 IN | SYSTOLIC BLOOD PRESSURE: 122 MMHG | RESPIRATION RATE: 18 BRPM

## 2023-12-07 VITALS
HEIGHT: 74 IN | WEIGHT: 202.4 LBS | HEART RATE: 85 BPM | BODY MASS INDEX: 25.98 KG/M2 | DIASTOLIC BLOOD PRESSURE: 85 MMHG | SYSTOLIC BLOOD PRESSURE: 128 MMHG

## 2023-12-07 DIAGNOSIS — R10.84 GENERALIZED ABDOMINAL PAIN: ICD-10-CM

## 2023-12-07 DIAGNOSIS — I10 ESSENTIAL HYPERTENSION: Chronic | ICD-10-CM

## 2023-12-07 DIAGNOSIS — I25.10 CORONARY ARTERY DISEASE INVOLVING NATIVE CORONARY ARTERY OF NATIVE HEART WITHOUT ANGINA PECTORIS: Chronic | ICD-10-CM

## 2023-12-07 DIAGNOSIS — K21.9 GASTROESOPHAGEAL REFLUX DISEASE, UNSPECIFIED WHETHER ESOPHAGITIS PRESENT: ICD-10-CM

## 2023-12-07 DIAGNOSIS — R19.7 DIARRHEA, UNSPECIFIED TYPE: ICD-10-CM

## 2023-12-07 DIAGNOSIS — K50.00 CROHN'S DISEASE OF SMALL INTESTINE WITHOUT COMPLICATION: Primary | ICD-10-CM

## 2023-12-07 DIAGNOSIS — Z00.00 VISIT FOR ANNUAL HEALTH EXAMINATION: Primary | ICD-10-CM

## 2023-12-07 DIAGNOSIS — K52.9 COLITIS: ICD-10-CM

## 2023-12-07 PROCEDURE — 1170F FXNL STATUS ASSESSED: CPT | Performed by: NURSE PRACTITIONER

## 2023-12-07 PROCEDURE — 3074F SYST BP LT 130 MM HG: CPT | Performed by: NURSE PRACTITIONER

## 2023-12-07 PROCEDURE — 99396 PREV VISIT EST AGE 40-64: CPT | Performed by: NURSE PRACTITIONER

## 2023-12-07 PROCEDURE — 1160F RVW MEDS BY RX/DR IN RCRD: CPT | Performed by: NURSE PRACTITIONER

## 2023-12-07 PROCEDURE — 3079F DIAST BP 80-89 MM HG: CPT | Performed by: NURSE PRACTITIONER

## 2023-12-07 PROCEDURE — 1159F MED LIST DOCD IN RCRD: CPT | Performed by: NURSE PRACTITIONER

## 2023-12-07 NOTE — PROGRESS NOTES
"Subjective   Scott Montes is a 48 y.o. male who presents to the office today as a follow up appointment regarding Crohn's Disease      History of Present Illness:    Interval History 12/7/2023: The patient presents today for follow-up Crohn's disease.  He has recently been followed by Jenny Nettles PA-C.  The patient is currently on Humira and mesalamine.  The patient underwent colonoscopy in July 2012 22 which showed scattered erosions and erythema throughout the colon.  Biopsies revealed chronic active ileitis and mild acute colitis.  On April 23, 2023 his fecal calprotectin was 166.  This is down from previously collected fecal calprotectin which was 895.  His last CRP was performed on 3/2/2023.  This was normal at 0.30.  This had previously been elevated.  A recent CBC revealed normal hemoglobin and hematocrit.  This was performed in November 2023.  Due to involvement of the terminal ileum, the patient takes Colestid due to suspected poor bile acid recirculation in the terminal ileum.  At the time of his last visit, the patient had complained of heartburn and reflux.  He had been given Protonix but did not take this routinely per Akira Nettles's notes.  She subsequently placed him on Prevacid.  He has bowel movement 3 to 5 times a day.  His stool is for the most part formed.  He does feel that he empties well.  He admits he does not take his Colestid every day. This can constipate him a bit.  He generally takes it a couple of times a week. He had his gallbladder was removed a few weeks ago.  He states his gallbladder was nonfunctioning.  He has not noted increased bowel movements with cholecystectomy. He is doing better on Prevacid in respect to heartburn.  He had an AMI in the Sept 2023. He tells me it was a \"blood clot\".  He is now on ASA and Brillanta.  He initially lost 15 lbs when sick with Crohn's disease but has gained some weight back.  He states \"I am 200% better\" than when we first started to " see him for Crohn's disease.  No BRBPR.  He does describe some mild intermittent tenderness in the right lower abdominal pain but this is not worrisome.  He is wondering if he should get the pneumovax 23 vaccination.          Review of Systems:  Review of Systems   Constitutional:  Negative for activity change, appetite change, chills, fatigue, fever and unexpected weight change.   HENT:  Negative for mouth sores and trouble swallowing.    Eyes:  Negative for photophobia, pain and redness.   Respiratory:  Negative for cough and choking.    Cardiovascular:  Negative for chest pain and leg swelling.   Gastrointestinal:  Positive for abdominal distention, abdominal pain, anal bleeding and nausea. Negative for constipation, diarrhea, rectal pain and vomiting.   Endocrine: Negative for cold intolerance, heat intolerance and polyphagia.   Genitourinary:  Negative for difficulty urinating and dysuria.   Musculoskeletal:  Negative for arthralgias, joint swelling and myalgias.   Skin:  Negative for rash and wound.   Allergic/Immunologic: Negative for environmental allergies and food allergies.   Neurological:  Negative for dizziness and light-headedness.   Hematological:  Negative for adenopathy. Does not bruise/bleed easily.   Psychiatric/Behavioral:  Negative for sleep disturbance. The patient is not nervous/anxious.        Past Medical History:  Past Medical History:   Diagnosis Date    Anemia     Arthritis     Coronary artery disease     Crohn's disease     Elevated cholesterol     GERD (gastroesophageal reflux disease)     H/O blood clots     Heart attack     Hypertension     Low back pain     STEMI (ST elevation myocardial infarction) 09/06/2022    Ulcerative colitis        Past Surgical History:  Past Surgical History:   Procedure Laterality Date    CARDIAC CATHETERIZATION N/A 09/06/2022    Procedure: Left Heart Cath;  Surgeon: Matthew Hill MD;  Location: [facility] CATH INVASIVE LOCATION;  Service:  Cardiovascular;  Laterality: N/A;    CARDIAC CATHETERIZATION N/A 09/06/2022    Procedure: Percutaneous Coronary Intervention;  Surgeon: Matthew Hill MD;  Location: [facility] CATH INVASIVE LOCATION;  Service: Cardiovascular;  Laterality: N/A;    CARDIAC SURGERY      CHOLECYSTECTOMY N/A 11/13/2023    Procedure: CHOLECYSTECTOMY LAPAROSCOPIC WITH DAVINCI ROBOT;  Surgeon: Gonzalo Rudolph MD;  Location: [facility] OR;  Service: Robotics - DaVinci;  Laterality: N/A;    COLONOSCOPY N/A 07/12/2022    Procedure: COLONOSCOPY;  Surgeon: Stella Aparicio MD;  Location: [facility] OR;  Service: Gastroenterology;  Laterality: N/A;    CYST REMOVAL      shoulder    FRACTURE SURGERY Right     foot/toes    VASCULAR SURGERY      VASECTOMY         Family History:  Family History   Problem Relation Age of Onset    Colon cancer Mother     Cancer Father        Social History:  Social History     Socioeconomic History    Marital status:    Tobacco Use    Smoking status: Every Day     Packs/day: 0.50     Years: 35.00     Additional pack years: 0.00     Total pack years: 17.50     Types: Cigarettes     Passive exposure: Current    Smokeless tobacco: Never    Tobacco comments:     Smoking cessation encouraged   Vaping Use    Vaping Use: Every day    Substances: Nicotine    Devices: Disposable   Substance and Sexual Activity    Alcohol use: No    Drug use: No    Sexual activity: Defer     Partners: Female     Birth control/protection: Vasectomy       Current Medication List:    Current Outpatient Medications:     Adalimumab (Humira Pen) 40 MG/0.4ML Pen-injector Kit, Inject 40 mg (0.4 ml) under the skin Every 14 (Fourteen) Days., Disp: 2 each, Rfl: 5    aspirin 81 MG EC tablet, Take 1 tablet by mouth Daily., Disp: 90 tablet, Rfl: 2    atorvastatin (LIPITOR) 80 MG tablet, Take 1 tablet by mouth Every Night., Disp: 90 tablet, Rfl: 2    colestipol (COLESTID) 1 g tablet, Take 1 tablet by mouth Daily., Disp: 90 tablet, Rfl:  "2    cyclobenzaprine (FLEXERIL) 5 MG tablet, TAKE ONE (1) TABLET BY MOUTH THREE TIMES DAILY AS NEEDED FOR MUSCLE SPASMS., Disp: 90 tablet, Rfl: 5    ibuprofen (ADVIL,MOTRIN) 600 MG tablet, Take 1 tablet by mouth Every 6 (Six) Hours As Needed for Mild Pain., Disp: 20 tablet, Rfl: 0    ibuprofen (ADVIL,MOTRIN) 600 MG tablet, Take 1 tablet by mouth Every 6 (Six) Hours As Needed for Mild Pain., Disp: 20 tablet, Rfl: 0    isosorbide mononitrate (IMDUR) 30 MG 24 hr tablet, Take 1 tablet by mouth Daily. Hold for SBP <110, Disp: 90 tablet, Rfl: 2    lansoprazole (PREVACID) 30 MG capsule, Take 1 capsule by mouth Daily., Disp: 90 capsule, Rfl: 3    lisinopril (PRINIVIL,ZESTRIL) 10 MG tablet, Take 1 tablet by mouth Daily for 360 days., Disp: 90 tablet, Rfl: 3    mesalamine (LIALDA) 1.2 g EC tablet, TAKE ONE TABLET BY MOUTH DAILY WITH BREAKFAST, Disp: 30 tablet, Rfl: 11    metoprolol succinate XL (TOPROL-XL) 25 MG 24 hr tablet, Take 0.5 tablets by mouth Daily. Hold for SBP <110 and/or HR <60, Disp: 90 tablet, Rfl: 2    nitroglycerin (NITROSTAT) 0.4 MG SL tablet, 1 under the tongue as needed for angina, may repeat q5mins for up three doses, Disp: 100 tablet, Rfl: 11    tamsulosin (FLOMAX) 0.4 MG capsule 24 hr capsule, TAKE ONE (1) CAPSULE BY MOUTH EVERY NIGHT., Disp: 30 capsule, Rfl: 5    ticagrelor (Brilinta) 60 MG tablet tablet, Take 1 tablet by mouth 2 (Two) Times a Day., Disp: 180 tablet, Rfl: 3    traMADol (ULTRAM) 50 MG tablet, Take 1 tablet by mouth Every 6 (Six) Hours As Needed for Moderate Pain., Disp: 12 tablet, Rfl: 0    triamcinolone (KENALOG) 0.1 % cream, Apply 1 application  topically to the appropriate area as directed 2 (Two) Times a Day., Disp: 80 g, Rfl: 3    vitamin D (ERGOCALCIFEROL) 1.25 MG (78780 UT) capsule capsule, Take 1 capsule by mouth 1 (One) Time Per Week., Disp: 4 capsule, Rfl: 5    Allergies:   Patient has no known allergies.    Vitals:  /85   Pulse 85   Ht 188 cm (74\")   Wt 91.8 kg (202 " "lb 6.4 oz)   BMI 25.99 kg/m²     Physical Exam:  Physical Exam    Results Review:  Lab Results:   Admission on 2023, Discharged on 2023   Component Date Value Ref Range Status    Reference Lab Report 2023    Final                    Value:[facility]  [address] [place]  [address]  Phone: [phone] or [phone]  Fax: [phone]  Jb Alonso M.D., Medical Director    PATIENT NAME                           LABORATORY NO.  [id number]  PABLO MEEKS                        [id number]  [id number]                         AGE              SEX  SSN           CLIENT REF #  [facility]              48      1975      [id number]   [id number]    [address] [place] [address]                     REQUESTING M.D.     [place] [address]                   CALDERON KRAMER  DATE COLLECTED      DATE RECEIVED      DATE REPORTED  2023          2023         11/15/2023    DIAGNOSIS:  GALLBLADDER:  Mild chronic cholecystitis  Benign adenomyoma, fundus  Cholelithiasis    CAM    CLINICAL HISTORY:  Biliary dyskinesia    SPECIMENS RECEIVED:  GALLBLADDER    MICROSCOPIC DESCRIPTION:  Tissue blocks are prepared and slides are examined microscopically. See  diagnosis                           for details.    Professional interpretation rendered by Adela Donnelly M.D., F.C.A.P. at  [facility], [address] [place] [id number].    GROSS DESCRIPTION:  Specimen received in formalin labeled \"gallbladder\" and consists of an intact  saccular gallbladder measuring 8.3 x 3.2 x 3.2 cm.  The wall thickness averages  0.1 cm.  The serosal surface is glistening, wrinkled and green, and the mucosal  surface is velvety green.  The cystic duct measures 0.4 cm in length.  Located  within the wall of the gallbladder fundus is a 0.5 x 0.4 x 0.2 cm cystic mass.  There is 1 black roughened ovoid stone located within the lumen measuring 0.8  x 0.7 x 0.6 cm.  " Representative sections are submitted in 2 cassettes with cystic  duct margin in A1 and entirety of cystic mass in A2.  JTM/RLL    REVIEWED, DIAGNOSED AND ELECTRONICALLY  SIGNED BY:    Adela Donnelly M.D., F.C.A.P.  CPT CODES:  30708     Pre-Admission Testing on 11/10/2023   Component Date Value Ref Range Status    Glucose 11/10/2023 86  65 - 99 mg/dL Final    BUN 11/10/2023 10  6 - 20 mg/dL Final    Creatinine 11/10/2023 0.73 (L)  0.76 - 1.27 mg/dL Final    Sodium 11/10/2023 140  136 - 145 mmol/L Final    Potassium 11/10/2023 3.7  3.5 - 5.2 mmol/L Final    Chloride 11/10/2023 106  98 - 107 mmol/L Final    CO2 11/10/2023 25.3  22.0 - 29.0 mmol/L Final    Calcium 11/10/2023 9.2  8.6 - 10.5 mg/dL Final    BUN/Creatinine Ratio 11/10/2023 13.7  7.0 - 25.0 Final    Anion Gap 11/10/2023 8.7  5.0 - 15.0 mmol/L Final    eGFR 11/10/2023 111.5  >60.0 mL/min/1.73 Final    WBC 11/10/2023 10.15  3.40 - 10.80 10*3/mm3 Final    RBC 11/10/2023 4.50  4.14 - 5.80 10*6/mm3 Final    Hemoglobin 11/10/2023 13.2  13.0 - 17.7 g/dL Final    Hematocrit 11/10/2023 41.0  37.5 - 51.0 % Final    MCV 11/10/2023 91.1  79.0 - 97.0 fL Final    MCH 11/10/2023 29.3  26.6 - 33.0 pg Final    MCHC 11/10/2023 32.2  31.5 - 35.7 g/dL Final    RDW 11/10/2023 14.1  12.3 - 15.4 % Final    RDW-SD 11/10/2023 47.7  37.0 - 54.0 fl Final    MPV 11/10/2023 11.1  6.0 - 12.0 fL Final    Platelets 11/10/2023 293  140 - 450 10*3/mm3 Final       Assessment & Plan     Visit Diagnoses:    ICD-10-CM ICD-9-CM   1. Crohn's disease of small intestine without complication  K50.00 555.0   2. Gastroesophageal reflux disease, unspecified whether esophagitis present  K21.9 530.81   3. Diarrhea, unspecified type  R19.7 787.91   4. Generalized abdominal pain  R10.84 789.07       Plan:    1.  Crohn's disease of the colon and small intestine: The patient is doing well on Humira and mesalamine.  He does have some intermittent right lower quadrant abdominal pain but for the most  part this is minimal.  He is having regular bowel movements.  He has gained weight since starting Humira.  Overall, he feels improved.  At the time of his next visit, I will obtain repeat QuantiFERON gold.  I have advised him to go ahead and get the Pneumovax 23 which is an inactivated vaccine.    2.  GERD: The patient is improved on Prevacid once daily.  He is going to continue this.    3.  Diarrhea: This is managed pretty well with Humira and Colestid as needed.          MEDS ORDERED DURING VISIT:  No orders of the defined types were placed in this encounter.      Return in about 3 months (around 3/7/2024).             This document has been electronically signed by Stella Aparicio MD   December 7, 2023 14:23 EST      Part of this note may be an electronic transcription/translation of spoken language to printed text using the Dragon Dictation System.

## 2023-12-07 NOTE — PATIENT INSTRUCTIONS
It is important for your health to eat a healthy balanced diet, to exercise as tolerated, obtain dental and vision checkups routinely, work on stress reduction and be active about taking care of your mental health.  Routine age related immunizations(pneumonia, flu, shingles if applicable) are recommended.  Be active in obtaining age and gender routine maintenance exams (such as paps, breast exams, colonscopy, prostate exams, etc).    You are encouraged to have safe sex practices for STD prevention.    Be alert/educated on gun and water safety, seek help for any domestic violence concerns, and seatbelt use is strongly encouraged.       Advance Directive    Advance directives are legal documents that allow you to make decisions about your health care and medical treatment in case you become unable to communicate for yourself. Advance directives let your wishes be known to family, friends, and health care providers.  Discussing and writing advance directives should happen over time rather than all at once. Advance directives can be changed and updated at any time. There are different types of advance directives, such as:  Medical power of .  Living will.  Do not resuscitate (DNR) order or do not attempt resuscitation (DNAR) order.  Health care proxy and medical power of   A health care proxy is also called a health care agent. This person is appointed to make medical decisions for you when you are unable to make decisions for yourself. Generally, people ask a trusted friend or family member to act as their proxy and represent their preferences. Make sure you have an agreement with your trusted person to act as your proxy. A proxy may have to make a medical decision on your behalf if your wishes are not known.  A medical power of , also called a durable power of  for health care, is a legal document that names your health care proxy. Depending on the laws in your state, the document may  need to be:  Signed.  Notarized.  Dated.  Copied.  Witnessed.  Incorporated into your medical record.  You may also want to appoint a trusted person to manage your money in the event you are unable to do so. This is called a durable power of  for finances. It is a separate legal document from the durable power of  for health care. You may choose your health care proxy or someone different to act as your agent in money matters.  If you do not appoint a proxy, or there is a concern that the proxy is not acting in your best interest, a court may appoint a guardian to act on your behalf.  Living will  A living will is a set of instructions that state your wishes about medical care when you cannot express them yourself. Health care providers should keep a copy of your living will in your medical record. You may want to give a copy to family members or friends. To alert caregivers in case of an emergency, you can place a card in your wallet to let them know that you have a living will and where they can find it. A living will is used if you become:  Terminally ill.  Disabled.  Unable to communicate or make decisions.  The following decisions should be included in your living will:  To use or not to use life support equipment, such as dialysis machines and breathing machines (ventilators).  Whether you want a DNR or DNAR order. This tells health care providers not to use cardiopulmonary resuscitation (CPR) if breathing or heartbeat stops.  To use or not to use tube feeding.  To be given or not to be given food and fluids.  Whether you want comfort (palliative) care when the goal becomes comfort rather than a cure.  Whether you want to donate your organs and tissues.  A living will does not give instructions for distributing your money and property if you should pass away.  DNR or DNAR  A DNR or DNAR order is a request not to have CPR in the event that your heart stops beating or you stop breathing. If a DNR  or DNAR order has not been made and shared, a health care provider will try to help any patient whose heart has stopped or who has stopped breathing. If you plan to have surgery, talk with your health care provider about how your DNR or DNAR order will be followed if problems occur.  What if I do not have an advance directive?  Some states assign family decision makers to act on your behalf if you do not have an advance directive. Each state has its own laws about advance directives. You may want to check with your health care provider, , or state representative about the laws in your state.  Summary  Advance directives are legal documents that allow you to make decisions about your health care and medical treatment in case you become unable to communicate for yourself.  The process of discussing and writing advance directives should happen over time. You can change and update advance directives at any time.  Advance directives may include a medical power of , a living will, and a DNR or DNAR order.  This information is not intended to replace advice given to you by your health care provider. Make sure you discuss any questions you have with your health care provider.  Document Revised: 09/21/2021 Document Reviewed: 09/21/2021  [employer] Patient Education © 2022 [employer]        Budget-Friendly Healthy Eating  There are many ways to save money at the grocery store and continue to eat healthy. You can be successful if you:  Plan meals according to your budget.  Make a grocery list and only purchase food according to your grocery list.  Prepare food yourself at home.  What are tips for following this plan?  Reading food labels  Compare food labels between brand name foods and the store brand. Often the nutritional value is the same, but the store brand is lower cost.  Look for products that do not have added sugar, fat, or salt (sodium). These often cost the same but are healthier for you. Products  "may be labeled as:  Sugar-free.  Nonfat.  Low-fat.  Sodium-free.  Low-sodium.  Look for lean ground beef labeled as at least 92% lean and 8% fat.  Shopping    Buy only the items on your grocery list and go only to the areas of the store that have the items on your list.  Use coupons only for foods and brands you normally buy. Avoid buying items you wouldn't normally buy simply because they are on sale.  Check online and in newspapers for weekly deals.  Buy healthy items from the bulk bins when available, such as herbs, spices, flour, pasta, nuts, and dried fruit.  Buy fruits and vegetables that are in season. Prices are usually lower on in-season produce.  Look at the unit price on the price tag. Use it to compare different brands and sizes to find out which item is the best deal.  Choose healthy items that are often low-cost, such as carrots, potatoes, apples, bananas, and oranges. Dried or canned beans are a low-cost protein source.  Buy in bulk and freeze extra food. Items you can buy in bulk include meats, fish, poultry, frozen fruits, and frozen vegetables.  Avoid buying \"ready-to-eat\" foods, such as pre-cut fruits and vegetables and pre-made salads.  If possible, shop around to discover where you can find the best prices. Consider other retailers such as dollar stores, larger wholesale stores, local fruit and vegetable stands, and farmers markets.  Do not shop when you are hungry. If you shop while hungry, it may be hard to stick to your list and budget.  Resist impulse buying. Use your grocery list as your official plan for the week.  Buy a variety of vegetables and fruits by purchasing fresh, frozen, and canned items.  Look at the top and bottom shelves for deals. Foods at eye level (eye level of an adult or child) are usually more expensive.  Be efficient with your time when shopping. The more time you spend at the store, the more money you are likely to spend.  To save money when choosing more expensive " "foods like meats and dairy:  Choose cheaper cuts of meat, such as bone-in chicken thighs and drumsticks instead of skinless and boneless chicken. When you are ready to prepare the chicken, you can remove the skin yourself to make it healthier.  Choose lean meats like chicken or turkey instead of beef.  Choose canned seafood, such as tuna, salmon, or sardines.  Buy eggs as a low-cost source of protein.  Buy dried beans and peas, such as lentils, split peas, or kidney beans instead of meats. Dried beans and peas are a good alternative source of protein.  Buy the larger tubs of yogurt instead of individual-sized containers.  Choose water instead of sodas and other sweetened beverages.  Avoid buying chips, cookies, and other \"junk food.\" These items are usually expensive and not healthy.  Cooking  Make extra food and freeze the extras in meal-sized containers or in individual portions for fast meals and snacks.  Pre-cook on days when you have extra time to prepare meals in advance. You can keep these meals in the fridge or freezer and reheat for a quick meal.  When you come home from the grocery store, wash, peel, and cut fruits and vegetables so they are ready to use and eat. This will help reduce food waste.  Meal planning  Do not eat out or get fast food. Prepare food at home.  Make a grocery list and make sure to bring it with you to the store. If you have a smart phone, you could use your phone to create your shopping list.  Plan meals and snacks according to a grocery list and budget you create.  Use leftovers in your meal plan for the week.  Look for recipes where you can cook once and make enough food for two meals.  Prepare budget-friendly types of meals like stews, casseroles, and stir-rivera dishes.  Try some meatless meals or try \"no cook\" meals like salads.  Make sure that half your plate is filled with fruits or vegetables. Choose from fresh, frozen, or canned fruits and vegetables. If eating canned, remember " to rinse them before eating. This will remove any excess salt added for packaging.  Summary  Eating healthy on a budget is possible if you plan your meals according to your budget, purchase according to your budget and grocery list, and prepare food yourself.  Tips for buying more food on a limited budget include buying generic brands, using coupons only for foods you normally buy, and buying healthy items from the bulk bins when available.  Tips for buying cheaper food to replace expensive food include choosing cheaper, lean cuts of meat, and buying dried beans and peas.  This information is not intended to replace advice given to you by your health care provider. Make sure you discuss any questions you have with your health care provider.  Document Revised: 09/30/2021 Document Reviewed: 09/30/2021  [email or web address] Patient Education © 2022 [email or web address]        Exercises to do While Sitting  Exercises that you do while sitting (chair exercises) can give you many of the same benefits as full exercise. Benefits include strengthening your heart, burning calories, and keeping muscles and joints healthy. Exercise can also improve your mood and help with depression and anxiety.  You may benefit from chair exercises if you are unable to do standing exercises due to:  Diabetic foot pain.  Obesity.  Illness.  Arthritis.  Recovery from surgery or injury.  Breathing problems.  Balance problems.  Another type of disability.  Before starting chair exercises, check with your health care provider or a physical therapist to find out how much exercise you can tolerate and which exercises are safe for you. If your health care provider approves:  Start out slowly and build up over time. Aim to work up to about 10-20 minutes for each exercise session.  Make exercise part of your daily routine.  Drink water when you exercise. Do not wait until you are thirsty. Drink every 10-15 minutes.  Stop exercising right away if you have pain, nausea,  "shortness of breath, or dizziness.  If you are exercising in a wheelchair, make sure to lock the wheels.  Ask your health care provider whether you can do kasi chi or yoga. Many positions in these mind-body exercises can be modified to do while seated.  Warm-up  Before starting other exercises:  Sit up as straight as you can. Have your knees bent at 90 degrees, which is the shape of the capital letter \"L.\" Keep your feet flat on the floor.  Sit at the front edge of your chair, if you can.  Pull in (tighten) the muscles in your abdomen and stretch your spine and neck as straight as you can. Hold this position for a few minutes.  Breathe in and out evenly. Try to concentrate on your breathing, and relax your mind.  Stretching  Exercise A: Arm stretch  Hold your arms out straight in front of your body.  Bend your hands at the wrist with your fingers pointing up, as if signaling someone to stop. Notice the slight tension in your forearms as you hold the position.  Keeping your arms out and your hands bent, rotate your hands outward as far as you can and hold this stretch. Aim to have your thumbs pointing up and your pinkie fingers pointing down.  Slowly repeat arm stretches for one minute as tolerated.  Exercise B: Leg stretch  If you can move your legs, try to \"draw\" letters on the floor with the toes of your foot. Write your name with one foot.  Write your name with the toes of your other foot.  Slowly repeat the movements for one minute as tolerated.  Exercise C: Reach for the devon  Reach your hands as far over your head as you can to stretch your spine.  Move your hands and arms as if you are climbing a rope.  Slowly repeat the movements for one minute as tolerated.  Range of motion exercises  Exercise A: Shoulder roll  Let your arms hang loosely at your sides.  Lift just your shoulders up toward your ears, then let them relax back down.  When your shoulders feel loose, rotate your shoulders in backward and forward " circles.  Do shoulder rolls slowly for one minute as tolerated.  Exercise B: March in place  As if you are marching, pump your arms and lift your legs up and down. Lift your knees as high as you can.  If you are unable to lift your knees, just pump your arms and move your ankles and feet up and down.  March in place for one minute as tolerated.  Exercise C: Seated jumping jacks  Let your arms hang down straight.  Keeping your arms straight, lift them up over your head. Aim to point your fingers to the ceiling.  While you lift your arms, straighten your legs and slide your heels along the floor to your sides, as wide as you can.  As you bring your arms back down to your sides, slide your legs back together.  If you are unable to use your legs, just move your arms.  Slowly repeat seated jumping jacks for one minute as tolerated.  Strengthening exercises  Exercise A: Shoulder squeeze  Hold your arms straight out from your body to your sides, with your elbows bent and your fists pointed at the ceiling.  Keeping your arms in the bent position, move them forward so your elbows and forearms meet in front of your face.  Open your arms back out as wide as you can with your elbows still bent, until you feel your shoulder blades squeezing together. Hold for 5 seconds.  Slowly repeat the movements forward and backward for one minute as tolerated.  Contact a health care provider if:  You have to stop exercising due to any of the following:  Pain.  Nausea.  Shortness of breath.  Dizziness.  Fatigue.  You have significant pain or soreness after exercising.  Get help right away if:  You have chest pain.  You have difficulty breathing.  These symptoms may represent a serious problem that is an emergency. Do not wait to see if the symptoms will go away. Get medical help right away. Call your local emergency services (911 in the [place] Do not drive yourself to the hospital.  Summary  Exercises that you do while sitting (chair  "exercises) can strengthen your heart, burn calories, and keep muscles and joints healthy.  You may benefit from chair exercises if you are unable to do standing exercises due to diabetic foot pain, obesity, recovery from surgery or injury, or other conditions.  Before starting chair exercises, check with your health care provider or a physical therapist to find out how much exercise you can tolerate and which exercises are safe for you.  This information is not intended to replace advice given to you by your health care provider. Make sure you discuss any questions you have with your health care provider.  Document Revised: 02/13/2022 Document Reviewed: 02/13/2022  [employer] Patient Education © 2022 [employer]     DASH Eating Plan  DASH stands for Dietary Approaches to Stop Hypertension. The DASH eating plan is a healthy eating plan that has been shown to:  Reduce high blood pressure (hypertension).  Reduce your risk for type 2 diabetes, heart disease, and stroke.  Help with weight loss.  What are tips for following this plan?  Reading food labels  Check food labels for the amount of salt (sodium) per serving. Choose foods with less than 5 percent of the Daily Value of sodium. Generally, foods with less than 300 milligrams (mg) of sodium per serving fit into this eating plan.  To find whole grains, look for the word \"whole\" as the first word in the ingredient list.  Shopping  Buy products labeled as \"low-sodium\" or \"no salt added.\"  Buy fresh foods. Avoid canned foods and pre-made or frozen meals.  Cooking  Avoid adding salt when cooking. Use salt-free seasonings or herbs instead of table salt or sea salt. Check with your health care provider or pharmacist before using salt substitutes.  Do not rivera foods. Cook foods using healthy methods such as baking, boiling, grilling, roasting, and broiling instead.  Cook with heart-healthy oils, such as olive, canola, avocado, soybean, or sunflower oil.  Meal planning    Eat " a balanced diet that includes:  4 or more servings of fruits and 4 or more servings of vegetables each day. Try to fill one-half of your plate with fruits and vegetables.  6-8 servings of whole grains each day.  Less than 6 oz (170 g) of lean meat, poultry, or fish each day. A 3-oz (85-g) serving of meat is about the same size as a deck of cards. One egg equals 1 oz (28 g).  2-3 servings of low-fat dairy each day. One serving is 1 cup (237 mL).  1 serving of nuts, seeds, or beans 5 times each week.  2-3 servings of heart-healthy fats. Healthy fats called omega-3 fatty acids are found in foods such as walnuts, flaxseeds, fortified milks, and eggs. These fats are also found in cold-water fish, such as sardines, salmon, and mackerel.  Limit how much you eat of:  Canned or prepackaged foods.  Food that is high in trans fat, such as some fried foods.  Food that is high in saturated fat, such as fatty meat.  Desserts and other sweets, sugary drinks, and other foods with added sugar.  Full-fat dairy products.  Do not salt foods before eating.  Do not eat more than 4 egg yolks a week.  Try to eat at least 2 vegetarian meals a week.  Eat more home-cooked food and less restaurant, buffet, and fast food.  Lifestyle  When eating at a restaurant, ask that your food be prepared with less salt or no salt, if possible.  If you drink alcohol:  Limit how much you use to:  0-1 drink a day for women who are not pregnant.  0-2 drinks a day for men.  Be aware of how much alcohol is in your drink. In the [place] one drink equals one 12 oz bottle of beer (355 mL), one 5 oz glass of wine (148 mL), or one 1½ oz glass of hard liquor (44 mL).  General information  Avoid eating more than 2,300 mg of salt a day. If you have hypertension, you may need to reduce your sodium intake to 1,500 mg a day.  Work with your health care provider to maintain a healthy body weight or to lose weight. Ask what an ideal weight is for you.  Get at least 30 minutes  of exercise that causes your heart to beat faster (aerobic exercise) most days of the week. Activities may include walking, swimming, or biking.  Work with your health care provider or dietitian to adjust your eating plan to your individual calorie needs.  What foods should I eat?  Fruits  All fresh, dried, or frozen fruit. Canned fruit in natural juice (without added sugar).  Vegetables  Fresh or frozen vegetables (raw, steamed, roasted, or grilled). Low-sodium or reduced-sodium tomato and vegetable juice. Low-sodium or reduced-sodium tomato sauce and tomato paste. Low-sodium or reduced-sodium canned vegetables.  Grains  Whole-grain or whole-wheat bread. Whole-grain or whole-wheat pasta. Brown rice. Oatmeal. Quinoa. Bulgur. Whole-grain and low-sodium cereals. Dianelys bread. Low-fat, low-sodium crackers. Whole-wheat flour tortillas.  Meats and other proteins  Skinless chicken or turkey. Ground chicken or turkey. Pork with fat trimmed off. Fish and seafood. Egg whites. Dried beans, peas, or lentils. Unsalted nuts, nut butters, and seeds. Unsalted canned beans. Lean cuts of beef with fat trimmed off. Low-sodium, lean precooked or cured meat, such as sausages or meat loaves.  Dairy  Low-fat (1%) or fat-free (skim) milk. Reduced-fat, low-fat, or fat-free cheeses. Nonfat, low-sodium ricotta or cottage cheese. Low-fat or nonfat yogurt. Low-fat, low-sodium cheese.  Fats and oils  Soft margarine without trans fats. Vegetable oil. Reduced-fat, low-fat, or light mayonnaise and salad dressings (reduced-sodium). Canola, safflower, olive, avocado, soybean, and sunflower oils. Avocado.  Seasonings and condiments  Herbs. Spices. Seasoning mixes without salt.  Other foods  Unsalted popcorn and pretzels. Fat-free sweets.  The items listed above may not be a complete list of foods and beverages you can eat. Contact a dietitian for more information.  What foods should I avoid?  Fruits  Canned fruit in a light or heavy syrup. Fried fruit.  Fruit in cream or butter sauce.  Vegetables  Creamed or fried vegetables. Vegetables in a cheese sauce. Regular canned vegetables (not low-sodium or reduced-sodium). Regular canned tomato sauce and paste (not low-sodium or reduced-sodium). Regular tomato and vegetable juice (not low-sodium or reduced-sodium). Pickles. Olives.  Grains  Baked goods made with fat, such as croissants, muffins, or some breads. Dry pasta or rice meal packs.  Meats and other proteins  Fatty cuts of meat. Ribs. Fried meat. Valdes. Bologna, salami, and other precooked or cured meats, such as sausages or meat loaves. Fat from the back of a pig (fatback). Bratwurst. Salted nuts and seeds. Canned beans with added salt. Canned or smoked fish. Whole eggs or egg yolks. Chicken or turkey with skin.  Dairy  Whole or 2% milk, cream, and half-and-half. Whole or full-fat cream cheese. Whole-fat or sweetened yogurt. Full-fat cheese. Nondairy creamers. Whipped toppings. Processed cheese and cheese spreads.  Fats and oils  Butter. Stick margarine. Lard. Shortening. Ghee. Valdes fat. Tropical oils, such as coconut, palm kernel, or palm oil.  Seasonings and condiments  Onion salt, garlic salt, seasoned salt, table salt, and sea salt. [facility] Center[facility] sauce. Tartar sauce. Barbecue sauce. Teriyaki sauce. Soy sauce, including reduced-sodium. Steak sauce. Canned and packaged gravies. Fish sauce. Oyster sauce. Cocktail sauce. Store-bought horseradish. Ketchup. Mustard. Meat flavorings and tenderizers. Bouillon cubes. Hot sauces. Pre-made or packaged marinades. Pre-made or packaged taco seasonings. Relishes. Regular salad dressings.  Other foods  Salted popcorn and pretzels.  The items listed above may not be a complete list of foods and beverages you should avoid. Contact a dietitian for more information.  Where to find more information  National Heart, Lung, and Blood [place]: www.nhlbi.nih.gov  American Heart Association: www.heart.org  Academy of Nutrition and  Dietetics: www.eatright.org  National Kidney Foundation: www.kidney.org  Summary  The DASH eating plan is a healthy eating plan that has been shown to reduce high blood pressure (hypertension). It may also reduce your risk for type 2 diabetes, heart disease, and stroke.  When on the DASH eating plan, aim to eat more fresh fruits and vegetables, whole grains, lean proteins, low-fat dairy, and heart-healthy fats.  With the DASH eating plan, you should limit salt (sodium) intake to 2,300 mg a day. If you have hypertension, you may need to reduce your sodium intake to 1,500 mg a day.  Work with your health care provider or dietitian to adjust your eating plan to your individual calorie needs.  This information is not intended to replace advice given to you by your health care provider. Make sure you discuss any questions you have with your health care provider.  Document Revised: 11/20/2020 Document Reviewed: 11/20/2020     Fall Prevention in the Home, Adult  Falls can cause injuries and affect people of all ages. There are many simple things that you can do to make your home safe and to help prevent falls. Ask for help when making these changes, if needed.  What actions can I take to prevent falls?  General instructions  Use good lighting in all rooms. Replace any light bulbs that burn out, turn on lights if it is dark, and use night-lights.  Place frequently used items in easy-to-reach places. Lower the shelves around your home if necessary.  Set up furniture so that there are clear paths around it. Avoid moving your furniture around.  Remove throw rugs and other tripping hazards from the floor.  Avoid walking on wet floors.  Fix any uneven floor surfaces.  Add color or contrast paint or tape to grab bars and handrails in your home. Place contrasting color strips on the first and last steps of staircases.  When you use a stepladder, make sure that it is completely opened and that the sides and supports are firmly  locked. Have someone hold the ladder while you are using it. Do not climb a closed stepladder.  Know where your pets are when moving through your home.  What can I do in the bathroom?     Keep the floor dry. Immediately clean up any water that is on the floor.  Remove soap buildup in the tub or shower regularly.  Use nonskid mats or decals on the floor of the tub or shower.  Attach bath mats securely with double-sided, nonslip rug tape.  If you need to sit down while you are in the shower, use a plastic, nonslip stool.  Install grab bars by the toilet and in the tub and shower. Do not use towel bars as grab bars.  What can I do in the bedroom?  Make sure that a bedside light is easy to reach.  Do not use oversized bedding that reaches the floor.  Have a firm chair that has side arms to use for getting dressed.  What can I do in the kitchen?  Clean up any spills right away.  If you need to reach for something above you, use a sturdy step stool that has a grab bar.  Keep electrical cables out of the way.  Do not use floor polish or wax that makes floors slippery. If you must use wax, make sure that it is non-skid floor wax.  What can I do with my stairs?  Do not leave any items on the stairs.  Make sure that you have a light switch at the top and the bottom of the stairs. Have them installed if you do not have them.  Make sure that there are handrails on both sides of the stairs. Fix handrails that are broken or loose. Make sure that handrails are as long as the staircases.  Install non-slip stair treads on all stairs in your home.  Avoid having throw rugs at the top or bottom of stairs, or secure the rugs with carpet tape to prevent them from moving.  Choose a carpet design that does not hide the edge of steps on the stairs.  Check any carpeting to make sure that it is firmly attached to the stairs. Fix any carpet that is loose or worn.  What can I do on the outside of my home?  Use bright outdoor  lighting.  Regularly repair the edges of walkways and driveways and fix any cracks.  Remove high doorway thresholds.  Trim any shrubbery on the main path into your home.  Regularly check that handrails are securely fastened and in good repair. Both sides of all steps should have handrails.  Install guardrails along the edges of any raised decks or porches.  Clear walkways of debris and clutter, including tools and rocks.  Have leaves, snow, and ice cleared regularly.  Use sand or salt on walkways during winter months.  In the garage, clean up any spills right away, including grease or oil spills.  What other actions can I take?  Wear closed-toe shoes that fit well and support your feet. Wear shoes that have rubber soles or low heels.  Use mobility aids as needed, such as canes, walkers, scooters, and crutches.  Review your medicines with your health care provider. Some medicines can cause dizziness or changes in blood pressure, which increase your risk of falling.  Talk with your health care provider about other ways that you can decrease your risk of falls. This may include working with a physical therapist or  to improve your strength, balance, and endurance.  Where to find more information  Centers for Disease Control and Prevention, [employer]: www.cdc.gov  [employer]ging: www.lalitha.nih.gov  Contact a health care provider if:  You are afraid of falling at home.  You feel weak, drowsy, or dizzy at home.  You fall at home.  Summary  There are many simple things that you can do to make your home safe and to help prevent falls.  Ways to make your home safe include removing tripping hazards and installing grab bars in the bathroom.  Ask for help when making these changes in your home.  This information is not intended to replace advice given to you by your health care provider. Make sure you discuss any questions you have with your health care provider.  Document Revised: 09/19/2022 Document Reviewed:  07/21/2021  Elsevier Patient Education © 2022 [employer]     Health Risks of Smoking  Smoking tobacco is very bad for your health. Tobacco smoke contains many toxic chemicals that can damage every part of your body. Secondhand smoke can be harmful to those around you. Tobacco or nicotine use can cause many long-term (chronic) diseases.  Smoking is difficult to quit because a chemical in tobacco, called nicotine, causes addiction or dependence. When you smoke and inhale, nicotine is absorbed quickly into the bloodstream through your lungs. Both inhaled and non-inhaled nicotine may be addictive.  How can quitting affect me?  There are health benefits of quitting smoking. Some benefits happen right away and others take time. Benefits may include:  Blood flow, blood pressure, heart rate, and lung capacity may begin to improve. However, any lung damage that has already occurred cannot be repaired.  Temporary respiratory symptoms, such as nasal congestion and cough, may improve over time.  Your risk of heart disease, stroke, and cancer is reduced.  The overall quality of your health may improve.  You may save money, as you will not spend money on tobacco products and may spend less money on smoking-related health issues.  What can increase my risk?  Smoking harms nearly every organ in the body. People who smoke tobacco have a shorter life expectancy and an increased risk of many serious medical problems. These include:  More respiratory infections, such as colds and pneumonia.  Cancer.  Heart disease.  Stroke.  Chronic respiratory diseases.  Delayed wound healing and increased risk of complications during surgery.  Problems with reproduction, pregnancy, and childbirth, such as infertility, early (premature) births, stillbirths, and birth defects.  Secondhand smoke exposure to children increases the risk of:  Sudden infant death syndrome (SIDS).  Infections in the nose, throat, or airways (respiratory  infections).  Chronic respiratory symptoms.  What actions can I take to quit?  Smoking is an addiction that affects both your body and your mind, and long-time habits can be hard to change. Your health care provider can recommend:  Nicotine replacement products, such as patches, gum, and nasal sprays. Use these products only as directed. Do not replace cigarette smoking with electronic cigarettes, which are commonly called e-cigarettes. The safety of e-cigarettes is not known, and some may contain harmful chemicals.  Programs and community resources, which may include group support, education, or talk therapy.  Prescription medicines to help reduce cravings.  A combination of two or more quit methods, which will increase the success of quitting.  Where to find support  Follow the recommendations from your health care provider about support groups and other assistance. You can also visit:  [place] [employer] Quitline Consortium: www.PeopleAdminitline.org or call 1-800-QUIT-NOW.  [place] Department of Health and Human Services: www.smokefree.gov  American Lung Association: www.freedomfromsmoking.org  American Heart Association: www.heart.org  Where to find more information  Centers for Disease Control and Prevention: www.cdc.gov  World Health Organization: www.who.int  Summary  Smoking tobacco is very bad for your health. Tobacco smoke contains many toxic chemicals that can damage every part of the body.  Smoking is difficult to quit because a chemical in tobacco, called nicotine, causes addiction or dependence.  There are immediate and long-term health benefits of quitting smoking.  A combination of two or more quit methods increases the success of quitting.  This information is not intended to replace advice given to you by your health care provider. Make sure you discuss any questions you have with your health care provider.  Document Revised: 08/22/2022 Document Reviewed: 02/01/2021  Elsevier Patient Education © 2022 [employer]     Steps to Quit Smoking  Smoking tobacco is the leading cause of preventable death. It can affect almost every organ in the body. Smoking puts you and those around you at risk for developing many serious chronic diseases. Quitting smoking can be difficult, but it is one of the best things that you can do for your health. It is never too late to quit.  How do I get ready to quit?  When you decide to quit smoking, create a plan to help you succeed. Before you quit:  Pick a date to quit. Set a date within the next 2 weeks to give you time to prepare.  Write down the reasons why you are quitting. Keep this list in places where you will see it often.  Tell your family, friends, and co-workers that you are quitting. Support from your loved ones can make quitting easier.  Talk with your health care provider about your options for quitting smoking.  Find out what treatment options are covered by your health insurance.  Identify people, places, things, and activities that make you want to smoke (triggers). Avoid them.  What first steps can I take to quit smoking?  Throw away all cigarettes at home, at work, and in your car.  Throw away smoking accessories, such as ashtrays and lighters.  Clean your car. Make sure to empty the ashtray.  Clean your home, including curtains and carpets.  What strategies can I use to quit smoking?  Talk with your health care provider about combining strategies, such as taking medicines while you are also receiving in-person counseling. Using these two strategies together makes you more likely to succeed in quitting than if you used either strategy on its own.  If you are pregnant or breastfeeding, talk with your health care provider about finding counseling or other support strategies to quit smoking. Do not take medicine to help you quit smoking unless your health care provider tells you to do so.  To quit smoking:  Quit right away  Quit smoking completely, instead of gradually reducing  how much you smoke over a period of time. Research shows that stopping smoking right away is more successful than gradually quitting.  Attend in-person counseling to help you build problem-solving skills. You are more likely to succeed in quitting if you attend counseling sessions regularly. Even short sessions of 10 minutes can be effective.  Take medicine  You may take medicines to help you quit smoking. Some medicines require a prescription and some you can purchase over-the-counter. Medicines may have nicotine in them to replace the nicotine in cigarettes. Medicines may:  Help to stop cravings.  Help to relieve withdrawal symptoms.  Your health care provider may recommend:  Nicotine patches, gum, or lozenges.  Nicotine inhalers or sprays.  Non-nicotine medicine that is taken by mouth.  Find resources  Find resources and support systems that can help you to quit smoking and remain smoke-free after you quit. These resources are most helpful when you use them often. They include:  Online chats with a counselor.  Telephone quitlines.  Printed self-help materials.  Support groups or group counseling.  Text messaging programs.  Mobile phone apps or applications. Use apps that can help you stick to your quit plan by providing reminders, tips, and encouragement. There are many free apps for mobile devices as well as websites. Examples include Quit Guide from the CDC and smokefree.gov  What things can I do to make it easier to quit?    Reach out to your family and friends for support and encouragement. Call telephone quitlines (7-800QUIT-NOW), reach out to support groups, or work with a counselor for support.  Ask people who smoke to avoid smoking around you.  Avoid places that trigger you to smoke, such as bars, parties, or smoke-break areas at work.  Spend time with people who do not smoke.  Lessen the stress in your life. Stress can be a smoking trigger for some people. To lessen stress, try:  Exercising  regularly.  Doing deep-breathing exercises.  Doing yoga.  Meditating.  Performing a body scan. This involves closing your eyes, scanning your body from head to toe, and noticing which parts of your body are particularly tense. Try to relax the muscles in those areas.  How will I feel when I quit smoking?  Day 1 to 3 weeks  Within the first 24 hours of quitting smoking, you may start to feel withdrawal symptoms. These symptoms are usually most noticeable 2-3 days after quitting, but they usually do not last for more than 2-3 weeks. You may experience these symptoms:  Mood swings.  Restlessness, anxiety, or irritability.  Trouble concentrating.  Dizziness.  Strong cravings for sugary foods and nicotine.  Mild weight gain.  Constipation.  Nausea.  Coughing or a sore throat.  Changes in how the medicines that you take for unrelated issues work in your body.  Depression.  Trouble sleeping (insomnia).  Week 3 and afterward  After the first 2-3 weeks of quitting, you may start to notice more positive results, such as:  Improved sense of smell and taste.  Decreased coughing and sore throat.  Slower heart rate.  Lower blood pressure.  Clearer skin.  The ability to breathe more easily.  Fewer sick days.  Quitting smoking can be very challenging. Do not get discouraged if you are not successful the first time. Some people need to make many attempts to quit before they achieve long-term success. Do your best to stick to your quit plan, and talk with your health care provider if you have any questions or concerns.  Summary  Smoking tobacco is the leading cause of preventable death. Quitting smoking is one of the best things that you can do for your health.  When you decide to quit smoking, create a plan to help you succeed.  Quit smoking right away, not slowly over a period of time.  When you start quitting, seek help from your health care provider, family, or friends.  This information is not intended to replace advice given to  you by your health care provider. Make sure you discuss any questions you have with your health care provider.  Document Revised: 08/26/2022 Document Reviewed: 03/07/2020  Elsevier Patient Education © 2022 [employer].

## 2023-12-07 NOTE — ASSESSMENT & PLAN NOTE
Continue under the care of cardiology.  Continue aspirin 81, Brilinta 60 mg twice daily, atorvastatin 80 mg, isosorbide 30 mg, and lisinopril 10 mg.  Continue metoprolol 25 mg.  As needed nitroglycerin if needed for chest pain.  Ambulatory BP monitoring either at home or random community checks.  Patient to report continued elevations >140/90.  Patient may come by office for checks if needed.

## 2023-12-07 NOTE — PROGRESS NOTES
Subjective   Scott Montes is a 48 y.o. male.     Chief Complaint   Patient presents with    Hypertension       History of Present Illness     Annual physical-patient is here for an updated annual physical.  Hypertension-chronic and ongoing.  Patient is currently ordered isosorbide 30 mg, lisinopril 10 mg, and metoprolol 25 mg.  He is taking half of the metoprolol daily.  No negative side effects.   He is checking periodically but not daily.  He has not noted elevations.  No recent CP.  No palpitations.  Last cards follow up 10/5/2023.  He had decrease in Brilinta.    History of MI/anticoagulation/CAD-patient is currently taking aspirin 81 mg and Brilinta 60 mg.  No negative side effects.  Patient denies any bleeding of gums, melena, hematochezia, hematuria, or epistaxis..  He is on atorvastatin 80 mg daily since his MI.  No negative side effects of cholesterol medication.  GERD-chronic and ongoing.  With history of colitis.  Patient is currently on meds Kody 1.2 g and Humira injection.  He is also on Colestid 1 g daily.  Vitamin D deficiency-chronic and ongoing.  Patient is presently taking vitamin D 50,000 units supplement.  No negative side effects of medication are reported.  Vitamin D level is monitored periodically via labs.  GB removal on 11/30/2023.   He continues to have some GI tenderness.  He reports that bowels are getting back to some of his baseline normally.  He has abdomen pain and cramps.  Occasional diarrhea.  He will have a GI appt per request of his disability provider.    Dry palms-has been ongoing for several months.  He reports he notes some blister like areas at times but then they are dry and scaling.  Does improve with steroid use.  He has been using TCM cream.      The following portions of the patient's history were reviewed and updated as appropriate: CC, ROS, allergies, current medications, past family history, past medical history, past social history, past surgical history and  "problem list.    Review of Systems   Constitutional:  Positive for fatigue. Negative for appetite change, unexpected weight gain and unexpected weight loss.   HENT:  Negative for congestion, ear pain, postnasal drip, rhinorrhea, sore throat, swollen glands, trouble swallowing and voice change.    Eyes:  Negative for pain and visual disturbance.   Respiratory:  Negative for cough, chest tightness, shortness of breath and wheezing.    Cardiovascular:  Negative for chest pain, palpitations and leg swelling.   Gastrointestinal:  Positive for abdominal pain and diarrhea. Negative for blood in stool, constipation, nausea, vomiting and indigestion.   Genitourinary:  Negative for dysuria, hematuria and urgency.   Musculoskeletal:  Positive for arthralgias and myalgias. Negative for back pain, gait problem and joint swelling.   Skin:  Negative for color change and skin lesions.   Allergic/Immunologic: Negative.    Neurological:  Negative for dizziness, numbness and headache.   Hematological: Negative.    Psychiatric/Behavioral:  Negative for dysphoric mood, sleep disturbance and suicidal ideas. The patient is not nervous/anxious.    All other systems reviewed and are negative.      Objective     /80   Pulse 90   Temp 98.2 °F (36.8 °C)   Resp 18   Ht 188 cm (74.02\")   Wt 91.6 kg (202 lb)   SpO2 99%   BMI 25.92 kg/m²     Physical Exam  Vitals reviewed.   Constitutional:       General: He is not in acute distress.     Appearance: He is well-developed. He is not diaphoretic.   HENT:      Head: Normocephalic and atraumatic.      Jaw: No tenderness.      Right Ear: Hearing, tympanic membrane, ear canal and external ear normal.      Left Ear: Hearing, tympanic membrane, ear canal and external ear normal.      Nose: Nose normal. No nasal tenderness or congestion.      Right Sinus: No maxillary sinus tenderness or frontal sinus tenderness.      Left Sinus: No maxillary sinus tenderness or frontal sinus tenderness.      " Mouth/Throat:      Lips: Pink.      Mouth: Mucous membranes are moist.      Pharynx: Oropharynx is clear. Uvula midline.   Eyes:      General: Lids are normal. No scleral icterus.     Extraocular Movements:      Right eye: Normal extraocular motion and no nystagmus.      Left eye: Normal extraocular motion and no nystagmus.      Conjunctiva/sclera: Conjunctivae normal.      Pupils: Pupils are equal, round, and reactive to light.   Neck:      Thyroid: No thyromegaly or thyroid tenderness.      Vascular: No carotid bruit or JVD.      Trachea: No tracheal tenderness.   Cardiovascular:      Rate and Rhythm: Normal rate and regular rhythm.      Pulses:           Dorsalis pedis pulses are 2+ on the right side and 2+ on the left side.        Posterior tibial pulses are 2+ on the right side and 2+ on the left side.      Heart sounds: Normal heart sounds, S1 normal and S2 normal. No murmur heard.  Pulmonary:      Effort: Pulmonary effort is normal. No accessory muscle usage, prolonged expiration or respiratory distress.      Breath sounds: Normal breath sounds.   Chest:      Chest wall: No tenderness.   Abdominal:      General: Bowel sounds are normal. There is distension (mild).      Palpations: Abdomen is soft. There is no hepatomegaly, splenomegaly or mass.      Tenderness: There is generalized abdominal tenderness.      Comments: Bowel sounds very loud and gurgling in quality   Musculoskeletal:         General: Tenderness (generalized areas of stiffness) present.      Cervical back: Normal range of motion and neck supple.      Right lower leg: No edema.      Left lower leg: No edema.      Comments: No muscular atrophy or flaccidity.   Lymphadenopathy:      Head:      Right side of head: No submental or submandibular adenopathy.      Left side of head: No submental or submandibular adenopathy.      Cervical: No cervical adenopathy.      Right cervical: No superficial cervical adenopathy.     Left cervical: No superficial  cervical adenopathy.   Skin:     General: Skin is warm and dry.      Capillary Refill: Capillary refill takes less than 2 seconds.      Coloration: Skin is not jaundiced or pale.      Findings: No erythema.      Nails: There is no clubbing.   Neurological:      Mental Status: He is alert and oriented to person, place, and time.      Cranial Nerves: No cranial nerve deficit or facial asymmetry.      Sensory: No sensory deficit.      Motor: No weakness, tremor, atrophy or abnormal muscle tone.      Coordination: Coordination normal.      Gait: Gait abnormal (mild antalgia).      Deep Tendon Reflexes: Reflexes are normal and symmetric.   Psychiatric:         Attention and Perception: He is attentive.         Mood and Affect: Mood normal. Mood is not anxious or depressed.         Speech: Speech normal.         Behavior: Behavior normal. Behavior is cooperative.         Thought Content: Thought content normal.         Cognition and Memory: Cognition normal.         Judgment: Judgment normal.         Assessment & Plan         12/7/2023    10:00 AM   Functional & Cognitive Status   Do you have difficulty preparing food and eating? No   Do you have difficulty bathing yourself, getting dressed or grooming yourself? No   Do you have difficulty using the toilet? No   Do you have difficulty moving around from place to place? No   Do you have trouble with steps or getting out of a bed or a chair? No   Current Diet Well Balanced Diet   Dental Exam Not up to date   Eye Exam Unknown   Exercise (times per week) 0 times per week   Current Exercises Include No Regular Exercise   Do you need help using the phone?  No   Are you deaf or do you have serious difficulty hearing?  No   Do you need help to go to places out of walking distance? No   Do you need help shopping? No   Do you need help preparing meals?  No   Do you need help with housework?  No   Do you need help with laundry? No   Do you need help taking your medications? No   Do  you need help managing money? No   Do you ever drive or ride in a car without wearing a seat belt? No   Have you felt unusual stress, anger or loneliness in the last month? No   Who do you live with? Spouse   If you need help, do you have trouble finding someone available to you? No   Have you been bothered in the last four weeks by sexual problems? No   Do you have difficulty concentrating, remembering or making decisions? No     STEADI Fall Risk Assessment was completed, and patient is at LOW risk for falls.Assessment completed on:12/7/2023    PHQ-2 Depression Screening  Little interest or pleasure in doing things? 0-->not at all   Feeling down, depressed, or hopeless? 0-->not at all   PHQ-2 Total Score 0         Diagnoses and all orders for this visit:    1. Visit for annual health examination (Primary)    2. Coronary artery disease involving native coronary artery of native heart without angina pectoris  Overview:  9/6/2022 [facility] for STEMI: PCI BUZZ to the RCA  9/6/2022 [facility]: LVEF 51 to 55%    Assessment & Plan:  Continue aspirin 81 mg, atorvastatin 80 mg, and isosorbide 30 mg.  Continue lisinopril 10 mg and metoprolol 25 mg half tab daily.  Continue under the care of cardiology.  Continue Brilinta 60 mg twice daily for anticoagulation.      3. Essential hypertension  Overview:  With MI September 2022    Assessment & Plan:  Continue under the care of cardiology.  Continue aspirin 81, Brilinta 60 mg twice daily, atorvastatin 80 mg, isosorbide 30 mg, and lisinopril 10 mg.  Continue metoprolol 25 mg.  As needed nitroglycerin if needed for chest pain.  Ambulatory BP monitoring either at home or random community checks.  Patient to report continued elevations >140/90.  Patient may come by office for checks if needed.       4. Colitis  Overview:  Crohn's disease under the care of GI    Assessment & Plan:  Keep GI appt today  Continue Humira injections as directed.   Continue Mesalamine as directed  Avoid food/GI  triggers                Scott Montes  reports that he has been smoking cigarettes. He has a 17.50 pack-year smoking history. He has been exposed to tobacco smoke. He has never used smokeless tobacco.. I have educated him on the risk of diseases from using tobacco products such as cancer, COPD, and heart disease.   I advised him to quit and he is not willing to quit.  I spent 3  minutes counseling the patient.    Understands disease processes and need for medications.  Understands reasons for urgent and emergent care.  Patient (& family) verbalized agreement for treatment plan.   Emotional support and active listening provided.  Patient provided time to verbalize feelings.  Counseling provided today including importance of good nutrition, exercise as tolerated, dental health, stress reduction and mental health. Importance of immunizations discussed.   Appropriate screenings based on gender (paps, breast exam, mammogram, PSA, colon screens, etc).     Counseled on safe sex practices and STD prevention.   Counseling regarding gun and water safety, domestic violence, and seatbelt use.      Will plan for updated labs.   Patient advised to consider Pneumonia vaccine.    RTC 3-4 months, sooner if needed.             This document has been electronically signed by:  EDDIE Thurman, MARCC    Dragon disclaimer:  Part of this note may be an electronic transcription/translation of spoken language to printed text using the Dragon Dictation System.

## 2023-12-07 NOTE — ASSESSMENT & PLAN NOTE
Continue aspirin 81 mg, atorvastatin 80 mg, and isosorbide 30 mg.  Continue lisinopril 10 mg and metoprolol 25 mg half tab daily.  Continue under the care of cardiology.  Continue Brilinta 60 mg twice daily for anticoagulation.

## 2023-12-07 NOTE — ASSESSMENT & PLAN NOTE
Keep GI appt today  Continue Humira injections as directed.   Continue Mesalamine as directed  Avoid food/GI triggers

## 2024-01-05 ENCOUNTER — TELEPHONE (OUTPATIENT)
Dept: FAMILY MEDICINE CLINIC | Facility: CLINIC | Age: 49
End: 2024-01-05

## 2024-01-05 NOTE — TELEPHONE ENCOUNTER
Caller: [place]    Relationship: Other    Best call back number: [phone]     What form or medical record are you requesting: APPOINTMENT NOTES AND SUMMARY FROM APPOINTMENT ON 12/07/23    Who is requesting this form or medical record from you: [facility]IR[place] WITH [employer]     How would you like to receive the form or medical records (pick-up, mail, fax): FAX  If fax, what is the fax number: [phone]    Timeframe paperwork needed: AS SOON AS POSSIBLE     Additional notes: RECORDS WAS FAXED TO OFFICE BUT INDICATES PATIENT WAS SEEN ON 12/7, BUT [place] STATES THEY ONLY HAVE APPOINTMENT NOTES AND SUMMARY UP TO APPOINTMENT ON 09/7

## 2024-01-11 ENCOUNTER — TELEPHONE (OUTPATIENT)
Dept: GASTROENTEROLOGY | Facility: CLINIC | Age: 49
End: 2024-01-11
Payer: MEDICAID

## 2024-01-22 ENCOUNTER — SPECIALTY PHARMACY (OUTPATIENT)
Dept: PHARMACY | Facility: HOSPITAL | Age: 49
End: 2024-01-22
Payer: MEDICAID

## 2024-01-22 RX ORDER — ADALIMUMAB 40MG/0.4ML
40 KIT SUBCUTANEOUS
Qty: 2 EACH | Refills: 5 | Status: SHIPPED | OUTPATIENT
Start: 2024-01-22

## 2024-01-22 NOTE — PROGRESS NOTES
[facility]  Inflammatory Bowel Disease Management       Scott Montes is a 48 y.o. male referred by Gastroenterology, Lula Nettles, to [facility] for Crohn Disease. The patient's current IBD regimen includes: colestid, Mesalamine, Humira.     Patient reports that symptoms have improved since starting Humira. He denies any adverse effects of the medication and denies any s/sx of infection, new autoimmune disorder, or malignancy (fever, appetite loss, fatigue, chest pain, swelling, night sweats or weight loss). He denies any issues with giving himself the injection and denies any missed doses.     Past Medical History:   Diagnosis Date    Anemia     Arthritis     Coronary artery disease     Crohn's disease     Elevated cholesterol     GERD (gastroesophageal reflux disease)     H/O blood clots     Heart attack     Hypertension     Low back pain     STEMI (ST elevation myocardial infarction) 09/06/2022    Ulcerative colitis      Social History     Socioeconomic History    Marital status:    Tobacco Use    Smoking status: Every Day     Packs/day: 0.50     Years: 35.00     Additional pack years: 0.00     Total pack years: 17.50     Types: Cigarettes     Passive exposure: Current    Smokeless tobacco: Never    Tobacco comments:     Smoking cessation encouraged   Vaping Use    Vaping Use: Every day    Substances: Nicotine    Devices: Disposable   Substance and Sexual Activity    Alcohol use: No    Drug use: No    Sexual activity: Defer     Partners: Female     Birth control/protection: Vasectomy     Patient has no known allergies.    Current Outpatient Medications:     Adalimumab (Humira Pen) 40 MG/0.4ML Pen-injector Kit, Inject 40 mg (0.4 ml) under the skin Every 14 (Fourteen) Days., Disp: 2 each, Rfl: 2    aspirin 81 MG EC tablet, Take 1 tablet by mouth Daily., Disp: 90 tablet, Rfl: 2    atorvastatin (LIPITOR) 80 MG tablet, Take 1 tablet by mouth Every Night., Disp:  90 tablet, Rfl: 2    colestipol (COLESTID) 1 g tablet, Take 1 tablet by mouth Daily., Disp: 90 tablet, Rfl: 2    cyclobenzaprine (FLEXERIL) 5 MG tablet, TAKE ONE (1) TABLET BY MOUTH THREE TIMES DAILY AS NEEDED FOR MUSCLE SPASMS., Disp: 90 tablet, Rfl: 5    ibuprofen (ADVIL,MOTRIN) 600 MG tablet, Take 1 tablet by mouth Every 6 (Six) Hours As Needed for Mild Pain., Disp: 20 tablet, Rfl: 0    ibuprofen (ADVIL,MOTRIN) 600 MG tablet, Take 1 tablet by mouth Every 6 (Six) Hours As Needed for Mild Pain., Disp: 20 tablet, Rfl: 0    isosorbide mononitrate (IMDUR) 30 MG 24 hr tablet, Take 1 tablet by mouth Daily. Hold for SBP <110, Disp: 90 tablet, Rfl: 2    lansoprazole (PREVACID) 30 MG capsule, Take 1 capsule by mouth Daily., Disp: 90 capsule, Rfl: 3    lisinopril (PRINIVIL,ZESTRIL) 10 MG tablet, Take 1 tablet by mouth Daily for 360 days., Disp: 90 tablet, Rfl: 3    mesalamine (LIALDA) 1.2 g EC tablet, TAKE ONE TABLET BY MOUTH DAILY WITH BREAKFAST, Disp: 30 tablet, Rfl: 11    metoprolol succinate XL (TOPROL-XL) 25 MG 24 hr tablet, Take 0.5 tablets by mouth Daily. Hold for SBP <110 and/or HR <60, Disp: 90 tablet, Rfl: 2    tamsulosin (FLOMAX) 0.4 MG capsule 24 hr capsule, TAKE ONE (1) CAPSULE BY MOUTH EVERY NIGHT., Disp: 30 capsule, Rfl: 5    ticagrelor (Brilinta) 60 MG tablet tablet, Take 1 tablet by mouth 2 (Two) Times a Day., Disp: 180 tablet, Rfl: 3    traMADol (ULTRAM) 50 MG tablet, Take 1 tablet by mouth Every 6 (Six) Hours As Needed for Moderate Pain., Disp: 12 tablet, Rfl: 0    vitamin D (ERGOCALCIFEROL) 1.25 MG (17246 UT) capsule capsule, Take 1 capsule by mouth 1 (One) Time Per Week., Disp: 4 capsule, Rfl: 5    Adalimumab (Humira Pen) 40 MG/0.4ML Pen-injector Kit, Inject 40 mg (0.4 ml) under the skin Every 14 (Fourteen) Days. (Patient not taking: Reported on 1/22/2024), Disp: 2 each, Rfl: 5    nitroglycerin (NITROSTAT) 0.4 MG SL tablet, 1 under the tongue as needed for angina, may repeat q5mins for up three doses  (Patient not taking: Reported on 1/22/2024), Disp: 100 tablet, Rfl: 11    triamcinolone (KENALOG) 0.1 % cream, Apply 1 application  topically to the appropriate area as directed 2 (Two) Times a Day. (Patient not taking: Reported on 1/22/2024), Disp: 80 g, Rfl: 3    There were no vitals filed for this visit.      Labs  WBC   Date Value Ref Range Status   11/10/2023 10.15 3.40 - 10.80 10*3/mm3 Final     RBC   Date Value Ref Range Status   11/10/2023 4.50 4.14 - 5.80 10*6/mm3 Final     Hemoglobin   Date Value Ref Range Status   11/10/2023 13.2 13.0 - 17.7 g/dL Final     Hematocrit   Date Value Ref Range Status   11/10/2023 41.0 37.5 - 51.0 % Final     MCV   Date Value Ref Range Status   11/10/2023 91.1 79.0 - 97.0 fL Final     MCH   Date Value Ref Range Status   11/10/2023 29.3 26.6 - 33.0 pg Final     MCHC   Date Value Ref Range Status   11/10/2023 32.2 31.5 - 35.7 g/dL Final     RDW   Date Value Ref Range Status   11/10/2023 14.1 12.3 - 15.4 % Final     RDW-SD   Date Value Ref Range Status   11/10/2023 47.7 37.0 - 54.0 fl Final     MPV   Date Value Ref Range Status   11/10/2023 11.1 6.0 - 12.0 fL Final     Platelets   Date Value Ref Range Status   11/10/2023 293 140 - 450 10*3/mm3 Final     Neutrophil %   Date Value Ref Range Status   06/02/2023 59.1 42.7 - 76.0 % Final     Lymphocyte %   Date Value Ref Range Status   06/02/2023 28.2 19.6 - 45.3 % Final     Monocyte %   Date Value Ref Range Status   06/02/2023 9.9 5.0 - 12.0 % Final     Eosinophil %   Date Value Ref Range Status   06/02/2023 1.4 0.3 - 6.2 % Final     Basophil %   Date Value Ref Range Status   06/02/2023 1.0 0.0 - 1.5 % Final     Immature Grans %   Date Value Ref Range Status   06/02/2023 0.4 0.0 - 0.5 % Final     Neutrophils, Absolute   Date Value Ref Range Status   06/02/2023 4.98 1.70 - 7.00 10*3/mm3 Final     Lymphocytes, Absolute   Date Value Ref Range Status   06/02/2023 2.37 0.70 - 3.10 10*3/mm3 Final     Monocytes, Absolute   Date Value Ref  Range Status   06/02/2023 0.83 0.10 - 0.90 10*3/mm3 Final     Eosinophils, Absolute   Date Value Ref Range Status   06/02/2023 0.12 0.00 - 0.40 10*3/mm3 Final     Basophils, Absolute   Date Value Ref Range Status   06/02/2023 0.08 0.00 - 0.20 10*3/mm3 Final     Immature Grans, Absolute   Date Value Ref Range Status   06/02/2023 0.03 0.00 - 0.05 10*3/mm3 Final     nRBC   Date Value Ref Range Status   06/02/2023 0.0 0.0 - 0.2 /100 WBC Final     Lab Results   Component Value Date    GLUCOSE 86 11/10/2023    BUN 10 11/10/2023    CREATININE 0.73 (L) 11/10/2023    EGFR 111.5 11/10/2023    BCR 13.7 11/10/2023    K 3.7 11/10/2023    CO2 25.3 11/10/2023    CALCIUM 9.2 11/10/2023    ALBUMIN 4.5 10/05/2023    BILITOT 0.8 10/05/2023    AST 19 10/05/2023    ALT 30 10/05/2023       Pretreatment Screening  TB: 10/10/22: Negative   HBV: 10/10/22: Non-reactive    Immunizations Screening   (Live Vaccines Should not be given while on therapy)  COVID 19: Primary series complete; pt declines any further doses   Influenza: Declined  Pneumococcal: Declined  Zoster: Declined    Drug-Drug Interactions:    No drug-drug interactions expected with Humira according to literature       Adherence and Self-Administration  Barriers to Patient Adherence and/or Self-Administration: None  Methods for Supporting Patient Adherence and/or Self-Administration: None Required     Goals of Therapy  Patient Goals of Therapy: To not miss any doses    Clinical Goals or Therapeutic Targets, If Applicable: Improved Quality of Life/ Decrease flares      Medication Assessment & Plan:   Will continue Humira 40 mg/0.4 mL every 14 days for continuation of maintenance therapy per GI referral. Patient last saw GI on 12/7/23 and Humira was continued.  Will follow-up 6 months or sooner if needed. Patient to keep GI follow up as appropriate and as scheduled.   Patient would like to continue to receive specialty mail out services. Care Coordinator will set up refills and  escalations.     Carline Phelps PharmD  1/22/2024  13:34 EST

## 2024-02-08 ENCOUNTER — SPECIALTY PHARMACY (OUTPATIENT)
Dept: PHARMACY | Facility: HOSPITAL | Age: 49
End: 2024-02-08
Payer: MEDICAID

## 2024-02-12 ENCOUNTER — OFFICE VISIT (OUTPATIENT)
Dept: PULMONOLOGY | Facility: CLINIC | Age: 49
End: 2024-02-12
Payer: MEDICAID

## 2024-02-12 VITALS
HEIGHT: 74 IN | WEIGHT: 202 LBS | OXYGEN SATURATION: 99 % | DIASTOLIC BLOOD PRESSURE: 76 MMHG | BODY MASS INDEX: 25.93 KG/M2 | TEMPERATURE: 97.5 F | SYSTOLIC BLOOD PRESSURE: 128 MMHG | HEART RATE: 78 BPM

## 2024-02-12 DIAGNOSIS — F17.210 CIGARETTE NICOTINE DEPENDENCE WITHOUT COMPLICATION: ICD-10-CM

## 2024-02-12 DIAGNOSIS — E66.3 OVERWEIGHT: ICD-10-CM

## 2024-02-12 DIAGNOSIS — I25.10 CORONARY ARTERY DISEASE INVOLVING NATIVE CORONARY ARTERY OF NATIVE HEART WITHOUT ANGINA PECTORIS: Chronic | ICD-10-CM

## 2024-02-12 DIAGNOSIS — R91.8 MULTIPLE PULMONARY NODULES: Primary | ICD-10-CM

## 2024-02-12 PROCEDURE — 99213 OFFICE O/P EST LOW 20 MIN: CPT | Performed by: NURSE PRACTITIONER

## 2024-02-12 PROCEDURE — 1160F RVW MEDS BY RX/DR IN RCRD: CPT | Performed by: NURSE PRACTITIONER

## 2024-02-12 PROCEDURE — 3078F DIAST BP <80 MM HG: CPT | Performed by: NURSE PRACTITIONER

## 2024-02-12 PROCEDURE — 1159F MED LIST DOCD IN RCRD: CPT | Performed by: NURSE PRACTITIONER

## 2024-02-12 PROCEDURE — 3074F SYST BP LT 130 MM HG: CPT | Performed by: NURSE PRACTITIONER

## 2024-02-12 RX ORDER — ASPIRIN 81 MG/1
81 TABLET, COATED ORAL DAILY
Qty: 90 TABLET | Refills: 2 | Status: SHIPPED | OUTPATIENT
Start: 2024-02-12

## 2024-03-07 ENCOUNTER — OFFICE VISIT (OUTPATIENT)
Dept: FAMILY MEDICINE CLINIC | Facility: CLINIC | Age: 49
End: 2024-03-07
Payer: MEDICAID

## 2024-03-07 ENCOUNTER — SPECIALTY PHARMACY (OUTPATIENT)
Dept: PHARMACY | Facility: HOSPITAL | Age: 49
End: 2024-03-07
Payer: MEDICAID

## 2024-03-07 VITALS
DIASTOLIC BLOOD PRESSURE: 78 MMHG | HEIGHT: 74 IN | BODY MASS INDEX: 25.41 KG/M2 | SYSTOLIC BLOOD PRESSURE: 110 MMHG | WEIGHT: 198 LBS | RESPIRATION RATE: 18 BRPM | HEART RATE: 90 BPM | OXYGEN SATURATION: 98 %

## 2024-03-07 DIAGNOSIS — E55.9 VITAMIN D DEFICIENCY: ICD-10-CM

## 2024-03-07 DIAGNOSIS — Z13.1 DIABETES MELLITUS SCREENING: ICD-10-CM

## 2024-03-07 DIAGNOSIS — I10 ESSENTIAL HYPERTENSION: Primary | ICD-10-CM

## 2024-03-07 DIAGNOSIS — Z13.220 SCREENING CHOLESTEROL LEVEL: ICD-10-CM

## 2024-03-07 DIAGNOSIS — R36.1 BLOOD IN SEMEN: ICD-10-CM

## 2024-03-07 DIAGNOSIS — Z13.29 THYROID DISORDER SCREEN: ICD-10-CM

## 2024-03-07 DIAGNOSIS — E78.5 DYSLIPIDEMIA, GOAL LDL BELOW 70: ICD-10-CM

## 2024-03-07 DIAGNOSIS — R53.83 OTHER FATIGUE: ICD-10-CM

## 2024-03-07 DIAGNOSIS — D72.828 OTHER ELEVATED WHITE BLOOD CELL (WBC) COUNT: ICD-10-CM

## 2024-03-07 DIAGNOSIS — E34.9 TESTOSTERONE DEFICIENCY: ICD-10-CM

## 2024-03-07 PROCEDURE — 83036 HEMOGLOBIN GLYCOSYLATED A1C: CPT | Performed by: NURSE PRACTITIONER

## 2024-03-07 PROCEDURE — 36415 COLL VENOUS BLD VENIPUNCTURE: CPT | Performed by: NURSE PRACTITIONER

## 2024-03-07 PROCEDURE — 80050 GENERAL HEALTH PANEL: CPT | Performed by: NURSE PRACTITIONER

## 2024-03-07 PROCEDURE — 80061 LIPID PANEL: CPT | Performed by: NURSE PRACTITIONER

## 2024-03-07 PROCEDURE — 84403 ASSAY OF TOTAL TESTOSTERONE: CPT | Performed by: NURSE PRACTITIONER

## 2024-03-07 PROCEDURE — 84154 ASSAY OF PSA FREE: CPT | Performed by: NURSE PRACTITIONER

## 2024-03-07 PROCEDURE — 82607 VITAMIN B-12: CPT | Performed by: NURSE PRACTITIONER

## 2024-03-07 PROCEDURE — 81001 URINALYSIS AUTO W/SCOPE: CPT | Performed by: NURSE PRACTITIONER

## 2024-03-07 PROCEDURE — 84439 ASSAY OF FREE THYROXINE: CPT | Performed by: NURSE PRACTITIONER

## 2024-03-07 PROCEDURE — 84153 ASSAY OF PSA TOTAL: CPT | Performed by: NURSE PRACTITIONER

## 2024-03-07 PROCEDURE — 84402 ASSAY OF FREE TESTOSTERONE: CPT | Performed by: NURSE PRACTITIONER

## 2024-03-07 PROCEDURE — 82306 VITAMIN D 25 HYDROXY: CPT | Performed by: NURSE PRACTITIONER

## 2024-03-07 NOTE — PROGRESS NOTES
"Subjective   Scott Montes is a 48 y.o. male.     Chief Complaint   Patient presents with    GERD       History of Present Illness     HTN/history of MI-ongoing.  He is currently on lisinopril 10 mg and metoprolol 25 mg half tab daily.  He is also on Imdur 30 mg.  He is followed by cardiology.  He continues Brilinta 60 mg twice daily for anticoagulation.  He is also on aspirin 81 mg.  He has the same chest and arm pain he has had since he had the MI.    GERD/Crohn's-under the care of GI.  He is currently on Colestid 1 g daily for his chronic diarrhea.  He is also on Prevacid 30 mg for reflux and Mesalamine 1.2 g.   He has abdomen pain and cramps today.  He reports that when he flares he is flared for 2-3 days.  He has no appetite and diarrhea is consistent for that time.   He continues Humira Q 2 weeks.    Blood concern-has noted blood in sperm.  Has been consistent since December.  No blood that he has seen in his urine.  No pain with intercourse or ejaculation.   He denies any swelling or irritation.   He is already on Flomax.    Vitamin D deficiency-chronic and ongoing.  Patient is presently taking vitamin D 50,000 units supplement.  No negative side effects of medication are reported.  Vitamin D level is stable with medication use.  Feet and leg pain-has noted some increased foot and leg pain when he walks.  He reports that they feel \"sore\".  He does not feel they are swelling.  It does resolve overnight but returns when he is up and active for long periods.  He has some generalized leg aching at night since he got ill with GI and had the MI.  He also had Covid prior to all of the incidents.       The following portions of the patient's history were reviewed and updated as appropriate: CC, ROS, allergies, current medications, past family history, past medical history, past social history, past surgical history and problem list.    Review of Systems   Constitutional:  Positive for fatigue. Negative for appetite " "change, unexpected weight gain and unexpected weight loss.   HENT:  Negative for congestion, ear pain, postnasal drip, rhinorrhea, sore throat, swollen glands, trouble swallowing and voice change.    Eyes:  Negative for blurred vision, double vision, pain and visual disturbance.   Respiratory:  Negative for cough, chest tightness, shortness of breath and wheezing.    Cardiovascular:  Negative for chest pain, palpitations and leg swelling.   Gastrointestinal:  Negative for abdominal pain, blood in stool, constipation, diarrhea, nausea and indigestion.   Genitourinary:  Negative for dysuria, hematuria and urgency.   Musculoskeletal:  Positive for arthralgias, gait problem and myalgias. Negative for back pain and joint swelling.   Skin:  Negative for color change and skin lesions.   Allergic/Immunologic: Negative.    Neurological:  Negative for dizziness, tremors, syncope, numbness and headache.   Hematological: Negative.    Psychiatric/Behavioral:  Positive for sleep disturbance (has been ongoing since his illness.  he has difficulty going to and staying asleep) and stress. Negative for dysphoric mood and suicidal ideas. The patient is not nervous/anxious.    All other systems reviewed and are negative.      Objective     /78   Pulse 90   Resp 18   Ht 188 cm (74.02\")   Wt 89.8 kg (198 lb)   SpO2 98%   BMI 25.41 kg/m²     Physical Exam  Vitals reviewed.   Constitutional:       General: He is not in acute distress.     Appearance: He is well-developed. He is not diaphoretic.   HENT:      Head: Normocephalic and atraumatic.      Jaw: No tenderness.      Right Ear: Hearing, tympanic membrane, ear canal and external ear normal.      Left Ear: Hearing, tympanic membrane, ear canal and external ear normal.      Nose: Nose normal. No nasal tenderness or congestion.      Right Sinus: No maxillary sinus tenderness or frontal sinus tenderness.      Left Sinus: No maxillary sinus tenderness or frontal sinus " tenderness.      Mouth/Throat:      Lips: Pink.      Mouth: Mucous membranes are moist.      Pharynx: Oropharynx is clear. Uvula midline.   Eyes:      General: Lids are normal. No scleral icterus.     Extraocular Movements:      Right eye: Normal extraocular motion and no nystagmus.      Left eye: Normal extraocular motion and no nystagmus.      Conjunctiva/sclera: Conjunctivae normal.      Pupils: Pupils are equal, round, and reactive to light.   Neck:      Thyroid: No thyromegaly or thyroid tenderness.      Vascular: No carotid bruit or JVD.      Trachea: No tracheal tenderness.   Cardiovascular:      Rate and Rhythm: Normal rate and regular rhythm.      Pulses:           Dorsalis pedis pulses are 2+ on the right side and 2+ on the left side.        Posterior tibial pulses are 2+ on the right side and 2+ on the left side.      Heart sounds: Normal heart sounds, S1 normal and S2 normal. No murmur heard.  Pulmonary:      Effort: Pulmonary effort is normal. No accessory muscle usage, prolonged expiration or respiratory distress.      Breath sounds: Normal breath sounds.   Chest:      Chest wall: No tenderness.   Abdominal:      General: Bowel sounds are normal. There is distension (mild).      Palpations: Abdomen is soft. There is no hepatomegaly, splenomegaly or mass.      Tenderness: There is generalized abdominal tenderness.      Comments: Bowel sounds very loud and gurgling in quality   Musculoskeletal:         General: Tenderness (generalized areas of stiffness) present.      Cervical back: Normal range of motion and neck supple.      Right lower leg: No edema.      Left lower leg: No edema.      Comments: No muscular atrophy or flaccidity.   Lymphadenopathy:      Head:      Right side of head: No submental or submandibular adenopathy.      Left side of head: No submental or submandibular adenopathy.      Cervical: No cervical adenopathy.      Right cervical: No superficial cervical adenopathy.     Left  cervical: No superficial cervical adenopathy.   Skin:     General: Skin is warm and dry.      Capillary Refill: Capillary refill takes less than 2 seconds.      Coloration: Skin is not jaundiced or pale.      Findings: No erythema.      Nails: There is no clubbing.   Neurological:      Mental Status: He is alert and oriented to person, place, and time.      Cranial Nerves: No cranial nerve deficit or facial asymmetry.      Sensory: No sensory deficit.      Motor: No weakness, tremor, atrophy or abnormal muscle tone.      Coordination: Coordination normal.      Gait: Gait abnormal (mild antalgia).      Deep Tendon Reflexes: Reflexes are normal and symmetric.   Psychiatric:         Attention and Perception: He is attentive.         Mood and Affect: Mood normal. Mood is not anxious or depressed.         Speech: Speech normal.         Behavior: Behavior normal. Behavior is cooperative.         Thought Content: Thought content normal.         Cognition and Memory: Cognition normal.         Judgment: Judgment normal.         Diagnoses and all orders for this visit:    1. Essential hypertension (Primary)  Overview:  With MI September 2022    Orders:  -     CBC & Differential  -     Comprehensive Metabolic Panel    2. Dyslipidemia, goal LDL below 70  Overview:  19 2022 total cholesterol 161, triglycerides 97, HDL 28, and   9/7/2022 total cholesterol 174, triglycerides 178, HDL 28, and   2/14/2023 total cholesterol 112, triglycerides 91, HDL 40 and LDL 54    Orders:  -     Lipid Panel    3. Vitamin D deficiency  -     Vitamin D,25-Hydroxy    4. Thyroid disorder screen  -     TSH  -     T4, Free    5. Diabetes mellitus screening  -     Hemoglobin A1c    6. Screening cholesterol level    7. Other fatigue  -     Vitamin B12    8. Other elevated white blood cell (WBC) count    9. Testosterone deficiency  -     PSA, Total & Free  -     Testosterone, Free, Total    10. Blood in semen  -     PSA, Total & Free  -      Testosterone, Free, Total  -     Urinalysis With Microscopic - Urine, Clean Catch  -     Ambulatory Referral to Urology      Understands disease processes and need for medications.  Understands reasons for urgent and emergent care.  Patient (& family) verbalized agreement for treatment plan.   Emotional support and active listening provided.  Patient provided time to verbalize feelings.    Labs today.  Will notify patient of results.     RTC 3 months, sooner if needed.             This document has been electronically signed by:  EDDIE Thurman, FNP-C    Dragon disclaimer:  Part of this note may be an electronic transcription/translation of spoken language to printed text using the Dragon Dictation System.

## 2024-03-07 NOTE — PROGRESS NOTES
Specialty Pharmacy Refill Coordination Note     Scott is a 48 y.o. male contacted today regarding refills of Humira specialty medication(s).    Reviewed and verified with patient: yes  Specialty medication(s) and dose(s) confirmed: yes    Refill Questions      Flowsheet Row Most Recent Value   Changes to allergies? No   Changes to medications? No   New conditions or infections since last clinic visit No   Unplanned office visit, urgent care, ED, or hospital admission in the last 4 weeks  No   How does patient/caregiver feel medication is working? Very good   Financial problems or insurance changes  No   Since the previous refill, were any specialty medication doses or scheduled injections missed or delayed?  No   Does this patient require a clinical escalation to a pharmacist? No            Delivery Questions      Flowsheet Row Most Recent Value   Delivery method [employer]   Copay verified? No   Copay amount n/a   Copay form of payment No copayment ($0)                   Follow-up: 28 day(s)     Seema Lindsay, Pharmacy Technician  Specialty Pharmacy Technician

## 2024-03-08 LAB
25(OH)D3 SERPL-MCNC: 16.1 NG/ML (ref 30–100)
ALBUMIN SERPL-MCNC: 4.3 G/DL (ref 3.5–5.2)
ALBUMIN/GLOB SERPL: 1.7 G/DL
ALP SERPL-CCNC: 133 U/L (ref 39–117)
ALT SERPL W P-5'-P-CCNC: 25 U/L (ref 1–41)
ANION GAP SERPL CALCULATED.3IONS-SCNC: 12.5 MMOL/L (ref 5–15)
AST SERPL-CCNC: 21 U/L (ref 1–40)
BACTERIA UR QL AUTO: NORMAL /HPF
BASOPHILS # BLD AUTO: 0.1 10*3/MM3 (ref 0–0.2)
BASOPHILS NFR BLD AUTO: 0.8 % (ref 0–1.5)
BILIRUB SERPL-MCNC: 0.6 MG/DL (ref 0–1.2)
BILIRUB UR QL STRIP: NEGATIVE
BUN SERPL-MCNC: 13 MG/DL (ref 6–20)
BUN/CREAT SERPL: 18.3 (ref 7–25)
CALCIUM SPEC-SCNC: 8.8 MG/DL (ref 8.6–10.5)
CHLORIDE SERPL-SCNC: 107 MMOL/L (ref 98–107)
CHOLEST SERPL-MCNC: 105 MG/DL (ref 0–200)
CLARITY UR: CLEAR
CO2 SERPL-SCNC: 21.5 MMOL/L (ref 22–29)
COLOR UR: YELLOW
CREAT SERPL-MCNC: 0.71 MG/DL (ref 0.76–1.27)
DEPRECATED RDW RBC AUTO: 41.5 FL (ref 37–54)
EGFRCR SERPLBLD CKD-EPI 2021: 112.5 ML/MIN/1.73
EOSINOPHIL # BLD AUTO: 0.08 10*3/MM3 (ref 0–0.4)
EOSINOPHIL NFR BLD AUTO: 0.7 % (ref 0.3–6.2)
ERYTHROCYTE [DISTWIDTH] IN BLOOD BY AUTOMATED COUNT: 13.1 % (ref 12.3–15.4)
GLOBULIN UR ELPH-MCNC: 2.6 GM/DL
GLUCOSE SERPL-MCNC: 73 MG/DL (ref 65–99)
GLUCOSE UR STRIP-MCNC: NEGATIVE MG/DL
HBA1C MFR BLD: 5.4 % (ref 4.8–5.6)
HCT VFR BLD AUTO: 44.7 % (ref 37.5–51)
HDLC SERPL-MCNC: 29 MG/DL (ref 40–60)
HGB BLD-MCNC: 14.5 G/DL (ref 13–17.7)
HGB UR QL STRIP.AUTO: NEGATIVE
HYALINE CASTS UR QL AUTO: NORMAL /LPF
IMM GRANULOCYTES # BLD AUTO: 0.06 10*3/MM3 (ref 0–0.05)
IMM GRANULOCYTES NFR BLD AUTO: 0.5 % (ref 0–0.5)
KETONES UR QL STRIP: NEGATIVE
LDLC SERPL CALC-MCNC: 54 MG/DL (ref 0–100)
LDLC/HDLC SERPL: 1.77 {RATIO}
LEUKOCYTE ESTERASE UR QL STRIP.AUTO: NEGATIVE
LYMPHOCYTES # BLD AUTO: 3.29 10*3/MM3 (ref 0.7–3.1)
LYMPHOCYTES NFR BLD AUTO: 27.1 % (ref 19.6–45.3)
MCH RBC QN AUTO: 28.2 PG (ref 26.6–33)
MCHC RBC AUTO-ENTMCNC: 32.4 G/DL (ref 31.5–35.7)
MCV RBC AUTO: 87 FL (ref 79–97)
MONOCYTES # BLD AUTO: 1.08 10*3/MM3 (ref 0.1–0.9)
MONOCYTES NFR BLD AUTO: 8.9 % (ref 5–12)
NEUTROPHILS NFR BLD AUTO: 62 % (ref 42.7–76)
NEUTROPHILS NFR BLD AUTO: 7.51 10*3/MM3 (ref 1.7–7)
NITRITE UR QL STRIP: NEGATIVE
NRBC BLD AUTO-RTO: 0 /100 WBC (ref 0–0.2)
PH UR STRIP.AUTO: 6.5 [PH] (ref 5–8)
PLATELET # BLD AUTO: 321 10*3/MM3 (ref 140–450)
PMV BLD AUTO: 12.1 FL (ref 6–12)
POTASSIUM SERPL-SCNC: 4.1 MMOL/L (ref 3.5–5.2)
PROT SERPL-MCNC: 6.9 G/DL (ref 6–8.5)
PROT UR QL STRIP: NEGATIVE
PSA FREE MFR SERPL: 31.7 %
PSA FREE SERPL-MCNC: 0.19 NG/ML
PSA SERPL-MCNC: 0.6 NG/ML (ref 0–4)
RBC # BLD AUTO: 5.14 10*6/MM3 (ref 4.14–5.8)
RBC # UR STRIP: NORMAL /HPF
REF LAB TEST METHOD: NORMAL
SODIUM SERPL-SCNC: 141 MMOL/L (ref 136–145)
SP GR UR STRIP: 1.01 (ref 1–1.03)
SQUAMOUS #/AREA URNS HPF: NORMAL /HPF
T4 FREE SERPL-MCNC: 1.4 NG/DL (ref 0.93–1.7)
TRIGL SERPL-MCNC: 123 MG/DL (ref 0–150)
TSH SERPL DL<=0.05 MIU/L-ACNC: 1.38 UIU/ML (ref 0.27–4.2)
UROBILINOGEN UR QL STRIP: NORMAL
VIT B12 BLD-MCNC: 316 PG/ML (ref 211–946)
VLDLC SERPL-MCNC: 22 MG/DL (ref 5–40)
WBC # UR STRIP: NORMAL /HPF
WBC NRBC COR # BLD AUTO: 12.12 10*3/MM3 (ref 3.4–10.8)

## 2024-03-12 LAB
TESTOST FREE SERPL-MCNC: 5.4 PG/ML (ref 6.8–21.5)
TESTOST SERPL-MCNC: 333 NG/DL (ref 264–916)

## 2024-03-12 NOTE — PROGRESS NOTES
Patient notified of labs via [employer].  Patient message is as follows:      Vitamin D is low.  If you are not taking a vitamin D supplement we do need to get you started on 1 for your bone health.  Kidney function is okay.  You have 1 liver marker which is the alkaline phosphatase that is elevated.  This is the first time it is ever been elevated.  We will monitor it for now.  Cholesterol is good but your HDL is a little bit low.  You can increase that by eating certain kinds of fish which are high in omega-3.  Your blood count is also elevated as your white count.  This could be related to any recent illness.  Prostate screening is normal.  Urine looked normal.  B12 is okay and thyroid levels are normal.

## 2024-03-22 DIAGNOSIS — K50.00 CROHN'S DISEASE OF SMALL INTESTINE WITHOUT COMPLICATION: ICD-10-CM

## 2024-03-22 DIAGNOSIS — R19.7 DIARRHEA, UNSPECIFIED TYPE: ICD-10-CM

## 2024-03-22 RX ORDER — MONTELUKAST SODIUM 4 MG/1
1 TABLET, CHEWABLE ORAL DAILY
Qty: 90 TABLET | Refills: 2 | Status: SHIPPED | OUTPATIENT
Start: 2024-03-22

## 2024-03-22 RX ORDER — MONTELUKAST SODIUM 4 MG/1
1 TABLET, CHEWABLE ORAL DAILY
Qty: 90 TABLET | Refills: 2 | Status: SHIPPED | OUTPATIENT
Start: 2024-03-22 | End: 2024-03-22

## 2024-04-02 ENCOUNTER — OFFICE VISIT (OUTPATIENT)
Dept: UROLOGY | Facility: CLINIC | Age: 49
End: 2024-04-02
Payer: MEDICAID

## 2024-04-02 VITALS
HEART RATE: 76 BPM | SYSTOLIC BLOOD PRESSURE: 132 MMHG | BODY MASS INDEX: 25.9 KG/M2 | DIASTOLIC BLOOD PRESSURE: 82 MMHG | WEIGHT: 201.8 LBS | HEIGHT: 74 IN

## 2024-04-02 DIAGNOSIS — R35.0 FREQUENCY OF MICTURITION: ICD-10-CM

## 2024-04-02 DIAGNOSIS — R36.1 HEMATOSPERMIA: Primary | ICD-10-CM

## 2024-04-02 DIAGNOSIS — N40.0 BPH WITHOUT OBSTRUCTION/LOWER URINARY TRACT SYMPTOMS: ICD-10-CM

## 2024-04-02 DIAGNOSIS — R33.9 INCOMPLETE EMPTYING OF BLADDER: ICD-10-CM

## 2024-04-02 PROCEDURE — 1160F RVW MEDS BY RX/DR IN RCRD: CPT | Performed by: NURSE PRACTITIONER

## 2024-04-02 PROCEDURE — 51798 US URINE CAPACITY MEASURE: CPT | Performed by: NURSE PRACTITIONER

## 2024-04-02 PROCEDURE — 1159F MED LIST DOCD IN RCRD: CPT | Performed by: NURSE PRACTITIONER

## 2024-04-02 PROCEDURE — 3075F SYST BP GE 130 - 139MM HG: CPT | Performed by: NURSE PRACTITIONER

## 2024-04-02 PROCEDURE — 3079F DIAST BP 80-89 MM HG: CPT | Performed by: NURSE PRACTITIONER

## 2024-04-02 PROCEDURE — 87086 URINE CULTURE/COLONY COUNT: CPT | Performed by: NURSE PRACTITIONER

## 2024-04-02 PROCEDURE — 99214 OFFICE O/P EST MOD 30 MIN: CPT | Performed by: NURSE PRACTITIONER

## 2024-04-02 NOTE — PROGRESS NOTES
Chief Complaint  blood in semen  (FOLLOW UP ON HEMATOSPERMIA/BPH WITH LUTS)    Subjective          Scott Montes presents to [facility] for HEMATOSPERMIA/BPH WITH LUTS  History of Present Illness   History of Present Illness  The patient is a pleasant 48-year-old male who presents to clinic today for evaluation. The patient presents with complaints of hematospermia. He reports episodes of bloody semen that has been ongoing since 11/2023. The patient reports episode started right after gallbladder surgery. He denies any difficulty with urination, he denies any recent groin trauma or urethral procedures, he denies any recent bladder or kidney infections, denies any history of kidney stones.     He was last evaluated on 12/12/2023 by Dr. Perez with concerns of BPH, lower urinary tract symptoms, most bothersome of which was frequency and dysuria. The patient was started on tamsulosin 0.4 mg at that time. His most recent PSA today is 0.6 and that was completed on 03/07/2024. His PSA prior to that was 0.59 1 year ago. He still reports episodes of nocturia 1 to 2 times, urine frequency and constant feeling of incomplete bladder emptying.  However, urinalysis today is completely negative for any leukocyte esterase. It is negative for nitrite. It is negative for gross/microscopic hematuria. His postvoid residual is 2 mL with an IPSS score only of 6.    He did have a CT abdomen and pelvis completed in 2023 which I have reviewed. Kidneys and ureters were unremarkable with no solid mass, no obstructing stones, no hydronephrosis. Bladder was also unremarkable for any masses or stones. Overall the source of his hematuria was not noted on CT.    In summary, the patient reports that he underwent gallbladder surgery and has since noticed blood in his semen approximately 6 times, coinciding with his sexual intercourse. The blood is described as brownish and red in color.  Post-ejaculation, he experiences a slight discomfort, but no dysuria. He reports increased urinary frequency and occasional incomplete bladder emptying. He experiences severe pain upon waking in the morning. He denies late-night fluid intake and engages in intercourse approximately once a week. His primary care physician has referred him for further evaluation.  He did have urinalysis that was completed and negative for any bacterial infections,  He was not prescribed any antibiotics during his last visit.  Denies any recent exposure to STIs.  He has not undergone a cystoscopy. He continues to take Flomax for BPH, renal LUTS.      Supplemental Information  He went down to 140 pounds. He was diagnosed with Crohn's and ulcerative colitis. He had a heart attack on 09/06/2023. It caused a blood clot. He was getting infusions and antibiotics. He is on Brilinta 60 mg for coronary artery disease status post stent placement.  Also explained correlation with hematospermia.  .    IMPRESSION  CT ABD/PELVIS 06/07/22:  KIDNEYS AND URETERS:  Unremarkable.  No solid mass.  No obstructing  stones.  No hydronephrosis.BLADDER: Unremarkable. No mass. No stones.   1.  Again there is extensive abnormal thickening of the sigmoid colon  and terminal ileum that has not changed drastically in appearance since  previous study.  Mesenteric fat stranding again noted.  2.  Small free fluid in the pelvis is again noted.  3.  Right lower lobe pulmonary nodule measuring 9 mm in maximum  transverse dimension.  For low-risk or high-risk patients consider a  follow-up chest CT at 3 months.  If unchanged consider an additional  follow-up CT at 18-24 months.  Alternatively (or additionally) PET/CT or  tissue sampling could be performe  Active Ambulatory Problems     Diagnosis Date Noted    Testosterone deficiency 08/24/2018    Other male erectile dysfunction 08/24/2018    Primary osteoarthritis involving multiple joints 04/09/2021    Vitamin D  "deficiency 04/09/2021    Colitis 05/06/2022    Generalized abdominal pain 06/29/2022    Abnormal CT scan, colon 06/29/2022    Coronary artery disease involving native coronary artery of native heart without angina pectoris 10/04/2022    Former smoker (Stopped 9/2022) 10/17/2022    Multiple pulmonary nodules (Max 9mm) 10/17/2022    RESENDEZ (dyspnea on exertion) 10/17/2022    Benign prostatic hyperplasia with nocturia 12/14/2022    Essential hypertension 02/20/2023    Dyslipidemia, goal LDL below 70 02/20/2023    Biliary dyskinesia 10/23/2023    Hematospermia 04/02/2024    Frequency of micturition 04/02/2024    Incomplete emptying of bladder 04/02/2024    BPH without obstruction/lower urinary tract symptoms 04/02/2024     Resolved Ambulatory Problems     Diagnosis Date Noted    STEMI (ST elevation myocardial infarction) 09/06/2022    Return to work evaluation 12/26/2022     Past Medical History:   Diagnosis Date    Anemia     Arthritis     Coronary artery disease     Crohn's disease     Elevated cholesterol     Erectile dysfunction     GERD (gastroesophageal reflux disease)     H/O blood clots     Heart attack     Hormone disorder 9 years    Hypertension     Interstitial cystitis     Low back pain     Ulcerative colitis       Objective   Vital Signs:   /82 (BP Location: Left arm, Patient Position: Sitting, Cuff Size: Adult)   Pulse 76   Ht 188 cm (74.02\")   Wt 91.5 kg (201 lb 12.8 oz)   BMI 25.90 kg/m²       ROS:   Review of Systems   Constitutional:  Positive for activity change, appetite change and fatigue. Negative for chills, diaphoresis, fever, unexpected weight gain and unexpected weight loss.   HENT:  Negative for congestion, ear discharge, ear pain, nosebleeds, rhinorrhea, sinus pressure and sore throat.    Eyes:  Negative for blurred vision, double vision, photophobia, pain, redness and visual disturbance.   Respiratory:  Negative for apnea, cough, chest tightness, shortness of breath, wheezing and " stridor.    Cardiovascular:  Negative for chest pain and palpitations.   Gastrointestinal:  Positive for abdominal distention. Negative for abdominal pain, constipation, diarrhea, nausea and vomiting.   Endocrine: Negative for polydipsia, polyphagia and polyuria.   Genitourinary:  Positive for flank pain, hematuria, nocturia and erectile dysfunction. Negative for decreased urine volume, difficulty urinating, discharge, dysuria, frequency, genital sores, penile pain, penile swelling, scrotal swelling, testicular pain, urgency and urinary incontinence.   Musculoskeletal:  Positive for back pain and myalgias. Negative for arthralgias and joint swelling.   Skin:  Negative for pallor, rash and wound.   Neurological:  Negative for dizziness, tremors, syncope, weakness, light-headedness, memory problem and confusion.   Psychiatric/Behavioral:  Positive for sleep disturbance and stress. Negative for agitation, behavioral problems and decreased concentration.         Physical Exam  Constitutional:       General: He is in acute distress.      Appearance: He is well-developed. He is obese. He is ill-appearing.   HENT:      Head: Normocephalic and atraumatic.      Right Ear: External ear normal.      Left Ear: External ear normal.   Eyes:      General:         Right eye: No discharge.         Left eye: No discharge.      Conjunctiva/sclera: Conjunctivae normal.      Pupils: Pupils are equal, round, and reactive to light.   Neck:      Thyroid: No thyromegaly.      Trachea: No tracheal deviation.   Cardiovascular:      Rate and Rhythm: Normal rate and regular rhythm.      Heart sounds: No murmur heard.     No friction rub.   Pulmonary:      Effort: Pulmonary effort is normal. No respiratory distress.      Breath sounds: Normal breath sounds. No stridor.   Abdominal:      General: Bowel sounds are normal. There is distension.      Palpations: Abdomen is soft.      Tenderness: There is abdominal tenderness. There is no guarding.    Genitourinary:     Penis: Normal and uncircumcised. No tenderness or discharge.       Testes: Normal.      Rectum: Normal. Guaiac result negative.   Musculoskeletal:         General: Tenderness present. No deformity. Normal range of motion.      Cervical back: Normal range of motion and neck supple.   Skin:     General: Skin is warm and dry.      Capillary Refill: Capillary refill takes less than 2 seconds.      Coloration: Skin is not pale.   Neurological:      Mental Status: He is alert and oriented to person, place, and time.      Cranial Nerves: No cranial nerve deficit.      Motor: Weakness present.      Coordination: Coordination normal.   Psychiatric:         Behavior: Behavior normal.         Thought Content: Thought content normal.         Judgment: Judgment normal.        Physical Exam    Result Review :     UA          3/7/2024    10:49 4/2/2024    10:18   Urinalysis   Squamous Epithelial Cells, UA 0-2     Specific Gravity, UA 1.012     Ketones, UA Negative  Negative    Blood, UA Negative     Leukocytes, UA Negative  Negative    Nitrite, UA Negative     RBC, UA 0-2     WBC, UA 0-2     Bacteria, UA None Seen                  Assessment and Plan    Problem List Items Addressed This Visit          Genitourinary and Reproductive     Hematospermia - Primary    Frequency of micturition    Relevant Orders    POC Urinalysis Dipstick, Automated (Completed)    Urine Culture - Urine, Urine, Random Void    Incomplete emptying of bladder    Relevant Orders    Bladder Scan (Completed)    BPH without obstruction/lower urinary tract symptoms      ASSESSMENT          HEMATOSPERMIA BPH WITH LUTS:FREQUENCY/NOCTURIA  Mr. Scott Jones is a pleasant 48-year-old male who presents to clinic today for evaluation, with complaints of hematospermia. He reports episodes of bloody semen that has been ongoing since 11/2023. The patient reports episode started right after gallbladder surgery. He has BPH with minor urinary tract  symptoms, but denies any difficulty with urination, he denies any recent groin trauma or urethral procedures, he denies any recent bladder or kidney infections, denies any history of kidney stones, denies any exposure to STIs.  His urinalysis today is completely negative for leukocyte esterase, it is negative for nitrite, it is negative for gross/microscopic hematuria.  His PVR is 0 mL, with an IPSS score of 6.  Currently on tamsulosin for BPH, most recent PSA is 0.6 and that was completed on 03/7 of 2024.    We discussed hematospermia.  Explained to patient that, blood in ejaculate was considered clinically insignificant and presumed secondary to prolonged sexual intercourse. Nevertheless, also explained the fact that blood in the semen can be caused by tumors, infections, anatomical abnormalities, stones, or inflammation in many sites throughout the genitourinary system. Usually blood in the semen is benign and resolves on its own.     In most cases, hematospermia has no underlying cause, is benign, self-limited, and no treatment is required.  However sometimes it can be caused by many conditions affecting the male genitourinary system. Areas affected may include the bladder, urethra, the testicles, the tubes that distribute semen from the testicles (known as the seminal vesicles), the epididymis , and the prostate gland which is negative for nodularity during his ARIELLE.     BPH: WE Discussed the pathophysiology of BPH and obstruction. We discussed the static and dynamic effects of BPH as well as using 5 alpha reductase inhibitors versus alpha blockade.  We discussed the indications for transurethral surgery as well.  And/ or other therapeutic options available including all of the newer techniques.  He has no real symptomatology referable to his prostate other than moderate enlargement.  Rather than proceed with an expensive workup he doesn't need, at this time I am recommending an alpha blocker tamsulosin 0.4 mg  capsule daily, and alpha reductase inhibitor-finasteride 5 mg daily-deferred    WE Discussed maximal medical therapy with finasteride and tamsulosin and how each medication works I explained that finasteride would reduce the size of the prostate by 20-30% reduce his PSA by 50% reduce the risks of either surgery or urinary retention by 50% and may reduce the risk of prostate cancer well-differentiated by 20-30% however he may increase the risk of poorly differentiated prostate cancer by half to 1%.  I also explained how  Flomax relaxes the prostate, and can often cause retrograde ejaculation. Nevertheless, I am recommending that he continue his tamsulosin as prescribed.    Assessment & Plan  1. Hematospermia.  Currently, the patient exhibits no concerns. His urinalysis work-up, as per his primary care physician, has yielded negative results. We have deliberated on the potential future investigation via cystoscopy should the recurrent hematospermia persist. However, the patient has chosen to defer this option at this juncture.    Follow-up  The patient is scheduled for a follow-up visit in the clinic on an as-needed basis.    He will return sooner if need BE    Patient reports that he is not currently experiencing any symptoms of urinary incontinence.    RADIOLOGY (CT AND/OR KUB):    CT Abdomen and Pelvis: Results for orders placed during the hospital encounter of 06/07/22    CT Abdomen Pelvis With & Without Contrast    Narrative  EXAM:  CT Abdomen and Pelvis Without and With Intravenous Contrast    EXAM DATE:  6/7/2022 8:20 AM    CLINICAL HISTORY:  Abdominal pain, acute, nonlocalized; R10.84-Generalized abdominal pain    TECHNIQUE:  Axial computed tomography images of the abdomen and pelvis without and  with intravenous contrast.  Sagittal and coronal reformatted images were  created and reviewed.  This CT exam was performed using one or more of  the following dose reduction techniques:  automated exposure  control,  adjustment of the mA and/or kV according to patient size, and/or use of  iterative reconstruction technique.    COMPARISON:  05/06/2022    FINDINGS:  LUNG BASES:  Right lower lobe pulmonary nodule measuring 9 mm in  maximum transverse dimension.    ABDOMEN:  LIVER:  Unremarkable.  No mass.  GALLBLADDER AND BILE DUCTS:  Unremarkable.  No calcified stones.  No  ductal dilation.  PANCREAS:  Unremarkable.  No mass.  No ductal dilation.  SPLEEN:  Unremarkable.  No splenomegaly.  ADRENALS:  Unremarkable.  No mass.  KIDNEYS AND URETERS:  Unremarkable.  No solid mass.  No obstructing  stones.  No hydronephrosis.  STOMACH AND BOWEL:  Again there is extensive abnormal thickening of  the sigmoid colon and terminal ileum that has not changed drastically in  appearance since previous study.  No obstruction.    PELVIS:  APPENDIX:  No findings to suggest acute appendicitis.  BLADDER:  Unremarkable.  No mass.  No stones.  REPRODUCTIVE:  Unremarkable as visualized.    ABDOMEN and PELVIS:  INTRAPERITONEAL SPACE:  Small free fluid in the pelvis is again noted.  No free air.  BONES/JOINTS:  No acute fracture.  No dislocation.  SOFT TISSUES:  Unremarkable.  VASCULATURE:  Unremarkable.  No abdominal aortic aneurysm.  LYMPH NODES:  Unremarkable.  No enlarged lymph nodes.    Impression  1.  Again there is extensive abnormal thickening of the sigmoid colon  and terminal ileum that has not changed drastically in appearance since  previous study.  Mesenteric fat stranding again noted.  2.  Small free fluid in the pelvis is again noted.  3.  Right lower lobe pulmonary nodule measuring 9 mm in maximum  transverse dimension.  For low-risk or high-risk patients consider a  follow-up chest CT at 3 months.  If unchanged consider an additional  follow-up CT at 18-24 months.  Alternatively (or additionally) PET/CT or  tissue sampling could be performed.    This report was finalized on 6/7/2022 9:15 AM by Dr. Jed Laguerre MD.     CT Stone  Protocol: No results found for this or any previous visit.     KUB: No results found for this or any previous visit.       [unfilled]  LABS (3 MONTHS):    Office Visit on 04/02/2024   Component Date Value Ref Range Status    Color 04/02/2024 Yellow  Yellow, Straw, Dark Yellow, Kareen Final    Clarity, UA 04/02/2024 Clear  Clear Final    Specific Gravity  04/02/2024 1.010  1.005 - 1.030 Final    pH, Urine 04/02/2024 6.5  5.0 - 8.0 Final    Leukocytes 04/02/2024 Negative  Negative Final    Nitrite, UA 04/02/2024 Negative  Negative Final    Protein, POC 04/02/2024 Negative  Negative mg/dL Final    Glucose, UA 04/02/2024 Negative  Negative mg/dL Final    Ketones, UA 04/02/2024 Negative  Negative Final    Urobilinogen, UA 04/02/2024 Normal  Normal, 0.2 E.U./dL Final    Bilirubin 04/02/2024 Negative  Negative Final    Blood, UA 04/02/2024 Negative  Negative Final    Lot Number 04/02/2024 [id number]   Final    Expiration Date 04/02/2024 10/25/24   Final   Office Visit on 03/07/2024   Component Date Value Ref Range Status    Glucose 03/07/2024 73  65 - 99 mg/dL Final    BUN 03/07/2024 13  6 - 20 mg/dL Final    Creatinine 03/07/2024 0.71 (L)  0.76 - 1.27 mg/dL Final    Sodium 03/07/2024 141  136 - 145 mmol/L Final    Potassium 03/07/2024 4.1  3.5 - 5.2 mmol/L Final    Chloride 03/07/2024 107  98 - 107 mmol/L Final    CO2 03/07/2024 21.5 (L)  22.0 - 29.0 mmol/L Final    Calcium 03/07/2024 8.8  8.6 - 10.5 mg/dL Final    Total Protein 03/07/2024 6.9  6.0 - 8.5 g/dL Final    Albumin 03/07/2024 4.3  3.5 - 5.2 g/dL Final    ALT (SGPT) 03/07/2024 25  1 - 41 U/L Final    AST (SGOT) 03/07/2024 21  1 - 40 U/L Final    Alkaline Phosphatase 03/07/2024 133 (H)  39 - 117 U/L Final    Total Bilirubin 03/07/2024 0.6  0.0 - 1.2 mg/dL Final    Globulin 03/07/2024 2.6  gm/dL Final    A/G Ratio 03/07/2024 1.7  g/dL Final    BUN/Creatinine Ratio 03/07/2024 18.3  7.0 - 25.0 Final    Anion Gap 03/07/2024 12.5  5.0 - 15.0 mmol/L Final    eGFR  03/07/2024 112.5  >60.0 mL/min/1.73 Final    Total Cholesterol 03/07/2024 105  0 - 200 mg/dL Final    Triglycerides 03/07/2024 123  0 - 150 mg/dL Final    HDL Cholesterol 03/07/2024 29 (L)  40 - 60 mg/dL Final    LDL Cholesterol  03/07/2024 54  0 - 100 mg/dL Final    VLDL Cholesterol 03/07/2024 22  5 - 40 mg/dL Final    LDL/HDL Ratio 03/07/2024 1.77   Final    TSH 03/07/2024 1.380  0.270 - 4.200 uIU/mL Final    Hemoglobin A1C 03/07/2024 5.40  4.80 - 5.60 % Final    25 Hydroxy, Vitamin D 03/07/2024 16.1 (L)  30.0 - 100.0 ng/ml Final    Free T4 03/07/2024 1.40  0.93 - 1.70 ng/dL Final    Vitamin B-12 03/07/2024 316  211 - 946 pg/mL Final    PSA 03/07/2024 0.6  0.0 - 4.0 ng/mL Final    Roche ECLIA methodology.  According to the American Urological Association, Serum PSA should  decrease and remain at undetectable levels after radical  prostatectomy. The AUA defines biochemical recurrence as an initial  PSA value 0.2 ng/mL or greater followed by a subsequent confirmatory  PSA value 0.2 ng/mL or greater.  Values obtained with different assay methods or kits cannot be used  interchangeably. Results cannot be interpreted as absolute evidence  of the presence or absence of malignant disease.    PSA, Free 03/07/2024 0.19  N/A ng/mL Final    Roche ECLIA methodology.    PSA, Free % 03/07/2024 31.7  % Final    The table below lists the probability of prostate cancer for  men with non-suspicious ARIELLE results and total PSA between  4 and 10 ng/mL, by patient age (Will et al, ROSHAN 1998,  279:1542).                    % Free PSA       50-64 yr        65-75 yr                    0.00-10.00%        56%             55%                   10.01-15.00%        24%             35%                   15.01-20.00%        17%             23%                   20.01-25.00%        10%             20%                        >25.00%         5%              9%  Please note:  Will et al did not make specific                recommendations  regarding the use of                percent free PSA for any other population                of men.    Testosterone, Total 03/07/2024 333  264 - 916 ng/dL Final    Adult male reference interval is based on a population of  healthy nonobese males (BMI <30) between 19 and 39 years old.  Kamila et.al. JCEM 2017,102;1441-2988. PMID: 34390839.    Testosterone, Free 03/07/2024 5.4 (L)  6.8 - 21.5 pg/mL Final    WBC 03/07/2024 12.12 (H)  3.40 - 10.80 10*3/mm3 Final    RBC 03/07/2024 5.14  4.14 - 5.80 10*6/mm3 Final    Hemoglobin 03/07/2024 14.5  13.0 - 17.7 g/dL Final    Hematocrit 03/07/2024 44.7  37.5 - 51.0 % Final    MCV 03/07/2024 87.0  79.0 - 97.0 fL Final    MCH 03/07/2024 28.2  26.6 - 33.0 pg Final    MCHC 03/07/2024 32.4  31.5 - 35.7 g/dL Final    RDW 03/07/2024 13.1  12.3 - 15.4 % Final    RDW-SD 03/07/2024 41.5  37.0 - 54.0 fl Final    MPV 03/07/2024 12.1 (H)  6.0 - 12.0 fL Final    Platelets 03/07/2024 321  140 - 450 10*3/mm3 Final    Neutrophil % 03/07/2024 62.0  42.7 - 76.0 % Final    Lymphocyte % 03/07/2024 27.1  19.6 - 45.3 % Final    Monocyte % 03/07/2024 8.9  5.0 - 12.0 % Final    Eosinophil % 03/07/2024 0.7  0.3 - 6.2 % Final    Basophil % 03/07/2024 0.8  0.0 - 1.5 % Final    Immature Grans % 03/07/2024 0.5  0.0 - 0.5 % Final    Neutrophils, Absolute 03/07/2024 7.51 (H)  1.70 - 7.00 10*3/mm3 Final    Lymphocytes, Absolute 03/07/2024 3.29 (H)  0.70 - 3.10 10*3/mm3 Final    Monocytes, Absolute 03/07/2024 1.08 (H)  0.10 - 0.90 10*3/mm3 Final    Eosinophils, Absolute 03/07/2024 0.08  0.00 - 0.40 10*3/mm3 Final    Basophils, Absolute 03/07/2024 0.10  0.00 - 0.20 10*3/mm3 Final    Immature Grans, Absolute 03/07/2024 0.06 (H)  0.00 - 0.05 10*3/mm3 Final    nRBC 03/07/2024 0.0  0.0 - 0.2 /100 WBC Final    Color, UA 03/07/2024 Yellow  Yellow, Straw Final    Appearance, UA 03/07/2024 Clear  Clear Final    pH,  03/07/2024 6.5  5.0 - 8.0 Final    Specific Gravity,  03/07/2024 1.012  1.005 - 1.030 Final     Glucose, UA 03/07/2024 Negative  Negative Final    Ketones, UA 03/07/2024 Negative  Negative Final    Bilirubin, UA 03/07/2024 Negative  Negative Final    Blood, UA 03/07/2024 Negative  Negative Final    Protein, UA 03/07/2024 Negative  Negative Final    Leuk Esterase, UA 03/07/2024 Negative  Negative Final    Nitrite, UA 03/07/2024 Negative  Negative Final    Urobilinogen, UA 03/07/2024 0.2 E.U./dL  0.2 - 1.0 E.U./dL Final    RBC, UA 03/07/2024 0-2  None Seen, 0-2 /HPF Final    WBC, UA 03/07/2024 0-2  None Seen, 0-2 /HPF Final    Bacteria, UA 03/07/2024 None Seen  None Seen /HPF Final    Squamous Epithelial Cells, UA 03/07/2024 0-2  None Seen, 0-2 /HPF Final    Hyaline Casts, UA 03/07/2024 None Seen  None Seen /LPF Final    Methodology 03/07/2024 Automated Microscopy   Final        IPSS Questionnaire (AUA-7):  Over the past month…    1)  How often have you had a sensation of not emptying your bladder completely after you finish urinating?  0 - Not at all   2)  How often have you had to urinate again less than two hours after you finished urinating? 0 - Not at all   3)  How often have you found you stopped and started again several times when you urinated?  2 - Less than half the time   4) How difficult have you found it to postpone urination?  1 - Less than 1 time in 5   5) How often have you had a weak urinary stream?  2 - Less than half the time   6) How often have you had to push or strain to begin urination?  1 - Less than 1 time in 5   7) How many times did you most typically get up to urinate from the time you went to bed until the time you got up in the morning?  0 - None   Total Score:  6     Smoking Cessation Counseling:  Current every day smoker. less than 3 minutes spent counseling. Has reduced tobacco use.  I advised patient to quit tobacco use and offered support.  I provided patient with tobacco cessation educational material printed in the patient's After Visit Summary.     Follow Up   Return in  about 1 year (around 4/2/2025) for Next scheduled follow up, With Dr. Lomeli, FOR  BPH W LUTS/PSA/-PROSTATE ARIELLE/MEDS/HEMATOSPERMIA.    Patient was given instructions and counseling regarding his condition or for health maintenance advice. Please see specific information pulled into the AVS if appropriate.          This document has been electronically signed by Griselda Cheng-Akwa, APRN   April 2, 2024 20:27 EDT    Dictated Utilizing Dragon Dictation: Part of this note may be an electronic transcription/translation of spoken language to printed text using the Dragon Dictation System.    Patient or patient representative verbalized consent for the use of Ambient Listening during the visit with  Griselda Cheng-Akwa, APRN for chart documentation. 4/2/2024  20:48 EDT

## 2024-04-04 LAB — BACTERIA SPEC AEROBE CULT: NO GROWTH

## 2024-04-05 ENCOUNTER — SPECIALTY PHARMACY (OUTPATIENT)
Dept: PHARMACY | Facility: HOSPITAL | Age: 49
End: 2024-04-05
Payer: MEDICAID

## 2024-04-05 NOTE — PROGRESS NOTES
Specialty Pharmacy Refill Coordination Note     Scott is a 48 y.o. male contacted today regarding refills of Humira specialty medication(s).    Reviewed and verified with patient: yes  Specialty medication(s) and dose(s) confirmed: yes    Refill Questions      Flowsheet Row Most Recent Value   Changes to allergies? No   Changes to medications? No   New conditions or infections since last clinic visit No   Unplanned office visit, urgent care, ED, or hospital admission in the last 4 weeks  No   How does patient/caregiver feel medication is working? Very good   Financial problems or insurance changes  No   Since the previous refill, were any specialty medication doses or scheduled injections missed or delayed?  No   Does this patient require a clinical escalation to a pharmacist? No            Delivery Questions      Flowsheet Row Most Recent Value   Copay verified? No   Copay amount n/a   Copay form of payment No copayment ($0)                   Follow-up: 28 day(s)     Seema Lindsay, Pharmacy Technician  Specialty Pharmacy Technician

## 2024-04-08 ENCOUNTER — OFFICE VISIT (OUTPATIENT)
Dept: CARDIOLOGY | Facility: CLINIC | Age: 49
End: 2024-04-08
Payer: MEDICAID

## 2024-04-08 VITALS
WEIGHT: 204 LBS | HEART RATE: 77 BPM | BODY MASS INDEX: 26.18 KG/M2 | DIASTOLIC BLOOD PRESSURE: 78 MMHG | SYSTOLIC BLOOD PRESSURE: 129 MMHG | OXYGEN SATURATION: 98 % | HEIGHT: 74 IN

## 2024-04-08 DIAGNOSIS — I25.10 CORONARY ARTERY DISEASE INVOLVING NATIVE CORONARY ARTERY OF NATIVE HEART WITHOUT ANGINA PECTORIS: Primary | Chronic | ICD-10-CM

## 2024-04-08 DIAGNOSIS — E78.5 DYSLIPIDEMIA, GOAL LDL BELOW 70: Chronic | ICD-10-CM

## 2024-04-08 DIAGNOSIS — I10 ESSENTIAL HYPERTENSION: Chronic | ICD-10-CM

## 2024-04-08 PROCEDURE — 1159F MED LIST DOCD IN RCRD: CPT | Performed by: NURSE PRACTITIONER

## 2024-04-08 PROCEDURE — 93000 ELECTROCARDIOGRAM COMPLETE: CPT | Performed by: NURSE PRACTITIONER

## 2024-04-08 PROCEDURE — 3078F DIAST BP <80 MM HG: CPT | Performed by: NURSE PRACTITIONER

## 2024-04-08 PROCEDURE — 99214 OFFICE O/P EST MOD 30 MIN: CPT | Performed by: NURSE PRACTITIONER

## 2024-04-08 PROCEDURE — 1160F RVW MEDS BY RX/DR IN RCRD: CPT | Performed by: NURSE PRACTITIONER

## 2024-04-08 PROCEDURE — 3074F SYST BP LT 130 MM HG: CPT | Performed by: NURSE PRACTITIONER

## 2024-04-08 RX ORDER — METOPROLOL SUCCINATE 25 MG/1
12.5 TABLET, EXTENDED RELEASE ORAL DAILY
Qty: 90 TABLET | Refills: 2 | Status: SHIPPED | OUTPATIENT
Start: 2024-04-08

## 2024-04-08 RX ORDER — LISINOPRIL 10 MG/1
10 TABLET ORAL DAILY
Qty: 90 TABLET | Refills: 3 | Status: SHIPPED | OUTPATIENT
Start: 2024-04-08 | End: 2025-04-03

## 2024-04-08 RX ORDER — NITROGLYCERIN 0.4 MG/1
TABLET SUBLINGUAL
Qty: 30 TABLET | Refills: 0 | Status: SHIPPED | OUTPATIENT
Start: 2024-04-08

## 2024-04-08 RX ORDER — ASPIRIN 81 MG/1
81 TABLET ORAL DAILY
Qty: 90 TABLET | Refills: 2 | Status: SHIPPED | OUTPATIENT
Start: 2024-04-08

## 2024-04-08 RX ORDER — ATORVASTATIN CALCIUM 80 MG/1
80 TABLET, FILM COATED ORAL NIGHTLY
Qty: 90 TABLET | Refills: 2 | Status: SHIPPED | OUTPATIENT
Start: 2024-04-08

## 2024-04-08 NOTE — PROGRESS NOTES
"Chief Complaint  Coronary Artery Disease (Patient denies SOA, states has random chest pain )    Subjective          Scott Montes presents to [facility] for follow up.    History of Present Illness  Mr. Montes presents for routine follow-up of coronary artery disease, hypertension, and hyperlipidemia.  His last visit to clinic was 10/05/2023 with me.  At that time his Brilinta dose was lowered to 60 mg.    Labs reveal that his lipids are at goal on his current dose of high intensity statin.  He is not having any troublesome side effects from that medication.    His blood pressure is at goal today.  He does not check it frequently at home, however he has regular doctor appointments and finds that his systolic is typically in the 120s.    His prescription for nitroglycerin tablets has .  He requests a refill on those.    He has not been taking his isosorbide mononitrate.  He reports no increase in his baseline intermittent twinge like chest pain.    Unfortunately, he continues to smoke against medical advice.  Discussed the correlation between nicotine and coronary artery disease.  Offered multiple cessation aids.  At this time he is not ready to discontinue smoking and declines those medications.   Tobacco Use: High Risk (2024)    Patient History     Smoking Tobacco Use: Every Day     Smokeless Tobacco Use: Never     Passive Exposure: Current       Objective     Vital Signs:   /78 (BP Location: Left arm, Patient Position: Sitting, Cuff Size: Adult)   Pulse 77   Ht 188 cm (74\")   Wt 92.5 kg (204 lb)   SpO2 98%   BMI 26.19 kg/m²       Physical Exam  Vitals and nursing note reviewed.   Constitutional:       General: He is not in acute distress.     Appearance: Normal appearance.   Neck:      Vascular: No carotid bruit.   Cardiovascular:      Rate and Rhythm: Normal rate and regular rhythm.      Heart sounds: No murmur heard.  Pulmonary:      Effort: Pulmonary effort " is normal.      Breath sounds: Normal breath sounds.   Musculoskeletal:      Right lower leg: No edema.      Left lower leg: No edema.   Skin:     General: Skin is warm and dry.   Neurological:      General: No focal deficit present.      Mental Status: He is alert.   Psychiatric:         Mood and Affect: Mood normal.          Result Review :   The following data was reviewed by: EDDIE Stout on 04/08/2024:  CMP          10/5/2023    10:42 11/10/2023    12:34 3/7/2024    10:49   CMP   Glucose 82  86  73    BUN 10  10  13    Creatinine 0.75  0.73  0.71    EGFR 110.6  111.5  112.5    Sodium 139  140  141    Potassium 4.2  3.7  4.1    Chloride 104  106  107    Calcium 9.3  9.2  8.8    Total Protein 7.1   6.9    Albumin 4.5   4.3    Globulin 2.6   2.6    Total Bilirubin 0.8   0.6    Alkaline Phosphatase 117   133    AST (SGOT) 19   21    ALT (SGPT) 30   25    Albumin/Globulin Ratio 1.7   1.7    BUN/Creatinine Ratio 13.3  13.7  18.3    Anion Gap 10.6  8.7  12.5      Lipid Panel          10/5/2023    10:42 3/7/2024    10:49   Lipid Panel   Total Cholesterol 133  105    Triglycerides 115  123    HDL Cholesterol 36  29    VLDL Cholesterol 21  22    LDL Cholesterol  76  54    LDL/HDL Ratio 2.06  1.77      Data reviewed : Cardiology studies as detailed below      Last Cardiac Cath  Results for orders placed during the hospital encounter of 09/06/22    Cardiac Catheterization/Vascular Study    Narrative  Images from the original result were not included.  CARDIAC CATHETERIZATION / INTERVENTION REPORT            DATE OF PROCEDURE: 9/6/2022      INDICATION FOR PROCEDURE: STEMI      PRE PROCEDURE DIAGNOSIS:  Inferior STEMI      POST PROCEDURE DIAGNOSIS:  % proximal RCA occlusion s/ pPCI with 3.5 x 28 BUZZ post dilated with 4.0 NC balloon    Face to face mdoerate conscious  sedation time :      COMPLICATIONS : None    Specimens collected : None    PROCEDURE PERFORMED:    1. Selective right and left Coronary  Angiogram  2. Left heart catheretization  3. Left Ventriculography  4. PCI of RCA  5. IVUS of RCA  6. Manual aspiration thrombectomy    Description of the procedure:  Prior to the procedure risk, benefits and possible alternative were discussed with the patient and informed consent was obtained. Patient was brought to cardiac cath lab table in post absorbtive state. Patient was prepped and drape in usual sterile fashion. IV Versed and Fentanyl was used for moderate sedation. 2% Lidocaine was used for topical anesthesia. R radial arterial site was prepped and a micropuncture needle was used to access the artery and a 5 F slender sheath was placed. 2.5 mg of Verapamil and 200 mcg of NTG was given through the sheath intra arterial and 5000 units of Heparin was given once the catheter crossed the aortic arch.    5 F TIG 4 catheters was used for right and left coronary angiogram and 5 F pigtail catheter was used for Left heart hemodynamics and left ventriculography. All the catheters were exchanged over 0.035 wire. The R radial arterial sheath was removed and TR band was applied and immediate and complete hemostasis was achieved. The patient tolerate the entire procedure well without any immediate known complications.    Coronary anatomy findings:    LM: Is a large calibre vessel , normal take off from left cusp, divides into LAD and Lcx. No stenosis    LAD: Mild luminal irregularities no stenosis    LCX: Moderate calibre vessel, mild luminal irregularities.    RCA: Large calibre, dominant artery, normal take off from right cusp.  100% occlusion in proximal.    Left Ventriculography:    LV systolic function was normal with visual estimated EF of 55%.Basal inferior wall hypokinesia  No significant mitral regurgitation noted.    LVEDP: 10 mmHg  No gradient across the aortic valve on pull back.      PERCUTANEOUS CORONARY INTERVENTION PROCEDURE NOTE:    Heparin monotherapy was used for anticoagulation.  Total of 10,000 Units  was given IV.Single bolus integrelin was also given    6 F JR 4 guide was used to engage the RCA coaxially. Pronto V4 aspiration catheter was used for manual thrombectomy was performed    0.014 Runthrough guidewire was used to cross the lesion and parked distally.    Used a 2.5 x15 Compliant TREK balloon to predilate the lesion at 10 nandini.    Prepped a 3.5 x 28 Shana Drug eluting stent and position at the lesion and successfully deployed at 16 nandini. IVUS showed vessel diameter was 4.5 proximal and distal, used a 4.0 NC at high pressure to post dilate    Post stent deployment angio pictures showed good expansion with 0% residual stenosis, KELLY 3 flow in the distal vascular bed and no dissection or distal wire perforation.    Lesion length: 20 mm  Pre PCI Stenosis: 100 %  Post PCI stenosis: 0 %  Pre PCI KELLY flow: 0  Post PCI KELLY flow: 3    EBL: Less than 10cc      Final Impression:  % proximal RCA occlusion s/ pPCI with 3.5 x 28 BUZZ post dilated with 4.0 NC balloon      Recommendations:  Aggressive guideline directed medical management for CAD  Dual Anti platelet medication with Asa 81 mg qd and Brilinta 90 mg bid for minimum 1 year.            Matthew Hill MD, [facility]  Interventional Cardiology    09/06/22  12:34 EDT  Clinical frailty score: 4      Last Stress test       Last Echo  Results for orders placed during the hospital encounter of 09/06/22    Adult Transthoracic Echo Complete W/ Cont if Necessary Per Protocol    Interpretation Summary  · Left ventricular ejection fraction appears to be 51 - 55%. Left ventricular systolic function is normal, mild hypokinesia of basal inferolateral wall  · Left ventricular diastolic function was normal.  · There is no evidence of pericardial effusion         ECG 12 Lead    Date/Time: 4/8/2024 10:27 AM  Performed by: Ilana Diehl APRN    Authorized by: Ilana Diehl APRN  Comparison: compared with previous ECG from 10/5/2023  Similar to previous  ECG  Comparison to previous ECG: Sinus rhythm 72 bpm  Rhythm: sinus rhythm  Rate: normal  BPM: 70  Conduction: conduction normal  ST Segments: ST segments normal  QRS axis: normal  Other: no other findings    Clinical impression: normal ECG           Current Outpatient Medications   Medication Sig Dispense Refill    Adalimumab (Humira, 2 Pen,) 40 MG/0.4ML Pen-injector Kit Inject 40 mg (0.4 ml) under the skin Every 14 (Fourteen) Days. 2 each 5    aspirin (Aspirin Low Dose) 81 MG EC tablet Take 1 tablet by mouth Daily. 90 tablet 2    atorvastatin (LIPITOR) 80 MG tablet Take 1 tablet by mouth Every Night. 90 tablet 2    colestipol (COLESTID) 1 g tablet TAKE ONE (1) TABLET BY MOUTH DAILY. 90 tablet 2    lansoprazole (PREVACID) 30 MG capsule Take 1 capsule by mouth Daily. 90 capsule 3    lisinopril (PRINIVIL,ZESTRIL) 10 MG tablet Take 1 tablet by mouth Daily for 360 days. 90 tablet 3    mesalamine (LIALDA) 1.2 g EC tablet TAKE ONE TABLET BY MOUTH DAILY WITH BREAKFAST 30 tablet 11    metoprolol succinate XL (TOPROL-XL) 25 MG 24 hr tablet Take 0.5 tablets by mouth Daily. Hold for SBP <110 and/or HR <60 90 tablet 2    nitroglycerin (NITROSTAT) 0.4 MG SL tablet 1 under the tongue as needed for angina, may repeat q5mins for up three doses 30 tablet 0    tamsulosin (FLOMAX) 0.4 MG capsule 24 hr capsule TAKE ONE (1) CAPSULE BY MOUTH EVERY NIGHT. 30 capsule 5    ticagrelor (Brilinta) 60 MG tablet tablet Take 1 tablet by mouth 2 (Two) Times a Day. 180 tablet 3    triamcinolone (KENALOG) 0.1 % cream Apply 1 application  topically to the appropriate area as directed 2 (Two) Times a Day. 80 g 3    vitamin D (ERGOCALCIFEROL) 1.25 MG (10589 UT) capsule capsule Take 1 capsule by mouth 1 (One) Time Per Week. 4 capsule 5     No current facility-administered medications for this visit.            Assessment and Plan    Problem List Items Addressed This Visit       Coronary artery disease involving native coronary artery of native heart  without angina pectoris - Primary (Chronic)    Overview     9/6/2022 [facility] for STEMI: PCI BUZZ to the RCA  9/6/2022 [facility]: LVEF 51 to 55%         Relevant Medications    nitroglycerin (NITROSTAT) 0.4 MG SL tablet    aspirin (Aspirin Low Dose) 81 MG EC tablet    metoprolol succinate XL (TOPROL-XL) 25 MG 24 hr tablet    ticagrelor (Brilinta) 60 MG tablet tablet    Other Relevant Orders    ECG 12 Lead    Lipid Panel    Essential hypertension (Chronic)    Overview     With MI September 2022         Relevant Medications    lisinopril (PRINIVIL,ZESTRIL) 10 MG tablet    metoprolol succinate XL (TOPROL-XL) 25 MG 24 hr tablet    Other Relevant Orders    Comprehensive Metabolic Panel    CBC (No Diff)    Dyslipidemia, goal LDL below 70 (Chronic)    Overview     19 2022 total cholesterol 161, triglycerides 97, HDL 28, and   9/7/2022 total cholesterol 174, triglycerides 178, HDL 28, and   2/14/2023 total cholesterol 112, triglycerides 91, HDL 40 and LDL 54  10/05/2023 total cholesterol 133, triglycerides 115, HDL 36, LDL 76  03/07/2024 total cholesterol 105, triglycerides 123, HDL 29, LDL 54         Relevant Medications    atorvastatin (LIPITOR) 80 MG tablet    Other Relevant Orders    Lipid Panel     Diagnoses and all orders for this visit:    1. Coronary artery disease involving native coronary artery of native heart without angina pectoris (Primary)  -     nitroglycerin (NITROSTAT) 0.4 MG SL tablet; 1 under the tongue as needed for angina, may repeat q5mins for up three doses  Dispense: 30 tablet; Refill: 0  -     aspirin (Aspirin Low Dose) 81 MG EC tablet; Take 1 tablet by mouth Daily.  Dispense: 90 tablet; Refill: 2  -     ticagrelor (Brilinta) 60 MG tablet tablet; Take 1 tablet by mouth 2 (Two) Times a Day.  Dispense: 180 tablet; Refill: 3  -     ECG 12 Lead  -     Lipid Panel; Future    2. Dyslipidemia, goal LDL below 70  -     atorvastatin (LIPITOR) 80 MG tablet; Take 1 tablet by mouth Every Night.  Dispense: 90  tablet; Refill: 2  -     Lipid Panel; Future    3. Essential hypertension  -     lisinopril (PRINIVIL,ZESTRIL) 10 MG tablet; Take 1 tablet by mouth Daily for 360 days.  Dispense: 90 tablet; Refill: 3  -     metoprolol succinate XL (TOPROL-XL) 25 MG 24 hr tablet; Take 0.5 tablets by mouth Daily. Hold for SBP <110 and/or HR <60  Dispense: 90 tablet; Refill: 2  -     Comprehensive Metabolic Panel; Future  -     CBC (No Diff); Future      Continue GDMT with dual antiplatelet therapy, high intensity statin, beta-blocker, and ACE inhibitor.  Discussed potentially discontinuing Brilinta at our next visit.  Would prefer to do this when all of his modifiable risk factors are at goal.  That would include smoking cessation.  I encouraged him to work on that and we will discuss again in October.      Follow Up     Return in about 6 months (around 10/8/2024).    Patient was given instructions and counseling regarding his condition or for health maintenance advice. Please see specific information pulled into the AVS if appropriate.           Dictated Utilizing Dragon Dictation: Part of this note may be an electronic transcription/translation of spoken language to printed text using the Dragon Dictation System

## 2024-04-11 ENCOUNTER — OFFICE VISIT (OUTPATIENT)
Dept: GASTROENTEROLOGY | Facility: CLINIC | Age: 49
End: 2024-04-11
Payer: MEDICAID

## 2024-04-11 ENCOUNTER — LAB (OUTPATIENT)
Dept: LAB | Facility: HOSPITAL | Age: 49
End: 2024-04-11
Payer: MEDICAID

## 2024-04-11 VITALS
SYSTOLIC BLOOD PRESSURE: 117 MMHG | HEART RATE: 76 BPM | BODY MASS INDEX: 26.18 KG/M2 | HEIGHT: 74 IN | DIASTOLIC BLOOD PRESSURE: 81 MMHG | WEIGHT: 204 LBS

## 2024-04-11 DIAGNOSIS — Z91.89 AT RISK FOR INFECTION DUE TO IMMUNOSUPPRESSION: Primary | ICD-10-CM

## 2024-04-11 DIAGNOSIS — Z91.89 AT RISK FOR INFECTION DUE TO IMMUNOSUPPRESSION: ICD-10-CM

## 2024-04-11 DIAGNOSIS — K50.00 CROHN'S DISEASE OF SMALL INTESTINE WITHOUT COMPLICATION: Primary | ICD-10-CM

## 2024-04-11 PROCEDURE — 86480 TB TEST CELL IMMUN MEASURE: CPT

## 2024-04-11 PROCEDURE — 36415 COLL VENOUS BLD VENIPUNCTURE: CPT

## 2024-04-11 NOTE — PROGRESS NOTES
"Subjective   Scott Montes is a 48 y.o. male who presents to the office today as a follow up appointment regarding Crohn's Disease      History of Present Illness:    Interval History 12/7/2023: The patient presents today for follow-up Crohn's disease.  He has recently been followed by Jenny Nettles PA-C.  The patient is currently on Humira and mesalamine.  The patient underwent colonoscopy in July 2012 22 which showed scattered erosions and erythema throughout the colon.  Biopsies revealed chronic active ileitis and mild acute colitis.  On April 23, 2023 his fecal calprotectin was 166.  This is down from previously collected fecal calprotectin which was 895.  His last CRP was performed on 3/2/2023.  This was normal at 0.30.  This had previously been elevated.  A recent CBC revealed normal hemoglobin and hematocrit.  This was performed in November 2023.  Due to involvement of the terminal ileum, the patient takes Colestid due to suspected poor bile acid recirculation in the terminal ileum.  At the time of his last visit, the patient had complained of heartburn and reflux.  He had been given Protonix but did not take this routinely per Akira Nettles's notes.  She subsequently placed him on Prevacid.  He has bowel movement 3 to 5 times a day.  His stool is for the most part formed.  He does feel that he empties well.  He admits he does not take his Colestid every day. This can constipate him a bit.  He generally takes it a couple of times a week. He had his gallbladder was removed a few weeks ago.  He states his gallbladder was nonfunctioning.  He has not noted increased bowel movements with cholecystectomy. He is doing better on Prevacid in respect to heartburn.  He had an AMI in the Sept 2023. He tells me it was a \"blood clot\".  He is now on ASA and Brillanta.  He initially lost 15 lbs when sick with Crohn's disease but has gained some weight back.  He states \"I am 200% better\" than when we first started to " see him for Crohn's disease.  No BRBPR.  He does describe some mild intermittent tenderness in the right lower abdominal pain but this is not worrisome.  He is wondering if he should get the pneumovax 23 vaccination.    Interval history 4/11/2024: The patient presents today for follow-up Crohn's disease.  He is currently on Humira and mesalamine.  His last colonoscopy was in July 20,2022.  This showed scattered erosions and erythema throughout the colon.  In April 2023 his fecal calprotectin was 166.  It had previously been 895.  His last CRP was performed in March 2023 this was normal at 0.30.  He underwent cholecystectomy in the fall of last year.  He is on Colestid for suspected bile acid diarrhea.  His stool is a Randolph score 3-4.  His last CMP was performed in March 2024.  This was mildly elevated.  His vitamin D level was low at 16.1.  His PCP placed him on Vitamin D supplement once a week. He has been doing well on Humira.  He will have occasional abdominal pain which is fleeting.  He is having a bowel movement once a day.  Occasionally, he will have up to 5 bm daily.  This seems to becoming less often. He denies BRBPR or melena. He feels like his energy improved. He does have joint pain.  He has issues with pain in the knees, ankles primarily.  He takes Tylenol.  Heartburn and reflux under good control on Prevacid.            Review of Systems:  Review of Systems   Constitutional:  Negative for activity change, appetite change, chills, fatigue, fever and unexpected weight change.   HENT:  Negative for mouth sores and trouble swallowing.    Eyes:  Negative for photophobia, pain and redness.   Respiratory:  Negative for cough and choking.    Cardiovascular:  Negative for chest pain and leg swelling.   Gastrointestinal:  Positive for abdominal distention, abdominal pain, anal bleeding and nausea. Negative for constipation, diarrhea, rectal pain and vomiting.   Endocrine: Negative for cold intolerance, heat  intolerance and polyphagia.   Genitourinary:  Negative for difficulty urinating and dysuria.   Musculoskeletal:  Negative for arthralgias, joint swelling and myalgias.   Skin:  Negative for rash and wound.   Allergic/Immunologic: Negative for environmental allergies and food allergies.   Neurological:  Negative for dizziness and light-headedness.   Hematological:  Negative for adenopathy. Does not bruise/bleed easily.   Psychiatric/Behavioral:  Negative for sleep disturbance. The patient is not nervous/anxious.        Past Medical History:  Past Medical History:   Diagnosis Date    Anemia     Arthritis     BPH without obstruction/lower urinary tract symptoms 4/2/2024    Coronary artery disease     Crohn's disease     Elevated cholesterol     Erectile dysfunction     GERD (gastroesophageal reflux disease)     H/O blood clots     Heart attack     Hormone disorder 9 years    Hypertension     Interstitial cystitis     Low back pain     STEMI (ST elevation myocardial infarction) 09/06/2022    Ulcerative colitis        Past Surgical History:  Past Surgical History:   Procedure Laterality Date    CARDIAC CATHETERIZATION N/A 09/06/2022    Procedure: Left Heart Cath;  Surgeon: Matthew Hill MD;  Location: [facility] INVASIVE LOCATION;  Service: Cardiovascular;  Laterality: N/A;    CARDIAC CATHETERIZATION N/A 09/06/2022    Procedure: Percutaneous Coronary Intervention;  Surgeon: Matthew Hill MD;  Location: [facility] INVASIVE LOCATION;  Service: Cardiovascular;  Laterality: N/A;    CARDIAC SURGERY      CHOLECYSTECTOMY N/A 11/13/2023    Procedure: CHOLECYSTECTOMY LAPAROSCOPIC WITH DAVINCI ROBOT;  Surgeon: Gonzalo Rudolph MD;  Location: [facility];  Service: Robotics - DaVinci;  Laterality: N/A;    COLONOSCOPY N/A 07/12/2022    Procedure: COLONOSCOPY;  Surgeon: Stella Aparicio MD;  Location: [facility];  Service: Gastroenterology;  Laterality: N/A;    CYST REMOVAL      shoulder     FRACTURE SURGERY Right     foot/toes    VASCULAR SURGERY      VASECTOMY         Family History:  Family History   Problem Relation Age of Onset    Colon cancer Mother     Cancer Father        Social History:  Social History     Socioeconomic History    Marital status:    Tobacco Use    Smoking status: Every Day     Current packs/day: 0.50     Average packs/day: 0.5 packs/day for 35.0 years (17.5 ttl pk-yrs)     Types: Cigarettes     Passive exposure: Current    Smokeless tobacco: Never    Tobacco comments:     Smoking cessation encouraged   Vaping Use    Vaping status: Former    Substances: Nicotine    Devices: Disposable   Substance and Sexual Activity    Alcohol use: No    Drug use: No    Sexual activity: Yes     Partners: Female     Birth control/protection: Vasectomy       Current Medication List:    Current Outpatient Medications:     Adalimumab (Humira, 2 Pen,) 40 MG/0.4ML Pen-injector Kit, Inject 40 mg (0.4 ml) under the skin Every 14 (Fourteen) Days., Disp: 2 each, Rfl: 5    aspirin (Aspirin Low Dose) 81 MG EC tablet, Take 1 tablet by mouth Daily., Disp: 90 tablet, Rfl: 2    atorvastatin (LIPITOR) 80 MG tablet, Take 1 tablet by mouth Every Night., Disp: 90 tablet, Rfl: 2    colestipol (COLESTID) 1 g tablet, TAKE ONE (1) TABLET BY MOUTH DAILY., Disp: 90 tablet, Rfl: 2    lansoprazole (PREVACID) 30 MG capsule, Take 1 capsule by mouth Daily., Disp: 90 capsule, Rfl: 3    lisinopril (PRINIVIL,ZESTRIL) 10 MG tablet, Take 1 tablet by mouth Daily for 360 days., Disp: 90 tablet, Rfl: 3    mesalamine (LIALDA) 1.2 g EC tablet, TAKE ONE TABLET BY MOUTH DAILY WITH BREAKFAST, Disp: 30 tablet, Rfl: 11    metoprolol succinate XL (TOPROL-XL) 25 MG 24 hr tablet, Take 0.5 tablets by mouth Daily. Hold for SBP <110 and/or HR <60, Disp: 90 tablet, Rfl: 2    nitroglycerin (NITROSTAT) 0.4 MG SL tablet, 1 under the tongue as needed for angina, may repeat q5mins for up three doses, Disp: 30 tablet, Rfl: 0    tamsulosin  "(FLOMAX) 0.4 MG capsule 24 hr capsule, TAKE ONE (1) CAPSULE BY MOUTH EVERY NIGHT., Disp: 30 capsule, Rfl: 5    ticagrelor (Brilinta) 60 MG tablet tablet, Take 1 tablet by mouth 2 (Two) Times a Day., Disp: 180 tablet, Rfl: 3    triamcinolone (KENALOG) 0.1 % cream, Apply 1 application  topically to the appropriate area as directed 2 (Two) Times a Day., Disp: 80 g, Rfl: 3    vitamin D (ERGOCALCIFEROL) 1.25 MG (91822 UT) capsule capsule, Take 1 capsule by mouth 1 (One) Time Per Week., Disp: 4 capsule, Rfl: 5    Allergies:   Patient has no known allergies.    Vitals:  /81 (BP Location: Left arm, Patient Position: Sitting, Cuff Size: Large Adult)   Pulse 76   Ht 188 cm (74\")   Wt 92.5 kg (204 lb)   BMI 26.19 kg/m²     Physical Exam:  Physical Exam  Constitutional:       Appearance: He is normal weight.   HENT:      Head: Normocephalic and atraumatic.      Nose: Nose normal. No congestion or rhinorrhea.   Eyes:      General: No scleral icterus.     Extraocular Movements: Extraocular movements intact.      Conjunctiva/sclera: Conjunctivae normal.      Pupils: Pupils are equal, round, and reactive to light.   Cardiovascular:      Rate and Rhythm: Normal rate and regular rhythm.      Pulses: Normal pulses.      Heart sounds: Normal heart sounds.   Pulmonary:      Effort: Pulmonary effort is normal.      Breath sounds: Normal breath sounds.   Abdominal:      General: Abdomen is flat. Bowel sounds are normal. There is no distension.      Palpations: Abdomen is soft. There is no shifting dullness, fluid wave, hepatomegaly, splenomegaly, mass or pulsatile mass.      Tenderness: There is no abdominal tenderness. There is no guarding or rebound.      Hernia: No hernia is present.   Musculoskeletal:         General: No swelling or tenderness.      Cervical back: Normal range of motion and neck supple.   Skin:     General: Skin is warm and dry.      Coloration: Skin is not jaundiced.   Neurological:      General: No focal " "deficit present.      Mental Status: He is alert and oriented to person, place, and time.   Psychiatric:         Mood and Affect: Mood normal.         Behavior: Behavior normal.         Results Review:  Lab Results:   Office Visit on 04/02/2024   Component Date Value Ref Range Status    Color 04/02/2024 Yellow  Yellow, Straw, Dark Yellow, Kareen Final    Clarity, UA 04/02/2024 Clear  Clear Final    Specific Gravity  04/02/2024 1.010  1.005 - 1.030 Final    pH, Urine 04/02/2024 6.5  5.0 - 8.0 Final    Leukocytes 04/02/2024 Negative  Negative Final    Nitrite, UA 04/02/2024 Negative  Negative Final    Protein, POC 04/02/2024 Negative  Negative mg/dL Final    Glucose, UA 04/02/2024 Negative  Negative mg/dL Final    Ketones, UA 04/02/2024 Negative  Negative Final    Urobilinogen, UA 04/02/2024 Normal  Normal, 0.2 E.U./dL Final    Bilirubin 04/02/2024 Negative  Negative Final    Blood, UA 04/02/2024 Negative  Negative Final    Lot Number 04/02/2024 [id number]   Final    Expiration Date 04/02/2024 10/25/24   Final    Urine Culture 04/02/2024 No growth   Final       Assessment & Plan     Visit Diagnoses:    ICD-10-CM ICD-9-CM   1. Crohn's disease of small intestine without complication  K50.00 555.0   2. At risk for infection due to immunosuppression  Z91.89 V15.89         Plan:    1.  Crohn's disease of the colon and small intestine: The patient is doing well on Humira and mesalamine.  He is doing quite well.  The patient has no complaints.  He tells me that he is \"100% better\" since starting Humira.  I am going to obtain QuantiFERON gold which is evaluated yearly since he is on immunosuppression.  2.  GERD: The patient is improved on Prevacid once daily.  He is going to continue this.    3.  Diarrhea: The patient denies diarrhea.  He is having more formed stool.  He is on Colestid for suspected bile acid diarrhea.    MEDS ORDERED DURING VISIT:  No orders of the defined types were placed in this encounter.      Return " in about 6 months (around 10/11/2024).             This document has been electronically signed by Stella Aparicio MD   April 11, 2024 15:39 EDT      Part of this note may be an electronic transcription/translation of spoken language to printed text using the Dragon Dictation System.

## 2024-04-13 LAB
GAMMA INTERFERON BACKGROUND BLD IA-ACNC: 0.01 IU/ML
M TB IFN-G BLD-IMP: NEGATIVE
M TB IFN-G CD4+ BCKGRND COR BLD-ACNC: 0.01 IU/ML
M TB IFN-G CD4+CD8+ BCKGRND COR BLD-ACNC: 0.01 IU/ML
MITOGEN IGNF BCKGRD COR BLD-ACNC: >10 IU/ML
QUANTIFERON INCUBATION: NORMAL
SERVICE CMNT-IMP: NORMAL

## 2024-05-01 ENCOUNTER — SPECIALTY PHARMACY (OUTPATIENT)
Dept: PHARMACY | Facility: HOSPITAL | Age: 49
End: 2024-05-01
Payer: MEDICAID

## 2024-05-01 NOTE — PROGRESS NOTES
Specialty Pharmacy Refill Coordination Note     Scott is a 49 y.o. male contacted today regarding refills of Humira specialty medication(s).    Reviewed and verified with patient: yes  Specialty medication(s) and dose(s) confirmed: yes    Refill Questions      Flowsheet Row Most Recent Value   Changes to allergies? No   Changes to medications? No   New conditions or infections since last clinic visit No   Unplanned office visit, urgent care, ED, or hospital admission in the last 4 weeks  No   How does patient/caregiver feel medication is working? Very good   Financial problems or insurance changes  No   Since the previous refill, were any specialty medication doses or scheduled injections missed or delayed?  No   Does this patient require a clinical escalation to a pharmacist? No            Delivery Questions      Flowsheet Row Most Recent Value   Copay verified? No   Copay amount n/a   Copay form of payment No copayment ($0)                   Follow-up: 28 day(s)     Seema Lindsay, Pharmacy Technician  Specialty Pharmacy Technician

## 2024-05-28 ENCOUNTER — SPECIALTY PHARMACY (OUTPATIENT)
Dept: PHARMACY | Facility: HOSPITAL | Age: 49
End: 2024-05-28
Payer: MEDICAID

## 2024-05-28 DIAGNOSIS — R35.0 FREQUENCY OF URINATION: ICD-10-CM

## 2024-05-28 DIAGNOSIS — I25.10 CORONARY ARTERY DISEASE INVOLVING NATIVE CORONARY ARTERY OF NATIVE HEART WITHOUT ANGINA PECTORIS: Chronic | ICD-10-CM

## 2024-05-28 RX ORDER — NITROGLYCERIN 0.4 MG/1
TABLET SUBLINGUAL
Qty: 25 TABLET | Refills: 0 | Status: SHIPPED | OUTPATIENT
Start: 2024-05-28

## 2024-05-28 RX ORDER — TAMSULOSIN HYDROCHLORIDE 0.4 MG/1
CAPSULE ORAL
Qty: 30 CAPSULE | Refills: 5 | Status: SHIPPED | OUTPATIENT
Start: 2024-05-28

## 2024-05-28 NOTE — PROGRESS NOTES
Specialty Pharmacy Refill Coordination Note     Scott is a 49 y.o. male contacted today regarding refills of  Humira specialty medication(s).    Reviewed and verified with patient:       Specialty medication(s) and dose(s) confirmed: yes    Refill Questions      Flowsheet Row Most Recent Value   Changes to allergies? No   Changes to medications? No   New conditions or infections since last clinic visit No   Unplanned office visit, urgent care, ED, or hospital admission in the last 4 weeks  No   How does patient/caregiver feel medication is working? Very good   Financial problems or insurance changes  No   Since the previous refill, were any specialty medication doses or scheduled injections missed or delayed?  No   Does this patient require a clinical escalation to a pharmacist? No            Delivery Questions      Flowsheet Row Most Recent Value   Copay verified? No   Copay amount na   Copay form of payment No copayment ($0)                   Follow-up: 30 day(s)     Deborah Correia, Pharmacy Technician  Specialty Pharmacy Technician

## 2024-06-06 ENCOUNTER — OFFICE VISIT (OUTPATIENT)
Dept: FAMILY MEDICINE CLINIC | Facility: CLINIC | Age: 49
End: 2024-06-06
Payer: MEDICAID

## 2024-06-06 VITALS
SYSTOLIC BLOOD PRESSURE: 120 MMHG | HEART RATE: 96 BPM | DIASTOLIC BLOOD PRESSURE: 74 MMHG | HEIGHT: 74 IN | OXYGEN SATURATION: 98 % | WEIGHT: 197 LBS | RESPIRATION RATE: 18 BRPM | BODY MASS INDEX: 25.28 KG/M2

## 2024-06-06 DIAGNOSIS — M15.9 PRIMARY OSTEOARTHRITIS INVOLVING MULTIPLE JOINTS: ICD-10-CM

## 2024-06-06 DIAGNOSIS — I25.10 CORONARY ARTERY DISEASE INVOLVING NATIVE CORONARY ARTERY OF NATIVE HEART WITHOUT ANGINA PECTORIS: Primary | Chronic | ICD-10-CM

## 2024-06-06 DIAGNOSIS — I10 ESSENTIAL HYPERTENSION: Chronic | ICD-10-CM

## 2024-06-06 DIAGNOSIS — K52.9 COLITIS: ICD-10-CM

## 2024-06-06 DIAGNOSIS — E55.9 VITAMIN D DEFICIENCY: ICD-10-CM

## 2024-06-06 PROCEDURE — 3078F DIAST BP <80 MM HG: CPT | Performed by: NURSE PRACTITIONER

## 2024-06-06 PROCEDURE — 3074F SYST BP LT 130 MM HG: CPT | Performed by: NURSE PRACTITIONER

## 2024-06-06 PROCEDURE — 99214 OFFICE O/P EST MOD 30 MIN: CPT | Performed by: NURSE PRACTITIONER

## 2024-06-06 PROCEDURE — 1126F AMNT PAIN NOTED NONE PRSNT: CPT | Performed by: NURSE PRACTITIONER

## 2024-06-06 PROCEDURE — 1160F RVW MEDS BY RX/DR IN RCRD: CPT | Performed by: NURSE PRACTITIONER

## 2024-06-06 PROCEDURE — 1159F MED LIST DOCD IN RCRD: CPT | Performed by: NURSE PRACTITIONER

## 2024-06-06 RX ORDER — TRAMADOL HYDROCHLORIDE 50 MG/1
50 TABLET ORAL EVERY 8 HOURS PRN
Qty: 9 TABLET | Refills: 0 | Status: SHIPPED | OUTPATIENT
Start: 2024-06-06

## 2024-06-06 RX ORDER — TRAMADOL HYDROCHLORIDE 50 MG/1
50 TABLET ORAL EVERY 8 HOURS PRN
Qty: 9 TABLET | Refills: 0 | Status: SHIPPED | OUTPATIENT
Start: 2024-06-06 | End: 2024-06-06 | Stop reason: SDUPTHER

## 2024-06-06 RX ORDER — PREDNISONE 20 MG/1
TABLET ORAL
Qty: 10 TABLET | Refills: 0 | Status: SHIPPED | OUTPATIENT
Start: 2024-06-06 | End: 2024-06-16

## 2024-06-06 NOTE — PATIENT INSTRUCTIONS
Health Risks of Smoking  Smoking tobacco is very bad for your health. Tobacco smoke contains many toxic chemicals that can damage every part of your body. Secondhand smoke can be harmful to those around you. Tobacco or nicotine use can cause many long-term (chronic) diseases.  Smoking is difficult to quit because a chemical in tobacco, called nicotine, causes addiction or dependence. When you smoke and inhale, nicotine is absorbed quickly into the bloodstream through your lungs. Both inhaled and non-inhaled nicotine may be addictive.  How can quitting affect me?  There are health benefits of quitting smoking. Some benefits happen right away and others take time. Benefits may include:  Blood flow, blood pressure, heart rate, and lung capacity may begin to improve. However, any lung damage that has already occurred cannot be repaired.  Temporary respiratory symptoms, such as nasal congestion and cough, may improve over time.  Your risk of heart disease, stroke, and cancer is reduced.  The overall quality of your health may improve.  You may save money, as you will not spend money on tobacco products and may spend less money on smoking-related health issues.  What can increase my risk?  Smoking harms nearly every organ in the body. People who smoke tobacco have a shorter life expectancy and an increased risk of many serious medical problems. These include:  More respiratory infections, such as colds and pneumonia.  Cancer.  Heart disease.  Stroke.  Chronic respiratory diseases.  Delayed wound healing and increased risk of complications during surgery.  Problems with reproduction, pregnancy, and childbirth, such as infertility, early (premature) births, stillbirths, and birth defects.  Secondhand smoke exposure to children increases the risk of:  Sudden infant death syndrome (SIDS).  Infections in the nose, throat, or airways (respiratory infections).  Chronic respiratory symptoms.  What actions can I take to  quit?  Smoking is an addiction that affects both your body and your mind, and long-time habits can be hard to change. Your health care provider can recommend:  Nicotine replacement products, such as patches, gum, and nasal sprays. Use these products only as directed. Do not replace cigarette smoking with electronic cigarettes, which are commonly called e-cigarettes. The safety of e-cigarettes is not known, and some may contain harmful chemicals.  Programs and community resources, which may include group support, education, or talk therapy.  Prescription medicines to help reduce cravings.  A combination of two or more quit methods, which will increase the success of quitting.  Where to find support  Follow the recommendations from your health care provider about support groups and other assistance. You can also visit:  [place] Quitline Consortium: www.[employer].org or call 9-994-QUIT-NOW.  U.S. Department of Health and Human Services: www.smokefree.gov  American Lung Association: www.freedomfromsmoking.org  American Heart Association: www.heart.org  Where to find more information  Centers for Disease Control and Prevention: www.cdc.gov  World Health Organization: www.who.int  Summary  Smoking tobacco is very bad for your health. Tobacco smoke contains many toxic chemicals that can damage every part of the body.  Smoking is difficult to quit because a chemical in tobacco, called nicotine, causes addiction or dependence.  There are immediate and long-term health benefits of quitting smoking.  A combination of two or more quit methods increases the success of quitting.  This information is not intended to replace advice given to you by your health care provider. Make sure you discuss any questions you have with your health care provider.  Document Revised: 08/22/2022 Document Reviewed: 02/01/2021  Elsevier Patient Education © 2022 Elsevier Inc. Steps to Quit Smoking  Smoking tobacco is the leading cause of  preventable death. It can affect almost every organ in the body. Smoking puts you and those around you at risk for developing many serious chronic diseases. Quitting smoking can be difficult, but it is one of the best things that you can do for your health. It is never too late to quit.  How do I get ready to quit?  When you decide to quit smoking, create a plan to help you succeed. Before you quit:  Pick a date to quit. Set a date within the next 2 weeks to give you time to prepare.  Write down the reasons why you are quitting. Keep this list in places where you will see it often.  Tell your family, friends, and co-workers that you are quitting. Support from your loved ones can make quitting easier.  Talk with your health care provider about your options for quitting smoking.  Find out what treatment options are covered by your health insurance.  Identify people, places, things, and activities that make you want to smoke (triggers). Avoid them.  What first steps can I take to quit smoking?  Throw away all cigarettes at home, at work, and in your car.  Throw away smoking accessories, such as ashtrays and lighters.  Clean your car. Make sure to empty the ashtray.  Clean your home, including curtains and carpets.  What strategies can I use to quit smoking?  Talk with your health care provider about combining strategies, such as taking medicines while you are also receiving in-person counseling. Using these two strategies together makes you more likely to succeed in quitting than if you used either strategy on its own.  If you are pregnant or breastfeeding, talk with your health care provider about finding counseling or other support strategies to quit smoking. Do not take medicine to help you quit smoking unless your health care provider tells you to do so.  To quit smoking:  Quit right away  Quit smoking completely, instead of gradually reducing how much you smoke over a period of time. Research shows that stopping  smoking right away is more successful than gradually quitting.  Attend in-person counseling to help you build problem-solving skills. You are more likely to succeed in quitting if you attend counseling sessions regularly. Even short sessions of 10 minutes can be effective.  Take medicine  You may take medicines to help you quit smoking. Some medicines require a prescription and some you can purchase over-the-counter. Medicines may have nicotine in them to replace the nicotine in cigarettes. Medicines may:  Help to stop cravings.  Help to relieve withdrawal symptoms.  Your health care provider may recommend:  Nicotine patches, gum, or lozenges.  Nicotine inhalers or sprays.  Non-nicotine medicine that is taken by mouth.  Find resources  Find resources and support systems that can help you to quit smoking and remain smoke-free after you quit. These resources are most helpful when you use them often. They include:  Online chats with a counselor.  Telephone quitlines.  Printed self-help materials.  Support groups or group counseling.  Text messaging programs.  Mobile phone apps or applications. Use apps that can help you stick to your quit plan by providing reminders, tips, and encouragement. There are many free apps for mobile devices as well as websites. Examples include Quit Guide from the CDC and smokefree.gov  What things can I do to make it easier to quit?    Reach out to your family and friends for support and encouragement. Call telephone quitlines (3-363-QUIT-NOW), reach out to support groups, or work with a counselor for support.  Ask people who smoke to avoid smoking around you.  Avoid places that trigger you to smoke, such as bars, parties, or smoke-break areas at work.  Spend time with people who do not smoke.  Lessen the stress in your life. Stress can be a smoking trigger for some people. To lessen stress, try:  Exercising regularly.  Doing deep-breathing exercises.  Doing yoga.  Meditating.  Performing a  body scan. This involves closing your eyes, scanning your body from head to toe, and noticing which parts of your body are particularly tense. Try to relax the muscles in those areas.  How will I feel when I quit smoking?  Day 1 to 3 weeks  Within the first 24 hours of quitting smoking, you may start to feel withdrawal symptoms. These symptoms are usually most noticeable 2-3 days after quitting, but they usually do not last for more than 2-3 weeks. You may experience these symptoms:  Mood swings.  Restlessness, anxiety, or irritability.  Trouble concentrating.  Dizziness.  Strong cravings for sugary foods and nicotine.  Mild weight gain.  Constipation.  Nausea.  Coughing or a sore throat.  Changes in how the medicines that you take for unrelated issues work in your body.  Depression.  Trouble sleeping (insomnia).  Week 3 and afterward  After the first 2-3 weeks of quitting, you may start to notice more positive results, such as:  Improved sense of smell and taste.  Decreased coughing and sore throat.  Slower heart rate.  Lower blood pressure.  Clearer skin.  The ability to breathe more easily.  Fewer sick days.  Quitting smoking can be very challenging. Do not get discouraged if you are not successful the first time. Some people need to make many attempts to quit before they achieve long-term success. Do your best to stick to your quit plan, and talk with your health care provider if you have any questions or concerns.  Summary  Smoking tobacco is the leading cause of preventable death. Quitting smoking is one of the best things that you can do for your health.  When you decide to quit smoking, create a plan to help you succeed.  Quit smoking right away, not slowly over a period of time.  When you start quitting, seek help from your health care provider, family, or friends.  This information is not intended to replace advice given to you by your health care provider. Make sure you discuss any questions you have with  your health care provider.  Document Revised: 08/26/2022 Document Reviewed: 03/07/2020  Elsevier Patient Education © 2022 Elsevier Inc.

## 2024-06-06 NOTE — PROGRESS NOTES
"Subjective   Scott Montes is a 49 y.o. male.     Chief Complaint   Patient presents with    Hypertension       History of Present Illness     HTN/history of MI-ongoing.  He is currently on lisinopril 10 mg and metoprolol 25 mg half tab daily.  He is also on Imdur 30 mg.  He is followed by cardiology.  He continues Brilinta 60 mg twice daily for anticoagulation.  He is also on aspirin 81 mg.  He has the same chest and arm pain he has had since he had the MI.  It is unchanged today.   GERD/Crohn's-under the care of GI.  He is currently on Colestid 1 g daily for his chronic diarrhea.  He is also on Prevacid 30 mg for reflux and Mesalamine 1.2 g.   He has abdomen pain and cramps today.  He reports that when he flares he is flared for 2-3 days.  He has no appetite and diarrhea is consistent for that time.   He continues Humira Q 2 weeks.  He has some GI upset this morning.  Today he reports he has at least 3 bad days per week.  He has sick feeling, abdomen pain and generalized diarrhea.  Diarrhea will occur for several episodes.    Blood concern-has noted blood in sperm.  Has been consistent since December.  No blood that he has seen in his urine.  No pain with intercourse or ejaculation.   He denies any swelling or irritation.   He is already on Flomax.    Vitamin D deficiency-chronic and ongoing.  Patient is presently taking vitamin D 50,000 units supplement.  No negative side effects of medication are reported.  Vitamin D level is stable with medication use.  Feet and leg pain-has noted some increased foot and leg pain when he walks.  He reports that they feel \"sore\".  He does not feel they are swelling.  It does resolve overnight but returns when he is up and active for long periods.  He has some generalized leg aching at night since he got ill with GI and had the MI.  He also had Covid prior to all of the incidents.  He reports today hurting \"really bad\".  She reports no burning or swelling.  \"Just hurt\".  " "Discomfort involves the whole foot and ankle.  He has generalized arthralgia.      The following portions of the patient's history were reviewed and updated as appropriate: CC, ROS, allergies, current medications, past family history, past medical history, past social history, past surgical history and problem list.      Review of Systems   Constitutional:  Positive for appetite change (some days are poor) and fatigue. Negative for unexpected weight gain and unexpected weight loss.   HENT:  Positive for postnasal drip (worse in the AM). Negative for congestion, ear pain, rhinorrhea, sore throat, swollen glands, trouble swallowing and voice change.    Eyes:  Negative for blurred vision, double vision, pain and visual disturbance.   Respiratory:  Positive for cough. Negative for chest tightness, shortness of breath and wheezing.    Cardiovascular:  Positive for chest pain (intermittent). Negative for palpitations and leg swelling.   Gastrointestinal:  Positive for abdominal pain and diarrhea. Negative for blood in stool, constipation, nausea and indigestion.   Genitourinary:  Negative for decreased urine volume, dysuria, hematuria and urgency.   Musculoskeletal:  Positive for arthralgias, back pain, gait problem, myalgias and neck stiffness. Negative for joint swelling.   Skin:  Negative for color change and skin lesions.   Allergic/Immunologic: Negative.    Neurological:  Positive for dizziness and headache (intermittent). Negative for tremors, syncope and numbness.   Hematological: Negative.    Psychiatric/Behavioral:  Positive for sleep disturbance (has been ongoing since his illness.  he has difficulty going to and staying asleep) and stress. Negative for dysphoric mood and suicidal ideas. The patient is not nervous/anxious.    All other systems reviewed and are negative.      Objective     /74   Pulse 96   Resp 18   Ht 188 cm (74.02\")   Wt 89.4 kg (197 lb)   SpO2 98%   BMI 25.28 kg/m²     Physical " Exam  Vitals reviewed.   Constitutional:       General: He is not in acute distress.     Appearance: He is well-developed. He is not diaphoretic.   HENT:      Head: Normocephalic and atraumatic.      Jaw: No tenderness.      Right Ear: Hearing, tympanic membrane, ear canal and external ear normal.      Left Ear: Hearing, tympanic membrane, ear canal and external ear normal.      Nose: Nose normal. No nasal tenderness or congestion.      Right Sinus: No maxillary sinus tenderness or frontal sinus tenderness.      Left Sinus: No maxillary sinus tenderness or frontal sinus tenderness.      Mouth/Throat:      Lips: Pink.      Mouth: Mucous membranes are moist.      Pharynx: Oropharynx is clear. Uvula midline.   Eyes:      General: Lids are normal. No scleral icterus.     Extraocular Movements:      Right eye: Normal extraocular motion and no nystagmus.      Left eye: Normal extraocular motion and no nystagmus.      Conjunctiva/sclera: Conjunctivae normal.      Pupils: Pupils are equal, round, and reactive to light.   Neck:      Thyroid: No thyromegaly or thyroid tenderness.      Vascular: No carotid bruit or JVD.      Trachea: No tracheal tenderness.   Cardiovascular:      Rate and Rhythm: Normal rate and regular rhythm.      Pulses:           Dorsalis pedis pulses are 2+ on the right side and 2+ on the left side.        Posterior tibial pulses are 2+ on the right side and 2+ on the left side.      Heart sounds: Normal heart sounds, S1 normal and S2 normal. No murmur heard.  Pulmonary:      Effort: Pulmonary effort is normal. No accessory muscle usage, prolonged expiration or respiratory distress.      Breath sounds: Normal breath sounds.   Chest:      Chest wall: No tenderness.   Abdominal:      General: Abdomen is flat. Bowel sounds are normal.      Palpations: Abdomen is soft. There is fluid wave. There is no hepatomegaly, splenomegaly or mass.      Tenderness: There is generalized abdominal tenderness.    Musculoskeletal:         General: Tenderness (generalized areas of stiffness) present.      Cervical back: Normal range of motion and neck supple.      Right lower leg: No edema.      Left lower leg: No edema.      Comments: No muscular atrophy or flaccidity.   Lymphadenopathy:      Head:      Right side of head: No submental or submandibular adenopathy.      Left side of head: No submental or submandibular adenopathy.      Cervical: No cervical adenopathy.      Right cervical: No superficial cervical adenopathy.     Left cervical: No superficial cervical adenopathy.   Skin:     General: Skin is warm and dry.      Capillary Refill: Capillary refill takes less than 2 seconds.      Coloration: Skin is not jaundiced or pale.      Findings: No erythema.      Nails: There is no clubbing.   Neurological:      Mental Status: He is alert and oriented to person, place, and time.      Cranial Nerves: No cranial nerve deficit or facial asymmetry.      Sensory: No sensory deficit.      Motor: No weakness, tremor, atrophy or abnormal muscle tone.      Coordination: Coordination normal.      Gait: Gait abnormal (mild antalgia).      Deep Tendon Reflexes: Reflexes are normal and symmetric.   Psychiatric:         Attention and Perception: He is attentive.         Mood and Affect: Mood normal. Mood is not anxious or depressed.         Speech: Speech normal.         Behavior: Behavior normal. Behavior is cooperative.         Thought Content: Thought content normal.         Cognition and Memory: Cognition normal.         Judgment: Judgment normal.       Diagnoses and all orders for this visit:    1. Coronary artery disease involving native coronary artery of native heart without angina pectoris (Primary)  Overview:  9/6/2022 [facility] for STEMI: PCI BUZZ to the RCA  9/6/2022 [facility]: LVEF 51 to 55%    Assessment & Plan:  Continue aspirin 81 mg, atorvastatin 80 mg, and isosorbide 30 mg.  Continue lisinopril 10 mg and metoprolol 25 mg half tab  daily.  Continue under the care of cardiology.  Continue Brilinta 60 mg twice daily for anticoagulation.      2. Essential hypertension  Overview:  With MI September 2022    Assessment & Plan:  Continue under the care of cardiology.  Continue aspirin 81, Brilinta 60 mg twice daily, atorvastatin 80 mg, isosorbide 30 mg, and lisinopril 10 mg.  Continue metoprolol 25 mg.  As needed nitroglycerin if needed for chest pain.  Ambulatory BP monitoring either at home or random community checks.  Patient to report continued elevations >140/90.  Patient may come by office for checks if needed.       3. Primary osteoarthritis involving multiple joints  Assessment & Plan:  Continue PRN Flexeril. Continue conservative measures at home.   Avoid overuse activities.  No NSAIDs.  Will continue to evaluate and treat PRN    Orders:  -     predniSONE (DELTASONE) 20 MG tablet; 2 daily  Dispense: 10 tablet; Refill: 0  -     Discontinue: traMADol (ULTRAM) 50 MG tablet; Take 1 tablet by mouth Every 8 (Eight) Hours As Needed for Moderate Pain.  Dispense: 9 tablet; Refill: 0  -     traMADol (ULTRAM) 50 MG tablet; Take 1 tablet by mouth Every 8 (Eight) Hours As Needed for Moderate Pain.  Dispense: 9 tablet; Refill: 0    4. Vitamin D deficiency  Assessment & Plan:  Vitamin D level ordered.  Continue vitamin D supplement      5. Colitis  Comments:  continue under the care of GI.  Continue Mesalamine and Humira  Overview:  Crohn's disease under the care of GI         Understands disease processes and need for medications.  Understands reasons for urgent and emergent care.  Patient (& family) verbalized agreement for treatment plan.   Emotional support and active listening provided.  Patient provided time to verbalize feelings.    Continue meds as directed.   Continue under the care of specialist.    CHADWICK/DIMPLE reviewed today and consistent.  Will refill prescribed controlled medication today.  Patient is aware they cannot receive narcotics from  any other provider except if under care of pain management or speciality clinic.  Risk and benefits of medication use has been reviewed.  History and physical exam exhibit continued safe and appropriate use of controlled substances.  The patient is aware of the potential for addiction and dependence.  This patient has been made aware of the appropriate use of such medications, including potential risk of somnolence, limited ability to drive and / or work safely, and potential for overdose.    It has also been made clear that these medications are for use by this patient only, without concomitant use of alcohol or other substances unless prescribed/advised by medical provider.  Patient understands they may be subject to UDS and pill counts at random.    Patient considered to be low risk for addiction due to use of single controlled medications.  Patient understands and accepts these risks.  Patient need for medication will be reassessed at each visit.  Doses will be adjusted according to patient need and findings.    Goal of [place]: Patient will not have any adverse reactions of medication.  Patient will have reduction in there chronic back and joint pain symptoms with use of tramadol as needed as directed.  Patient will be able to remain active in an outside of their home with minimal to no interference from their pain symptoms.    [place] Patient Controlled Substance Report (from 6/7/2023 to 6/6/2024)    Dispensed  Strength Quantity Days Supply Provider Pharmacy   11/13/2023 Tramadol Hydrochloride 50MG 12 each 3 [facility] KRAMER [facility]        RTC 3-4 months, sooner if needed.             This document has been electronically signed by:  EDDIE Thurman FNP-C Dragon disclaimer:  Part of this note may be an electronic transcription/translation of spoken language to printed text using the Dragon Dictation System.

## 2024-06-17 ENCOUNTER — SPECIALTY PHARMACY (OUTPATIENT)
Dept: PHARMACY | Facility: HOSPITAL | Age: 49
End: 2024-06-17
Payer: MEDICAID

## 2024-06-17 RX ORDER — ADALIMUMAB 40MG/0.4ML
40 KIT SUBCUTANEOUS
Qty: 2 EACH | Refills: 5 | Status: SHIPPED | OUTPATIENT
Start: 2024-06-17

## 2024-06-17 NOTE — PROGRESS NOTES
[facility]  Inflammatory Bowel Disease Management     Clinical Assessment:    Scott Montes is a 49 y.o. male referred by Gastroenterology, Lula Nettles, to the [facility] for Crohn Disease. The patient's current IBD regimen includes: colestid, Mesalamine, Humira.     Patient reports that symptoms have improved since starting Humira. He denies any adverse effects of the medication and denies any s/sx of infection, new autoimmune disorder, or malignancy (fever, appetite loss, fatigue, chest pain, swelling, night sweats or weight loss). He denies any issues with giving himself the injection and denies any missed doses.     Past Medical History:   Diagnosis Date    Anemia     Arthritis     BPH without obstruction/lower urinary tract symptoms 4/2/2024    Coronary artery disease     Crohn's disease     Elevated cholesterol     Erectile dysfunction     GERD (gastroesophageal reflux disease)     H/O blood clots     Heart attack     Hormone disorder 9 years    Hypertension     Interstitial cystitis     Low back pain     STEMI (ST elevation myocardial infarction) 09/06/2022    Ulcerative colitis      Social History     Socioeconomic History    Marital status:    Tobacco Use    Smoking status: Every Day     Current packs/day: 0.50     Average packs/day: 0.5 packs/day for 35.0 years (17.5 ttl pk-yrs)     Types: Cigarettes     Passive exposure: Current    Smokeless tobacco: Never    Tobacco comments:     Smoking cessation encouraged   Vaping Use    Vaping status: Former    Substances: Nicotine    Devices: Disposable   Substance and Sexual Activity    Alcohol use: No    Drug use: No    Sexual activity: Yes     Partners: Female     Birth control/protection: Vasectomy     Patient has no known allergies.    Current Outpatient Medications:     Adalimumab (Humira, 2 Pen,) 40 MG/0.4ML Pen-injector Kit, Inject 40 mg (0.4 ml) under the skin Every 14 (Fourteen) Days., Disp: 2 each, Rfl:  5    aspirin (Aspirin Low Dose) 81 MG EC tablet, Take 1 tablet by mouth Daily., Disp: 90 tablet, Rfl: 2    atorvastatin (LIPITOR) 80 MG tablet, Take 1 tablet by mouth Every Night., Disp: 90 tablet, Rfl: 2    colestipol (COLESTID) 1 g tablet, TAKE ONE (1) TABLET BY MOUTH DAILY., Disp: 90 tablet, Rfl: 2    lansoprazole (PREVACID) 30 MG capsule, Take 1 capsule by mouth Daily., Disp: 90 capsule, Rfl: 3    lisinopril (PRINIVIL,ZESTRIL) 10 MG tablet, Take 1 tablet by mouth Daily for 360 days., Disp: 90 tablet, Rfl: 3    mesalamine (LIALDA) 1.2 g EC tablet, TAKE ONE TABLET BY MOUTH DAILY WITH BREAKFAST, Disp: 30 tablet, Rfl: 11    metoprolol succinate XL (TOPROL-XL) 25 MG 24 hr tablet, Take 0.5 tablets by mouth Daily. Hold for SBP <110 and/or HR <60, Disp: 90 tablet, Rfl: 2    tamsulosin (FLOMAX) 0.4 MG capsule 24 hr capsule, TAKE ONE (1) CAPSULE BY MOUTH EVERY NIGHT., Disp: 30 capsule, Rfl: 5    ticagrelor (Brilinta) 60 MG tablet tablet, Take 1 tablet by mouth 2 (Two) Times a Day., Disp: 180 tablet, Rfl: 3    traMADol (ULTRAM) 50 MG tablet, Take 1 tablet by mouth Every 8 (Eight) Hours As Needed for Moderate Pain., Disp: 9 tablet, Rfl: 0    triamcinolone (KENALOG) 0.1 % cream, Apply 1 application  topically to the appropriate area as directed 2 (Two) Times a Day., Disp: 80 g, Rfl: 3    vitamin D (ERGOCALCIFEROL) 1.25 MG (38314 UT) capsule capsule, Take 1 capsule by mouth 1 (One) Time Per Week., Disp: 4 capsule, Rfl: 5    nitroglycerin (NITROSTAT) 0.4 MG SL tablet, DISSOLVE ONE (1) TABLET UNDER THE TONGUE AS NEEDED FOR ANGINA, MAY REPEAT EVERY FIVE (5) MINUTES FOR UP THREE DOSES (Patient not taking: Reported on 6/17/2024), Disp: 25 tablet, Rfl: 0    There were no vitals filed for this visit.      Labs  WBC   Date Value Ref Range Status   03/07/2024 12.12 (H) 3.40 - 10.80 10*3/mm3 Final     RBC   Date Value Ref Range Status   03/07/2024 5.14 4.14 - 5.80 10*6/mm3 Final     Hemoglobin   Date Value Ref Range Status   03/07/2024  14.5 13.0 - 17.7 g/dL Final     Hematocrit   Date Value Ref Range Status   03/07/2024 44.7 37.5 - 51.0 % Final     MCV   Date Value Ref Range Status   03/07/2024 87.0 79.0 - 97.0 fL Final     MCH   Date Value Ref Range Status   03/07/2024 28.2 26.6 - 33.0 pg Final     MCHC   Date Value Ref Range Status   03/07/2024 32.4 31.5 - 35.7 g/dL Final     RDW   Date Value Ref Range Status   03/07/2024 13.1 12.3 - 15.4 % Final     RDW-SD   Date Value Ref Range Status   03/07/2024 41.5 37.0 - 54.0 fl Final     MPV   Date Value Ref Range Status   03/07/2024 12.1 (H) 6.0 - 12.0 fL Final     Platelets   Date Value Ref Range Status   03/07/2024 321 140 - 450 10*3/mm3 Final     Neutrophil %   Date Value Ref Range Status   03/07/2024 62.0 42.7 - 76.0 % Final     Lymphocyte %   Date Value Ref Range Status   03/07/2024 27.1 19.6 - 45.3 % Final     Monocyte %   Date Value Ref Range Status   03/07/2024 8.9 5.0 - 12.0 % Final     Eosinophil %   Date Value Ref Range Status   03/07/2024 0.7 0.3 - 6.2 % Final     Basophil %   Date Value Ref Range Status   03/07/2024 0.8 0.0 - 1.5 % Final     Immature Grans %   Date Value Ref Range Status   03/07/2024 0.5 0.0 - 0.5 % Final     Neutrophils, Absolute   Date Value Ref Range Status   03/07/2024 7.51 (H) 1.70 - 7.00 10*3/mm3 Final     Lymphocytes, Absolute   Date Value Ref Range Status   03/07/2024 3.29 (H) 0.70 - 3.10 10*3/mm3 Final     Monocytes, Absolute   Date Value Ref Range Status   03/07/2024 1.08 (H) 0.10 - 0.90 10*3/mm3 Final     Eosinophils, Absolute   Date Value Ref Range Status   03/07/2024 0.08 0.00 - 0.40 10*3/mm3 Final     Basophils, Absolute   Date Value Ref Range Status   03/07/2024 0.10 0.00 - 0.20 10*3/mm3 Final     Immature Grans, Absolute   Date Value Ref Range Status   03/07/2024 0.06 (H) 0.00 - 0.05 10*3/mm3 Final     nRBC   Date Value Ref Range Status   03/07/2024 0.0 0.0 - 0.2 /100 WBC Final     Lab Results   Component Value Date    GLUCOSE 73 03/07/2024    BUN 13  03/07/2024    CREATININE 0.71 (L) 03/07/2024    EGFR 112.5 03/07/2024    BCR 18.3 03/07/2024    K 4.1 03/07/2024    CO2 21.5 (L) 03/07/2024    CALCIUM 8.8 03/07/2024    ALBUMIN 4.3 03/07/2024    BILITOT 0.6 03/07/2024    AST 21 03/07/2024    ALT 25 03/07/2024       Pretreatment Screening  TB: 10/10/22: Negative   HBV: 10/10/22: Non-reactive    Immunizations Screening   (Live Vaccines Should not be given while on therapy)  COVID 19: Primary series complete; pt declines any further doses   Influenza: Declined  Pneumococcal: Declined  Zoster: Declined    Drug-Drug Interactions:    No drug-drug interactions expected with Humira according to literature       Adherence and Self-Administration  Barriers to Patient Adherence and/or Self-Administration: None  Methods for Supporting Patient Adherence and/or Self-Administration: None Required     Goals of Therapy  Patient Goals of Therapy: To not miss any doses    Clinical Goals or Therapeutic Targets, If Applicable: Improved Quality of Life/ Decrease flares      Medication Assessment & Plan:   Will continue Humira 40 mg/0.4 mL every 14 days for continuation of maintenance therapy per GI referral. Patient last saw GI on 4/11/24. Verified via secure chat with Dr. Casanova and she approved 6 months of refills be sent for patient.  Will follow-up 6 months or sooner if needed. Patient to keep GI follow up as appropriate and as scheduled. Next GI appointment is 10/17/24.  Patient would like to continue to receive specialty mail out services. Verbally verified shipping address with patient. Care Coordinator will set up refills and escalations.     Mayela Rader, PharmD  6/17/2024  15:54 EDT

## 2024-06-20 ENCOUNTER — SPECIALTY PHARMACY (OUTPATIENT)
Dept: PHARMACY | Facility: HOSPITAL | Age: 49
End: 2024-06-20
Payer: MEDICAID

## 2024-06-20 NOTE — PROGRESS NOTES
Specialty Pharmacy Refill Coordination Note     Scott is a 49 y.o. male contacted today regarding refills of Humira specialty medication(s).    Reviewed and verified with patient: yes  Specialty medication(s) and dose(s) confirmed: yes    Refill Questions      Flowsheet Row Most Recent Value   Changes to allergies? No   Changes to medications? No   New conditions or infections since last clinic visit No   Unplanned office visit, urgent care, ED, or hospital admission in the last 4 weeks  No   How does patient/caregiver feel medication is working? Very good   Financial problems or insurance changes  No   Since the previous refill, were any specialty medication doses or scheduled injections missed or delayed?  No   Does this patient require a clinical escalation to a pharmacist? No            Delivery Questions      Flowsheet Row Most Recent Value   Copay verified? Yes   Copay amount $0   Copay form of payment No copayment ($0)                   Follow-up: 28 day(s)     Seema Lindsay, Pharmacy Technician  Specialty Pharmacy Technician

## 2024-07-11 ENCOUNTER — SPECIALTY PHARMACY (OUTPATIENT)
Dept: PHARMACY | Facility: HOSPITAL | Age: 49
End: 2024-07-11
Payer: MEDICAID

## 2024-08-08 ENCOUNTER — SPECIALTY PHARMACY (OUTPATIENT)
Dept: PHARMACY | Facility: HOSPITAL | Age: 49
End: 2024-08-08
Payer: MEDICAID

## 2024-09-04 ENCOUNTER — SPECIALTY PHARMACY (OUTPATIENT)
Dept: PHARMACY | Facility: HOSPITAL | Age: 49
End: 2024-09-04
Payer: MEDICAID

## 2024-09-04 NOTE — PROGRESS NOTES
Specialty Pharmacy Refill Coordination Note     Scott is a 49 y.o. male contacted today regarding refills of Humira specialty medication(s).    Reviewed and verified with patient: yes  Specialty medication(s) and dose(s) confirmed: yes    Refill Questions      Flowsheet Row Most Recent Value   Changes to allergies? No   Changes to medications? No   New conditions or infections since last clinic visit No   Unplanned office visit, urgent care, ED, or hospital admission in the last 4 weeks  No   How does patient/caregiver feel medication is working? Very good   Financial problems or insurance changes  No   Since the previous refill, were any specialty medication doses or scheduled injections missed or delayed?  No   Does this patient require a clinical escalation to a pharmacist? No            Delivery Questions      Flowsheet Row Most Recent Value   Delivery method [employer]   Delivery address verified with patient/caregiver? Yes   Number of medications in delivery 1   Medication(s) being filled and delivered Adalimumab   Doses left of specialty medications N/A   Copay verified? Yes   Copay amount $0   Copay form of payment No copayment ($0)   Ship Date N/A   Delivery Date N/A                   Follow-up: 84 day(s)     Seema Lindsay, Pharmacy Technician  Specialty Pharmacy Technician

## 2024-10-08 ENCOUNTER — OFFICE VISIT (OUTPATIENT)
Dept: CARDIOLOGY | Facility: CLINIC | Age: 49
End: 2024-10-08
Payer: MEDICAID

## 2024-10-08 VITALS
BODY MASS INDEX: 24.59 KG/M2 | HEART RATE: 68 BPM | OXYGEN SATURATION: 98 % | DIASTOLIC BLOOD PRESSURE: 85 MMHG | SYSTOLIC BLOOD PRESSURE: 126 MMHG | WEIGHT: 191.6 LBS | HEIGHT: 74 IN

## 2024-10-08 DIAGNOSIS — I25.10 CORONARY ARTERY DISEASE INVOLVING NATIVE CORONARY ARTERY OF NATIVE HEART WITHOUT ANGINA PECTORIS: Chronic | ICD-10-CM

## 2024-10-08 DIAGNOSIS — I10 ESSENTIAL HYPERTENSION: Chronic | ICD-10-CM

## 2024-10-08 DIAGNOSIS — F17.200 SMOKER: Primary | Chronic | ICD-10-CM

## 2024-10-08 DIAGNOSIS — E78.5 DYSLIPIDEMIA, GOAL LDL BELOW 70: Chronic | ICD-10-CM

## 2024-10-08 PROBLEM — Z87.891 FORMER SMOKER: Status: RESOLVED | Noted: 2022-10-17 | Resolved: 2024-10-08

## 2024-10-08 PROCEDURE — 1159F MED LIST DOCD IN RCRD: CPT | Performed by: NURSE PRACTITIONER

## 2024-10-08 PROCEDURE — 3074F SYST BP LT 130 MM HG: CPT | Performed by: NURSE PRACTITIONER

## 2024-10-08 PROCEDURE — 93000 ELECTROCARDIOGRAM COMPLETE: CPT | Performed by: NURSE PRACTITIONER

## 2024-10-08 PROCEDURE — 1160F RVW MEDS BY RX/DR IN RCRD: CPT | Performed by: NURSE PRACTITIONER

## 2024-10-08 PROCEDURE — 99214 OFFICE O/P EST MOD 30 MIN: CPT | Performed by: NURSE PRACTITIONER

## 2024-10-08 PROCEDURE — 3079F DIAST BP 80-89 MM HG: CPT | Performed by: NURSE PRACTITIONER

## 2024-10-08 RX ORDER — LISINOPRIL 10 MG/1
10 TABLET ORAL DAILY
Qty: 90 TABLET | Refills: 3 | Status: SHIPPED | OUTPATIENT
Start: 2024-10-08 | End: 2025-10-03

## 2024-10-08 RX ORDER — ATORVASTATIN CALCIUM 80 MG/1
80 TABLET, FILM COATED ORAL NIGHTLY
Qty: 90 TABLET | Refills: 3 | Status: SHIPPED | OUTPATIENT
Start: 2024-10-08

## 2024-10-08 NOTE — PROGRESS NOTES
"Chief Complaint  Follow-up (Denies CP, no other cardiac symptoms.) and Med Management (Tolerating all current medications with no side effects.)    Subjective          Scott Montes presents to [facility] for follow up.    History of Present Illness  History of Present Illness  The patient is a 49-year-old male who comes in for follow-up of coronary artery disease, hypertension, and hyperlipidemia. He was last seen on 04/08/2023, at which time his medication regimen was continued without any changes.    He reports overall good health since his last visit. His current medications include Brilinta, aspirin, and lisinopril. His blood pressure readings at home are consistent with those taken in the clinic, typically in the 120s over 80s.    He has not had a recent lipid panel and agrees to have that drawn.    Review of his medications, he reports that he has not taken metoprolol more than twice since 2022.  He does not wish to add an additional medication to his regimen despite understanding that it is part of guideline directed medical therapy.    He experiences occasional muscle pain but has not needed to use nitroglycerin. He continues to smoke, although less frequently than before.     Discussed that, as it has been more than 1 year since stent placement, the guidelines recommend shared decision making about transitioning to single antiplatelet therapy.  ASCVD risk factors have been well-controlled.  After discussion of risks versus benefits the patient desires to discontinue Brilinta due to shortness of breath and associated bruising.       Tobacco Use: High Risk (10/8/2024)    Patient History     Smoking Tobacco Use: Every Day     Smokeless Tobacco Use: Never     Passive Exposure: Current       Objective     Vital Signs:   /85 (BP Location: Left arm, Patient Position: Sitting, Cuff Size: Adult)   Pulse 68   Ht 188 cm (74\")   Wt 86.9 kg (191 lb 9.6 oz)   SpO2 98%   BMI 24.60 " Writer called and spoke with patient. Patient would like Dexa ordered prior to preop appt as to not delay her surgery which is scheduled on 3/12. Last DEXA 5/6/2022 per chart review. Patient stated that surgeon was requesting dexa scan due to her \"bones looking thin on xray.\"    Order cued if in agreement     Patient provided with scheduling number and billing contact center to determine cost estimate.    kg/m²       Physical Exam  Vitals and nursing note reviewed.   Constitutional:       General: He is not in acute distress.     Appearance: Normal appearance.   Neck:      Vascular: No carotid bruit.   Cardiovascular:      Rate and Rhythm: Normal rate and regular rhythm.      Heart sounds: No murmur heard.  Pulmonary:      Effort: Pulmonary effort is normal.      Breath sounds: Normal breath sounds.   Musculoskeletal:      Right lower leg: No edema.      Left lower leg: No edema.   Skin:     General: Skin is warm and dry.   Neurological:      General: No focal deficit present.      Mental Status: He is alert.   Psychiatric:         Mood and Affect: Mood normal.        Physical Exam         Result Review :   The following data was reviewed by: EDDIE Stout on 10/08/2024:  Common labs          11/10/2023    12:34 3/7/2024    10:49   Common Labs   Glucose 86  73    BUN 10  13    Creatinine 0.73  0.71    Sodium 140  141    Potassium 3.7  4.1    Chloride 106  107    Calcium 9.2  8.8    Albumin  4.3    Total Bilirubin  0.6    Alkaline Phosphatase  133    AST (SGOT)  21    ALT (SGPT)  25    WBC 10.15  12.12    Hemoglobin 13.2  14.5    Hematocrit 41.0  44.7    Platelets 293  321    Total Cholesterol  105    Triglycerides  123    HDL Cholesterol  29    LDL Cholesterol   54    Hemoglobin A1C  5.40    PSA  0.6      Lipid Panel          3/7/2024    10:49   Lipid Panel   Total Cholesterol 105    Triglycerides 123    HDL Cholesterol 29    VLDL Cholesterol 22    LDL Cholesterol  54    LDL/HDL Ratio 1.77      Data reviewed : Cardiology studies as detailed below      Last Cardiac Cath  Results for orders placed during the hospital encounter of 09/06/22    Cardiac Catheterization/Vascular Study    Narrative  Images from the original result were not included.  CARDIAC CATHETERIZATION / INTERVENTION REPORT            DATE OF PROCEDURE: 9/6/2022      INDICATION FOR PROCEDURE: STEMI      PRE PROCEDURE DIAGNOSIS:  Inferior  STEMI      POST PROCEDURE DIAGNOSIS:  % proximal RCA occlusion s/ pPCI with 3.5 x 28 BUZZ post dilated with 4.0 NC balloon    Face to face mdoerate conscious  sedation time :      COMPLICATIONS : None    Specimens collected : None    PROCEDURE PERFORMED:    1. Selective right and left Coronary Angiogram  2. Left heart catheretization  3. Left Ventriculography  4. PCI of RCA  5. IVUS of RCA  6. Manual aspiration thrombectomy    Description of the procedure:  Prior to the procedure risk, benefits and possible alternative were discussed with the patient and informed consent was obtained. Patient was brought to cardiac cath lab table in post absorbtive state. Patient was prepped and drape in usual sterile fashion. IV Versed and Fentanyl was used for moderate sedation. 2% Lidocaine was used for topical anesthesia. R radial arterial site was prepped and a micropuncture needle was used to access the artery and a 5 F slender sheath was placed. 2.5 mg of Verapamil and 200 mcg of NTG was given through the sheath intra arterial and 5000 units of Heparin was given once the catheter crossed the aortic arch.    5 F TIG 4 catheters was used for right and left coronary angiogram and 5 F pigtail catheter was used for Left heart hemodynamics and left ventriculography. All the catheters were exchanged over 0.035 wire. The R radial arterial sheath was removed and TR band was applied and immediate and complete hemostasis was achieved. The patient tolerate the entire procedure well without any immediate known complications.    Coronary anatomy findings:    LM: Is a large calibre vessel , normal take off from left cusp, divides into LAD and Lcx. No stenosis    LAD: Mild luminal irregularities no stenosis    LCX: Moderate calibre vessel, mild luminal irregularities.    RCA: Large calibre, dominant artery, normal take off from right cusp.  100% occlusion in proximal.    Left Ventriculography:    LV systolic function was normal with  visual estimated EF of 55%.Basal inferior wall hypokinesia  No significant mitral regurgitation noted.    LVEDP: 10 mmHg  No gradient across the aortic valve on pull back.      PERCUTANEOUS CORONARY INTERVENTION PROCEDURE NOTE:    Heparin monotherapy was used for anticoagulation.  Total of 10,000 Units was given IV.Single bolus integrelin was also given    6 F JR 4 guide was used to engage the RCA coaxially. Pronto V4 aspiration catheter was used for manual thrombectomy was performed    0.014 Runthrough guidewire was used to cross the lesion and parked distally.    Used a 2.5 x15 Compliant TREK balloon to predilate the lesion at 10 nandini.    Prepped a 3.5 x 28 [employer]ana [employer] eluting stent and position at the lesion and successfully deployed at 16 nandini. IVUS showed vessel diameter was 4.5 proximal and distal, used a 4.0 NC at high pressure to post dilate    Post stent deployment angio pictures showed good expansion with 0% residual stenosis, KELLY 3 flow in the distal vascular bed and no dissection or distal wire perforation.    Lesion length: 20 mm  Pre PCI Stenosis: 100 %  Post PCI stenosis: 0 %  Pre PCI KELLY flow: 0  Post PCI KELLY flow: 3    EBL: Less than 10cc      Final Impression:  % proximal RCA occlusion s/ pPCI with 3.5 x 28 BUZZ post dilated with 4.0 NC balloon      Recommendations:  Aggressive guideline directed medical management for CAD  Dual Anti platelet medication with Asa 81 mg qd and Brilinta 90 mg bid for minimum 1 year.            Matthew Hill MD, [facility]  Interventional Cardiology    09/06/22  12:34 EDT  Clinical frailty score: 4      Last Stress test       Last Echo  Results for orders placed during the hospital encounter of 09/06/22    Adult Transthoracic Echo Complete W/ Cont if Necessary Per Protocol    Interpretation Summary  · Left ventricular ejection fraction appears to be 51 - 55%. Left ventricular systolic function is normal, mild hypokinesia of basal inferolateral wall  · Left  ventricular diastolic function was normal.  · There is no evidence of pericardial effusion         ECG 12 Lead    Date/Time: 10/8/2024 5:13 PM  Performed by: Ilana Diehl APRN    Authorized by: Ilana Diehl APRN  Comparison: compared with previous ECG from 4/8/2024  Similar to previous ECG  Comparison to previous ECG: Sinus rhythm at 70 bpm  Rhythm: sinus rhythm  Rate: normal  BPM: 70  Conduction: conduction normal  QRS axis: normal    Clinical impression: normal ECG           Current Outpatient Medications   Medication Sig Dispense Refill    Adalimumab (Humira, 2 Pen,) 40 MG/0.4ML Pen-injector Kit Inject 40 mg (0.4 ml) under the skin Every 14 (Fourteen) Days. 2 each 5    aspirin (Aspirin Low Dose) 81 MG EC tablet Take 1 tablet by mouth Daily. 90 tablet 2    atorvastatin (LIPITOR) 80 MG tablet Take 1 tablet by mouth Every Night. 90 tablet 3    colestipol (COLESTID) 1 g tablet TAKE ONE (1) TABLET BY MOUTH DAILY. 90 tablet 2    lansoprazole (PREVACID) 30 MG capsule Take 1 capsule by mouth Daily. 90 capsule 3    lisinopril (PRINIVIL,ZESTRIL) 10 MG tablet Take 1 tablet by mouth Daily for 360 days. 90 tablet 3    mesalamine (LIALDA) 1.2 g EC tablet TAKE ONE TABLET BY MOUTH DAILY WITH BREAKFAST 30 tablet 11    nitroglycerin (NITROSTAT) 0.4 MG SL tablet DISSOLVE ONE (1) TABLET UNDER THE TONGUE AS NEEDED FOR ANGINA, MAY REPEAT EVERY FIVE (5) MINUTES FOR UP THREE DOSES 25 tablet 0    tamsulosin (FLOMAX) 0.4 MG capsule 24 hr capsule TAKE ONE (1) CAPSULE BY MOUTH EVERY NIGHT. 30 capsule 5    traMADol (ULTRAM) 50 MG tablet Take 1 tablet by mouth Every 8 (Eight) Hours As Needed for Moderate Pain. 9 tablet 0    vitamin D (ERGOCALCIFEROL) 1.25 MG (12216 UT) capsule capsule Take 1 capsule by mouth 1 (One) Time Per Week. 4 capsule 5    triamcinolone (KENALOG) 0.1 % cream Apply 1 application  topically to the appropriate area as directed 2 (Two) Times a Day. (Patient not taking: Reported on 10/8/2024) 80 g 3     No current  facility-administered medications for this visit.            Assessment and Plan    Problem List Items Addressed This Visit       Coronary artery disease involving native coronary artery of native heart without angina pectoris (Chronic)    Overview     9/6/2022 [facility] for STEMI: PCI BUZZ to the RCA  9/6/2022 [facility]: LVEF 51 to 55%         Current Assessment & Plan     Coronary Artery Disease (OPTIONAL): Coronary artery disease is stable.  Medication changes per orders. Stop smoking. Beta-blocker was not prescribed due to patient refusal.  Cardiac status will be reassessed in 1 year.         Relevant Orders    ECG 12 Lead    Essential hypertension (Chronic)    Overview     With MI September 2022         Current Assessment & Plan     Hypertension is stable and controlled  Continue current treatment regimen.  Stop smoking.  Blood pressure will be reassessed in 1 year.         Relevant Medications    lisinopril (PRINIVIL,ZESTRIL) 10 MG tablet    Dyslipidemia, goal LDL below 70 (Chronic)    Overview     19 2022 total cholesterol 161, triglycerides 97, HDL 28, and   9/7/2022 total cholesterol 174, triglycerides 178, HDL 28, and   2/14/2023 total cholesterol 112, triglycerides 91, HDL 40 and LDL 54  10/05/2023 total cholesterol 133, triglycerides 115, HDL 36, LDL 76  03/07/2024 total cholesterol 105, triglycerides 123, HDL 29, LDL 54         Current Assessment & Plan      Lipid abnormalities are  due for reevaluation    Plan:  Continue same medication/s without change.      Counseled patient on lifestyle modifications to help control hyperlipidemia.   Stop tobacco use encouraged    Patient Treatment Goals:   LDL goal is less than 70    Followup in 1 year.         Relevant Medications    atorvastatin (LIPITOR) 80 MG tablet    Smoker - Primary (Chronic)     Diagnoses and all orders for this visit:    1. Smoker (Primary)    2. Coronary artery disease involving native coronary artery of native heart without angina  pectoris  Assessment & Plan:  Coronary Artery Disease (OPTIONAL): Coronary artery disease is stable.  Medication changes per orders. Stop smoking. Beta-blocker was not prescribed due to patient refusal.  Cardiac status will be reassessed in 1 year.    Orders:  -     ECG 12 Lead    3. Dyslipidemia, goal LDL below 70  Assessment & Plan:   Lipid abnormalities are  due for reevaluation    Plan:  Continue same medication/s without change.      Counseled patient on lifestyle modifications to help control hyperlipidemia.   Stop tobacco use encouraged    Patient Treatment Goals:   LDL goal is less than 70    Followup in 1 year.    Orders:  -     atorvastatin (LIPITOR) 80 MG tablet; Take 1 tablet by mouth Every Night.  Dispense: 90 tablet; Refill: 3    4. Essential hypertension  Assessment & Plan:  Hypertension is stable and controlled  Continue current treatment regimen.  Stop smoking.  Blood pressure will be reassessed in 1 year.    Orders:  -     lisinopril (PRINIVIL,ZESTRIL) 10 MG tablet; Take 1 tablet by mouth Daily for 360 days.  Dispense: 90 tablet; Refill: 3      Assessment & Plan             Follow Up     Return in about 1 year (around 10/8/2025).    Patient was given instructions and counseling regarding his condition or for health maintenance advice. Please see specific information pulled into the AVS if appropriate.       Patient or patient representative verbalized consent for the use of Ambient Listening during the visit with  EDDIE Stout for chart documentation. 10/8/2024  17:14 EDT     Dictated Utilizing Dragon Dictation: Part of this note may be an electronic transcription/translation of spoken language to printed text using the Dragon Dictation System

## 2024-10-08 NOTE — ASSESSMENT & PLAN NOTE
Coronary Artery Disease (OPTIONAL): Coronary artery disease is stable.  Medication changes per orders. Stop smoking. Beta-blocker was not prescribed due to patient refusal.  Cardiac status will be reassessed in 1 year.

## 2024-10-08 NOTE — ASSESSMENT & PLAN NOTE
Hypertension is stable and controlled  Continue current treatment regimen.  Stop smoking.  Blood pressure will be reassessed in 1 year.

## 2024-10-08 NOTE — ASSESSMENT & PLAN NOTE
Lipid abnormalities are  due for reevaluation    Plan:  Continue same medication/s without change.      Counseled patient on lifestyle modifications to help control hyperlipidemia.   Stop tobacco use encouraged    Patient Treatment Goals:   LDL goal is less than 70    Followup in 1 year.

## 2024-10-17 ENCOUNTER — OFFICE VISIT (OUTPATIENT)
Dept: GASTROENTEROLOGY | Facility: CLINIC | Age: 49
End: 2024-10-17
Payer: MEDICAID

## 2024-10-17 VITALS
SYSTOLIC BLOOD PRESSURE: 123 MMHG | WEIGHT: 199.6 LBS | DIASTOLIC BLOOD PRESSURE: 82 MMHG | HEART RATE: 76 BPM | HEIGHT: 74 IN | BODY MASS INDEX: 25.62 KG/M2

## 2024-10-17 DIAGNOSIS — K50.90 EXACERBATION OF CROHN'S DISEASE WITHOUT COMPLICATION: ICD-10-CM

## 2024-10-17 DIAGNOSIS — R10.84 GENERALIZED ABDOMINAL PAIN: ICD-10-CM

## 2024-10-17 DIAGNOSIS — K50.00 CROHN'S DISEASE OF SMALL INTESTINE WITHOUT COMPLICATION: Primary | ICD-10-CM

## 2024-10-17 PROCEDURE — 85025 COMPLETE CBC W/AUTO DIFF WBC: CPT | Performed by: INTERNAL MEDICINE

## 2024-10-17 PROCEDURE — 86140 C-REACTIVE PROTEIN: CPT | Performed by: INTERNAL MEDICINE

## 2024-10-17 PROCEDURE — 80053 COMPREHEN METABOLIC PANEL: CPT | Performed by: INTERNAL MEDICINE

## 2024-10-17 NOTE — PROGRESS NOTES
"Subjective   Scott Montes is a 49 y.o. male who presents to the office today as a follow up appointment regarding Crohn's Disease      History of Present Illness:    Interval History 12/7/2023: The patient presents today for follow-up Crohn's disease.  He has recently been followed by Jenny Nettles PA-C.  The patient is currently on Humira and mesalamine.  The patient underwent colonoscopy in July 2012 22 which showed scattered erosions and erythema throughout the colon.  Biopsies revealed chronic active ileitis and mild acute colitis.  On April 23, 2023 his fecal calprotectin was 166.  This is down from previously collected fecal calprotectin which was 895.  His last CRP was performed on 3/2/2023.  This was normal at 0.30.  This had previously been elevated.  A recent CBC revealed normal hemoglobin and hematocrit.  This was performed in November 2023.  Due to involvement of the terminal ileum, the patient takes Colestid due to suspected poor bile acid recirculation in the terminal ileum.  At the time of his last visit, the patient had complained of heartburn and reflux.  He had been given Protonix but did not take this routinely per Akira Nettles's notes.  She subsequently placed him on Prevacid.  He has bowel movement 3 to 5 times a day.  His stool is for the most part formed.  He does feel that he empties well.  He admits he does not take his Colestid every day. This can constipate him a bit.  He generally takes it a couple of times a week. He had his gallbladder was removed a few weeks ago.  He states his gallbladder was nonfunctioning.  He has not noted increased bowel movements with cholecystectomy. He is doing better on Prevacid in respect to heartburn.  He had an AMI in the Sept 2023. He tells me it was a \"blood clot\".  He is now on ASA and Brillanta.  He initially lost 15 lbs when sick with Crohn's disease but has gained some weight back.  He states \"I am 200% better\" than when we first started to " see him for Crohn's disease.  No BRBPR.  He does describe some mild intermittent tenderness in the right lower abdominal pain but this is not worrisome.  He is wondering if he should get the pneumovax 23 vaccination.    Interval history 4/11/2024: The patient presents today for follow-up Crohn's disease.  He is currently on Humira and mesalamine.  His last colonoscopy was in July 20,2022.  This showed scattered erosions and erythema throughout the colon.  In April 2023 his fecal calprotectin was 166.  It had previously been 895.  His last CRP was performed in March 2023 this was normal at 0.30.  He underwent cholecystectomy in the fall of last year.  He is on Colestid for suspected bile acid diarrhea.  His stool is a [place] score 3-4.  His last CMP was performed in March 2024.  This was mildly elevated.  His vitamin D level was low at 16.1.  His PCP placed him on Vitamin D supplement once a week. He has been doing well on Humira.  He will have occasional abdominal pain which is fleeting.  He is having a bowel movement once a day.  Occasionally, he will have up to 5 bm daily.  This seems to becoming less often. He denies BRBPR or melena. He feels like his energy improved. He does have joint pain.  He has issues with pain in the knees, ankles primarily.  He takes Tylenol.  Heartburn and reflux under good control on Prevacid.      Interval history 10/17/2024: The patient presents today for follow-up Crohn's disease.  He is currently on Humira and mesalamine.  He takes this as prescribed.  QuantiFERON gold was checked earlier in the year and was negative.  He states for the past week and a half he has experienced abdominal tenderness.He denies nausea and vomiting.  He is having some mild heartburn for which he takes Prevacid. He does take this daily.  He notes that he when he eats he gets full fast.  He has a bowel movement 3 times daily.  He does feel like he empties well.  His stool is formed. He does take Colestid  "for bile acid reflux.  He does state his abdomen does swell but more recently he has noted some reduction in swelling.  No blood in the stool.  No weight loss.  Nothing seems to bring the pain on.  He does note that it is better if he sits \"really still\".  He rates the pain as a 6 out of 10.  The pain has been constant for the past week and a half. He denies NSAID use. He has had this pain before and has gone away.            Review of Systems:  Review of Systems   Constitutional:  Negative for activity change, appetite change, chills, fatigue, fever and unexpected weight change.   HENT:  Negative for mouth sores and trouble swallowing.    Eyes:  Negative for photophobia, pain and redness.   Respiratory:  Negative for cough and choking.    Cardiovascular:  Negative for chest pain and leg swelling.   Gastrointestinal:  Positive for abdominal distention, abdominal pain, anal bleeding and nausea. Negative for constipation, diarrhea, rectal pain and vomiting.   Endocrine: Negative for cold intolerance, heat intolerance and polyphagia.   Genitourinary:  Negative for difficulty urinating and dysuria.   Musculoskeletal:  Negative for arthralgias, joint swelling and myalgias.   Skin:  Negative for rash and wound.   Allergic/Immunologic: Negative for environmental allergies and food allergies.   Neurological:  Negative for dizziness and light-headedness.   Hematological:  Negative for adenopathy. Does not bruise/bleed easily.   Psychiatric/Behavioral:  Negative for sleep disturbance. The patient is not nervous/anxious.        Past Medical History:  Past Medical History:   Diagnosis Date    Anemia     Arthritis     BPH without obstruction/lower urinary tract symptoms 4/2/2024    Coronary artery disease     Crohn's disease     Elevated cholesterol     Erectile dysfunction     GERD (gastroesophageal reflux disease)     H/O blood clots     Heart attack     Hormone disorder 9 years    Hypertension     Interstitial cystitis     " Low back pain     STEMI (ST elevation myocardial infarction) 09/06/2022    Ulcerative colitis        Past Surgical History:  Past Surgical History:   Procedure Laterality Date    CARDIAC CATHETERIZATION N/A 09/06/2022    Procedure: Left Heart Cath;  Surgeon: Matthew Hill MD;  Location: [facility] CATH INVASIVE LOCATION;  Service: Cardiovascular;  Laterality: N/A;    CARDIAC CATHETERIZATION N/A 09/06/2022    Procedure: Percutaneous Coronary Intervention;  Surgeon: Matthew Hill MD;  Location: [facility] CATH INVASIVE LOCATION;  Service: Cardiovascular;  Laterality: N/A;    CARDIAC SURGERY      CHOLECYSTECTOMY N/A 11/13/2023    Procedure: CHOLECYSTECTOMY LAPAROSCOPIC WITH DAVINCI ROBOT;  Surgeon: Gonzalo Rudolph MD;  Location: [facility] OR;  Service: Robotics - DaVinci;  Laterality: N/A;    COLONOSCOPY N/A 07/12/2022    Procedure: COLONOSCOPY;  Surgeon: Stella Aparicio MD;  Location: [facility] OR;  Service: Gastroenterology;  Laterality: N/A;    CYST REMOVAL      shoulder    FRACTURE SURGERY Right     foot/toes    VASCULAR SURGERY      VASECTOMY         Family History:  Family History   Problem Relation Age of Onset    Colon cancer Mother     Cancer Father        Social History:  Social History     Socioeconomic History    Marital status:    Tobacco Use    Smoking status: Every Day     Current packs/day: 0.50     Average packs/day: 0.5 packs/day for 35.0 years (17.5 ttl pk-yrs)     Types: Cigarettes     Passive exposure: Current    Smokeless tobacco: Never    Tobacco comments:     Smoking cessation encouraged   Vaping Use    Vaping status: Every Day    Substances: Nicotine    Devices: Disposable   Substance and Sexual Activity    Alcohol use: No    Drug use: No    Sexual activity: Yes     Partners: Female     Birth control/protection: Vasectomy       Current Medication List:    Current Outpatient Medications:     Adalimumab (Humira, 2 Pen,) 40 MG/0.4ML Pen-injector Kit, Inject 40  "mg (0.4 ml) under the skin Every 14 (Fourteen) Days., Disp: 2 each, Rfl: 5    aspirin (Aspirin Low Dose) 81 MG EC tablet, Take 1 tablet by mouth Daily., Disp: 90 tablet, Rfl: 2    atorvastatin (LIPITOR) 80 MG tablet, Take 1 tablet by mouth Every Night., Disp: 90 tablet, Rfl: 3    colestipol (COLESTID) 1 g tablet, TAKE ONE (1) TABLET BY MOUTH DAILY., Disp: 90 tablet, Rfl: 2    lansoprazole (PREVACID) 30 MG capsule, Take 1 capsule by mouth Daily., Disp: 90 capsule, Rfl: 3    lisinopril (PRINIVIL,ZESTRIL) 10 MG tablet, Take 1 tablet by mouth Daily for 360 days., Disp: 90 tablet, Rfl: 3    mesalamine (LIALDA) 1.2 g EC tablet, TAKE ONE TABLET BY MOUTH DAILY WITH BREAKFAST, Disp: 30 tablet, Rfl: 11    nitroglycerin (NITROSTAT) 0.4 MG SL tablet, DISSOLVE ONE (1) TABLET UNDER THE TONGUE AS NEEDED FOR ANGINA, MAY REPEAT EVERY FIVE (5) MINUTES FOR UP THREE DOSES, Disp: 25 tablet, Rfl: 0    tamsulosin (FLOMAX) 0.4 MG capsule 24 hr capsule, TAKE ONE (1) CAPSULE BY MOUTH EVERY NIGHT., Disp: 30 capsule, Rfl: 5    traMADol (ULTRAM) 50 MG tablet, Take 1 tablet by mouth Every 8 (Eight) Hours As Needed for Moderate Pain., Disp: 9 tablet, Rfl: 0    triamcinolone (KENALOG) 0.1 % cream, Apply 1 application  topically to the appropriate area as directed 2 (Two) Times a Day., Disp: 80 g, Rfl: 3    vitamin D (ERGOCALCIFEROL) 1.25 MG (05459 UT) capsule capsule, Take 1 capsule by mouth 1 (One) Time Per Week., Disp: 4 capsule, Rfl: 5    Allergies:   Patient has no known allergies.    Vitals:  /82   Pulse 76   Ht 188 cm (74\")   Wt 90.5 kg (199 lb 9.6 oz)   BMI 25.63 kg/m²     Physical Exam:  Physical Exam  Constitutional:       Appearance: He is normal weight.   HENT:      Head: Normocephalic and atraumatic.      Nose: Nose normal. No congestion or rhinorrhea.   Eyes:      General: No scleral icterus.     Extraocular Movements: Extraocular movements intact.      Conjunctiva/sclera: Conjunctivae normal.      Pupils: Pupils are equal, " round, and reactive to light.   Cardiovascular:      Rate and Rhythm: Normal rate and regular rhythm.      Pulses: Normal pulses.      Heart sounds: Normal heart sounds.   Pulmonary:      Effort: Pulmonary effort is normal.      Breath sounds: Normal breath sounds.   Abdominal:      General: Abdomen is flat. Bowel sounds are normal. There is no distension.      Palpations: Abdomen is soft. There is no shifting dullness, fluid wave, hepatomegaly, splenomegaly, mass or pulsatile mass.      Tenderness: There is abdominal tenderness. There is no guarding or rebound.      Hernia: No hernia is present.      Comments: Diffuse mildly tender abdomen on deep palpation   Musculoskeletal:         General: No swelling or tenderness.      Cervical back: Normal range of motion and neck supple.   Skin:     General: Skin is warm and dry.      Coloration: Skin is not jaundiced.   Neurological:      General: No focal deficit present.      Mental Status: He is alert and oriented to person, place, and time.   Psychiatric:         Mood and Affect: Mood normal.         Behavior: Behavior normal.         Results Review:  Lab Results:   No visits with results within 1 Month(s) from this visit.   Latest known visit with results is:   Lab on 04/11/2024   Component Date Value Ref Range Status    QuantiFERON Incubation 04/11/2024 Incubation performed.   Final    QUANTIFERON-TB GOLD PLUS 04/11/2024 Negative  Negative Final    No response to M tuberculosis antigens detected.  Infection with M tuberculosis is unlikely, but high risk  individuals should be considered for additional testing  (ATS/IDSA/CDC Clinical Practice Guidelines, 2017). The  reference range is an Antigen minus Nil result of <0.35 IU/mL.  Chemiluminescence immunoassay methodology    QuantiFERON Criteria 04/11/2024 Comment   Final    QuantiFERON-TB Gold Plus is a qualitative indirect test for  M tuberculosis infection (including disease) and is intended for use  in conjunction  with risk assessment, radiography, and other medical  and diagnostic evaluations. The QuantiFERON-TB Gold Plus result is  determined by subtracting the Nil value from either TB antigen (Ag)  value. The Mitogen tube serves as a control for the test.    QUANTIFERON TB1 AG VALUE 04/11/2024 0.01  IU/mL Final    QUANTIFERON TB2 AG VALUE 04/11/2024 0.01  IU/mL Final    QuantiFERON Nil Value 04/11/2024 0.01  IU/mL Final    QuantiFERON Mitogen Value 04/11/2024 >10.00  IU/mL Final       Assessment & Plan     Visit Diagnoses:    ICD-10-CM ICD-9-CM   1. Crohn's disease of small intestine without complication  K50.00 555.0   2. Generalized abdominal pain  R10.84 789.07   3. Exacerbation of Crohn's disease without complication  K50.90 555.9           Plan:    1.  Crohn's disease of the colon and small intestine: The patient complains of diffuse abdominal tenderness which has been present for the past week and a half.  This is similar to his pain when he was having a Crohn's exacerbation in the past.  He denies associated nausea and vomiting but does state that his abdomen swells.  This was notable by his wife as well.  I will proceed with CT enterography to further evaluate for the presence of Crohn's disease in the small bowel.  He is compliant with all of his medications.  This includes Humira, mesalamine and Colestid (he takes this for suspected bile acid diarrhea.  He does not feel constipated.  He is having regular bowel movements daily.  2.  GERD: The patient is improved on Prevacid once daily.  He is going to continue this.  3.  Diarrhea: The patient denies diarrhea.  He is having more formed stool.  This has resolved with the use of Colestid once daily.    MEDS ORDERED DURING VISIT:  No orders of the defined types were placed in this encounter.      Return in about 6 weeks (around 11/28/2024).             This document has been electronically signed by Stella Aparicio MD   October 17, 2024 14:35 EDT      Part  of this note may be an electronic transcription/translation of spoken language to printed text using the Dragon Dictation System.

## 2024-10-18 LAB
ALBUMIN SERPL-MCNC: 4 G/DL (ref 3.5–5.2)
ALBUMIN/GLOB SERPL: 1.4 G/DL
ALP SERPL-CCNC: 133 U/L (ref 39–117)
ALT SERPL W P-5'-P-CCNC: 36 U/L (ref 1–41)
ANION GAP SERPL CALCULATED.3IONS-SCNC: 6.7 MMOL/L (ref 5–15)
AST SERPL-CCNC: 20 U/L (ref 1–40)
BASOPHILS # BLD AUTO: 0.13 10*3/MM3 (ref 0–0.2)
BASOPHILS NFR BLD AUTO: 1.3 % (ref 0–1.5)
BILIRUB SERPL-MCNC: 0.5 MG/DL (ref 0–1.2)
BUN SERPL-MCNC: 6 MG/DL (ref 6–20)
BUN/CREAT SERPL: 6.5 (ref 7–25)
CALCIUM SPEC-SCNC: 9.4 MG/DL (ref 8.6–10.5)
CHLORIDE SERPL-SCNC: 104 MMOL/L (ref 98–107)
CO2 SERPL-SCNC: 27.3 MMOL/L (ref 22–29)
CREAT SERPL-MCNC: 0.93 MG/DL (ref 0.76–1.27)
CRP SERPL-MCNC: <0.3 MG/DL (ref 0–0.5)
DEPRECATED RDW RBC AUTO: 40 FL (ref 37–54)
EGFRCR SERPLBLD CKD-EPI 2021: 100 ML/MIN/1.73
EOSINOPHIL # BLD AUTO: 0.1 10*3/MM3 (ref 0–0.4)
EOSINOPHIL NFR BLD AUTO: 1 % (ref 0.3–6.2)
ERYTHROCYTE [DISTWIDTH] IN BLOOD BY AUTOMATED COUNT: 13 % (ref 12.3–15.4)
GLOBULIN UR ELPH-MCNC: 2.9 GM/DL
GLUCOSE SERPL-MCNC: 81 MG/DL (ref 65–99)
HCT VFR BLD AUTO: 42.3 % (ref 37.5–51)
HGB BLD-MCNC: 14.2 G/DL (ref 13–17.7)
IMM GRANULOCYTES # BLD AUTO: 0.03 10*3/MM3 (ref 0–0.05)
IMM GRANULOCYTES NFR BLD AUTO: 0.3 % (ref 0–0.5)
LYMPHOCYTES # BLD AUTO: 2.98 10*3/MM3 (ref 0.7–3.1)
LYMPHOCYTES NFR BLD AUTO: 28.7 % (ref 19.6–45.3)
MCH RBC QN AUTO: 28.9 PG (ref 26.6–33)
MCHC RBC AUTO-ENTMCNC: 33.6 G/DL (ref 31.5–35.7)
MCV RBC AUTO: 86.2 FL (ref 79–97)
MONOCYTES # BLD AUTO: 0.99 10*3/MM3 (ref 0.1–0.9)
MONOCYTES NFR BLD AUTO: 9.5 % (ref 5–12)
NEUTROPHILS NFR BLD AUTO: 59.2 % (ref 42.7–76)
NEUTROPHILS NFR BLD AUTO: 6.16 10*3/MM3 (ref 1.7–7)
NRBC BLD AUTO-RTO: 0 /100 WBC (ref 0–0.2)
PLATELET # BLD AUTO: 323 10*3/MM3 (ref 140–450)
PMV BLD AUTO: 11.9 FL (ref 6–12)
POTASSIUM SERPL-SCNC: 4.4 MMOL/L (ref 3.5–5.2)
PROT SERPL-MCNC: 6.9 G/DL (ref 6–8.5)
RBC # BLD AUTO: 4.91 10*6/MM3 (ref 4.14–5.8)
SODIUM SERPL-SCNC: 138 MMOL/L (ref 136–145)
WBC NRBC COR # BLD AUTO: 10.39 10*3/MM3 (ref 3.4–10.8)

## 2024-10-24 ENCOUNTER — OFFICE VISIT (OUTPATIENT)
Dept: FAMILY MEDICINE CLINIC | Facility: CLINIC | Age: 49
End: 2024-10-24
Payer: MEDICAID

## 2024-10-24 VITALS
SYSTOLIC BLOOD PRESSURE: 120 MMHG | HEIGHT: 74 IN | DIASTOLIC BLOOD PRESSURE: 80 MMHG | BODY MASS INDEX: 25 KG/M2 | HEART RATE: 73 BPM | WEIGHT: 194.8 LBS | RESPIRATION RATE: 14 BRPM | OXYGEN SATURATION: 98 %

## 2024-10-24 DIAGNOSIS — I10 ESSENTIAL HYPERTENSION: Primary | ICD-10-CM

## 2024-10-24 DIAGNOSIS — K52.9 COLITIS: ICD-10-CM

## 2024-10-24 DIAGNOSIS — M15.0 PRIMARY OSTEOARTHRITIS INVOLVING MULTIPLE JOINTS: ICD-10-CM

## 2024-10-24 DIAGNOSIS — E55.9 VITAMIN D DEFICIENCY: ICD-10-CM

## 2024-10-24 DIAGNOSIS — R35.0 FREQUENCY OF URINATION: ICD-10-CM

## 2024-10-24 DIAGNOSIS — N40.0 BPH WITHOUT OBSTRUCTION/LOWER URINARY TRACT SYMPTOMS: ICD-10-CM

## 2024-10-24 DIAGNOSIS — K21.9 GASTROESOPHAGEAL REFLUX DISEASE, UNSPECIFIED WHETHER ESOPHAGITIS PRESENT: ICD-10-CM

## 2024-10-24 PROCEDURE — 3074F SYST BP LT 130 MM HG: CPT | Performed by: NURSE PRACTITIONER

## 2024-10-24 PROCEDURE — 3079F DIAST BP 80-89 MM HG: CPT | Performed by: NURSE PRACTITIONER

## 2024-10-24 PROCEDURE — 99214 OFFICE O/P EST MOD 30 MIN: CPT | Performed by: NURSE PRACTITIONER

## 2024-10-24 PROCEDURE — 1126F AMNT PAIN NOTED NONE PRSNT: CPT | Performed by: NURSE PRACTITIONER

## 2024-10-24 PROCEDURE — 1160F RVW MEDS BY RX/DR IN RCRD: CPT | Performed by: NURSE PRACTITIONER

## 2024-10-24 PROCEDURE — 1159F MED LIST DOCD IN RCRD: CPT | Performed by: NURSE PRACTITIONER

## 2024-10-24 RX ORDER — ERGOCALCIFEROL 1.25 MG/1
50000 CAPSULE, LIQUID FILLED ORAL WEEKLY
Qty: 4 CAPSULE | Refills: 5 | Status: SHIPPED | OUTPATIENT
Start: 2024-10-24

## 2024-10-24 RX ORDER — LANSOPRAZOLE 30 MG/1
30 CAPSULE, DELAYED RELEASE ORAL DAILY
Qty: 90 CAPSULE | Refills: 2 | Status: SHIPPED | OUTPATIENT
Start: 2024-10-24

## 2024-10-24 RX ORDER — TRAMADOL HYDROCHLORIDE 50 MG/1
50 TABLET ORAL EVERY 8 HOURS PRN
Qty: 12 TABLET | Refills: 0 | Status: SHIPPED | OUTPATIENT
Start: 2024-10-24

## 2024-10-24 RX ORDER — TAMSULOSIN HYDROCHLORIDE 0.4 MG/1
1 CAPSULE ORAL DAILY
Qty: 30 CAPSULE | Refills: 5 | Status: SHIPPED | OUTPATIENT
Start: 2024-10-24

## 2024-10-24 NOTE — PROGRESS NOTES
Subjective   Scott Montes is a 49 y.o. male.     Chief Complaint   Patient presents with    Coronary artery disease involving native coronary artery of        History of Present Illness     CAD/hypertension-with history of MI.  Patient currently on atorvastatin 80 mg, aspirin 81 mg, lisinopril 10 mg.  No negative side effects.  IBS/Crohn's disease-ongoing.  Patient is currently on Humira 40 mg q. 14 days, Colestid 1 g tablet daily and mesalamine 1.2 g daily.  He continues to feel bloated and have generalized mid abdomen discomfort.  He was seen by GI 10/17/2024.  He will be having CT enterography for evaluation of Crohns.  This is scheduled for 12/12 @ 10 am.    GERD-chronic and ongoing.  Patient currently on Pepcid 30 mg.  No negative side effects.  He has been followed by GI due to his gastric history.  Cardiology follow up-has been taken off Brilinta.  He feels his breathing is not as short being off the medication.   He will continue Lisinopril and follow up in a year.  He does need updated labs.   Joint pain-reports since colder weather, he has had increase in his overall joint discomfort.  He reports aching.  No joint swelling or warmth.   He is not a candidate for NSAIDs due to CVD.    The following portions of the patient's history were reviewed and updated as appropriate: CC, ROS, allergies, current medications, past family history, past medical history, past social history, past surgical history and problem list.    Review of Systems   Constitutional:  Positive for fatigue. Negative for appetite change, unexpected weight gain and unexpected weight loss.   HENT:  Negative for congestion, ear pain, postnasal drip, rhinorrhea, sore throat, swollen glands, trouble swallowing and voice change.    Eyes:  Negative for pain and visual disturbance.   Respiratory:  Negative for cough, chest tightness, shortness of breath and wheezing.    Cardiovascular:  Negative for chest pain, palpitations and leg swelling.  "  Gastrointestinal:  Positive for abdominal distention, abdominal pain and diarrhea. Negative for blood in stool, constipation, nausea, vomiting and indigestion.   Endocrine: Negative.    Genitourinary:  Negative for dysuria, hematuria and urgency.   Musculoskeletal:  Positive for arthralgias, back pain, gait problem and myalgias. Negative for joint swelling.   Skin:  Negative for color change and skin lesions.   Allergic/Immunologic: Negative.    Neurological:  Negative for dizziness, numbness and headache.   Hematological: Negative.    Psychiatric/Behavioral:  Negative for dysphoric mood, sleep disturbance and suicidal ideas. The patient is not nervous/anxious.    All other systems reviewed and are negative.      Objective     /80   Pulse 73   Resp 14   Ht 188 cm (74\")   Wt 88.4 kg (194 lb 12.8 oz)   SpO2 98%   BMI 25.01 kg/m²     Physical Exam  Vitals reviewed.   Constitutional:       General: He is not in acute distress.     Appearance: He is well-developed. He is not diaphoretic.   HENT:      Head: Normocephalic and atraumatic.      Jaw: No tenderness.      Right Ear: Hearing, tympanic membrane, ear canal and external ear normal.      Left Ear: Hearing, tympanic membrane, ear canal and external ear normal.      Nose: Nose normal. No nasal tenderness or congestion.      Right Sinus: No maxillary sinus tenderness or frontal sinus tenderness.      Left Sinus: No maxillary sinus tenderness or frontal sinus tenderness.      Mouth/Throat:      Lips: Pink.      Mouth: Mucous membranes are moist.      Pharynx: Oropharynx is clear. Uvula midline.   Eyes:      General: Lids are normal. No scleral icterus.     Extraocular Movements:      Right eye: Normal extraocular motion and no nystagmus.      Left eye: Normal extraocular motion and no nystagmus.      Conjunctiva/sclera: Conjunctivae normal.      Pupils: Pupils are equal, round, and reactive to light.   Neck:      Thyroid: No thyromegaly or thyroid " tenderness.      Vascular: No carotid bruit or JVD.      Trachea: No tracheal tenderness.   Cardiovascular:      Rate and Rhythm: Normal rate and regular rhythm.      Pulses:           Dorsalis pedis pulses are 2+ on the right side and 2+ on the left side.        Posterior tibial pulses are 2+ on the right side and 2+ on the left side.      Heart sounds: Normal heart sounds, S1 normal and S2 normal. No murmur heard.  Pulmonary:      Effort: Pulmonary effort is normal. No accessory muscle usage, prolonged expiration or respiratory distress.      Breath sounds: Normal breath sounds.   Chest:      Chest wall: No tenderness.   Abdominal:      General: Bowel sounds are normal. There is no distension.      Palpations: Abdomen is soft. There is no hepatomegaly, splenomegaly or mass.      Tenderness: There is no abdominal tenderness.   Musculoskeletal:         General: Tenderness present.      Cervical back: Normal range of motion and neck supple.      Right lower leg: No edema.      Left lower leg: No edema.      Comments: No muscular atrophy or flaccidity.  Multiple areas of generalized arthralgia and myalgia noted throughout spine, hips, knees, and shoulders.     Lymphadenopathy:      Head:      Right side of head: No submental or submandibular adenopathy.      Left side of head: No submental or submandibular adenopathy.      Cervical: No cervical adenopathy.      Right cervical: No superficial cervical adenopathy.     Left cervical: No superficial cervical adenopathy.   Skin:     General: Skin is warm and dry.      Capillary Refill: Capillary refill takes less than 2 seconds.      Coloration: Skin is not jaundiced or pale.      Findings: No erythema.      Nails: There is no clubbing.   Neurological:      Mental Status: He is alert and oriented to person, place, and time.      Cranial Nerves: No cranial nerve deficit or facial asymmetry.      Sensory: No sensory deficit.      Motor: No weakness, tremor, atrophy or  abnormal muscle tone.      Coordination: Coordination normal.      Deep Tendon Reflexes: Reflexes are normal and symmetric.   Psychiatric:         Attention and Perception: He is attentive.         Mood and Affect: Mood normal. Mood is not anxious or depressed.         Speech: Speech normal.         Behavior: Behavior normal. Behavior is cooperative.         Thought Content: Thought content normal.         Cognition and Memory: Cognition normal.         Judgment: Judgment normal.       Diagnoses and all orders for this visit:    1. Essential hypertension (Primary)  Comments:  continue lisinopril 10 mg.  Monitor BP  Overview:  With MI September 2022      2. BPH without obstruction/lower urinary tract symptoms  Comments:  contineu flomax.  urology follow ups as needed/scheduled  Orders:  -     tamsulosin (FLOMAX) 0.4 MG capsule 24 hr capsule; Take 1 capsule by mouth Daily.  Dispense: 30 capsule; Refill: 5    3. Primary osteoarthritis involving multiple joints  Comments:  continue conservative care  Orders:  -     traMADol (ULTRAM) 50 MG tablet; Take 1 tablet by mouth Every 8 (Eight) Hours As Needed for Moderate Pain.  Dispense: 12 tablet; Refill: 0    4. Gastroesophageal reflux disease, unspecified whether esophagitis present  Comments:  continue Prevcid. avoid food triggers.  Orders:  -     lansoprazole (PREVACID) 30 MG capsule; Take 1 capsule by mouth Daily.  Dispense: 90 capsule; Refill: 2    5. Frequency of urination  -     tamsulosin (FLOMAX) 0.4 MG capsule 24 hr capsule; Take 1 capsule by mouth Daily.  Dispense: 30 capsule; Refill: 5    6. Vitamin D deficiency  -     vitamin D (ERGOCALCIFEROL) 1.25 MG (43080 UT) capsule capsule; Take 1 capsule by mouth 1 (One) Time Per Week.  Dispense: 4 capsule; Refill: 5    7. Colitis  Comments:  continue under the care of GI.  Continue Humira and Colestid and Mesalamine.  Overview:  Crohn's disease under the care of GI          Understands disease processes and need for  medications.  Understands reasons for urgent and emergent care.  Patient (& family) verbalized agreement for treatment plan.   Emotional support and active listening provided.  Patient provided time to verbalize feelings.    CHADWICK/DIMPLE reviewed today and consistent.  Will refill prescribed controlled medication today.  Patient is aware they cannot receive narcotics from any other provider except if under care of pain management or speciality clinic.  Risk and benefits of medication use has been reviewed.  History and physical exam exhibit continued safe and appropriate use of controlled substances.  The patient is aware of the potential for addiction and dependence.  This patient has been made aware of the appropriate use of such medications, including potential risk of somnolence, limited ability to drive and / or work safely, and potential for overdose.    It has also been made clear that these medications are for use by this patient only, without concomitant use of alcohol or other substances unless prescribed/advised by medical provider.  Patient understands they may be subject to UDS and pill counts at random.    Patient considered to be low risk for addiction due to use of single controlled medications.  Patient understands and accepts these risks.  Patient need for medication will be reassessed at each visit.  Doses will be adjusted according to patient need and findings.    Goal of TX: Patient will not have any adverse reactions of medication.  Patient will have reduction in there chronic back and joint pain symptoms with use of tramadol as needed as directed.  Patient will be able to remain active in an outside of their home with minimal to no interference from their pain symptoms.    [place] Patient Controlled Substance Report (from 10/25/2023 to 10/24/2024)    Dispensed  Strength Quantity Days Supply Provider Pharmacy   06/06/2024 Tramadol Hydrochloride 50MG 9 each 3 RETA OATES [facility]    11/13/2023 Tramadol Hydrochloride 50MG 12 each 3 CALDERON KRAMER [facility]        RTC 3-4 months, sooner if needed.             This document has been electronically signed by:  EDDIE Thurman FNP-C Dragon disclaimer:  Part of this note may be an electronic transcription/translation of spoken language to printed text using the Dragon Dictation System.

## 2024-10-29 ENCOUNTER — LAB (OUTPATIENT)
Dept: FAMILY MEDICINE CLINIC | Facility: CLINIC | Age: 49
End: 2024-10-29
Payer: MEDICAID

## 2024-10-29 DIAGNOSIS — I10 ESSENTIAL HYPERTENSION: Chronic | ICD-10-CM

## 2024-10-29 DIAGNOSIS — I25.10 CORONARY ARTERY DISEASE INVOLVING NATIVE CORONARY ARTERY OF NATIVE HEART WITHOUT ANGINA PECTORIS: Chronic | ICD-10-CM

## 2024-10-29 DIAGNOSIS — E78.5 DYSLIPIDEMIA, GOAL LDL BELOW 70: Chronic | ICD-10-CM

## 2024-10-29 LAB
ALBUMIN SERPL-MCNC: 4.3 G/DL (ref 3.5–5.2)
ALBUMIN/GLOB SERPL: 1.5 G/DL
ALP SERPL-CCNC: 145 U/L (ref 39–117)
ALT SERPL W P-5'-P-CCNC: 47 U/L (ref 1–41)
ANION GAP SERPL CALCULATED.3IONS-SCNC: 10.6 MMOL/L (ref 5–15)
AST SERPL-CCNC: 24 U/L (ref 1–40)
BILIRUB SERPL-MCNC: 0.5 MG/DL (ref 0–1.2)
BUN SERPL-MCNC: 12 MG/DL (ref 6–20)
BUN/CREAT SERPL: 14.1 (ref 7–25)
CALCIUM SPEC-SCNC: 9.3 MG/DL (ref 8.6–10.5)
CHLORIDE SERPL-SCNC: 105 MMOL/L (ref 98–107)
CHOLEST SERPL-MCNC: 218 MG/DL (ref 0–200)
CO2 SERPL-SCNC: 25.4 MMOL/L (ref 22–29)
CREAT SERPL-MCNC: 0.85 MG/DL (ref 0.76–1.27)
DEPRECATED RDW RBC AUTO: 44.6 FL (ref 37–54)
EGFRCR SERPLBLD CKD-EPI 2021: 105.9 ML/MIN/1.73
ERYTHROCYTE [DISTWIDTH] IN BLOOD BY AUTOMATED COUNT: 13.8 % (ref 12.3–15.4)
GLOBULIN UR ELPH-MCNC: 2.9 GM/DL
GLUCOSE SERPL-MCNC: 99 MG/DL (ref 65–99)
HCT VFR BLD AUTO: 44.7 % (ref 37.5–51)
HDLC SERPL-MCNC: 30 MG/DL (ref 40–60)
HGB BLD-MCNC: 14.6 G/DL (ref 13–17.7)
LDLC SERPL CALC-MCNC: 146 MG/DL (ref 0–100)
LDLC/HDLC SERPL: 4.75 {RATIO}
MCH RBC QN AUTO: 29 PG (ref 26.6–33)
MCHC RBC AUTO-ENTMCNC: 32.7 G/DL (ref 31.5–35.7)
MCV RBC AUTO: 88.7 FL (ref 79–97)
PLATELET # BLD AUTO: 323 10*3/MM3 (ref 140–450)
PMV BLD AUTO: 11.8 FL (ref 6–12)
POTASSIUM SERPL-SCNC: 4.3 MMOL/L (ref 3.5–5.2)
PROT SERPL-MCNC: 7.2 G/DL (ref 6–8.5)
RBC # BLD AUTO: 5.04 10*6/MM3 (ref 4.14–5.8)
SODIUM SERPL-SCNC: 141 MMOL/L (ref 136–145)
TRIGL SERPL-MCNC: 227 MG/DL (ref 0–150)
VLDLC SERPL-MCNC: 42 MG/DL (ref 5–40)
WBC NRBC COR # BLD AUTO: 10.22 10*3/MM3 (ref 3.4–10.8)

## 2024-10-29 PROCEDURE — 80053 COMPREHEN METABOLIC PANEL: CPT | Performed by: NURSE PRACTITIONER

## 2024-10-29 PROCEDURE — 85027 COMPLETE CBC AUTOMATED: CPT | Performed by: NURSE PRACTITIONER

## 2024-10-29 PROCEDURE — 80061 LIPID PANEL: CPT | Performed by: NURSE PRACTITIONER

## 2024-10-29 PROCEDURE — 36415 COLL VENOUS BLD VENIPUNCTURE: CPT

## 2024-11-07 ENCOUNTER — SPECIALTY PHARMACY (OUTPATIENT)
Dept: PHARMACY | Facility: HOSPITAL | Age: 49
End: 2024-11-07
Payer: MEDICAID

## 2024-11-07 NOTE — PROGRESS NOTES
Specialty Pharmacy Patient Management Program  Gastroenterology Initial Assessment     Scott Montes is a 49 y.o. male referred by their provider, Lula Nettles, to the Gastroenterology Patient Management program offered by [facility] for Inflammatory Bowel Disease.  An initial outreach was conducted, including assessment of therapy appropriateness and specialty medication education for Humira (adalimumab). The patient was introduced to services offered by [facility], including: regular assessments, refill coordination, curbside pick-up or mail order delivery options, prior authorization maintenance, and financial assistance programs as applicable. The patient was also provided with contact information for the pharmacy team.     The patient's current IBD regimen includes: Humira, colestid, and mesalamine     Patient reports that symptoms have improved since starting Humira. He denies any adverse effects of the medication and denies any s/sx of infection, new autoimmune disorder, or malignancy (fever, appetite loss, fatigue, chest pain, swelling, night sweats or weight loss). He denies any issues with giving himself the injection and denies any missed doses.     Insurance Coverage & Financial Support  Medicaid    Relevant Past Medical History and Comorbidities  Relevant medical history and concomitant health conditions were discussed with the patient. The patient's chart has been reviewed for relevant past medical history and comorbid health conditions and updated as necessary.   Past Medical History:   Diagnosis Date    Anemia     Arthritis     BPH without obstruction/lower urinary tract symptoms 4/2/2024    Coronary artery disease     Crohn's disease     Elevated cholesterol     Erectile dysfunction     GERD (gastroesophageal reflux disease)     H/O blood clots     Heart attack     Hormone disorder 9 years    Hypertension      Interstitial cystitis     Low back pain     STEMI (ST elevation myocardial infarction) 09/06/2022    Ulcerative colitis      Social History     Socioeconomic History    Marital status:    Tobacco Use    Smoking status: Every Day     Current packs/day: 0.50     Average packs/day: 0.5 packs/day for 35.0 years (17.5 ttl pk-yrs)     Types: Cigarettes     Passive exposure: Current    Smokeless tobacco: Never    Tobacco comments:     Smoking cessation encouraged   Vaping Use    Vaping status: Every Day    Substances: Nicotine    Devices: Disposable   Substance and Sexual Activity    Alcohol use: No    Drug use: No    Sexual activity: Yes     Partners: Female     Birth control/protection: Vasectomy     Problem list reviewed by Carline Phelps PharmD on 11/7/2024 at 10:57 AM    Allergies  Known allergies and reactions were discussed with the patient. The patient's chart has been reviewed for allergy information and updated as necessary.   Patient has no known allergies.  Allergies reviewed by Carline Phelps PharmD on 11/7/2024 at 10:56 AM    Current Medication List  This medication list has been reviewed with the patient and evaluated for any interactions or necessary modifications/recommendations, and updated to include all prescription medications, OTC medications, and supplements the patient is currently taking. This list reflects what is contained in the patient's profile, which has also been marked as reviewed to communicate to other providers it is the most up to date version of the patient's current medication therapy.     Current Outpatient Medications:     Adalimumab (Humira, 2 Pen,) 40 MG/0.4ML Pen-injector Kit, Inject 40 mg (0.4 ml) under the skin Every 14 (Fourteen) Days., Disp: 2 each, Rfl: 5    aspirin (Aspirin Low Dose) 81 MG EC tablet, Take 1 tablet by mouth Daily., Disp: 90 tablet, Rfl: 2    atorvastatin (LIPITOR) 80 MG tablet, Take 1 tablet by mouth Every Night., Disp: 90 tablet, Rfl: 3    colestipol  (COLESTID) 1 g tablet, TAKE ONE (1) TABLET BY MOUTH DAILY., Disp: 90 tablet, Rfl: 2    lansoprazole (PREVACID) 30 MG capsule, Take 1 capsule by mouth Daily., Disp: 90 capsule, Rfl: 2    lisinopril (PRINIVIL,ZESTRIL) 10 MG tablet, Take 1 tablet by mouth Daily for 360 days., Disp: 90 tablet, Rfl: 3    mesalamine (LIALDA) 1.2 g EC tablet, TAKE ONE TABLET BY MOUTH DAILY WITH BREAKFAST, Disp: 30 tablet, Rfl: 11    tamsulosin (FLOMAX) 0.4 MG capsule 24 hr capsule, Take 1 capsule by mouth Daily., Disp: 30 capsule, Rfl: 5    traMADol (ULTRAM) 50 MG tablet, Take 1 tablet by mouth Every 8 (Eight) Hours As Needed for Moderate Pain., Disp: 12 tablet, Rfl: 0    triamcinolone (KENALOG) 0.1 % cream, Apply 1 application  topically to the appropriate area as directed 2 (Two) Times a Day., Disp: 80 g, Rfl: 3    vitamin D (ERGOCALCIFEROL) 1.25 MG (54585 UT) capsule capsule, Take 1 capsule by mouth 1 (One) Time Per Week., Disp: 4 capsule, Rfl: 5    nitroglycerin (NITROSTAT) 0.4 MG SL tablet, DISSOLVE ONE (1) TABLET UNDER THE TONGUE AS NEEDED FOR ANGINA, MAY REPEAT EVERY FIVE (5) MINUTES FOR UP THREE DOSES (Patient not taking: Reported on 11/7/2024), Disp: 25 tablet, Rfl: 0  Medicines reviewed by Carline Phelps, PharmD on 11/7/2024 at 10:57 AM    Drug Interactions  No drug-drug interactions expected with Humira according to literature     Pretreatment Screening  TB: 10/10/22: Negative   HBV: 10/10/22: Non-reactive    Relevant Laboratory Values  Lab Results   Component Value Date    GLUCOSE 99 10/29/2024    BUN 12 10/29/2024    CREATININE 0.85 10/29/2024    BCR 14.1 10/29/2024     10/29/2024    K 4.3 10/29/2024     10/29/2024    CO2 25.4 10/29/2024    CALCIUM 9.3 10/29/2024    PROTEINTOT 7.2 10/29/2024    ALBUMIN 4.3 10/29/2024    ALT 47 (H) 10/29/2024    AST 24 10/29/2024    ALKPHOS 145 (H) 10/29/2024    BILITOT 0.5 10/29/2024    ANIONGAP 10.6 10/29/2024     Lab Results   Component Value Date    WBC 10.22 10/29/2024    HGB  "14.6 10/29/2024    HCT 44.7 10/29/2024     10/29/2024     Lab Results   Component Value Date    HEPAIGM Non-Reactive 10/10/2022    HEPBIGMCORE Non-Reactive 10/10/2022    HEPBSAG Non-Reactive 10/10/2022    HEPCVIRUSABY Non-Reactive 10/10/2022    DVB3QCF1 Non-Reactive 05/04/2022     Lab Results   Component Value Date    QUANTITBGLDP Negative 04/11/2024    QUANTITBGLDP Negative 10/10/2022     No results for input(s): \"CMP\", \"CBC\" in the last 72 hours.    *review CRP and fecal calprotectin    Lab Value Review  The above lab values have been reviewed; see plan for specialty medication(s) dose adjustment(s) and recommendations.     Immunizations Screening     (Live Vaccines Should not be given while on therapy or within 4 weeks of starting therapy)  COVID 19: Primary series complete; pt declines any further doses   Influenza: Declined  Pneumococcal: Declined  Zoster: Declined    Initial Education Provided for Specialty Medication  The patient has been provided with the following education and any applicable administration techniques (i.e. self-injection) have been demonstrated for the therapies indicated. All questions and concerns have been addressed prior to the patient receiving the medication, and the patient has verbalized understanding of the education and any materials provided. Additional patient education shall be provided and documented upon request by the patient, provider, or payer.                      Adherence and Self-Administration  Barriers to Patient Adherence and/or Self-Administration: none   Methods for Supporting Patient Adherence and/or Self-Administration: N/A     Goals of Therapy   Goals Addressed Today        Specialty Pharmacy General Goal      To decrease flare-ups              Reassessment Plan & Follow-Up  Medication Therapy Changes: Patient will continue Humira 40mg every 2 weeks. Dr. Casanova approved 6 more months of refills.  Additional Plans, Therapy Recommendations, or Therapy " Problems to Be Addressed: none   Pharmacist to perform regular reassessments no more than (6) months from the previous assessment.  Welcome information and patient satisfaction survey to be sent by retail team with patient's initial fill.  Care Coordinator to set up future refill outreaches, coordinate prescription delivery, and escalate clinical questions to pharmacist.     Attestation  Therapeutic appropriateness: Appropriate   I attest the patient was actively involved in and has agreed to the above plan of care. I attest that the initiated specialty medication(s) are appropriate for the patient based on my assessment. If the prescribed therapy is at any point deemed not appropriate based on the current or future assessments, a consultation will be initiated with the patient's specialty care provider to determine the best course of action. The revised plan of therapy will be documented along with any required assessments and/or additional patient education provided.     Carline Phelps, PharmD  11/07/24 10:58 EST

## 2024-11-11 DIAGNOSIS — I25.10 CORONARY ARTERY DISEASE INVOLVING NATIVE CORONARY ARTERY OF NATIVE HEART WITHOUT ANGINA PECTORIS: Primary | Chronic | ICD-10-CM

## 2024-11-11 DIAGNOSIS — E78.5 DYSLIPIDEMIA, GOAL LDL BELOW 70: Chronic | ICD-10-CM

## 2024-11-19 ENCOUNTER — SPECIALTY PHARMACY (OUTPATIENT)
Dept: PHARMACY | Facility: HOSPITAL | Age: 49
End: 2024-11-19
Payer: MEDICAID

## 2024-11-19 NOTE — PROGRESS NOTES
Specialty Pharmacy Refill Coordination Note     Scott is a 49 y.o. male contacted today regarding refills of Humira specialty medication(s).    Reviewed and verified with patient: yes  Specialty medication(s) and dose(s) confirmed: yes    Refill Questions      Flowsheet Row Most Recent Value   Changes to allergies? No   Changes to medications? No   New conditions or infections since last clinic visit No   Unplanned office visit, urgent care, ED, or hospital admission in the last 4 weeks  No   How does patient/caregiver feel medication is working? Very good   Financial problems or insurance changes  No   Since the previous refill, were any specialty medication doses or scheduled injections missed or delayed?  No   Does this patient require a clinical escalation to a pharmacist? No            Delivery Questions      Flowsheet Row Most Recent Value   Delivery method [employer]   Delivery address verified with patient/caregiver? Yes   Delivery address Home   Number of medications in delivery 1   Medication(s) being filled and delivered Adalimumab (Humira (2 Pen))   Doses left of specialty medications 0   Copay verified? Yes   Copay amount $0   Copay form of payment No copayment ($0)   Ship Date N/A   Delivery Date N/A                   Follow-up: 84 day(s)     Seema Lindsay, Pharmacy Technician  Specialty Pharmacy Technician

## 2024-11-20 ENCOUNTER — SPECIALTY PHARMACY (OUTPATIENT)
Dept: PHARMACY | Facility: HOSPITAL | Age: 49
End: 2024-11-20
Payer: MEDICAID

## 2024-11-20 ENCOUNTER — DISEASE STATE MANAGEMENT VISIT (OUTPATIENT)
Dept: PHARMACY | Facility: HOSPITAL | Age: 49
End: 2024-11-20
Payer: MEDICAID

## 2024-11-20 DIAGNOSIS — E78.5 DYSLIPIDEMIA, GOAL LDL BELOW 70: Primary | Chronic | ICD-10-CM

## 2024-11-20 NOTE — PROGRESS NOTES
[facility] Patient Management Program  Lipid Management Program - PCSK9i Initial Assessment   Scott Montes is a 49 y.o. male referred by their provider, Vanessa Diehl, to the Hyperlipidemia Patient Management program offered by [facility] for Lipid Management.  An initial outreach was conducted, including assessment of therapy appropriateness and specialty medication education for Repatha. The patient was introduced to services offered by [facility], including: regular assessments, refill coordination, curbside pick-up or mail order delivery options, prior authorization maintenance, and financial assistance programs as applicable. The patient was also provided with contact information for the pharmacy team.     Scott Montes is treated for clinical ASCVD and currently takes Lipitor 80 mg daily.  In the past, Pt has not tried any other statins in the past. Patients LDL is not currently at goal on current high intensity statin. The patient denies any allergies to latex.      Patients LDL goal is set at less than 70 per cardiology notes.       Insurance Coverage & Financial Support  Medicaid      Relevant Past Medical History and Comorbidities  Relevant medical history and concomitant health conditions were discussed with the patient. The patient's chart has been reviewed for relevant past medical history and comorbid conditions and updated as necessary.  Past Medical History:   Diagnosis Date    Anemia     Arthritis     BPH without obstruction/lower urinary tract symptoms 4/2/2024    Coronary artery disease     Crohn's disease     Elevated cholesterol     Erectile dysfunction     GERD (gastroesophageal reflux disease)     H/O blood clots     Heart attack     Hormone disorder 9 years    Hypertension     Interstitial cystitis     Low back pain     STEMI (ST elevation myocardial infarction) 09/06/2022     Ulcerative colitis      Social History     Socioeconomic History    Marital status:    Tobacco Use    Smoking status: Every Day     Current packs/day: 0.50     Average packs/day: 0.5 packs/day for 35.0 years (17.5 ttl pk-yrs)     Types: Cigarettes     Passive exposure: Current    Smokeless tobacco: Never    Tobacco comments:     Smoking cessation encouraged   Vaping Use    Vaping status: Every Day    Substances: Nicotine    Devices: Disposable   Substance and Sexual Activity    Alcohol use: No    Drug use: No    Sexual activity: Yes     Partners: Female     Birth control/protection: Vasectomy            Allergies  Known allergies and reactions were discussed with the patient. The patient's chart has been reviewed for  allergy information and updated as necessary.   No Known Allergies         Relevant Laboratory Values  Relevant laboratory values were discussed with the patient. The following specialty medication dose adjustment(s) are recommended: See plan, if applicable   Lab Results   Component Value Date    CHOL 218 (H) 10/29/2024    TRIG 227 (H) 10/29/2024    HDL 30 (L) 10/29/2024     (H) 10/29/2024       Current Medication List  This medication list has been reviewed with the patient and evaluated for any interactions or necessary modifications/recommendations, and updated to include all prescription medications, OTC medications, and supplements the patient is currently taking.  This list reflects what is contained in the patient's profile, which has also been marked as reviewed to communicate to other providers it is the most up to date version of the patient's current medication therapy.     Current Outpatient Medications:     Adalimumab (Humira, 2 Pen,) 40 MG/0.4ML Auto-injector Kit, Inject 40 mg (0.4 ml) under the skin Every 14 (Fourteen) Days., Disp: 2 each, Rfl: 5    aspirin (Aspirin Low Dose) 81 MG EC tablet, Take 1 tablet by mouth Daily., Disp: 90 tablet, Rfl: 2    atorvastatin (LIPITOR)  80 MG tablet, Take 1 tablet by mouth Every Night., Disp: 90 tablet, Rfl: 3    colestipol (COLESTID) 1 g tablet, TAKE ONE (1) TABLET BY MOUTH DAILY., Disp: 90 tablet, Rfl: 2    lansoprazole (PREVACID) 30 MG capsule, Take 1 capsule by mouth Daily., Disp: 90 capsule, Rfl: 2    lisinopril (PRINIVIL,ZESTRIL) 10 MG tablet, Take 1 tablet by mouth Daily for 360 days., Disp: 90 tablet, Rfl: 3    mesalamine (LIALDA) 1.2 g EC tablet, TAKE ONE TABLET BY MOUTH DAILY WITH BREAKFAST, Disp: 30 tablet, Rfl: 11    nitroglycerin (NITROSTAT) 0.4 MG SL tablet, DISSOLVE ONE (1) TABLET UNDER THE TONGUE AS NEEDED FOR ANGINA, MAY REPEAT EVERY FIVE (5) MINUTES FOR UP THREE DOSES (Patient not taking: Reported on 11/7/2024), Disp: 25 tablet, Rfl: 0    tamsulosin (FLOMAX) 0.4 MG capsule 24 hr capsule, Take 1 capsule by mouth Daily., Disp: 30 capsule, Rfl: 5    traMADol (ULTRAM) 50 MG tablet, Take 1 tablet by mouth Every 8 (Eight) Hours As Needed for Moderate Pain., Disp: 12 tablet, Rfl: 0    triamcinolone (KENALOG) 0.1 % cream, Apply 1 application  topically to the appropriate area as directed 2 (Two) Times a Day., Disp: 80 g, Rfl: 3    vitamin D (ERGOCALCIFEROL) 1.25 MG (52318 UT) capsule capsule, Take 1 capsule by mouth 1 (One) Time Per Week., Disp: 4 capsule, Rfl: 5         Drug Interactions  No significant drug-drug interactions expected with Repatha according to literature.     Adherence and Self-Administration  Adherence related to the patient's specialty therapy was discussed with the patient. The Adherence segment of this outreach has been reviewed and updated.              Methods for Supporting Patient Adherence and/or Self-Administration: see plan, if applicable     Open Medication Therapy Problems  No medication therapy recommendations to display    Goals of Therapy  Goals related to the patient's specialty therapy were discussed with the patient. The Patient Goals segment of this outreach has been reviewed and updated.   Goals  Addressed Today    None         Medication Assessment & Plan  Medication Therapy Changes: Patient started today on Repatha 140 mg every 2 weeks.   Injection training and medication education provided.   Pt administered into the RIGHT side of the stomach.  Patient reported feeling well when leaving clinic.   Welcome information and patient satisfaction survey to be sent by specialty pharmacy team with patient's initial fill.  Related Plans, Therapy Recommendations, or Therapy Problems to Be Addressed: NA  Patient will need lipid panel in 6 weeks, order placed.   Patient will continue regular follow-up with cardiology.   Patient will follow up with specialty pharmacy mail-out services through [facility]. Care Coordinator to set up future refill outreaches, coordinate prescription delivery, and escalate clinical questions to pharmacist.  Pharmacist to perform regular assessments no more than (6) months from the previous assessment. Will follow-up in 6 months, or sooner if needed.    Initial Education Provided for Specialty Medication  The patient has been provided with the following education and any applicable administration techniques (i.e. self-injection) have been demonstrated for the therapies indicated. All questions and concerns have been addressed prior to the patient receiving the medication, and the patient has verbalized comprehension of the education and any materials provided. Additional patient education shall be provided and documented upon request by the patient, provider, or payer.    REPATHA® (evolocumab)  Medication Expectations   Why am I taking this medication? You are taking Repatha to lower your “bad” cholesterol (LDL-C). This medication can be used in adults with high blood cholesterol including primary hyperlipidemia and familial hypercholesterolemia.    What should I expect while on this medication? You should expect to see your cholesterol improve over time. Specifically, you should see your LDL-C  decrease.    How does the medication work? Repatha works by blocking a protein called PCSK9 that contributes to high levels of bad cholesterol. It helps increase your liver's ability to remove bad cholesterol from your blood.     How long will I be on this medication for? The amount of time you will be on this medication will be determined by your doctor based on your cholesterol and/or your risk of having a cardiac event. You will most likely be on this medication or another cholesterol medication throughout your lifetime. Do not abruptly stop this medication without talking to your doctor first.    How do I take this medication? Take as directed on your prescription label. Repatha is injected under the skin (subcutaneously) of your stomach, thigh, or upper arm. This medication is usually given one or twice a month.   What are some possible side effects? The most common side effects of Repatha include redness, itching, swelling, or pain/tenderness at the injection site, symptoms of the common cold, flu or flu-like symptoms or back pain.    What happens if I miss a dose? If you miss a dose, take it as soon as you remember if it is within 7 days from the usual day of administration then resume your original schedule. If it is beyond 7 days and you use the jody-2-week dose, skip the missed dose and resume your normal dosing schedule.If it is beyond 7 days and you use the once-monthly dose, inject the dose and start a new schedule based on that date.      Medication Safety   What are things I should warn my doctor immediately about? Talk to your doctor if you are pregnant, planning to become pregnant, or breastfeeding. Stop the medication and tell your doctor or seek emergency medical help if you notice any signs/symptoms of an allergic reaction (severe rash, redness, hives, severe itching, trouble breathing, or swelling of the face, lips, or tongue). If you have a rubber or latex allergy, you should not use the  Repatha SureClick® Autoinjector pen or the prefilled syringe, please notify your doctor or pharmacist.   What are things that I should be cautious of? Be cautious of any side effects from this medication. Talk to your doctor if any new ones develop or aren't getting better.   What are some medications that can interact with this one? There are no known significant drug interactions with Repatha. Always tell your doctor or pharmacist immediately if you start taking any new medications, including over-the-counter medications, vitamins, and herbal supplements.      Medication Storage/Handling   How should I handle this medication? Do not shake or expose the pens, cartridges, or syringes to extreme heat or direct sunlight. Keep this medication out of reach of pets/children. Allow medication to warm at room temperature prior to administration.   How does this medication need to be stored? Store unused pens, cartridges, or syringes in the refrigerator in the original cartons to protect from light. If needed, Repatha may be kept at room temperature in the original carton for up to 30 days. Do not freeze.    How should I dispose of this medication? All the Repatha devices are single-dose and should be discarded in a sharps container after use. If your doctor decides to stop this medication, take to your local police station for proper disposal. Some pharmacies also have take-back bins for medication drop-off.      Resources/Support   How can I remind myself to take this medication? You can download reminder apps to help you manage your refills. You may also set an alarm on your phone to remind you to take your dose.    Is financial support available?  [facility] can provide co-pay cards if you have commercial insurance or patient assistance if you have Medicare or no insurance.    Which vaccines are recommended for me? Talk to your doctor about these vaccines: Flu, Coronavirus (COVID-19), Pneumococcal (pneumonia), Tdap,  Hepatitis B, Zoster (shingles)         Attestation      I attest the patient was actively involved in and has agreed to the above plan of care. If the prescribed therapy is at any point deemed not appropriate based on the current or future assessments, a consultation will be initiated with the patient's specialty care provider to determine the best course of action. The revised plan of therapy will be documented along with any required assessments and/or additional patient education provided.     Angie Shrestha. Giuliano, PharmD  11/20/2024  14:18 EST

## 2024-11-20 NOTE — PROGRESS NOTES
[facility] Pharmacy Patient Management Program  Lipid Management Program - PCSK9i Initial Assessment   Scott Montes is a 49 y.o. male referred by their provider, Vnaessa Diehl, to the Hyperlipidemia Patient Management program offered by [facility] for Lipid Management.  An initial outreach was conducted, including assessment of therapy appropriateness and specialty medication education for Repatha. The patient was introduced to services offered by [facility], including: regular assessments, refill coordination, curbside pick-up or mail order delivery options, prior authorization maintenance, and financial assistance programs as applicable. The patient was also provided with contact information for the pharmacy team.     Scott Montes is treated for clinical ASCVD and currently takes Lipitor 80 mg daily.  In the past, Pt has not tried any other statins in the past. Patients LDL is not currently at goal on current high intensity statin. The patient denies any allergies to latex.      Patients LDL goal is set at less than 70 per cardiology notes.       Insurance Coverage & Financial Support  Medicaid      Relevant Past Medical History and Comorbidities  Relevant medical history and concomitant health conditions were discussed with the patient. The patient's chart has been reviewed for relevant past medical history and comorbid conditions and updated as necessary.  Past Medical History:   Diagnosis Date    Anemia     Arthritis     BPH without obstruction/lower urinary tract symptoms 4/2/2024    Coronary artery disease     Crohn's disease     Elevated cholesterol     Erectile dysfunction     GERD (gastroesophageal reflux disease)     H/O blood clots     Heart attack     Hormone disorder 9 years    Hypertension     Interstitial cystitis     Low back pain     STEMI (ST elevation myocardial infarction) 09/06/2022     Ulcerative colitis      Social History     Socioeconomic History    Marital status:    Tobacco Use    Smoking status: Every Day     Current packs/day: 0.50     Average packs/day: 0.5 packs/day for 35.0 years (17.5 ttl pk-yrs)     Types: Cigarettes     Passive exposure: Current    Smokeless tobacco: Never    Tobacco comments:     Smoking cessation encouraged   Vaping Use    Vaping status: Every Day    Substances: Nicotine    Devices: Disposable   Substance and Sexual Activity    Alcohol use: No    Drug use: No    Sexual activity: Yes     Partners: Female     Birth control/protection: Vasectomy       Problem list reviewed by Angie Nobles PharmD on 11/20/2024 at  2:57 PM    Allergies  Known allergies and reactions were discussed with the patient. The patient's chart has been reviewed for  allergy information and updated as necessary.   No Known Allergies    Allergies reviewed by Angie Nobles PharmD on 11/20/2024 at  2:57 PM    Relevant Laboratory Values  Relevant laboratory values were discussed with the patient. The following specialty medication dose adjustment(s) are recommended: See plan, if applicable   Lab Results   Component Value Date    CHOL 218 (H) 10/29/2024    TRIG 227 (H) 10/29/2024    HDL 30 (L) 10/29/2024     (H) 10/29/2024       Current Medication List  This medication list has been reviewed with the patient and evaluated for any interactions or necessary modifications/recommendations, and updated to include all prescription medications, OTC medications, and supplements the patient is currently taking.  This list reflects what is contained in the patient's profile, which has also been marked as reviewed to communicate to other providers it is the most up to date version of the patient's current medication therapy.     Current Outpatient Medications:     Adalimumab (Humira, 2 Pen,) 40 MG/0.4ML Auto-injector Kit, Inject 40 mg (0.4 ml) under the skin Every 14 (Fourteen)  Days., Disp: 2 each, Rfl: 5    aspirin (Aspirin Low Dose) 81 MG EC tablet, Take 1 tablet by mouth Daily., Disp: 90 tablet, Rfl: 2    atorvastatin (LIPITOR) 80 MG tablet, Take 1 tablet by mouth Every Night., Disp: 90 tablet, Rfl: 3    colestipol (COLESTID) 1 g tablet, TAKE ONE (1) TABLET BY MOUTH DAILY., Disp: 90 tablet, Rfl: 2    Evolocumab (REPATHA) solution auto-injector SureClick injection, Inject 1 mL under the skin into the appropriate area as directed Every 14 (Fourteen) Days., Disp: 6 mL, Rfl: 2    lansoprazole (PREVACID) 30 MG capsule, Take 1 capsule by mouth Daily., Disp: 90 capsule, Rfl: 2    lisinopril (PRINIVIL,ZESTRIL) 10 MG tablet, Take 1 tablet by mouth Daily for 360 days., Disp: 90 tablet, Rfl: 3    mesalamine (LIALDA) 1.2 g EC tablet, TAKE ONE TABLET BY MOUTH DAILY WITH BREAKFAST (Patient not taking: Reported on 11/20/2024), Disp: 30 tablet, Rfl: 11    nitroglycerin (NITROSTAT) 0.4 MG SL tablet, DISSOLVE ONE (1) TABLET UNDER THE TONGUE AS NEEDED FOR ANGINA, MAY REPEAT EVERY FIVE (5) MINUTES FOR UP THREE DOSES, Disp: 25 tablet, Rfl: 0    tamsulosin (FLOMAX) 0.4 MG capsule 24 hr capsule, Take 1 capsule by mouth Daily., Disp: 30 capsule, Rfl: 5    traMADol (ULTRAM) 50 MG tablet, Take 1 tablet by mouth Every 8 (Eight) Hours As Needed for Moderate Pain., Disp: 12 tablet, Rfl: 0    triamcinolone (KENALOG) 0.1 % cream, Apply 1 application  topically to the appropriate area as directed 2 (Two) Times a Day. (Patient not taking: Reported on 11/20/2024), Disp: 80 g, Rfl: 3    vitamin D (ERGOCALCIFEROL) 1.25 MG (20078 UT) capsule capsule, Take 1 capsule by mouth 1 (One) Time Per Week., Disp: 4 capsule, Rfl: 5    Medicines reviewed by Angie Nobles, PharmD on 11/20/2024 at  2:57 PM    Drug Interactions  No significant drug-drug interactions expected with Repatha according to literature.     Adherence and Self-Administration  Adherence related to the patient's specialty therapy was discussed with the  patient. The Adherence segment of this outreach has been reviewed and updated.     Is there a concern with patient's ability to self administer the medication correctly and without issue?: No  Were any potential barriers to adherence identified during the initial assessment or patient education?: No  Are there any concerns regarding the patient's understanding of the importance of medication adherence?: No  Methods for Supporting Patient Adherence and/or Self-Administration: see plan, if applicable     Open Medication Therapy Problems  No medication therapy recommendations to display    Goals of Therapy  Goals related to the patient's specialty therapy were discussed with the patient. The Patient Goals segment of this outreach has been reviewed and updated.   Goals Addressed Today        Specialty Pharmacy General Goal      LDL less than 70               Medication Assessment & Plan  Medication Therapy Changes: Patient started today on Repatha 140 mg every 2 weeks.   Injection training and medication education provided.   Pt administered into the RIGHT side of the stomach.  Patient reported feeling well when leaving clinic.   Welcome information and patient satisfaction survey to be sent by specialty pharmacy team with patient's initial fill.  Related Plans, Therapy Recommendations, or Therapy Problems to Be Addressed: NA  Patient will need lipid panel in 6 weeks, order placed.   Patient will continue regular follow-up with cardiology.   Patient will follow up with specialty pharmacy mail-out services through [facility]. Care Coordinator to set up future refill outreaches, coordinate prescription delivery, and escalate clinical questions to pharmacist.  Pharmacist to perform regular assessments no more than (6) months from the previous assessment. Will follow-up in 6 months, or sooner if needed.    Initial Education Provided for Specialty Medication  The patient has been provided with the following education and any  applicable administration techniques (i.e. self-injection) have been demonstrated for the therapies indicated. All questions and concerns have been addressed prior to the patient receiving the medication, and the patient has verbalized comprehension of the education and any materials provided. Additional patient education shall be provided and documented upon request by the patient, provider, or payer.    REPATHA® (evolocumab)  Medication Expectations   Why am I taking this medication? You are taking Repatha to lower your “bad” cholesterol (LDL-C). This medication can be used in adults with high blood cholesterol including primary hyperlipidemia and familial hypercholesterolemia.    What should I expect while on this medication? You should expect to see your cholesterol improve over time. Specifically, you should see your LDL-C decrease.    How does the medication work? Repatha works by blocking a protein called PCSK9 that contributes to high levels of bad cholesterol. It helps increase your liver's ability to remove bad cholesterol from your blood.     How long will I be on this medication for? The amount of time you will be on this medication will be determined by your doctor based on your cholesterol and/or your risk of having a cardiac event. You will most likely be on this medication or another cholesterol medication throughout your lifetime. Do not abruptly stop this medication without talking to your doctor first.    How do I take this medication? Take as directed on your prescription label. Repatha is injected under the skin (subcutaneously) of your stomach, thigh, or upper arm. This medication is usually given one or twice a month.   What are some possible side effects? The most common side effects of Repatha include redness, itching, swelling, or pain/tenderness at the injection site, symptoms of the common cold, flu or flu-like symptoms or back pain.    What happens if I miss a dose? If you miss a dose,  take it as soon as you remember if it is within 7 days from the usual day of administration then resume your original schedule. If it is beyond 7 days and you use the jody-2-week dose, skip the missed dose and resume your normal dosing schedule.If it is beyond 7 days and you use the once-monthly dose, inject the dose and start a new schedule based on that date.      Medication Safety   What are things I should warn my doctor immediately about? Talk to your doctor if you are pregnant, planning to become pregnant, or breastfeeding. Stop the medication and tell your doctor or seek emergency medical help if you notice any signs/symptoms of an allergic reaction (severe rash, redness, hives, severe itching, trouble breathing, or swelling of the face, lips, or tongue). If you have a rubber or latex allergy, you should not use the Repatha SureClick® Autoinjector pen or the prefilled syringe, please notify your doctor or pharmacist.   What are things that I should be cautious of? Be cautious of any side effects from this medication. Talk to your doctor if any new ones develop or aren't getting better.   What are some medications that can interact with this one? There are no known significant drug interactions with Repatha. Always tell your doctor or pharmacist immediately if you start taking any new medications, including over-the-counter medications, vitamins, and herbal supplements.      Medication Storage/Handling   How should I handle this medication? Do not shake or expose the pens, cartridges, or syringes to extreme heat or direct sunlight. Keep this medication out of reach of pets/children. Allow medication to warm at room temperature prior to administration.   How does this medication need to be stored? Store unused pens, cartridges, or syringes in the refrigerator in the original cartons to protect from light. If needed, Repatha may be kept at room temperature in the original carton for up to 30 days. Do not freeze.     How should I dispose of this medication? All the Repatha devices are single-dose and should be discarded in a sharps container after use. If your doctor decides to stop this medication, take to your local police station for proper disposal. Some pharmacies also have take-back bins for medication drop-off.      Resources/Support   How can I remind myself to take this medication? You can download reminder apps to help you manage your refills. You may also set an alarm on your phone to remind you to take your dose.    Is financial support available?  [employer] can provide co-pay cards if you have commercial insurance or patient assistance if you have Medicare or no insurance.    Which vaccines are recommended for me? Talk to your doctor about these vaccines: Flu, Coronavirus (COVID-19), Pneumococcal (pneumonia), Tdap, Hepatitis B, Zoster (shingles)         Attestation  Therapeutic appropriateness: Appropriate   I attest the patient was actively involved in and has agreed to the above plan of care. If the prescribed therapy is at any point deemed not appropriate based on the current or future assessments, a consultation will be initiated with the patient's specialty care provider to determine the best course of action. The revised plan of therapy will be documented along with any required assessments and/or additional patient education provided.     Angie Shrestha. Giuliano, PharmD  11/20/2024  14:57 EST

## 2024-12-12 ENCOUNTER — HOSPITAL ENCOUNTER (OUTPATIENT)
Dept: CT IMAGING | Facility: HOSPITAL | Age: 49
Discharge: HOME | End: 2024-12-12
Admitting: INTERNAL MEDICINE
Payer: MEDICAID

## 2024-12-12 DIAGNOSIS — K50.00 CROHN'S DISEASE OF SMALL INTESTINE WITHOUT COMPLICATION: ICD-10-CM

## 2024-12-12 DIAGNOSIS — K50.90 EXACERBATION OF CROHN'S DISEASE WITHOUT COMPLICATION: Primary | ICD-10-CM

## 2024-12-12 LAB — CREAT BLDA-MCNC: 0.9 MG/DL (ref 0.6–1.3)

## 2024-12-12 PROCEDURE — 25510000001 IOPAMIDOL 61 % SOLUTION: Performed by: INTERNAL MEDICINE

## 2024-12-12 PROCEDURE — 82565 ASSAY OF CREATININE: CPT

## 2024-12-12 PROCEDURE — 74177 CT ABD & PELVIS W/CONTRAST: CPT

## 2024-12-12 RX ORDER — PREDNISONE 10 MG/1
TABLET ORAL
Qty: 91 TABLET | Refills: 0 | Status: SHIPPED | OUTPATIENT
Start: 2024-12-12

## 2024-12-12 RX ORDER — IOPAMIDOL 612 MG/ML
100 INJECTION, SOLUTION INTRAVASCULAR
Status: COMPLETED | OUTPATIENT
Start: 2024-12-12 | End: 2024-12-12

## 2024-12-12 RX ORDER — MESALAMINE 0.38 G/1
1.5 CAPSULE, EXTENDED RELEASE ORAL DAILY
Qty: 120 CAPSULE | Refills: 11 | Status: SHIPPED | OUTPATIENT
Start: 2024-12-12

## 2024-12-12 RX ADMIN — IOPAMIDOL 100 ML: 612 INJECTION, SOLUTION INTRAVENOUS at 09:59

## 2024-12-16 DIAGNOSIS — K52.9 COLITIS: Primary | ICD-10-CM

## 2024-12-17 PROBLEM — K50.00 CROHN'S DISEASE OF SMALL INTESTINE WITHOUT COMPLICATION: Status: ACTIVE | Noted: 2024-12-17

## 2024-12-18 ENCOUNTER — TELEPHONE (OUTPATIENT)
Dept: GASTROENTEROLOGY | Facility: CLINIC | Age: 49
End: 2024-12-18
Payer: MEDICAID

## 2024-12-18 NOTE — TELEPHONE ENCOUNTER
Left message for patient to call back. Per Dr Casanova patient's appointment needs to be changed from March to the end of January. Offer 01/23/25 at 3pm.

## 2024-12-19 ENCOUNTER — OFFICE VISIT (OUTPATIENT)
Dept: FAMILY MEDICINE CLINIC | Facility: CLINIC | Age: 49
End: 2024-12-19
Payer: MEDICAID

## 2024-12-19 VITALS
SYSTOLIC BLOOD PRESSURE: 116 MMHG | HEART RATE: 83 BPM | OXYGEN SATURATION: 97 % | DIASTOLIC BLOOD PRESSURE: 80 MMHG | RESPIRATION RATE: 16 BRPM | WEIGHT: 202.4 LBS | BODY MASS INDEX: 25.98 KG/M2 | HEIGHT: 74 IN

## 2024-12-19 DIAGNOSIS — K50.80 CROHN'S DISEASE OF BOTH SMALL AND LARGE INTESTINE WITHOUT COMPLICATION: Primary | ICD-10-CM

## 2024-12-19 DIAGNOSIS — I10 ESSENTIAL HYPERTENSION: Chronic | ICD-10-CM

## 2024-12-19 DIAGNOSIS — M15.0 PRIMARY OSTEOARTHRITIS INVOLVING MULTIPLE JOINTS: ICD-10-CM

## 2024-12-19 DIAGNOSIS — E78.5 DYSLIPIDEMIA, GOAL LDL BELOW 70: ICD-10-CM

## 2024-12-19 PROCEDURE — 1160F RVW MEDS BY RX/DR IN RCRD: CPT | Performed by: NURSE PRACTITIONER

## 2024-12-19 PROCEDURE — 99214 OFFICE O/P EST MOD 30 MIN: CPT | Performed by: NURSE PRACTITIONER

## 2024-12-19 PROCEDURE — 1126F AMNT PAIN NOTED NONE PRSNT: CPT | Performed by: NURSE PRACTITIONER

## 2024-12-19 PROCEDURE — 3074F SYST BP LT 130 MM HG: CPT | Performed by: NURSE PRACTITIONER

## 2024-12-19 PROCEDURE — 1159F MED LIST DOCD IN RCRD: CPT | Performed by: NURSE PRACTITIONER

## 2024-12-19 PROCEDURE — 3079F DIAST BP 80-89 MM HG: CPT | Performed by: NURSE PRACTITIONER

## 2024-12-19 RX ORDER — TRAMADOL HYDROCHLORIDE 50 MG/1
50 TABLET ORAL EVERY 8 HOURS PRN
Qty: 12 TABLET | Refills: 0 | Status: SHIPPED | OUTPATIENT
Start: 2024-12-19

## 2024-12-19 NOTE — PROGRESS NOTES
"Subjective   Scott Montes is a 49 y.o. male.     Chief Complaint   Patient presents with    Essential hypertension       History of Present Illness     HTN-ongoing.  Currently on lisinopril 10 mg.  No negative side effects.  He continues to have the same upper arm/shoulder discomfort that he has had since his MI.  No new pain.  He denies any midsternal or radiating chest pain.  He denies any jaw pain.   Bowel disease-has been noted to be have progression of his colitis.  His humira is not working and he will be changed to [employer].   His GI appt has been moved from March to end of January for medication evaluation.  He will continue Mesalamine and he was given a course of steroids to taper.  He has persistent abdomen tenderness.  He has colestid for diarrhea.   He reports he has not felt well in several days.  He has to have a BM shortly after meals and at times he will have to terminate eating early to go to the restroom.    Foot sole tenderness-has noted this bilaterally.  He reports that they seem to improve as the day progresses.  Has been noting for \"a little while\".  He reports no swelling or redness.  He reports that he   Hyperlipidemia-noted to have significant elevation in his LDL.  He has been started on repatha. He continues his atorvastatin 80 mg.  He reports that he is taking     The following portions of the patient's history were reviewed and updated as appropriate: CC, ROS, allergies, current medications, past family history, past medical history, past social history, past surgical history and problem list.      Review of Systems   Constitutional:  Positive for fatigue. Negative for appetite change, unexpected weight gain and unexpected weight loss.   HENT:  Negative for congestion, ear pain, postnasal drip, rhinorrhea, sore throat, swollen glands, trouble swallowing and voice change.    Eyes:  Negative for pain and visual disturbance.   Respiratory:  Negative for cough, chest tightness, shortness " "of breath and wheezing.    Cardiovascular:  Negative for chest pain, palpitations and leg swelling.   Gastrointestinal:  Positive for abdominal pain, diarrhea, nausea and indigestion. Negative for blood in stool and constipation.   Genitourinary:  Negative for dysuria, hematuria and urgency.   Musculoskeletal:  Positive for arthralgias, back pain and myalgias. Negative for gait problem and joint swelling.   Skin:  Negative for color change and skin lesions.   Allergic/Immunologic: Negative.    Neurological:  Negative for dizziness, numbness and headache.   Hematological: Negative.    Psychiatric/Behavioral:  Negative for dysphoric mood, sleep disturbance and suicidal ideas. The patient is not nervous/anxious.    All other systems reviewed and are negative.      Objective     /80   Pulse 83   Resp 16   Ht 188 cm (74\")   Wt 91.8 kg (202 lb 6.4 oz)   SpO2 97%   BMI 25.99 kg/m²     Physical Exam  Vitals reviewed.   Constitutional:       General: He is not in acute distress.     Appearance: He is well-developed. He is not diaphoretic.   HENT:      Head: Normocephalic and atraumatic.      Jaw: No tenderness.      Right Ear: Hearing, tympanic membrane, ear canal and external ear normal.      Left Ear: Hearing, tympanic membrane, ear canal and external ear normal.      Nose: Nose normal. No nasal tenderness or congestion.      Right Sinus: No maxillary sinus tenderness or frontal sinus tenderness.      Left Sinus: No maxillary sinus tenderness or frontal sinus tenderness.      Mouth/Throat:      Lips: Pink.      Mouth: Mucous membranes are moist.      Pharynx: Oropharynx is clear. Uvula midline.   Eyes:      General: Lids are normal. No scleral icterus.     Extraocular Movements:      Right eye: Normal extraocular motion and no nystagmus.      Left eye: Normal extraocular motion and no nystagmus.      Conjunctiva/sclera: Conjunctivae normal.      Pupils: Pupils are equal, round, and reactive to light.   Neck:    "   Thyroid: No thyromegaly or thyroid tenderness.      Vascular: No carotid bruit or JVD.      Trachea: No tracheal tenderness.   Cardiovascular:      Rate and Rhythm: Normal rate and regular rhythm.      Pulses:           Dorsalis pedis pulses are 2+ on the right side and 2+ on the left side.        Posterior tibial pulses are 2+ on the right side and 2+ on the left side.      Heart sounds: Normal heart sounds, S1 normal and S2 normal. No murmur heard.  Pulmonary:      Effort: Pulmonary effort is normal. No accessory muscle usage, prolonged expiration or respiratory distress.      Breath sounds: Normal breath sounds.   Chest:      Chest wall: No tenderness.   Abdominal:      General: Bowel sounds are normal. There is no distension.      Palpations: Abdomen is soft. There is no hepatomegaly, splenomegaly or mass.      Tenderness: There is generalized abdominal tenderness. There is no right CVA tenderness, left CVA tenderness or guarding.      Comments: Bowel sounds loud and gurgling.  Audible without auscultation.   Musculoskeletal:         General: Tenderness present.      Cervical back: Normal range of motion and neck supple.      Thoracic back: Tenderness present.      Right lower leg: No edema.      Left lower leg: No edema.      Comments: No muscular atrophy or flaccidity.  Multiple areas of generalized arthralgia and myalgia noted throughout spine, hips, knees, and shoulders.     Lymphadenopathy:      Head:      Right side of head: No submental or submandibular adenopathy.      Left side of head: No submental or submandibular adenopathy.      Cervical: No cervical adenopathy.      Right cervical: No superficial cervical adenopathy.     Left cervical: No superficial cervical adenopathy.   Skin:     General: Skin is warm and dry.      Capillary Refill: Capillary refill takes less than 2 seconds.      Coloration: Skin is not jaundiced or pale.      Findings: No erythema.      Nails: There is no clubbing.    Neurological:      Mental Status: He is alert and oriented to person, place, and time.      Cranial Nerves: No cranial nerve deficit or facial asymmetry.      Sensory: No sensory deficit.      Motor: No weakness, tremor, atrophy or abnormal muscle tone.      Coordination: Coordination normal.      Deep Tendon Reflexes: Reflexes are normal and symmetric.   Psychiatric:         Attention and Perception: He is attentive.         Mood and Affect: Mood normal. Mood is not anxious or depressed.         Speech: Speech normal.         Behavior: Behavior normal. Behavior is cooperative.         Thought Content: Thought content normal.         Cognition and Memory: Cognition normal.         Judgment: Judgment normal.         Diagnoses and all orders for this visit:    1. Crohn's disease of both small and large intestine without complication (Primary)  Comments:  progression since last CT of abdomen.  Continue under GI for medication changes.  Continue to avoid obvious food triggers.    2. Essential hypertension  Comments:  continue lisinopril.  monitor BP at home.  Overview:  With MI September 2022      3. Primary osteoarthritis involving multiple joints  Comments:  continue conservative care  Orders:  -     traMADol (ULTRAM) 50 MG tablet; Take 1 tablet by mouth Every 8 (Eight) Hours As Needed for Moderate Pain.  Dispense: 12 tablet; Refill: 0    4. Dyslipidemia, goal LDL below 70  Comments:  continue Lipitor and Repatha.  encouraged to pursue low Cholesterol diet  Overview:  19 2022 total cholesterol 161, triglycerides 97, HDL 28, and   9/7/2022 total cholesterol 174, triglycerides 178, HDL 28, and   2/14/2023 total cholesterol 112, triglycerides 91, HDL 40 and LDL 54  10/05/2023 total cholesterol 133, triglycerides 115, HDL 36, LDL 76  03/07/2024 total cholesterol 105, triglycerides 123, HDL 29, LDL 54         Understands disease processes and need for medications.  Understands reasons for urgent and  emergent care.  Patient (& family) verbalized agreement for treatment plan.   Emotional support and active listening provided.  Patient provided time to verbalize feelings.    CHADWICK/DIMPLE reviewed today and consistent.  Will refill prescribed controlled medication today.  Patient is aware they cannot receive narcotics from any other provider except if under care of pain management or speciality clinic.  Risk and benefits of medication use has been reviewed.  History and physical exam exhibit continued safe and appropriate use of controlled substances.  The patient is aware of the potential for addiction and dependence.  This patient has been made aware of the appropriate use of such medications, including potential risk of somnolence, limited ability to drive and / or work safely, and potential for overdose.    It has also been made clear that these medications are for use by this patient only, without concomitant use of alcohol or other substances unless prescribed/advised by medical provider.  Patient understands they may be subject to UDS and pill counts at random.    Patient considered to be low risk for addiction due to use of single controlled medications.  Patient understands and accepts these risks.  Patient need for medication will be reassessed at each visit.  Doses will be adjusted according to patient need and findings.    Goal of [place]: Patient will not have any adverse reactions of medication.  .Patient will have reduction in there chronic back and joint pain symptoms with use of tramadol as needed as directed.  Patient will be able to remain active in an outside of their home with minimal to no interference from their pain symptoms.    CHADWICK Patient Controlled Substance Report (from 12/20/2023 to 12/19/2024)    Dispensed  Strength Quantity Days Supply Provider Pharmacy   10/25/2024 Tramadol Hydrochloride 50MG 12 each 4 [facility]   06/06/2024 Tramadol Hydrochloride 50MG 9  each 3 [facility]        RTC 2 months, sooner if needed.             This document has been electronically signed by:  EDDIE Thurman, FNP-C    Dragon disclaimer:  Part of this note may be an electronic transcription/translation of spoken language to printed text using the Dragon Dictation System.

## 2024-12-23 DIAGNOSIS — I25.10 CORONARY ARTERY DISEASE INVOLVING NATIVE CORONARY ARTERY OF NATIVE HEART WITHOUT ANGINA PECTORIS: ICD-10-CM

## 2024-12-23 DIAGNOSIS — E78.5 DYSLIPIDEMIA, GOAL LDL BELOW 70: ICD-10-CM

## 2024-12-23 DIAGNOSIS — I10 ESSENTIAL HYPERTENSION: Primary | ICD-10-CM

## 2024-12-23 DIAGNOSIS — E55.9 VITAMIN D DEFICIENCY: ICD-10-CM

## 2024-12-23 DIAGNOSIS — R53.83 OTHER FATIGUE: ICD-10-CM

## 2024-12-27 ENCOUNTER — HOSPITAL ENCOUNTER (OUTPATIENT)
Facility: HOSPITAL | Age: 49
Discharge: HOME | End: 2024-12-27
Payer: MEDICAID

## 2024-12-27 ENCOUNTER — TELEPHONE (OUTPATIENT)
Dept: FAMILY MEDICINE CLINIC | Facility: CLINIC | Age: 49
End: 2024-12-27

## 2024-12-27 DIAGNOSIS — R91.8 MULTIPLE PULMONARY NODULES: ICD-10-CM

## 2024-12-27 PROCEDURE — 71250 CT THORAX DX C-: CPT | Performed by: RADIOLOGY

## 2024-12-27 PROCEDURE — 71250 CT THORAX DX C-: CPT

## 2024-12-27 NOTE — TELEPHONE ENCOUNTER
Caller: [facility]    Relationship:     Best call back number: [phone]  REF# [id number]    What form or medical record are you requesting:     LONG TERM DISABILITY      How would you like to receive the form or medical records (pick-up, mail, fax): FAX  If fax, what is the fax number:     [phone]    Additional notes:     PAPERWORK WAS FAXED 12/18    DID WE RECEIVE IT?

## 2025-01-06 ENCOUNTER — TELEPHONE (OUTPATIENT)
Dept: PULMONOLOGY | Facility: CLINIC | Age: 50
End: 2025-01-06
Payer: MEDICAID

## 2025-01-06 NOTE — TELEPHONE ENCOUNTER
Caller: [place] [employer]           Relationship:     Best call back number: [phone]    What form or medical record are you requesting: SINGLE PAGE FORM THEY FAXED THAT NEEDED TO BE FILLED OUT AND RETURNED.    Who is requesting this form or medical record from you: [employer].    How would you like to receive the form or medical records (pick-up, mail, fax):  FAX  If fax, what is the fax number: [phone]  If mail, what is the address:    If pick-up, provide patient with address and location details    Timeframe paperwork needed:  ASAP.    Additional notes:  REF# [id number]

## 2025-01-07 ENCOUNTER — INFUSION (OUTPATIENT)
Dept: ONCOLOGY | Facility: HOSPITAL | Age: 50
End: 2025-01-07
Payer: MEDICAID

## 2025-01-07 ENCOUNTER — DOCUMENTATION (OUTPATIENT)
Dept: PULMONOLOGY | Facility: CLINIC | Age: 50
End: 2025-01-07
Payer: MEDICAID

## 2025-01-07 VITALS
TEMPERATURE: 97.1 F | DIASTOLIC BLOOD PRESSURE: 87 MMHG | OXYGEN SATURATION: 100 % | BODY MASS INDEX: 26.46 KG/M2 | HEART RATE: 81 BPM | WEIGHT: 206.1 LBS | SYSTOLIC BLOOD PRESSURE: 133 MMHG | RESPIRATION RATE: 18 BRPM

## 2025-01-07 DIAGNOSIS — K50.00 CROHN'S DISEASE OF SMALL INTESTINE WITHOUT COMPLICATION: Primary | ICD-10-CM

## 2025-01-07 PROCEDURE — 25010000002 USTEKINUMAB 130 MG/26ML SOLUTION 26 ML VIAL: Performed by: INTERNAL MEDICINE

## 2025-01-07 PROCEDURE — 96365 THER/PROPH/DIAG IV INF INIT: CPT

## 2025-01-07 PROCEDURE — 25810000003 SODIUM CHLORIDE 0.9 % SOLUTION 250 ML FLEX CONT: Performed by: INTERNAL MEDICINE

## 2025-01-07 RX ADMIN — USTEKINUMAB 520 MG: 130 SOLUTION INTRAVENOUS at 10:39

## 2025-01-07 NOTE — PROGRESS NOTES
Spoke with the patient about his CT Chest.  Stability of pulmonary nodule is noted. We will plan to repeat CT Chest in one year for lung cancer screening.

## 2025-01-16 ENCOUNTER — TELEPHONE (OUTPATIENT)
Dept: PHARMACY | Facility: HOSPITAL | Age: 50
End: 2025-01-16

## 2025-01-16 NOTE — TELEPHONE ENCOUNTER
Dr. Casanova requested that patient be switched from [facility] to [facility].

## 2025-01-23 ENCOUNTER — OFFICE VISIT (OUTPATIENT)
Dept: GASTROENTEROLOGY | Facility: CLINIC | Age: 50
End: 2025-01-23
Payer: MEDICAID

## 2025-01-23 VITALS
DIASTOLIC BLOOD PRESSURE: 73 MMHG | HEIGHT: 74 IN | WEIGHT: 206 LBS | BODY MASS INDEX: 26.44 KG/M2 | HEART RATE: 83 BPM | SYSTOLIC BLOOD PRESSURE: 129 MMHG

## 2025-01-23 DIAGNOSIS — R10.84 GENERALIZED ABDOMINAL PAIN: ICD-10-CM

## 2025-01-23 DIAGNOSIS — K21.9 GASTROESOPHAGEAL REFLUX DISEASE, UNSPECIFIED WHETHER ESOPHAGITIS PRESENT: ICD-10-CM

## 2025-01-23 DIAGNOSIS — K50.90 EXACERBATION OF CROHN'S DISEASE WITHOUT COMPLICATION: Primary | ICD-10-CM

## 2025-01-23 PROCEDURE — 3078F DIAST BP <80 MM HG: CPT | Performed by: INTERNAL MEDICINE

## 2025-01-23 PROCEDURE — 1160F RVW MEDS BY RX/DR IN RCRD: CPT | Performed by: INTERNAL MEDICINE

## 2025-01-23 PROCEDURE — 99214 OFFICE O/P EST MOD 30 MIN: CPT | Performed by: INTERNAL MEDICINE

## 2025-01-23 PROCEDURE — 3074F SYST BP LT 130 MM HG: CPT | Performed by: INTERNAL MEDICINE

## 2025-01-23 PROCEDURE — 1159F MED LIST DOCD IN RCRD: CPT | Performed by: INTERNAL MEDICINE

## 2025-01-23 RX ORDER — LANSOPRAZOLE 30 MG/1
30 CAPSULE, DELAYED RELEASE ORAL 2 TIMES DAILY
Qty: 180 CAPSULE | Refills: 3 | Status: SHIPPED | OUTPATIENT
Start: 2025-01-23

## 2025-01-23 NOTE — PROGRESS NOTES
"Subjective   Scott Montes is a 49 y.o. male who presents to the office today as a follow up appointment regarding Crohn's Disease      History of Present Illness:    Interval History 12/7/2023: The patient presents today for follow-up Crohn's disease.  He has recently been followed by Jenny Nettles PA-C.  The patient is currently on Humira and mesalamine.  The patient underwent colonoscopy in July 2012 22 which showed scattered erosions and erythema throughout the colon.  Biopsies revealed chronic active ileitis and mild acute colitis.  On April 23, 2023 his fecal calprotectin was 166.  This is down from previously collected fecal calprotectin which was 895.  His last CRP was performed on 3/2/2023.  This was normal at 0.30.  This had previously been elevated.  A recent CBC revealed normal hemoglobin and hematocrit.  This was performed in November 2023.  Due to involvement of the terminal ileum, the patient takes Colestid due to suspected poor bile acid recirculation in the terminal ileum.  At the time of his last visit, the patient had complained of heartburn and reflux.  He had been given Protonix but did not take this routinely per Akira Nettles's notes.  She subsequently placed him on Prevacid.  He has bowel movement 3 to 5 times a day.  His stool is for the most part formed.  He does feel that he empties well.  He admits he does not take his Colestid every day. This can constipate him a bit.  He generally takes it a couple of times a week. He had his gallbladder was removed a few weeks ago.  He states his gallbladder was nonfunctioning.  He has not noted increased bowel movements with cholecystectomy. He is doing better on Prevacid in respect to heartburn.  He had an AMI in the Sept 2023. He tells me it was a \"blood clot\".  He is now on ASA and Brillanta.  He initially lost 15 lbs when sick with Crohn's disease but has gained some weight back.  He states \"I am 200% better\" than when we first started to " see him for Crohn's disease.  No BRBPR.  He does describe some mild intermittent tenderness in the right lower abdominal pain but this is not worrisome.  He is wondering if he should get the pneumovax 23 vaccination.    Interval history 4/11/2024: The patient presents today for follow-up Crohn's disease.  He is currently on Humira and mesalamine.  His last colonoscopy was in July 20,2022.  This showed scattered erosions and erythema throughout the colon.  In April 2023 his fecal calprotectin was 166.  It had previously been 895.  His last CRP was performed in March 2023 this was normal at 0.30.  He underwent cholecystectomy in the fall of last year.  He is on Colestid for suspected bile acid diarrhea.  His stool is a Milam score 3-4.  His last CMP was performed in March 2024.  This was mildly elevated.  His vitamin D level was low at 16.1.  His PCP placed him on Vitamin D supplement once a week. He has been doing well on Humira.  He will have occasional abdominal pain which is fleeting.  He is having a bowel movement once a day.  Occasionally, he will have up to 5 bm daily.  This seems to becoming less often. He denies BRBPR or melena. He feels like his energy improved. He does have joint pain.  He has issues with pain in the knees, ankles primarily.  He takes Tylenol.  Heartburn and reflux under good control on Prevacid.      Interval history 10/17/2024: The patient presents today for follow-up Crohn's disease.  He is currently on Humira and mesalamine.  He takes this as prescribed.  QuantiFERON gold was checked earlier in the year and was negative.  He states for the past week and a half he has experienced abdominal tenderness.He denies nausea and vomiting.  He is having some mild heartburn for which he takes Prevacid. He does take this daily.  He notes that he when he eats he gets full fast.  He has a bowel movement 3 times daily.  He does feel like he empties well.  His stool is formed. He does take Colestid  "for bile acid reflux.  He does state his abdomen does swell but more recently he has noted some reduction in swelling.  No blood in the stool.  No weight loss.  Nothing seems to bring the pain on.  He does note that it is better if he sits \"really still\".  He rates the pain as a 6 out of 10.  The pain has been constant for the past week and a half. He denies NSAID use. He has had this pain before and has gone away.      Interval history 1/23/2025: Patient presents today follow-up Crohn's disease.  He had been on Humira and mesalamine.  Recent CT enterography revealed ileitis.  This was performed because the patient had noted increased swelling in his abdomen and increased abdominal pain.  CT enterography revealed the following: multifocal wall thickening of the distal ileum with combination of both hyperemia and areas of prominence of the submucosal fat with relative narrowing and focal proximal segments of mild small bowel dilatation most consistent with multiple skip lesions in the setting of IBD terminal ileitis. Areas of hyperemia suggest relative acute ileitis superimposed on chronic ileitis, no bowel obstruction. no abscess or CT evidence of fistula. The Humira has been discontinued.  He was placed on Stelara.  He received the Stelara infusion January 7th.  The patient states prednisone helped his pain in the right lower abdomen.  He is tapering off prednisone. He complains of low energy.  He does have some diarrhea but \"not bad\".  He is complaining of acid reflux. He is on Prevacid.  He has gained weight but he feels this is due to prednisone.  He continues to experience some abdominal bloating           Review of Systems:  Review of Systems   Constitutional:  Positive for activity change. Negative for appetite change, chills, fatigue, fever and unexpected weight change.   HENT:  Negative for mouth sores and trouble swallowing.    Eyes:  Negative for photophobia, pain and redness.   Respiratory:  Negative for " cough and choking.    Cardiovascular:  Negative for chest pain and leg swelling.   Gastrointestinal:  Positive for abdominal distention, abdominal pain, anal bleeding and nausea. Negative for constipation, diarrhea, rectal pain and vomiting.   Endocrine: Negative for cold intolerance, heat intolerance and polyphagia.   Genitourinary:  Negative for difficulty urinating and dysuria.   Musculoskeletal:  Negative for arthralgias, joint swelling and myalgias.   Skin:  Negative for rash and wound.   Allergic/Immunologic: Negative for environmental allergies and food allergies.   Neurological:  Negative for dizziness and light-headedness.   Hematological:  Negative for adenopathy. Does not bruise/bleed easily.   Psychiatric/Behavioral:  Negative for sleep disturbance. The patient is not nervous/anxious.        Past Medical History:  Past Medical History:   Diagnosis Date    Anemia     Arthritis     BPH without obstruction/lower urinary tract symptoms 4/2/2024    Coronary artery disease     Crohn's disease     Elevated cholesterol     Erectile dysfunction     GERD (gastroesophageal reflux disease)     H/O blood clots     Heart attack     Hormone disorder 9 years    Hypertension     Interstitial cystitis     Low back pain     STEMI (ST elevation myocardial infarction) 09/06/2022    Ulcerative colitis        Past Surgical History:  Past Surgical History:   Procedure Laterality Date    CARDIAC CATHETERIZATION N/A 09/06/2022    Procedure: Left Heart Cath;  Surgeon: Matthew Hill MD;  Location: [facility] CATH INVASIVE LOCATION;  Service: Cardiovascular;  Laterality: N/A;    CARDIAC CATHETERIZATION N/A 09/06/2022    Procedure: Percutaneous Coronary Intervention;  Surgeon: Matthew Hill MD;  Location: [facility] INVASIVE LOCATION;  Service: Cardiovascular;  Laterality: N/A;    CARDIAC SURGERY      CHOLECYSTECTOMY N/A 11/13/2023    Procedure: CHOLECYSTECTOMY LAPAROSCOPIC WITH DAVINCI ROBOT;  Surgeon:  Gonzalo Rudolph MD;  Location: [facility] OR;  Service: Robotics - DaVinci;  Laterality: N/A;    COLONOSCOPY N/A 07/12/2022    Procedure: COLONOSCOPY;  Surgeon: Stella Aparicio MD;  Location: [facility] OR;  Service: Gastroenterology;  Laterality: N/A;    CYST REMOVAL      shoulder    FRACTURE SURGERY Right     foot/toes    VASCULAR SURGERY      VASECTOMY         Family History:  Family History   Problem Relation Age of Onset    Colon cancer Mother     Cancer Father        Social History:  Social History     Socioeconomic History    Marital status:    Tobacco Use    Smoking status: Every Day     Current packs/day: 0.50     Average packs/day: 0.5 packs/day for 35.0 years (17.5 ttl pk-yrs)     Types: Cigarettes     Passive exposure: Current    Smokeless tobacco: Never    Tobacco comments:     Smoking cessation encouraged   Vaping Use    Vaping status: Every Day    Substances: Nicotine    Devices: Disposable   Substance and Sexual Activity    Alcohol use: No    Drug use: No    Sexual activity: Yes     Partners: Female     Birth control/protection: Vasectomy       Current Medication List:    Current Outpatient Medications:     Adalimumab (Humira, 2 Pen,) 40 MG/0.4ML Auto-injector Kit, Inject 40 mg (0.4 ml) under the skin Every 14 (Fourteen) Days., Disp: 2 each, Rfl: 5    aspirin (Aspirin Low Dose) 81 MG EC tablet, Take 1 tablet by mouth Daily., Disp: 90 tablet, Rfl: 2    atorvastatin (LIPITOR) 80 MG tablet, Take 1 tablet by mouth Every Night., Disp: 90 tablet, Rfl: 3    colestipol (COLESTID) 1 g tablet, TAKE ONE (1) TABLET BY MOUTH DAILY., Disp: 90 tablet, Rfl: 2    Evolocumab (REPATHA) solution auto-injector SureClick injection, Inject 1 mL under the skin into the appropriate area as directed Every 14 (Fourteen) Days., Disp: 6 mL, Rfl: 2    lisinopril (PRINIVIL,ZESTRIL) 10 MG tablet, Take 1 tablet by mouth Daily for 360 days., Disp: 90 tablet, Rfl: 3    mesalamine (Apriso) 0.375 g 24 hr capsule, Take 4  "capsules by mouth Daily., Disp: 120 capsule, Rfl: 11    nitroglycerin (NITROSTAT) 0.4 MG SL tablet, DISSOLVE ONE (1) TABLET UNDER THE TONGUE AS NEEDED FOR ANGINA, MAY REPEAT EVERY FIVE (5) MINUTES FOR UP THREE DOSES, Disp: 25 tablet, Rfl: 0    predniSONE (DELTASONE) 10 MG tablet, Take 4 tablets daily for 7 days then take 3 tablets daily for 7 days then take 2 tablets daily for 14 days then take 1 tablet daily for 14 days, Disp: 91 tablet, Rfl: 0    tamsulosin (FLOMAX) 0.4 MG capsule 24 hr capsule, Take 1 capsule by mouth Daily., Disp: 30 capsule, Rfl: 5    traMADol (ULTRAM) 50 MG tablet, Take 1 tablet by mouth Every 8 (Eight) Hours As Needed for Moderate Pain., Disp: 12 tablet, Rfl: 0    triamcinolone (KENALOG) 0.1 % cream, Apply 1 application  topically to the appropriate area as directed 2 (Two) Times a Day., Disp: 80 g, Rfl: 3    vitamin D (ERGOCALCIFEROL) 1.25 MG (65928 UT) capsule capsule, Take 1 capsule by mouth 1 (One) Time Per Week., Disp: 4 capsule, Rfl: 5    lansoprazole (PREVACID) 30 MG capsule, Take 1 capsule by mouth 2 (Two) Times a Day., Disp: 180 capsule, Rfl: 3    Allergies:   Patient has no known allergies.    Vitals:  /73   Pulse 83   Ht 188 cm (74\")   Wt 93.4 kg (206 lb)   BMI 26.45 kg/m²     Physical Exam:  Physical Exam  Constitutional:       Appearance: He is normal weight.   HENT:      Head: Normocephalic and atraumatic.      Nose: Nose normal. No congestion or rhinorrhea.   Eyes:      General: No scleral icterus.     Extraocular Movements: Extraocular movements intact.      Conjunctiva/sclera: Conjunctivae normal.      Pupils: Pupils are equal, round, and reactive to light.   Cardiovascular:      Rate and Rhythm: Normal rate and regular rhythm.      Pulses: Normal pulses.      Heart sounds: Normal heart sounds.   Pulmonary:      Effort: Pulmonary effort is normal.      Breath sounds: Normal breath sounds.   Abdominal:      General: Abdomen is flat. Bowel sounds are normal. There is " no distension.      Palpations: Abdomen is soft. There is no shifting dullness, fluid wave, hepatomegaly, splenomegaly, mass or pulsatile mass.      Tenderness: There is abdominal tenderness. There is no guarding or rebound.      Hernia: No hernia is present.      Comments: Diffuse mildly tender abdomen on deep palpation   Musculoskeletal:         General: No swelling or tenderness.      Cervical back: Normal range of motion and neck supple.   Skin:     General: Skin is warm and dry.      Coloration: Skin is not jaundiced.   Neurological:      General: No focal deficit present.      Mental Status: He is alert and oriented to person, place, and time.   Psychiatric:         Mood and Affect: Mood normal.         Behavior: Behavior normal.         Results Review:  Lab Results:   Office Visit on 01/23/2025   Component Date Value Ref Range Status    C-Reactive Protein 01/23/2025 <0.30  0.00 - 0.50 mg/dL Final    Hemoglobin A1C 01/23/2025 5.10  4.80 - 5.60 % Final    Comment: Hemoglobin A1C Ranges:  Increased Risk for Diabetes  5.7% to 6.4%  Diabetes                     >= 6.5%  Diabetic Goal                < 7.0%      TSH 01/23/2025 0.616  0.270 - 4.200 uIU/mL Final    25 Hydroxy, Vitamin D 01/23/2025 15.1 (L)  30.0 - 100.0 ng/ml Final    Comment: Reference Range for Total Vitamin D 25(OH)  Deficiency <20.0 ng/mL  Insufficiency 21-29 ng/mL  Sufficiency  ng/mL  Toxicity >100 ng/ml      WBC 01/23/2025 12.33 (H)  3.40 - 10.80 10*3/mm3 Final    RBC 01/23/2025 4.73  4.14 - 5.80 10*6/mm3 Final    Hemoglobin 01/23/2025 14.2  13.0 - 17.7 g/dL Final    Hematocrit 01/23/2025 41.5  37.5 - 51.0 % Final    MCV 01/23/2025 87.7  79.0 - 97.0 fL Final    MCH 01/23/2025 30.0  26.6 - 33.0 pg Final    MCHC 01/23/2025 34.2  31.5 - 35.7 g/dL Final    RDW 01/23/2025 13.0  12.3 - 15.4 % Final    Platelets 01/23/2025 347  140 - 450 10*3/mm3 Final    Neutrophil Rel % 01/23/2025 79.4 (H)  42.7 - 76.0 % Final    Lymphocyte Rel % 01/23/2025  14.6 (L)  19.6 - 45.3 % Final    Monocyte Rel % 01/23/2025 5.1  5.0 - 12.0 % Final    Eosinophil Rel % 01/23/2025 0.0 (L)  0.3 - 6.2 % Final    Basophil Rel % 01/23/2025 0.4  0.0 - 1.5 % Final    Neutrophils Absolute 01/23/2025 9.79 (H)  1.70 - 7.00 10*3/mm3 Final    Lymphocytes Absolute 01/23/2025 1.80  0.70 - 3.10 10*3/mm3 Final    Monocytes Absolute 01/23/2025 0.63  0.10 - 0.90 10*3/mm3 Final    Eosinophils Absolute 01/23/2025 0.00  0.00 - 0.40 10*3/mm3 Final    Basophils Absolute 01/23/2025 0.05  0.00 - 0.20 10*3/mm3 Final    Immature Granulocyte Rel % 01/23/2025 0.5  0.0 - 0.5 % Final    Immature Grans Absolute 01/23/2025 0.06 (H)  0.00 - 0.05 10*3/mm3 Final    nRBC 01/23/2025 0.0  0.0 - 0.2 /100 WBC Final    Glucose 01/23/2025 88  65 - 99 mg/dL Final    BUN 01/23/2025 12  6 - 20 mg/dL Final    Creatinine 01/23/2025 0.73 (L)  0.76 - 1.27 mg/dL Final    EGFR Result 01/23/2025 110.8  >60.0 mL/min/1.73 Final    Comment: GFR Categories in Chronic Kidney Disease (CKD)/X09/  /X09/  GFR Category          GFR (mL/min/1.73)    Interpretation  G1/X09/                    90 or greater/X09/        Normal or high  (1)  G2//                    60-89                Mild decrease  (1)  G3a                   45-59                Mild to moderate  decrease  G3b                   30-44                Moderate to  severe decrease  G4                    15-29                Severe decrease  G5                    14 or less           Kidney failure//  /[id number]/  (1)In the absence of evidence of kidney disease, neither  GFR category G1 or G2 fulfill the criteria for CKD.  eGFR calculation 2021 CKD-EPI creatinine equation, which  does not include race as a factor      BUN/Creatinine Ratio 01/23/2025 16.4  7.0 - 25.0 Final    Sodium 01/23/2025 138  136 - 145 mmol/L Final    Potassium 01/23/2025 4.4  3.5 - 5.2 mmol/L Final    Chloride 01/23/2025 102  98 - 107 mmol/L Final    Total CO2 01/23/2025 24.2  22.0 - 29.0 mmol/L  Final    Calcium 01/23/2025 9.6  8.6 - 10.5 mg/dL Final    Total Protein 01/23/2025 6.9  6.0 - 8.5 g/dL Final    Albumin 01/23/2025 4.1  3.5 - 5.2 g/dL Final    Globulin 01/23/2025 2.8  gm/dL Final    A/G Ratio 01/23/2025 1.5  g/dL Final    Total Bilirubin 01/23/2025 0.5  0.0 - 1.2 mg/dL Final    Alkaline Phosphatase 01/23/2025 127 (H)  39 - 117 U/L Final    AST (SGOT) 01/23/2025 18  1 - 40 U/L Final    ALT (SGPT) 01/23/2025 27  1 - 41 U/L Final       Assessment & Plan     Visit Diagnoses:    ICD-10-CM ICD-9-CM   1. Exacerbation of Crohn's disease without complication  K50.90 555.9   2. Generalized abdominal pain  R10.84 789.07   3. Gastroesophageal reflux disease, unspecified whether esophagitis present  K21.9 530.81             Plan:    1.  Crohn's disease of the colon and small intestine: Due to findings on CT enterography of Crohn's disease involving the terminal ileum, Humira was discontinued and the patient was recently started on Stelara.  He has received his first Stelara infusion.  He is now tapering off prednisone.  2.  GERD: The patient is having GERD symptoms.  This likely may be due to the use of prednisone.  I am going to increase his Prevacid for the time being to twice daily.  3.  Diarrhea: The patient denies diarrhea.  He is having more formed stool.  This has resolved with the use of Colestid once daily.  4.  Fatigue: I am going to check the patient's vitamin D level and TSH.      Labs today: CRP, CMP, vitamin D level.  I will also check hemoglobin A1c per primary care physician request for these labs that have not yet been done.  He has not fasted today for fasting lipid panel.    MEDS ORDERED DURING VISIT:  New Medications Ordered This Visit   Medications    lansoprazole (PREVACID) 30 MG capsule     Sig: Take 1 capsule by mouth 2 (Two) Times a Day.     Dispense:  180 capsule     Refill:  3       Return in about 8 weeks (around 3/20/2025).             This document has been electronically signed  by Stella Aparicio MD   January 28, 2025 14:27 EST      Part of this note may be an electronic transcription/translation of spoken language to printed text using the Dragon Dictation System.

## 2025-01-24 LAB
25(OH)D3+25(OH)D2 SERPL-MCNC: 15.1 NG/ML (ref 30–100)
ALBUMIN SERPL-MCNC: 4.1 G/DL (ref 3.5–5.2)
ALBUMIN/GLOB SERPL: 1.5 G/DL
ALP SERPL-CCNC: 127 U/L (ref 39–117)
ALT SERPL-CCNC: 27 U/L (ref 1–41)
AST SERPL-CCNC: 18 U/L (ref 1–40)
BASOPHILS # BLD AUTO: 0.05 10*3/MM3 (ref 0–0.2)
BASOPHILS NFR BLD AUTO: 0.4 % (ref 0–1.5)
BILIRUB SERPL-MCNC: 0.5 MG/DL (ref 0–1.2)
BUN SERPL-MCNC: 12 MG/DL (ref 6–20)
BUN/CREAT SERPL: 16.4 (ref 7–25)
CALCIUM SERPL-MCNC: 9.6 MG/DL (ref 8.6–10.5)
CHLORIDE SERPL-SCNC: 102 MMOL/L (ref 98–107)
CO2 SERPL-SCNC: 24.2 MMOL/L (ref 22–29)
CREAT SERPL-MCNC: 0.73 MG/DL (ref 0.76–1.27)
CRP SERPL-MCNC: <0.3 MG/DL (ref 0–0.5)
EGFRCR SERPLBLD CKD-EPI 2021: 110.8 ML/MIN/1.73
EOSINOPHIL # BLD AUTO: 0 10*3/MM3 (ref 0–0.4)
EOSINOPHIL NFR BLD AUTO: 0 % (ref 0.3–6.2)
ERYTHROCYTE [DISTWIDTH] IN BLOOD BY AUTOMATED COUNT: 13 % (ref 12.3–15.4)
GLOBULIN SER CALC-MCNC: 2.8 GM/DL
GLUCOSE SERPL-MCNC: 88 MG/DL (ref 65–99)
HBA1C MFR BLD: 5.1 % (ref 4.8–5.6)
HCT VFR BLD AUTO: 41.5 % (ref 37.5–51)
HGB BLD-MCNC: 14.2 G/DL (ref 13–17.7)
IMM GRANULOCYTES # BLD AUTO: 0.06 10*3/MM3 (ref 0–0.05)
IMM GRANULOCYTES NFR BLD AUTO: 0.5 % (ref 0–0.5)
LYMPHOCYTES # BLD AUTO: 1.8 10*3/MM3 (ref 0.7–3.1)
LYMPHOCYTES NFR BLD AUTO: 14.6 % (ref 19.6–45.3)
MCH RBC QN AUTO: 30 PG (ref 26.6–33)
MCHC RBC AUTO-ENTMCNC: 34.2 G/DL (ref 31.5–35.7)
MCV RBC AUTO: 87.7 FL (ref 79–97)
MONOCYTES # BLD AUTO: 0.63 10*3/MM3 (ref 0.1–0.9)
MONOCYTES NFR BLD AUTO: 5.1 % (ref 5–12)
NEUTROPHILS # BLD AUTO: 9.79 10*3/MM3 (ref 1.7–7)
NEUTROPHILS NFR BLD AUTO: 79.4 % (ref 42.7–76)
NRBC BLD AUTO-RTO: 0 /100 WBC (ref 0–0.2)
PLATELET # BLD AUTO: 347 10*3/MM3 (ref 140–450)
POTASSIUM SERPL-SCNC: 4.4 MMOL/L (ref 3.5–5.2)
PROT SERPL-MCNC: 6.9 G/DL (ref 6–8.5)
RBC # BLD AUTO: 4.73 10*6/MM3 (ref 4.14–5.8)
SODIUM SERPL-SCNC: 138 MMOL/L (ref 136–145)
TSH SERPL DL<=0.005 MIU/L-ACNC: 0.62 UIU/ML (ref 0.27–4.2)
WBC # BLD AUTO: 12.33 10*3/MM3 (ref 3.4–10.8)

## 2025-01-28 NOTE — PROGRESS NOTES
Recently, labs were drawn at the office.  Your vitamin D level is low.  I am going to have you begin vitamin D supplement.  I am going to start you on 6000 units a day for 8 weeks.  We will then recheck your vitamin D level.  The remainder of your labs were normal.  Specifically, your thyroid function was normal and your hemoglobin A1c was also normal.  Alkaline phosphatase was mildly elevated.  This is down from previous testing.  Your white blood cell count was mildly elevated.  This is something we will keep an eye on.  Your hemoglobin and hematocrit were normal.  CRP which measures inflammation was also normal.  Please keep your follow-up appointment.

## 2025-01-29 ENCOUNTER — SPECIALTY PHARMACY (OUTPATIENT)
Dept: PHARMACY | Facility: HOSPITAL | Age: 50
End: 2025-01-29
Payer: MEDICAID

## 2025-01-29 NOTE — PROGRESS NOTES
Specialty Pharmacy Refill Coordination Note     Scott is a 49 y.o. male contacted today regarding refills of  Repatha SureCLick specialty medication(s).    Reviewed and verified with patient:       Specialty medication(s) and dose(s) confirmed: yes    Refill Questions      Flowsheet Row Most Recent Value   Changes to allergies? No   Changes to medications? No   New conditions or infections since last clinic visit No   Unplanned office visit, urgent care, ED, or hospital admission in the last 4 weeks  No   How does patient/caregiver feel medication is working? Good   Financial problems or insurance changes  No   Since the previous refill, were any specialty medication doses or scheduled injections missed or delayed?  No   Does this patient require a clinical escalation to a pharmacist? No            Delivery Questions      Flowsheet Row Most Recent Value   Delivery method [employer]   Delivery address verified with patient/caregiver? Yes   Medication(s) being filled and delivered Evolocumab (REPATHA)   Copay verified? Yes   Copay amount $0   Copay form of payment No copayment ($0)   Ship Date 1/30/25   Delivery Date Selection 01/31/25   Signature Required Yes                   Follow-up: 84 day(s)     Pebbles Oconnor, Pharmacy Technician  Specialty Pharmacy Technician

## 2025-01-30 ENCOUNTER — OFFICE VISIT (OUTPATIENT)
Dept: FAMILY MEDICINE CLINIC | Facility: CLINIC | Age: 50
End: 2025-01-30
Payer: MEDICAID

## 2025-01-30 VITALS
HEART RATE: 81 BPM | SYSTOLIC BLOOD PRESSURE: 82 MMHG | RESPIRATION RATE: 16 BRPM | HEIGHT: 74 IN | BODY MASS INDEX: 26.21 KG/M2 | DIASTOLIC BLOOD PRESSURE: 64 MMHG | WEIGHT: 204.2 LBS | OXYGEN SATURATION: 97 %

## 2025-01-30 DIAGNOSIS — K50.80 CROHN'S DISEASE OF BOTH SMALL AND LARGE INTESTINE WITHOUT COMPLICATION: ICD-10-CM

## 2025-01-30 DIAGNOSIS — M15.0 PRIMARY OSTEOARTHRITIS INVOLVING MULTIPLE JOINTS: ICD-10-CM

## 2025-01-30 DIAGNOSIS — E55.9 VITAMIN D DEFICIENCY: Primary | ICD-10-CM

## 2025-01-30 DIAGNOSIS — I10 ESSENTIAL HYPERTENSION: Chronic | ICD-10-CM

## 2025-01-30 DIAGNOSIS — M54.9 MECHANICAL BACK PAIN: ICD-10-CM

## 2025-01-30 PROCEDURE — 99214 OFFICE O/P EST MOD 30 MIN: CPT | Performed by: NURSE PRACTITIONER

## 2025-01-30 PROCEDURE — 3078F DIAST BP <80 MM HG: CPT | Performed by: NURSE PRACTITIONER

## 2025-01-30 PROCEDURE — 3074F SYST BP LT 130 MM HG: CPT | Performed by: NURSE PRACTITIONER

## 2025-01-30 PROCEDURE — 1125F AMNT PAIN NOTED PAIN PRSNT: CPT | Performed by: NURSE PRACTITIONER

## 2025-01-30 PROCEDURE — 1160F RVW MEDS BY RX/DR IN RCRD: CPT | Performed by: NURSE PRACTITIONER

## 2025-01-30 PROCEDURE — 1159F MED LIST DOCD IN RCRD: CPT | Performed by: NURSE PRACTITIONER

## 2025-01-30 RX ORDER — MULTIVIT-MIN/IRON/FOLIC ACID/K 18-600-40
6000 CAPSULE ORAL DAILY
Qty: 90 CAPSULE | Refills: 2 | Status: SHIPPED | OUTPATIENT
Start: 2025-01-30

## 2025-01-30 RX ORDER — USTEKINUMAB 45 MG/.5ML
45 INJECTION, SOLUTION SUBCUTANEOUS
COMMUNITY

## 2025-01-30 NOTE — PROGRESS NOTES
"Subjective   Scott Montes is a 49 y.o. male.     Chief Complaint   Patient presents with    bowel problem       History of Present Illness     Crohns-under care of GI.  He had his first Stelara infusion.  He will begin injections of Sterlara. He get an infusion initially he reports.  He will continue the mesalamine 0.375 g daily.  He will also continue the Colestid for diarrhea.    Anxiety-reports that relapse of his Crohns has increased his overall Depression and Anxiety.  He reports that his sleep is poor.  He is trying to be active but with winter and short days he has not been able to be outdoor.   Reports  \"has been hard\".  He is anxious about his own health.  He is use to being active and working but his illness over the last 2 years has changed his lifestyle pace.    Vit d def-noted to be 15 on his labs.  He was advised per GI to start 6000 units per day.  Neck pain and shoulder pain-has been progressive over time.  Last xray in 2021 with degenerative changes and bone spurring.  The worst of those changes were at C5-6 and C6-C7 there was no malalignment of his neck at that time.  Since his MI he has had persistent shoulder pain.  It varies based on activity but is present daily.  He is ROM at times is limited.  Generalized joint pain-last imaging of 2021.  Bilateral hip x-ray showed mild OA of both hips.  Lumbar spine showed anterior wedging of the L1 vertebral body consistent with an old compression fracture.  There is moderate arthritic change with sclerosis in both of the SI joints.  His T-spine was within normal limits.    The following portions of the patient's history were reviewed and updated as appropriate: CC, ROS, allergies, current medications, past family history, past medical history, past social history, past surgical history and problem list.      Review of Systems   Constitutional:  Positive for fatigue. Negative for appetite change, unexpected weight gain and unexpected weight loss.   HENT: " " Negative for congestion, ear pain, postnasal drip, rhinorrhea, sore throat, swollen glands, trouble swallowing and voice change.    Eyes:  Negative for blurred vision, double vision, pain and visual disturbance.   Respiratory:  Negative for cough, chest tightness, shortness of breath and wheezing.    Cardiovascular:  Negative for chest pain, palpitations and leg swelling.   Gastrointestinal:  Positive for abdominal pain, diarrhea and nausea. Negative for blood in stool, constipation, vomiting and indigestion.   Genitourinary:  Negative for dysuria, hematuria and urgency.   Musculoskeletal:  Positive for arthralgias, back pain, gait problem, myalgias and neck stiffness.   Skin:  Negative for color change and skin lesions.   Allergic/Immunologic: Negative.    Neurological:  Positive for numbness. Negative for dizziness, headache and confusion.   Hematological: Negative.    Psychiatric/Behavioral:  Positive for dysphoric mood, sleep disturbance and stress. Negative for suicidal ideas. The patient is nervous/anxious.    All other systems reviewed and are negative.      Objective     BP (!) 82/64   Pulse 81   Resp 16   Ht 188 cm (74\")   Wt 92.6 kg (204 lb 3.2 oz)   SpO2 97%   BMI 26.22 kg/m²     Physical Exam  Vitals reviewed.   Constitutional:       General: He is not in acute distress.     Appearance: He is well-developed. He is not diaphoretic.   HENT:      Head: Normocephalic and atraumatic.      Jaw: No tenderness.      Right Ear: Hearing, tympanic membrane, ear canal and external ear normal.      Left Ear: Hearing, tympanic membrane, ear canal and external ear normal.      Nose: Nose normal. No nasal tenderness or congestion.      Right Sinus: No maxillary sinus tenderness or frontal sinus tenderness.      Left Sinus: No maxillary sinus tenderness or frontal sinus tenderness.      Mouth/Throat:      Lips: Pink.      Mouth: Mucous membranes are moist.      Pharynx: Oropharynx is clear. Uvula midline.   Eyes: "      General: Lids are normal. No scleral icterus.     Extraocular Movements:      Right eye: Normal extraocular motion and no nystagmus.      Left eye: Normal extraocular motion and no nystagmus.      Conjunctiva/sclera: Conjunctivae normal.      Pupils: Pupils are equal, round, and reactive to light.   Neck:      Thyroid: No thyromegaly or thyroid tenderness.      Vascular: No carotid bruit or JVD.      Trachea: No tracheal tenderness.   Cardiovascular:      Rate and Rhythm: Normal rate and regular rhythm.      Pulses:           Dorsalis pedis pulses are 2+ on the right side and 2+ on the left side.        Posterior tibial pulses are 2+ on the right side and 2+ on the left side.      Heart sounds: Normal heart sounds, S1 normal and S2 normal. No murmur heard.  Pulmonary:      Effort: Pulmonary effort is normal. No accessory muscle usage, prolonged expiration or respiratory distress.      Breath sounds: Normal breath sounds.   Chest:      Chest wall: No tenderness.   Abdominal:      General: Bowel sounds are normal. There is no distension.      Palpations: Abdomen is soft. There is no hepatomegaly, splenomegaly or mass.      Tenderness: There is generalized abdominal tenderness. There is no right CVA tenderness, left CVA tenderness or guarding.      Comments: Bowel sounds loud and gurgling.  Audible without auscultation.   Musculoskeletal:         General: Tenderness present.      Cervical back: Normal range of motion and neck supple. Spasms, tenderness and bony tenderness present. Pain with movement present. Decreased range of motion.      Thoracic back: Tenderness present.      Lumbar back: Spasms and tenderness present. Decreased range of motion.      Right lower leg: No edema.      Left lower leg: No edema.      Comments: No muscular atrophy or flaccidity.  Multiple areas of generalized arthralgia and myalgia noted throughout spine, hips, knees, and shoulders.     Lymphadenopathy:      Head:      Right side of  head: No submental or submandibular adenopathy.      Left side of head: No submental or submandibular adenopathy.      Cervical: No cervical adenopathy.      Right cervical: No superficial cervical adenopathy.     Left cervical: No superficial cervical adenopathy.   Skin:     General: Skin is warm and dry.      Capillary Refill: Capillary refill takes less than 2 seconds.      Coloration: Skin is not jaundiced or pale.      Findings: No erythema.      Nails: There is no clubbing.   Neurological:      Mental Status: He is alert and oriented to person, place, and time.      Cranial Nerves: No cranial nerve deficit or facial asymmetry.      Sensory: No sensory deficit.      Motor: No weakness, tremor, atrophy or abnormal muscle tone.      Coordination: Coordination normal.      Deep Tendon Reflexes: Reflexes are normal and symmetric.   Psychiatric:         Attention and Perception: He is attentive.         Mood and Affect: Mood is depressed. Mood is not anxious.         Speech: Speech normal.         Behavior: Behavior normal. Behavior is cooperative.         Thought Content: Thought content normal.         Cognition and Memory: Cognition normal.         Judgment: Judgment normal.         Diagnoses and all orders for this visit:    1. Vitamin D deficiency (Primary)  Comments:  Stop weekly capsule.  Likely not being absorbed.  Will begin daily therapy 3 capsules/day  Orders:  -     Cholecalciferol (Vitamin D) 50 MCG (2000 UT) capsule; Take 3 capsules by mouth Daily.  Dispense: 90 capsule; Refill: 2    2. Mechanical back pain  Comments:  Likely worsening OA.  Updated x-rays ordered  Orders:  -     XR Spine Cervical 2 View  -     XR Spine Thoracic 2 View  -     XR Spine Lumbar 2 or 3 View    3. Crohn's disease of both small and large intestine without complication  Comments:  Continue under the care of GI.  Continue Ms. Estrada and Abdi as directed.  Continue Colestid for diarrhea.  Avoid food triggers such as spicy  foods    4. Essential hypertension  Overview:  With MI September 2022    Assessment & Plan:  Hypertension is stable and controlled  Continue current treatment regimen.  Stop smoking.  Blood pressure will be reassessed in 1 year.      5. Primary osteoarthritis involving multiple joints  Assessment & Plan:  Continue PRN Flexeril. Continue conservative measures at home.   Avoid overuse activities.  No NSAIDs.  Will continue to evaluate and treat PRN         Understands disease processes and need for medications.  Understands reasons for urgent and emergent care.  Patient (& family) verbalized agreement for treatment plan.   Emotional support and active listening provided.  Patient provided time to verbalize feelings.    Updated imaging ordered for evaluation of his generalized joint discomfort.      Return to clinic 5-6 weeks for re-evaluation, sooner if needed for symptom management.          This document has been electronically signed by:  EDDIE Thurman FNP-C Dragon disclaimer:  Part of this note may be an electronic transcription/translation of spoken language to printed text using the Dragon Dictation System.

## 2025-02-10 ENCOUNTER — HOSPITAL ENCOUNTER (OUTPATIENT)
Facility: HOSPITAL | Age: 50
Discharge: HOME | End: 2025-02-10
Admitting: NURSE PRACTITIONER
Payer: MEDICAID

## 2025-02-10 PROCEDURE — 72100 X-RAY EXAM L-S SPINE 2/3 VWS: CPT

## 2025-02-10 PROCEDURE — 72070 X-RAY EXAM THORAC SPINE 2VWS: CPT | Performed by: RADIOLOGY

## 2025-02-10 PROCEDURE — 72040 X-RAY EXAM NECK SPINE 2-3 VW: CPT | Performed by: RADIOLOGY

## 2025-02-10 PROCEDURE — 72040 X-RAY EXAM NECK SPINE 2-3 VW: CPT

## 2025-02-10 PROCEDURE — 72070 X-RAY EXAM THORAC SPINE 2VWS: CPT

## 2025-02-10 PROCEDURE — 72100 X-RAY EXAM L-S SPINE 2/3 VWS: CPT | Performed by: RADIOLOGY

## 2025-03-04 ENCOUNTER — SPECIALTY PHARMACY (OUTPATIENT)
Dept: PHARMACY | Facility: HOSPITAL | Age: 50
End: 2025-03-04
Payer: MEDICAID

## 2025-03-04 ENCOUNTER — DISEASE STATE MANAGEMENT VISIT (OUTPATIENT)
Dept: PHARMACY | Facility: HOSPITAL | Age: 50
End: 2025-03-04
Payer: MEDICAID

## 2025-03-04 RX ORDER — USTEKINUMAB 90 MG/ML
1 INJECTION, SOLUTION SUBCUTANEOUS
Qty: 1 ML | Refills: 2 | Status: SHIPPED | OUTPATIENT
Start: 2025-03-04

## 2025-03-04 NOTE — PROGRESS NOTES
Specialty Pharmacy Patient Management Program  Gastroenterology Initial Assessment     Scott Montes is a 49 y.o. male referred by their provider, Stella Ramsay, to the Gastroenterology Patient Management program offered by [facility] for Inflammatory Bowel Disease.  An initial outreach was conducted, including assessment of therapy appropriateness and specialty medication education for Stelara (ustekinumab). The patient was introduced to services offered by [facility], including: regular assessments, refill coordination, curbside pick-up or mail order delivery options, prior authorization maintenance, and financial assistance programs as applicable. The patient was also provided with contact information for the pharmacy team.     The patient's current IBD regimen includes: Stelara, colestid, mesalamine     The patient denies any known allergy to components of Stelara or a latex allergy. Patient received Stelara infusion on  January 7, 2025. Pt reports tolerating well.     Insurance Coverage & Financial Support  Medicaid    Relevant Past Medical History and Comorbidities  Relevant medical history and concomitant health conditions were discussed with the patient. The patient's chart has been reviewed for relevant past medical history and comorbid health conditions and updated as necessary.   Past Medical History:   Diagnosis Date    Anemia     Arthritis     BPH without obstruction/lower urinary tract symptoms 4/2/2024    Coronary artery disease     Crohn's disease     Elevated cholesterol     Erectile dysfunction     GERD (gastroesophageal reflux disease)     H/O blood clots     Heart attack     Hormone disorder 9 years    Hypertension     Interstitial cystitis     Low back pain     STEMI (ST elevation myocardial infarction) 09/06/2022    Ulcerative colitis      Social History     Socioeconomic History    Marital status:     Tobacco Use    Smoking status: Every Day     Current packs/day: 0.50     Average packs/day: 0.5 packs/day for 35.0 years (17.5 ttl pk-yrs)     Types: Cigarettes     Passive exposure: Current    Smokeless tobacco: Never    Tobacco comments:     Smoking cessation encouraged   Vaping Use    Vaping status: Every Day    Substances: Nicotine    Devices: Disposable   Substance and Sexual Activity    Alcohol use: No    Drug use: No    Sexual activity: Yes     Partners: Female     Birth control/protection: Vasectomy     Problem list reviewed by Ludivina Xavier RPH on 3/4/2025 at 10:02 AM  Problem list reviewed by Ludivina Xavier RPH on 3/4/2025 at 10:41 AM    Allergies  Known allergies and reactions were discussed with the patient. The patient's chart has been reviewed for allergy information and updated as necessary.   Patient has no known allergies.  Allergies reviewed by Ludivina Xavier RPH on 3/4/2025 at 10:02 AM  Allergies reviewed by Ludivina Xavier RPH on 3/4/2025 at 10:40 AM  Allergies reviewed by Ludivina Xavier RPH on 3/4/2025 at 10:40 AM    Current Medication List  This medication list has been reviewed with the patient and evaluated for any interactions or necessary modifications/recommendations, and updated to include all prescription medications, OTC medications, and supplements the patient is currently taking. This list reflects what is contained in the patient's profile, which has also been marked as reviewed to communicate to other providers it is the most up to date version of the patient's current medication therapy.     Current Outpatient Medications:     aspirin (Aspirin Low Dose) 81 MG EC tablet, Take 1 tablet by mouth Daily., Disp: 90 tablet, Rfl: 2    atorvastatin (LIPITOR) 80 MG tablet, Take 1 tablet by mouth Every Night., Disp: 90 tablet, Rfl: 3    Cholecalciferol (Vitamin D) 50 MCG (2000 UT) capsule, Take 3 capsules by mouth Daily., Disp: 90 capsule, Rfl: 2    colestipol (COLESTID) 1 g  tablet, TAKE ONE (1) TABLET BY MOUTH DAILY., Disp: 90 tablet, Rfl: 2    Evolocumab (REPATHA) solution auto-injector SureClick injection, Inject 1 mL under the skin into the appropriate area as directed Every 14 (Fourteen) Days., Disp: 6 mL, Rfl: 2    lansoprazole (PREVACID) 30 MG capsule, Take 1 capsule by mouth 2 (Two) Times a Day., Disp: 180 capsule, Rfl: 3    lisinopril (PRINIVIL,ZESTRIL) 10 MG tablet, Take 1 tablet by mouth Daily for 360 days., Disp: 90 tablet, Rfl: 3    mesalamine (Apriso) 0.375 g 24 hr capsule, Take 4 capsules by mouth Daily., Disp: 120 capsule, Rfl: 11    nitroglycerin (NITROSTAT) 0.4 MG SL tablet, DISSOLVE ONE (1) TABLET UNDER THE TONGUE AS NEEDED FOR ANGINA, MAY REPEAT EVERY FIVE (5) MINUTES FOR UP THREE DOSES, Disp: 25 tablet, Rfl: 0    tamsulosin (FLOMAX) 0.4 MG capsule 24 hr capsule, Take 1 capsule by mouth Daily., Disp: 30 capsule, Rfl: 5    traMADol (ULTRAM) 50 MG tablet, Take 1 tablet by mouth Every 8 (Eight) Hours As Needed for Moderate Pain., Disp: 12 tablet, Rfl: 0    triamcinolone (KENALOG) 0.1 % cream, Apply 1 application  topically to the appropriate area as directed 2 (Two) Times a Day., Disp: 80 g, Rfl: 3    ustekinumab (Stelara) 45 MG/0.5ML injection, Inject 0.5 mL under the skin into the appropriate area as directed Every 3 (Three) Months., Disp: , Rfl:     predniSONE (DELTASONE) 10 MG tablet, Take 4 tablets daily for 7 days then take 3 tablets daily for 7 days then take 2 tablets daily for 14 days then take 1 tablet daily for 14 days (Patient not taking: Reported on 3/4/2025), Disp: 91 tablet, Rfl: 0    Ustekinumab (Stelara) 90 MG/ML solution prefilled syringe Injection, Inject 90 mg under the skin into the appropriate area as directed Every 8 (Eight) Weeks., Disp: 1 mL, Rfl: 2  Medicines reviewed by Ludivina Xavier [facility] on 3/4/2025 at 10:03 AM  Medicines reviewed by Ludivina Xavier RPH on 3/4/2025 at 10:40 AM    Drug Interactions  No significant drug-drug interactions  "according to literature.     Pretreatment Screening  TB - negative on 4-11-24  HBV - non-reactive  HCV - non-reactive  HIV - non-reactive  *refer to pre-infusion checklist from 1/7/25    Relevant Laboratory Values  Lab Results   Component Value Date    GLUCOSE 88 01/23/2025    BUN 12 01/23/2025    CREATININE 0.73 (L) 01/23/2025    BCR 16.4 01/23/2025     01/23/2025    K 4.4 01/23/2025     01/23/2025    CO2 24.2 01/23/2025    CALCIUM 9.6 01/23/2025    PROTEINTOT 6.9 01/23/2025    ALBUMIN 4.1 01/23/2025    ALT 27 01/23/2025    AST 18 01/23/2025    ALKPHOS 127 (H) 01/23/2025    BILITOT 0.5 01/23/2025    ANIONGAP 10.6 10/29/2024     Lab Results   Component Value Date    WBC 12.33 (H) 01/23/2025    HGB 14.2 01/23/2025    HCT 41.5 01/23/2025     01/23/2025     Lab Results   Component Value Date    HEPAIGM Non-Reactive 10/10/2022    HEPBIGMCORE Non-Reactive 10/10/2022    HEPBSAG Non-Reactive 10/10/2022    HEPCVIRUSABY Non-Reactive 10/10/2022    CIO3ILJ2 Non-Reactive 05/04/2022     Lab Results   Component Value Date    QUANTITBGLDP Negative 04/11/2024    QUANTITBGLDP Negative 10/10/2022     No results for input(s): \"CMP\", \"CBC\" in the last 72 hours.    *review CRP and fecal calprotectin    Lab Value Review  The above lab values have been reviewed; see plan for specialty medication(s) dose adjustment(s) and recommendations.     Immunizations Screening     (Live Vaccines Should not be given while on therapy or within 4 weeks of starting therapy)  (You should not receive the BCG vaccine during the one year before receiving STELARA® or one year after you stop receiving STELARA®)  COVID 19: primary series complete; pt declines any further doses  Influenza: declined  Pneumococcal: declined  Zoster: declined    Initial Education Provided for Specialty Medication  The patient has been provided with the following education and any applicable administration techniques (i.e. self-injection) have been demonstrated " for the therapies indicated. All questions and concerns have been addressed prior to the patient receiving the medication, and the patient has verbalized understanding of the education and any materials provided. Additional patient education shall be provided and documented upon request by the patient, provider, or payer.             Adherence and Self-Administration  Barriers to Patient Adherence and/or Self-Administration: none   Methods for Supporting Patient Adherence and/or Self-Administration: patient will utilize alarms/reminders on the phone to aid in medication adherence     Goals of Therapy   Goals Addressed Today        Specialty Pharmacy General Goal      Mucosal Healing and patient-reported remission of symptoms by at least 50%               Reassessment Plan & Follow-Up  Medication Therapy Changes: Will start Stelara 90 mg every 8 weeks. Pt administered the medication into the RIGHT side of the abdomen.  Additional Plans, Therapy Recommendations, or Therapy Problems to Be Addressed: none  Pharmacist to perform regular reassessments no more than (6) months from the previous assessment.  Welcome information and patient satisfaction survey to be sent by retail team with patient's initial fill.  Care Coordinator to set up future refill outreaches, coordinate prescription delivery, and escalate clinical questions to pharmacist.     Attestation  Therapeutic appropriateness: Appropriate   I attest the patient was actively involved in and has agreed to the above plan of care. I attest that the initiated specialty medication(s) are appropriate for the patient based on my assessment. If the prescribed therapy is at any point deemed not appropriate based on the current or future assessments, a consultation will be initiated with the patient's specialty care provider to determine the best course of action. The revised plan of therapy will be documented along with any required assessments and/or additional patient  education provided.     Ludivina Xavier, [facility]  03/04/25 11:09 EST

## 2025-03-04 NOTE — PROGRESS NOTES
Specialty Pharmacy Patient Management Program  Gastroenterology Initial Assessment     Scott Montes is a 49 y.o. male referred by their provider, Stella Ramsay, to the Gastroenterology Patient Management program offered by [facility] for Inflammatory Bowel Disease.  An initial outreach was conducted, including assessment of therapy appropriateness and specialty medication education for Stelara (ustekinumab). The patient was introduced to services offered by [facility], including: regular assessments, refill coordination, curbside pick-up or mail order delivery options, prior authorization maintenance, and financial assistance programs as applicable. The patient was also provided with contact information for the pharmacy team.     The patient's current IBD regimen includes: Stelara, colestid, mesalamine     The patient denies any known allergy to components of Stelara or a latex allergy. Patient received Stelara infusion on  January 7, 2025. Pt reports tolerating well.     Insurance Coverage & Financial Support  Medicaid    Relevant Past Medical History and Comorbidities  Relevant medical history and concomitant health conditions were discussed with the patient. The patient's chart has been reviewed for relevant past medical history and comorbid health conditions and updated as necessary.   Past Medical History:   Diagnosis Date    Anemia     Arthritis     BPH without obstruction/lower urinary tract symptoms 4/2/2024    Coronary artery disease     Crohn's disease     Elevated cholesterol     Erectile dysfunction     GERD (gastroesophageal reflux disease)     H/O blood clots     Heart attack     Hormone disorder 9 years    Hypertension     Interstitial cystitis     Low back pain     STEMI (ST elevation myocardial infarction) 09/06/2022    Ulcerative colitis      Social History     Socioeconomic History    Marital status:     Tobacco Use    Smoking status: Every Day     Current packs/day: 0.50     Average packs/day: 0.5 packs/day for 35.0 years (17.5 ttl pk-yrs)     Types: Cigarettes     Passive exposure: Current    Smokeless tobacco: Never    Tobacco comments:     Smoking cessation encouraged   Vaping Use    Vaping status: Every Day    Substances: Nicotine    Devices: Disposable   Substance and Sexual Activity    Alcohol use: No    Drug use: No    Sexual activity: Yes     Partners: Female     Birth control/protection: Vasectomy          Allergies  Known allergies and reactions were discussed with the patient. The patient's chart has been reviewed for allergy information and updated as necessary.   Patient has no known allergies.       Current Medication List  This medication list has been reviewed with the patient and evaluated for any interactions or necessary modifications/recommendations, and updated to include all prescription medications, OTC medications, and supplements the patient is currently taking. This list reflects what is contained in the patient's profile, which has also been marked as reviewed to communicate to other providers it is the most up to date version of the patient's current medication therapy.     Current Outpatient Medications:     aspirin (Aspirin Low Dose) 81 MG EC tablet, Take 1 tablet by mouth Daily., Disp: 90 tablet, Rfl: 2    atorvastatin (LIPITOR) 80 MG tablet, Take 1 tablet by mouth Every Night., Disp: 90 tablet, Rfl: 3    Cholecalciferol (Vitamin D) 50 MCG (2000 UT) capsule, Take 3 capsules by mouth Daily., Disp: 90 capsule, Rfl: 2    colestipol (COLESTID) 1 g tablet, TAKE ONE (1) TABLET BY MOUTH DAILY., Disp: 90 tablet, Rfl: 2    Evolocumab (REPATHA) solution auto-injector SureClick injection, Inject 1 mL under the skin into the appropriate area as directed Every 14 (Fourteen) Days., Disp: 6 mL, Rfl: 2    lansoprazole (PREVACID) 30 MG capsule, Take 1 capsule by mouth 2 (Two) Times a Day., Disp: 180  capsule, Rfl: 3    lisinopril (PRINIVIL,ZESTRIL) 10 MG tablet, Take 1 tablet by mouth Daily for 360 days., Disp: 90 tablet, Rfl: 3    mesalamine (Apriso) 0.375 g 24 hr capsule, Take 4 capsules by mouth Daily., Disp: 120 capsule, Rfl: 11    nitroglycerin (NITROSTAT) 0.4 MG SL tablet, DISSOLVE ONE (1) TABLET UNDER THE TONGUE AS NEEDED FOR ANGINA, MAY REPEAT EVERY FIVE (5) MINUTES FOR UP THREE DOSES, Disp: 25 tablet, Rfl: 0    predniSONE (DELTASONE) 10 MG tablet, Take 4 tablets daily for 7 days then take 3 tablets daily for 7 days then take 2 tablets daily for 14 days then take 1 tablet daily for 14 days (Patient not taking: Reported on 3/4/2025), Disp: 91 tablet, Rfl: 0    tamsulosin (FLOMAX) 0.4 MG capsule 24 hr capsule, Take 1 capsule by mouth Daily., Disp: 30 capsule, Rfl: 5    traMADol (ULTRAM) 50 MG tablet, Take 1 tablet by mouth Every 8 (Eight) Hours As Needed for Moderate Pain., Disp: 12 tablet, Rfl: 0    triamcinolone (KENALOG) 0.1 % cream, Apply 1 application  topically to the appropriate area as directed 2 (Two) Times a Day., Disp: 80 g, Rfl: 3    ustekinumab (Stelara) 45 MG/0.5ML injection, Inject 0.5 mL under the skin into the appropriate area as directed Every 3 (Three) Months., Disp: , Rfl:     Ustekinumab (Stelara) 90 MG/ML solution prefilled syringe Injection, Inject 90 mg under the skin into the appropriate area as directed Every 8 (Eight) Weeks., Disp: 1 mL, Rfl: 2       Drug Interactions  No significant drug-drug interactions according to literature.     Pretreatment Screening  TB - negative on 4-11-24  HBV - non-reactive  HCV - non-reactive  HIV - non-reactive  *refer to pre-infusion checklist from 1/7/25    Relevant Laboratory Values  Lab Results   Component Value Date    GLUCOSE 88 01/23/2025    BUN 12 01/23/2025    CREATININE 0.73 (L) 01/23/2025    BCR 16.4 01/23/2025     01/23/2025    K 4.4 01/23/2025     01/23/2025    CO2 24.2 01/23/2025    CALCIUM 9.6 01/23/2025    PROTEINTOT 6.9  "01/23/2025    ALBUMIN 4.1 01/23/2025    ALT 27 01/23/2025    AST 18 01/23/2025    ALKPHOS 127 (H) 01/23/2025    BILITOT 0.5 01/23/2025    ANIONGAP 10.6 10/29/2024     Lab Results   Component Value Date    WBC 12.33 (H) 01/23/2025    HGB 14.2 01/23/2025    HCT 41.5 01/23/2025     01/23/2025     Lab Results   Component Value Date    HEPAIGM Non-Reactive 10/10/2022    HEPBIGMCORE Non-Reactive 10/10/2022    HEPBSAG Non-Reactive 10/10/2022    HEPCVIRUSABY Non-Reactive 10/10/2022    QGN5LJT4 Non-Reactive 05/04/2022     Lab Results   Component Value Date    QUANTITBGLDP Negative 04/11/2024    QUANTITBGLDP Negative 10/10/2022     No results for input(s): \"CMP\", \"CBC\" in the last 72 hours.    *review CRP and fecal calprotectin    Lab Value Review  The above lab values have been reviewed; see plan for specialty medication(s) dose adjustment(s) and recommendations.     Immunizations Screening     (Live Vaccines Should not be given while on therapy or within 4 weeks of starting therapy)  (You should not receive the BCG vaccine during the one year before receiving STELARA® or one year after you stop receiving STELARA®)  COVID 19: primary series complete; pt declines any further doses  Influenza: declined  Pneumococcal: declined  Zoster: declined    Initial Education Provided for Specialty Medication  The patient has been provided with the following education and any applicable administration techniques (i.e. self-injection) have been demonstrated for the therapies indicated. All questions and concerns have been addressed prior to the patient receiving the medication, and the patient has verbalized understanding of the education and any materials provided. Additional patient education shall be provided and documented upon request by the patient, provider, or payer.             Adherence and Self-Administration  Barriers to Patient Adherence and/or Self-Administration: none   Methods for Supporting Patient Adherence and/or " Self-Administration: patient will utilize alarms/reminders on the phone to aid in medication adherence     Goals of Therapy   Goals Addressed Today    None         Reassessment Plan & Follow-Up  Medication Therapy Changes: Will start Stelara 90 mg every 8 weeks. Pt administered the medication into the RIGHT side of the abdomen.  Additional Plans, Therapy Recommendations, or Therapy Problems to Be Addressed: none  Pharmacist to perform regular reassessments no more than (6) months from the previous assessment.  Welcome information and patient satisfaction survey to be sent by retail team with patient's initial fill.  Care Coordinator to set up future refill outreaches, coordinate prescription delivery, and escalate clinical questions to pharmacist.     Attestation      I attest the patient was actively involved in and has agreed to the above plan of care. I attest that the initiated specialty medication(s) are appropriate for the patient based on my assessment. If the prescribed therapy is at any point deemed not appropriate based on the current or future assessments, a consultation will be initiated with the patient's specialty care provider to determine the best course of action. The revised plan of therapy will be documented along with any required assessments and/or additional patient education provided.     Ludivina Xavier [facility]  03/04/25 11:11 EST

## 2025-03-13 ENCOUNTER — OFFICE VISIT (OUTPATIENT)
Dept: FAMILY MEDICINE CLINIC | Facility: CLINIC | Age: 50
End: 2025-03-13
Payer: MEDICAID

## 2025-03-13 VITALS
HEART RATE: 80 BPM | BODY MASS INDEX: 26.75 KG/M2 | OXYGEN SATURATION: 98 % | RESPIRATION RATE: 16 BRPM | HEIGHT: 74 IN | DIASTOLIC BLOOD PRESSURE: 76 MMHG | SYSTOLIC BLOOD PRESSURE: 110 MMHG | WEIGHT: 208.4 LBS

## 2025-03-13 DIAGNOSIS — N40.0 BPH WITHOUT OBSTRUCTION/LOWER URINARY TRACT SYMPTOMS: ICD-10-CM

## 2025-03-13 DIAGNOSIS — M50.30 DDD (DEGENERATIVE DISC DISEASE), CERVICAL: ICD-10-CM

## 2025-03-13 DIAGNOSIS — G89.29 CHRONIC RIGHT SHOULDER PAIN: ICD-10-CM

## 2025-03-13 DIAGNOSIS — M15.0 PRIMARY OSTEOARTHRITIS INVOLVING MULTIPLE JOINTS: ICD-10-CM

## 2025-03-13 DIAGNOSIS — I10 ESSENTIAL HYPERTENSION: Primary | Chronic | ICD-10-CM

## 2025-03-13 DIAGNOSIS — M25.511 CHRONIC RIGHT SHOULDER PAIN: ICD-10-CM

## 2025-03-13 DIAGNOSIS — R35.0 FREQUENCY OF URINATION: ICD-10-CM

## 2025-03-13 PROCEDURE — 99214 OFFICE O/P EST MOD 30 MIN: CPT | Performed by: NURSE PRACTITIONER

## 2025-03-13 PROCEDURE — 1160F RVW MEDS BY RX/DR IN RCRD: CPT | Performed by: NURSE PRACTITIONER

## 2025-03-13 PROCEDURE — 1125F AMNT PAIN NOTED PAIN PRSNT: CPT | Performed by: NURSE PRACTITIONER

## 2025-03-13 PROCEDURE — 1159F MED LIST DOCD IN RCRD: CPT | Performed by: NURSE PRACTITIONER

## 2025-03-13 PROCEDURE — 3078F DIAST BP <80 MM HG: CPT | Performed by: NURSE PRACTITIONER

## 2025-03-13 PROCEDURE — 3074F SYST BP LT 130 MM HG: CPT | Performed by: NURSE PRACTITIONER

## 2025-03-13 RX ORDER — TRAMADOL HYDROCHLORIDE 50 MG/1
50 TABLET ORAL EVERY 8 HOURS PRN
Qty: 30 TABLET | Refills: 0 | Status: SHIPPED | OUTPATIENT
Start: 2025-03-13

## 2025-03-13 RX ORDER — TAMSULOSIN HYDROCHLORIDE 0.4 MG/1
1 CAPSULE ORAL DAILY
Qty: 30 CAPSULE | Refills: 5 | Status: SHIPPED | OUTPATIENT
Start: 2025-03-13

## 2025-03-13 NOTE — PROGRESS NOTES
"Subjective   Scott Montes is a 49 y.o. male.     Chief Complaint   Patient presents with    Vitamin D Deficiency       History of Present Illness     Vitamin D deficiency-chronic and ongoing.  Patient is presently taking vitamin D 50,000 units supplement.  No negative side effects of medication are reported.  Vitamin D level is stable with medication use.  Chrons-reports that he has had his first infusion then the first injection.  He reports after the injection, he noted significant joint discomfort for at least 24 hours and a headache for several days after.  He was weak and tired.    Back and joint pain-ongoing.  Ongoing grinding in his neck and bilateral hip pain. Generalized myalgia in his legs.  He reports that certain   Left shoulder pain-has been ongoing since he had his cardiac surgery.  He reports that it is hurting even now with no activity.  He has generalized hand stiffness and difficulty opening and closing his hand.  Shoulder is popping and grinding.  Some hand and arm numbness.  Certain shoulder positions cause sharp pain in his shoulder and currently it is throbbing \"like a tooth ache\".      The following portions of the patient's history were reviewed and updated as appropriate: CC, ROS, allergies, current medications, past family history, past medical history, past social history, past surgical history and problem list.      Review of Systems   Constitutional:  Positive for fatigue. Negative for appetite change, unexpected weight gain and unexpected weight loss.   HENT:  Negative for congestion, ear pain, postnasal drip, rhinorrhea, sore throat, swollen glands, trouble swallowing and voice change.    Eyes:  Negative for pain and visual disturbance.   Respiratory:  Negative for cough, chest tightness, shortness of breath and wheezing.    Cardiovascular:  Negative for chest pain, palpitations and leg swelling.   Gastrointestinal:  Positive for abdominal pain, diarrhea and nausea. Negative for " "blood in stool, constipation, vomiting and indigestion.   Genitourinary:  Negative for dysuria, hematuria and urgency.   Musculoskeletal:  Positive for arthralgias, back pain, gait problem, myalgias and neck stiffness. Negative for joint swelling.   Skin:  Negative for color change and skin lesions.   Allergic/Immunologic: Negative.    Neurological:  Negative for dizziness, numbness and headache.   Hematological: Negative.    Psychiatric/Behavioral:  Positive for stress. Negative for dysphoric mood, sleep disturbance and suicidal ideas. The patient is not nervous/anxious.    All other systems reviewed and are negative.      Objective     /76   Pulse 80   Resp 16   Ht 188 cm (74\")   Wt 94.5 kg (208 lb 6.4 oz)   SpO2 98%   BMI 26.76 kg/m²     Physical Exam  Vitals reviewed.   Constitutional:       General: He is not in acute distress.     Appearance: He is well-developed. He is not diaphoretic.   HENT:      Head: Normocephalic and atraumatic.      Jaw: No tenderness.      Right Ear: Hearing, tympanic membrane, ear canal and external ear normal.      Left Ear: Hearing, tympanic membrane, ear canal and external ear normal.      Nose: Nose normal. No nasal tenderness or congestion.      Right Sinus: No maxillary sinus tenderness or frontal sinus tenderness.      Left Sinus: No maxillary sinus tenderness or frontal sinus tenderness.      Mouth/Throat:      Lips: Pink.      Mouth: Mucous membranes are moist.      Pharynx: Oropharynx is clear. Uvula midline.   Eyes:      General: Lids are normal. No scleral icterus.     Extraocular Movements:      Right eye: Normal extraocular motion and no nystagmus.      Left eye: Normal extraocular motion and no nystagmus.      Conjunctiva/sclera: Conjunctivae normal.      Pupils: Pupils are equal, round, and reactive to light.   Neck:      Thyroid: No thyromegaly or thyroid tenderness.      Vascular: No carotid bruit or JVD.      Trachea: No tracheal tenderness. "   Cardiovascular:      Rate and Rhythm: Normal rate and regular rhythm.      Pulses:           Dorsalis pedis pulses are 2+ on the right side and 2+ on the left side.        Posterior tibial pulses are 2+ on the right side and 2+ on the left side.      Heart sounds: Normal heart sounds, S1 normal and S2 normal. No murmur heard.  Pulmonary:      Effort: Pulmonary effort is normal. No accessory muscle usage, prolonged expiration or respiratory distress.      Breath sounds: Normal breath sounds.   Chest:      Chest wall: No tenderness.   Abdominal:      General: Bowel sounds are normal. There is no distension.      Palpations: Abdomen is soft. There is no hepatomegaly, splenomegaly or mass.      Tenderness: There is generalized abdominal tenderness. There is no right CVA tenderness, left CVA tenderness or guarding.      Comments: Bowel sounds loud and gurgling.  Audible without auscultation.   Musculoskeletal:         General: Tenderness present.      Left shoulder: Tenderness and crepitus present. No swelling or deformity. Decreased range of motion. Decreased strength. Normal pulse.      Cervical back: Normal range of motion and neck supple. Spasms, tenderness and bony tenderness present. Pain with movement present. Decreased range of motion.      Thoracic back: Tenderness present.      Lumbar back: Spasms and tenderness present. Decreased range of motion.      Right lower leg: No edema.      Left lower leg: No edema.      Comments: No muscular atrophy or flaccidity.  Multiple areas of generalized arthralgia and myalgia noted throughout spine, hips, knees, and shoulders.     Lymphadenopathy:      Head:      Right side of head: No submental or submandibular adenopathy.      Left side of head: No submental or submandibular adenopathy.      Cervical: No cervical adenopathy.      Right cervical: No superficial cervical adenopathy.     Left cervical: No superficial cervical adenopathy.   Skin:     General: Skin is warm and  dry.      Capillary Refill: Capillary refill takes less than 2 seconds.      Coloration: Skin is not jaundiced or pale.      Findings: No erythema.      Nails: There is no clubbing.   Neurological:      Mental Status: He is alert and oriented to person, place, and time.      Cranial Nerves: No cranial nerve deficit or facial asymmetry.      Sensory: No sensory deficit.      Motor: No weakness, tremor, atrophy or abnormal muscle tone.      Coordination: Coordination normal.      Gait: Gait abnormal (moderate antalgia).      Deep Tendon Reflexes: Reflexes are normal and symmetric.   Psychiatric:         Attention and Perception: He is attentive.         Mood and Affect: Mood is depressed. Mood is not anxious.         Speech: Speech normal.         Behavior: Behavior normal. Behavior is cooperative.         Thought Content: Thought content normal.         Cognition and Memory: Cognition normal.         Judgment: Judgment normal.         Diagnoses and all orders for this visit:    1. Essential hypertension (Primary)  Overview:  With MI September 2022    Assessment & Plan:  Hypertension is stable and controlled  Continue current treatment regimen.  Stop smoking.  Continue lisinopril 10 mg      2. DDD (degenerative disc disease), cervical  -     Ambulatory Referral to Physical Therapy for Evaluation & Treatment    3. Chronic right shoulder pain  -     Ambulatory Referral to Physical Therapy for Evaluation & Treatment    4. Frequency of urination  -     tamsulosin (FLOMAX) 0.4 MG capsule 24 hr capsule; Take 1 capsule by mouth Daily.  Dispense: 30 capsule; Refill: 5    5. BPH without obstruction/lower urinary tract symptoms  Comments:  contineu flomax.  urology follow ups as needed/scheduled  Assessment & Plan:  Refill on Flomax  Report any concerns with urinary retention     Orders:  -     tamsulosin (FLOMAX) 0.4 MG capsule 24 hr capsule; Take 1 capsule by mouth Daily.  Dispense: 30 capsule; Refill: 5    6. Primary  osteoarthritis involving multiple joints  Comments:  continue conservative care  Assessment & Plan:  Continue PRN Flexeril. Continue conservative measures at home.   Avoid overuse activities.  No NSAIDs.  Referral to PT  Will continue to evaluate and treat PRN    Orders:  -     traMADol (ULTRAM) 50 MG tablet; Take 1 tablet by mouth Every 8 (Eight) Hours As Needed for Moderate Pain.  Dispense: 30 tablet; Refill: 0       Understands disease processes and need for medications.  Understands reasons for urgent and emergent care.  Patient (& family) verbalized agreement for treatment plan.   Emotional support and active listening provided.  Patient provided time to verbalize feelings.    [place]/PMDP reviewed today and consistent.  Will refill prescribed controlled medication today.  Patient is aware they cannot receive narcotics from any other provider except if under care of pain management or speciality clinic.  Risk and benefits of medication use has been reviewed.  History and physical exam exhibit continued safe and appropriate use of controlled substances.  The patient is aware of the potential for addiction and dependence.  This patient has been made aware of the appropriate use of such medications, including potential risk of somnolence, limited ability to drive and / or work safely, and potential for overdose.    It has also been made clear that these medications are for use by this patient only, without concomitant use of alcohol or other substances unless prescribed/advised by medical provider.  Patient understands they may be subject to UDS and pill counts at random.    Patient considered to be low risk for addiction due to use of single controlled medications.  Patient understands and accepts these risks.  Patient need for medication will be reassessed at each visit.  Doses will be adjusted according to patient need and findings.    Goal of [place]: Patient will not have any adverse reactions of medication.   Patient will have reduction in there chronic back and joint pain symptoms with use of tramadol as needed as directed.  Patient will be able to remain active in an outside of their home with minimal to no interference from their pain symptoms.    [place] Patient Controlled Substance Report (from 3/13/2024 to 3/13/2025)    Dispensed  Strength Quantity Days Supply Provider Pharmacy   01/25/2025 Tramadol Hydrochloride 50MG 12 each 4 [facility]   10/25/2024 Tramadol Hydrochloride 50MG 12 each 4 [facility]   06/06/2024 Tramadol Hydrochloride 50MG 9 each 3 [facility]        Return to clinic 5-6 weeks for re-evaluation, sooner if needed for symptom management.          This document has been electronically signed by:  EDDIE Thurman FNP-C Dragon disclaimer:  Part of this note may be an electronic transcription/translation of spoken language to printed text using the Dragon Dictation System.

## 2025-03-17 ENCOUNTER — APPOINTMENT (OUTPATIENT)
Dept: CT IMAGING | Facility: HOSPITAL | Age: 50
End: 2025-03-17
Payer: MEDICAID

## 2025-03-17 ENCOUNTER — HOSPITAL ENCOUNTER (EMERGENCY)
Facility: HOSPITAL | Age: 50
Discharge: HOME | End: 2025-03-17
Attending: EMERGENCY MEDICINE | Admitting: EMERGENCY MEDICINE
Payer: MEDICAID

## 2025-03-17 VITALS
OXYGEN SATURATION: 98 % | HEART RATE: 74 BPM | WEIGHT: 205 LBS | HEIGHT: 74 IN | RESPIRATION RATE: 18 BRPM | DIASTOLIC BLOOD PRESSURE: 80 MMHG | TEMPERATURE: 97.7 F | SYSTOLIC BLOOD PRESSURE: 121 MMHG | BODY MASS INDEX: 26.31 KG/M2

## 2025-03-17 DIAGNOSIS — K62.5 BRBPR (BRIGHT RED BLOOD PER RECTUM): ICD-10-CM

## 2025-03-17 DIAGNOSIS — R19.7 DIARRHEA, UNSPECIFIED TYPE: Primary | ICD-10-CM

## 2025-03-17 DIAGNOSIS — K52.9 INFLAMMATORY BOWEL DISEASE: Primary | ICD-10-CM

## 2025-03-17 PROBLEM — K50.011 CROHN'S DISEASE OF SMALL INTESTINE WITH RECTAL BLEEDING: Status: ACTIVE | Noted: 2024-12-17

## 2025-03-17 LAB
ABO GROUP BLD: NORMAL
ABO GROUP BLD: NORMAL
ALBUMIN SERPL-MCNC: 4.2 G/DL (ref 3.5–5.2)
ALBUMIN/GLOB SERPL: 1.3 G/DL
ALP SERPL-CCNC: 146 U/L (ref 39–117)
ALT SERPL W P-5'-P-CCNC: 23 U/L (ref 1–41)
ANION GAP SERPL CALCULATED.3IONS-SCNC: 11.4 MMOL/L (ref 5–15)
APTT PPP: 30.4 SECONDS (ref 24.5–35.9)
AST SERPL-CCNC: 20 U/L (ref 1–40)
BASOPHILS # BLD AUTO: 0.1 10*3/MM3 (ref 0–0.2)
BASOPHILS NFR BLD AUTO: 0.8 % (ref 0–1.5)
BILIRUB SERPL-MCNC: 0.6 MG/DL (ref 0–1.2)
BLD GP AB SCN SERPL QL: NEGATIVE
BUN SERPL-MCNC: 12 MG/DL (ref 6–20)
BUN/CREAT SERPL: 14.6 (ref 7–25)
CALCIUM SPEC-SCNC: 9.5 MG/DL (ref 8.6–10.5)
CHLORIDE SERPL-SCNC: 104 MMOL/L (ref 98–107)
CO2 SERPL-SCNC: 24.6 MMOL/L (ref 22–29)
CREAT SERPL-MCNC: 0.82 MG/DL (ref 0.76–1.27)
DEPRECATED RDW RBC AUTO: 45.7 FL (ref 37–54)
EGFRCR SERPLBLD CKD-EPI 2021: 107 ML/MIN/1.73
EOSINOPHIL # BLD AUTO: 0.12 10*3/MM3 (ref 0–0.4)
EOSINOPHIL NFR BLD AUTO: 1 % (ref 0.3–6.2)
ERYTHROCYTE [DISTWIDTH] IN BLOOD BY AUTOMATED COUNT: 13.3 % (ref 12.3–15.4)
GLOBULIN UR ELPH-MCNC: 3.3 GM/DL
GLUCOSE SERPL-MCNC: 118 MG/DL (ref 65–99)
HCT VFR BLD AUTO: 44.8 % (ref 37.5–51)
HGB BLD-MCNC: 14.2 G/DL (ref 13–17.7)
HOLD SPECIMEN: NORMAL
HOLD SPECIMEN: NORMAL
IMM GRANULOCYTES # BLD AUTO: 0.05 10*3/MM3 (ref 0–0.05)
IMM GRANULOCYTES NFR BLD AUTO: 0.4 % (ref 0–0.5)
INR PPP: 0.89 (ref 0.9–1.1)
LYMPHOCYTES # BLD AUTO: 2.54 10*3/MM3 (ref 0.7–3.1)
LYMPHOCYTES NFR BLD AUTO: 20.9 % (ref 19.6–45.3)
MCH RBC QN AUTO: 29.2 PG (ref 26.6–33)
MCHC RBC AUTO-ENTMCNC: 31.7 G/DL (ref 31.5–35.7)
MCV RBC AUTO: 92.2 FL (ref 79–97)
MONOCYTES # BLD AUTO: 0.97 10*3/MM3 (ref 0.1–0.9)
MONOCYTES NFR BLD AUTO: 8 % (ref 5–12)
NEUTROPHILS NFR BLD AUTO: 68.9 % (ref 42.7–76)
NEUTROPHILS NFR BLD AUTO: 8.4 10*3/MM3 (ref 1.7–7)
NRBC BLD AUTO-RTO: 0 /100 WBC (ref 0–0.2)
PLATELET # BLD AUTO: 294 10*3/MM3 (ref 140–450)
PMV BLD AUTO: 11.2 FL (ref 6–12)
POTASSIUM SERPL-SCNC: 4.4 MMOL/L (ref 3.5–5.2)
PROT SERPL-MCNC: 7.5 G/DL (ref 6–8.5)
PROTHROMBIN TIME: 12.1 SECONDS (ref 11.6–15.1)
RBC # BLD AUTO: 4.86 10*6/MM3 (ref 4.14–5.8)
RH BLD: NEGATIVE
RH BLD: NEGATIVE
SODIUM SERPL-SCNC: 140 MMOL/L (ref 136–145)
T&S EXPIRATION DATE: NORMAL
WBC NRBC COR # BLD AUTO: 12.18 10*3/MM3 (ref 3.4–10.8)
WHOLE BLOOD HOLD COAG: NORMAL
WHOLE BLOOD HOLD SPECIMEN: NORMAL

## 2025-03-17 PROCEDURE — 99285 EMERGENCY DEPT VISIT HI MDM: CPT

## 2025-03-17 PROCEDURE — 86900 BLOOD TYPING SEROLOGIC ABO: CPT | Performed by: PHYSICIAN ASSISTANT

## 2025-03-17 PROCEDURE — 80053 COMPREHEN METABOLIC PANEL: CPT | Performed by: PHYSICIAN ASSISTANT

## 2025-03-17 PROCEDURE — 85610 PROTHROMBIN TIME: CPT | Performed by: PHYSICIAN ASSISTANT

## 2025-03-17 PROCEDURE — 74178 CT ABD&PLV WO CNTR FLWD CNTR: CPT

## 2025-03-17 PROCEDURE — 36415 COLL VENOUS BLD VENIPUNCTURE: CPT | Performed by: EMERGENCY MEDICINE

## 2025-03-17 PROCEDURE — 86901 BLOOD TYPING SEROLOGIC RH(D): CPT | Performed by: PHYSICIAN ASSISTANT

## 2025-03-17 PROCEDURE — 86901 BLOOD TYPING SEROLOGIC RH(D): CPT

## 2025-03-17 PROCEDURE — 25510000001 IOPAMIDOL 61 % SOLUTION: Performed by: EMERGENCY MEDICINE

## 2025-03-17 PROCEDURE — 85730 THROMBOPLASTIN TIME PARTIAL: CPT | Performed by: PHYSICIAN ASSISTANT

## 2025-03-17 PROCEDURE — 85025 COMPLETE CBC W/AUTO DIFF WBC: CPT | Performed by: PHYSICIAN ASSISTANT

## 2025-03-17 PROCEDURE — 86850 RBC ANTIBODY SCREEN: CPT | Performed by: PHYSICIAN ASSISTANT

## 2025-03-17 PROCEDURE — 86900 BLOOD TYPING SEROLOGIC ABO: CPT

## 2025-03-17 RX ORDER — IOPAMIDOL 612 MG/ML
100 INJECTION, SOLUTION INTRAVASCULAR
Status: COMPLETED | OUTPATIENT
Start: 2025-03-17 | End: 2025-03-17

## 2025-03-17 RX ORDER — METRONIDAZOLE 500 MG/1
500 TABLET ORAL 3 TIMES DAILY
Qty: 30 TABLET | Refills: 0 | Status: SHIPPED | OUTPATIENT
Start: 2025-03-17

## 2025-03-17 RX ORDER — CIPROFLOXACIN 500 MG/1
500 TABLET, FILM COATED ORAL 2 TIMES DAILY
Qty: 20 TABLET | Refills: 0 | Status: SHIPPED | OUTPATIENT
Start: 2025-03-17

## 2025-03-17 RX ADMIN — IOPAMIDOL 90 ML: 612 INJECTION, SOLUTION INTRAVENOUS at 10:47

## 2025-03-17 NOTE — ED PROVIDER NOTES
Subjective   History of Present Illness  49-year-old male presents secondary to blood in his stool.  Patient has a past medical history of Crohn's disease along with ulcerative colitis.  He is followed by Dr. Jocelyne Terry.  Patient also has a past medical history of hyperlipidemia, BPH, MI, hypertension.  Patient denies any abdominal pain at this point.  He states this morning he had bright red blood per rectum.  He started Stelara 1 week ago.      Review of Systems   Constitutional: Negative.  Negative for fever.   HENT: Negative.     Respiratory: Negative.     Cardiovascular: Negative.  Negative for chest pain.   Gastrointestinal:  Positive for blood in stool.   Endocrine: Negative.    Genitourinary: Negative.  Negative for dysuria.   Skin: Negative.    Neurological: Negative.    Psychiatric/Behavioral: Negative.     All other systems reviewed and are negative.      Past Medical History:   Diagnosis Date    Anemia     Arthritis     BPH without obstruction/lower urinary tract symptoms 4/2/2024    Coronary artery disease     Crohn's disease     Elevated cholesterol     Erectile dysfunction     GERD (gastroesophageal reflux disease)     H/O blood clots     Heart attack     Hormone disorder 9 years    Hypertension     Interstitial cystitis     Low back pain     STEMI (ST elevation myocardial infarction) 09/06/2022    Ulcerative colitis        No Known Allergies    Past Surgical History:   Procedure Laterality Date    CARDIAC CATHETERIZATION N/A 09/06/2022    Procedure: Left Heart Cath;  Surgeon: Matthew Hill MD;  Location: [facility] CATH INVASIVE LOCATION;  Service: Cardiovascular;  Laterality: N/A;    CARDIAC CATHETERIZATION N/A 09/06/2022    Procedure: Percutaneous Coronary Intervention;  Surgeon: Matthew Hill MD;  Location: [facility] CATH INVASIVE LOCATION;  Service: Cardiovascular;  Laterality: N/A;    CARDIAC SURGERY      CHOLECYSTECTOMY N/A 11/13/2023    Procedure: CHOLECYSTECTOMY  LAPAROSCOPIC WITH DAVINCI ROBOT;  Surgeon: Gonzalo Rudolph MD;  Location: [facility] OR;  Service: Robotics - DaVinci;  Laterality: N/A;    COLONOSCOPY N/A 07/12/2022    Procedure: COLONOSCOPY;  Surgeon: Stella Aparicio MD;  Location: [facility] OR;  Service: Gastroenterology;  Laterality: N/A;    CYST REMOVAL      shoulder    FRACTURE SURGERY Right     foot/toes    VASCULAR SURGERY      VASECTOMY         Family History   Problem Relation Age of Onset    Colon cancer Mother     Cancer Father        Social History     Socioeconomic History    Marital status:    Tobacco Use    Smoking status: Every Day     Current packs/day: 0.50     Average packs/day: 0.5 packs/day for 35.0 years (17.5 ttl pk-yrs)     Types: Cigarettes     Passive exposure: Current    Smokeless tobacco: Never    Tobacco comments:     Smoking cessation encouraged   Vaping Use    Vaping status: Every Day    Substances: Nicotine    Devices: Disposable   Substance and Sexual Activity    Alcohol use: No    Drug use: No    Sexual activity: Yes     Partners: Female     Birth control/protection: Vasectomy           Objective   Physical Exam  Vitals and nursing note reviewed.   Constitutional:       General: He is not in acute distress.     Appearance: He is well-developed. He is not diaphoretic.   HENT:      Head: Normocephalic and atraumatic.      Right Ear: External ear normal.      Left Ear: External ear normal.      Nose: Nose normal.   Eyes:      Conjunctiva/sclera: Conjunctivae normal.      Pupils: Pupils are equal, round, and reactive to light.   Neck:      Vascular: No JVD.      Trachea: No tracheal deviation.   Cardiovascular:      Rate and Rhythm: Normal rate and regular rhythm.      Heart sounds: Normal heart sounds. No murmur heard.  Pulmonary:      Effort: Pulmonary effort is normal. No respiratory distress.      Breath sounds: Normal breath sounds. No wheezing.   Abdominal:      General: Bowel sounds are normal.      Palpations:  Abdomen is soft.      Tenderness: There is no abdominal tenderness.   Musculoskeletal:         General: No deformity. Normal range of motion.      Cervical back: Normal range of motion and neck supple.   Skin:     General: Skin is warm and dry.      Coloration: Skin is not pale.      Findings: No erythema or rash.   Neurological:      Mental Status: He is alert and oriented to person, place, and time.      Cranial Nerves: No cranial nerve deficit.   Psychiatric:         Behavior: Behavior normal.         Thought Content: Thought content normal.         Procedures           ED Course  ED Course as of 03/17/25 1457   Mon Mar 17, 2025   1133 CT back.  We will try to contact gastroenterologist Dr. Mara Casanova [JI]   1145 Reviewed with Elvin Davis's physician assistant.  They will call him in some steroid taper after reviewing with her.  We will place him on antibiotics for the right sided perirectal swollen area.  Patient was counseled at signs and symptoms worsen with appropriate follow-up.  He voices understanding. [JI]      ED Course User Index  [JI] Michael Simon PA                                           Results for orders placed or performed during the hospital encounter of 03/17/25   Comprehensive Metabolic Panel    Collection Time: 03/17/25  9:49 AM    Specimen: Blood   Result Value Ref Range    Glucose 118 (H) 65 - 99 mg/dL    BUN 12 6 - 20 mg/dL    Creatinine 0.82 0.76 - 1.27 mg/dL    Sodium 140 136 - 145 mmol/L    Potassium 4.4 3.5 - 5.2 mmol/L    Chloride 104 98 - 107 mmol/L    CO2 24.6 22.0 - 29.0 mmol/L    Calcium 9.5 8.6 - 10.5 mg/dL    Total Protein 7.5 6.0 - 8.5 g/dL    Albumin 4.2 3.5 - 5.2 g/dL    ALT (SGPT) 23 1 - 41 U/L    AST (SGOT) 20 1 - 40 U/L    Alkaline Phosphatase 146 (H) 39 - 117 U/L    Total Bilirubin 0.6 0.0 - 1.2 mg/dL    Globulin 3.3 gm/dL    A/G Ratio 1.3 g/dL    BUN/Creatinine Ratio 14.6 7.0 - 25.0    Anion Gap 11.4 5.0 - 15.0 mmol/L    eGFR 107.0 >60.0 mL/min/1.73    Protime-INR    Collection Time: 03/17/25  9:49 AM    Specimen: Blood   Result Value Ref Range    Protime 12.1 11.6 - 15.1 Seconds    INR 0.89 (L) 0.90 - 1.10   aPTT    Collection Time: 03/17/25  9:49 AM    Specimen: Blood   Result Value Ref Range    PTT 30.4 24.5 - 35.9 seconds   CBC Auto Differential    Collection Time: 03/17/25  9:49 AM    Specimen: Blood   Result Value Ref Range    WBC 12.18 (H) 3.40 - 10.80 10*3/mm3    RBC 4.86 4.14 - 5.80 10*6/mm3    Hemoglobin 14.2 13.0 - 17.7 g/dL    Hematocrit 44.8 37.5 - 51.0 %    MCV 92.2 79.0 - 97.0 fL    MCH 29.2 26.6 - 33.0 pg    MCHC 31.7 31.5 - 35.7 g/dL    RDW 13.3 12.3 - 15.4 %    RDW-SD 45.7 37.0 - 54.0 fl    MPV 11.2 6.0 - 12.0 fL    Platelets 294 140 - 450 10*3/mm3    Neutrophil % 68.9 42.7 - 76.0 %    Lymphocyte % 20.9 19.6 - 45.3 %    Monocyte % 8.0 5.0 - 12.0 %    Eosinophil % 1.0 0.3 - 6.2 %    Basophil % 0.8 0.0 - 1.5 %    Immature Grans % 0.4 0.0 - 0.5 %    Neutrophils, Absolute 8.40 (H) 1.70 - 7.00 10*3/mm3    Lymphocytes, Absolute 2.54 0.70 - 3.10 10*3/mm3    Monocytes, Absolute 0.97 (H) 0.10 - 0.90 10*3/mm3    Eosinophils, Absolute 0.12 0.00 - 0.40 10*3/mm3    Basophils, Absolute 0.10 0.00 - 0.20 10*3/mm3    Immature Grans, Absolute 0.05 0.00 - 0.05 10*3/mm3    nRBC 0.0 0.0 - 0.2 /100 WBC   Type & Screen    Collection Time: 03/17/25  9:49 AM    Specimen: Blood   Result Value Ref Range    ABO Type O     RH type Negative     Antibody Screen Negative     T&S Expiration Date 3/20/2025 11:59:59 PM    Green Top (Gel)    Collection Time: 03/17/25  9:49 AM   Result Value Ref Range    Extra Tube Hold for add-ons.    Lavender Top    Collection Time: 03/17/25  9:49 AM   Result Value Ref Range    Extra Tube hold for add-on    Gold Top - SST    Collection Time: 03/17/25  9:49 AM   Result Value Ref Range    Extra Tube Hold for add-ons.    Light Blue Top    Collection Time: 03/17/25  9:49 AM   Result Value Ref Range    Extra Tube Hold for add-ons.    ABO RH Specimen  Verification    Collection Time: 03/17/25 10:24 AM    Specimen: Blood   Result Value Ref Range    ABO Type O     RH type Negative      CT Abdomen Pelvis With & Without Contrast  Result Date: 3/17/2025  1.  Stable right lower lobe 9 mm pulmonary nodule. 2.  Stable spiculated left upper lobe pulmonary nodule measuring 8 mm.  This report was finalized on 3/17/2025 11:30 AM by Dr. Jed Laguerre MD.                  Medical Decision Making  49-year-old male presents secondary to blood in his stool.  Patient has a past medical history of Crohn's disease along with ulcerative colitis.  He is followed by Dr. Jocelyne Terry.  Patient also has a past medical history of hyperlipidemia, BPH, MI, hypertension.  Patient denies any abdominal pain at this point.  He states this morning he had bright red blood per rectum.  He started Stelara 1 week ago    Problems Addressed:  Inflammatory bowel disease: complicated acute illness or injury    Amount and/or Complexity of Data Reviewed  Labs: ordered. Decision-making details documented in ED Course.  Radiology: ordered. Decision-making details documented in ED Course.    Risk  Prescription drug management.        Final diagnoses:   Inflammatory bowel disease       ED Disposition  ED Disposition       ED Disposition   Discharge    Condition   Stable    Comment   --               Stella Aparicio MD  [address] [address]  [phone]    Schedule an appointment as soon as possible for a visit       [facility] EMERGENCY DEPARTMENT  [address] [place] [id number] [phone]             Medication List        New Prescriptions      ciprofloxacin 500 MG tablet  Commonly known as: CIPRO  Take 1 tablet by mouth 2 (Two) Times a Day.     metroNIDAZOLE 500 MG tablet  Commonly known as: FLAGYL  Take 1 tablet by mouth 3 (Three) Times a Day.            Stop      predniSONE 10 MG tablet  Commonly known as: DELTASONE               Where to  Get Your Medications        You can get these medications from any pharmacy    Bring a paper prescription for each of these medications  ciprofloxacin 500 MG tablet  metroNIDAZOLE 500 MG tablet            Michael Simon PA  03/17/25 [id number]

## 2025-03-18 ENCOUNTER — LAB (OUTPATIENT)
Dept: FAMILY MEDICINE CLINIC | Facility: CLINIC | Age: 50
End: 2025-03-18
Payer: MEDICAID

## 2025-03-18 DIAGNOSIS — R19.7 DIARRHEA, UNSPECIFIED TYPE: ICD-10-CM

## 2025-03-18 DIAGNOSIS — K62.5 BRBPR (BRIGHT RED BLOOD PER RECTUM): ICD-10-CM

## 2025-03-18 LAB
027 TOXIN: NORMAL
ADV 40+41 DNA STL QL NAA+NON-PROBE: NOT DETECTED
ASTRO TYP 1-8 RNA STL QL NAA+NON-PROBE: NOT DETECTED
C CAYETANENSIS DNA STL QL NAA+NON-PROBE: NOT DETECTED
C COLI+JEJ+UPSA DNA STL QL NAA+NON-PROBE: NOT DETECTED
C DIFF TOX GENS STL QL NAA+PROBE: NEGATIVE
CRYPTOSP DNA STL QL NAA+NON-PROBE: NOT DETECTED
E HISTOLYT DNA STL QL NAA+NON-PROBE: NOT DETECTED
EAEC PAA PLAS AGGR+AATA ST NAA+NON-PRB: NOT DETECTED
EC STX1+STX2 GENES STL QL NAA+NON-PROBE: NOT DETECTED
EPEC EAE GENE STL QL NAA+NON-PROBE: NOT DETECTED
ETEC LTA+ST1A+ST1B TOX ST NAA+NON-PROBE: NOT DETECTED
G LAMBLIA DNA STL QL NAA+NON-PROBE: NOT DETECTED
NOROVIRUS GI+II RNA STL QL NAA+NON-PROBE: NOT DETECTED
P SHIGELLOIDES DNA STL QL NAA+NON-PROBE: NOT DETECTED
RVA RNA STL QL NAA+NON-PROBE: NOT DETECTED
S ENT+BONG DNA STL QL NAA+NON-PROBE: NOT DETECTED
SAPO I+II+IV+V RNA STL QL NAA+NON-PROBE: NOT DETECTED
SHIGELLA SP+EIEC IPAH ST NAA+NON-PROBE: NOT DETECTED
V CHOL+PARA+VUL DNA STL QL NAA+NON-PROBE: NOT DETECTED
V CHOLERAE DNA STL QL NAA+NON-PROBE: NOT DETECTED
Y ENTEROCOL DNA STL QL NAA+NON-PROBE: NOT DETECTED

## 2025-03-18 PROCEDURE — 83993 ASSAY FOR CALPROTECTIN FECAL: CPT

## 2025-03-18 PROCEDURE — 87209 SMEAR COMPLEX STAIN: CPT

## 2025-03-18 PROCEDURE — 87177 OVA AND PARASITES SMEARS: CPT

## 2025-03-18 PROCEDURE — 87493 C DIFF AMPLIFIED PROBE: CPT

## 2025-03-18 PROCEDURE — 87507 IADNA-DNA/RNA PROBE TQ 12-25: CPT

## 2025-03-18 NOTE — ASSESSMENT & PLAN NOTE
Hypertension is stable and controlled  Continue current treatment regimen.  Stop smoking.  Continue lisinopril 10 mg

## 2025-03-18 NOTE — ASSESSMENT & PLAN NOTE
Continue PRN Flexeril. Continue conservative measures at home.   Avoid overuse activities.  No NSAIDs.  Referral to PT  Will continue to evaluate and treat PRN

## 2025-03-19 ENCOUNTER — RESULTS FOLLOW-UP (OUTPATIENT)
Dept: GASTROENTEROLOGY | Facility: CLINIC | Age: 50
End: 2025-03-19
Payer: MEDICAID

## 2025-03-19 LAB
O+P SPEC MICRO: NORMAL
O+P STL CONC: NORMAL

## 2025-03-20 ENCOUNTER — OFFICE VISIT (OUTPATIENT)
Dept: GASTROENTEROLOGY | Facility: CLINIC | Age: 50
End: 2025-03-20
Payer: MEDICAID

## 2025-03-20 VITALS
BODY MASS INDEX: 26.41 KG/M2 | HEART RATE: 79 BPM | HEIGHT: 74 IN | WEIGHT: 205.8 LBS | SYSTOLIC BLOOD PRESSURE: 114 MMHG | DIASTOLIC BLOOD PRESSURE: 63 MMHG

## 2025-03-20 DIAGNOSIS — K50.00 CROHN'S DISEASE OF SMALL INTESTINE WITHOUT COMPLICATION: Primary | ICD-10-CM

## 2025-03-20 DIAGNOSIS — E55.9 VITAMIN D DEFICIENCY: ICD-10-CM

## 2025-03-20 DIAGNOSIS — K21.9 GASTROESOPHAGEAL REFLUX DISEASE, UNSPECIFIED WHETHER ESOPHAGITIS PRESENT: ICD-10-CM

## 2025-03-20 DIAGNOSIS — K62.5 BRBPR (BRIGHT RED BLOOD PER RECTUM): ICD-10-CM

## 2025-03-20 LAB — CALPROTECTIN STL-MCNT: 715 UG/G (ref 0–120)

## 2025-03-20 PROCEDURE — 99214 OFFICE O/P EST MOD 30 MIN: CPT | Performed by: INTERNAL MEDICINE

## 2025-03-20 PROCEDURE — 1159F MED LIST DOCD IN RCRD: CPT | Performed by: INTERNAL MEDICINE

## 2025-03-20 PROCEDURE — 1160F RVW MEDS BY RX/DR IN RCRD: CPT | Performed by: INTERNAL MEDICINE

## 2025-03-20 PROCEDURE — 3074F SYST BP LT 130 MM HG: CPT | Performed by: INTERNAL MEDICINE

## 2025-03-20 PROCEDURE — 3078F DIAST BP <80 MM HG: CPT | Performed by: INTERNAL MEDICINE

## 2025-03-21 ENCOUNTER — LAB (OUTPATIENT)
Dept: LAB | Facility: HOSPITAL | Age: 50
End: 2025-03-21
Payer: MEDICAID

## 2025-03-21 DIAGNOSIS — K50.011 CROHN'S DISEASE OF SMALL INTESTINE WITH RECTAL BLEEDING: ICD-10-CM

## 2025-03-21 DIAGNOSIS — K50.00 CROHN'S DISEASE OF SMALL INTESTINE WITHOUT COMPLICATION: Primary | ICD-10-CM

## 2025-03-21 PROCEDURE — 81335 TPMT GENE COM VARIANTS: CPT | Performed by: INTERNAL MEDICINE

## 2025-03-21 PROCEDURE — 81306 NUDT15 GENE COMMON VARIANTS: CPT | Performed by: INTERNAL MEDICINE

## 2025-03-21 RX ORDER — DIPHENHYDRAMINE HYDROCHLORIDE 50 MG/ML
50 INJECTION INTRAMUSCULAR; INTRAVENOUS ONCE
OUTPATIENT
Start: 2025-03-21 | End: 2025-03-21

## 2025-03-21 RX ORDER — DIPHENHYDRAMINE HCL 50 MG
50 CAPSULE ORAL ONCE
OUTPATIENT
Start: 2025-03-21 | End: 2025-03-21

## 2025-04-01 ENCOUNTER — INFUSION (OUTPATIENT)
Dept: ONCOLOGY | Facility: HOSPITAL | Age: 50
End: 2025-04-01
Payer: MEDICAID

## 2025-04-01 ENCOUNTER — RESULTS FOLLOW-UP (OUTPATIENT)
Dept: PHARMACY | Facility: HOSPITAL | Age: 50
End: 2025-04-01
Payer: MEDICAID

## 2025-04-01 VITALS
TEMPERATURE: 97.8 F | BODY MASS INDEX: 26.38 KG/M2 | HEART RATE: 73 BPM | OXYGEN SATURATION: 99 % | RESPIRATION RATE: 18 BRPM | SYSTOLIC BLOOD PRESSURE: 134 MMHG | WEIGHT: 205.5 LBS | DIASTOLIC BLOOD PRESSURE: 84 MMHG

## 2025-04-01 DIAGNOSIS — K50.011 CROHN'S DISEASE OF SMALL INTESTINE WITH RECTAL BLEEDING: Primary | ICD-10-CM

## 2025-04-01 LAB
ALBUMIN SERPL-MCNC: 4.1 G/DL (ref 3.5–5.2)
ALBUMIN/GLOB SERPL: 1.5 G/DL
ALP SERPL-CCNC: 130 U/L (ref 39–117)
ALT SERPL W P-5'-P-CCNC: 21 U/L (ref 1–41)
ANION GAP SERPL CALCULATED.3IONS-SCNC: 11 MMOL/L (ref 5–15)
AST SERPL-CCNC: 14 U/L (ref 1–40)
BILIRUB SERPL-MCNC: 0.5 MG/DL (ref 0–1.2)
BUN SERPL-MCNC: 13 MG/DL (ref 6–20)
BUN/CREAT SERPL: 17.1 (ref 7–25)
CALCIUM SPEC-SCNC: 8.7 MG/DL (ref 8.6–10.5)
CHLORIDE SERPL-SCNC: 107 MMOL/L (ref 98–107)
CO2 SERPL-SCNC: 23 MMOL/L (ref 22–29)
CREAT SERPL-MCNC: 0.76 MG/DL (ref 0.76–1.27)
EGFRCR SERPLBLD CKD-EPI 2021: 109.5 ML/MIN/1.73
GLOBULIN UR ELPH-MCNC: 2.7 GM/DL
GLUCOSE SERPL-MCNC: 107 MG/DL (ref 65–99)
POTASSIUM SERPL-SCNC: 4.3 MMOL/L (ref 3.5–5.2)
PROT SERPL-MCNC: 6.8 G/DL (ref 6–8.5)
SODIUM SERPL-SCNC: 141 MMOL/L (ref 136–145)

## 2025-04-01 PROCEDURE — 25810000003 SODIUM CHLORIDE 0.9 % SOLUTION 250 ML FLEX CONT: Performed by: INTERNAL MEDICINE

## 2025-04-01 PROCEDURE — 80053 COMPREHEN METABOLIC PANEL: CPT

## 2025-04-01 PROCEDURE — 25010000002 RISANKIZUMAB-RZAA 600 MG/10ML SOLUTION 10 ML VIAL: Performed by: INTERNAL MEDICINE

## 2025-04-01 PROCEDURE — 96365 THER/PROPH/DIAG IV INF INIT: CPT

## 2025-04-01 RX ORDER — DIPHENHYDRAMINE HYDROCHLORIDE 50 MG/ML
50 INJECTION, SOLUTION INTRAMUSCULAR; INTRAVENOUS ONCE
OUTPATIENT
Start: 2025-04-29 | End: 2025-04-29

## 2025-04-01 RX ORDER — DIPHENHYDRAMINE HCL 25 MG
50 CAPSULE ORAL ONCE
OUTPATIENT
Start: 2025-04-29 | End: 2025-04-29

## 2025-04-01 RX ADMIN — SODIUM CHLORIDE 600 MG: 9 INJECTION, SOLUTION INTRAVENOUS at 11:20

## 2025-04-01 NOTE — PROGRESS NOTES
Your recent labs revealed a decline in your alkaline phosphatase to 130.  This appears to fluctuate.  TPMT genetics were normal indicating normal metabolism of a medication called azathioprine.  This is a medication that we can use if needed in the future to treat Crohn's disease.  Please keep your follow-up appointment

## 2025-04-01 NOTE — LETTER
April 1, 2025    Scott Montes   [address]  [place] [address]      Scott,     Your recent labs revealed a decline in your alkaline phosphatase to 130. This appears to fluctuate. TPMT genetics were normal indicating normal metabolism of a medication called azathioprine. This is a medication that we can use if needed in the future to treat Crohn's disease. Please keep your follow-up appointment       Thank you,      Stella Aparicio MD

## 2025-04-20 DIAGNOSIS — E55.9 VITAMIN D DEFICIENCY: ICD-10-CM

## 2025-04-21 RX ORDER — ERGOCALCIFEROL 1.25 MG/1
50000 CAPSULE, LIQUID FILLED ORAL WEEKLY
Qty: 4 CAPSULE | Refills: 5 | OUTPATIENT
Start: 2025-04-21

## 2025-04-22 ENCOUNTER — SPECIALTY PHARMACY (OUTPATIENT)
Dept: PHARMACY | Facility: HOSPITAL | Age: 50
End: 2025-04-22
Payer: MEDICAID

## 2025-04-22 NOTE — PROGRESS NOTES
Specialty Pharmacy Patient Management Program  Medication Management Clinic Refill Outreach      Scott was contacted today regarding refills of his medication(s).    Specialty medication(s) and dose(s) confirmed: repatha sureclick    Refill Questions      Flowsheet Row Most Recent Value   Changes to allergies? No   Changes to medications? No   New conditions or infections since last clinic visit No   Unplanned office visit, urgent care, ED, or hospital admission in the last 4 weeks  No   How does patient/caregiver feel medication is working? Very good   Financial problems or insurance changes  No   Since the previous refill, were any specialty medication doses or scheduled injections missed or delayed?  No   Does this patient require a clinical escalation to a pharmacist? No          Delivery Questions      Flowsheet Row Most Recent Value   Delivery method [employer]   Delivery address verified with patient/caregiver? Yes   Delivery address Home   Number of medications in delivery 1   Medication(s) being filled and delivered Evolocumab (REPATHA)   Doses left of specialty medications 0   Copay verified? Yes   Copay amount $0.00   Copay form of payment No copayment ($0)   Delivery Date Selection 04/24/25   Signature Required Yes   Do you consent to receive electronic handouts?  Yes            Follow-Up: 84 days    Mayela Rader, PharmD  4/22/2025  15:55 EDT

## 2025-04-24 ENCOUNTER — OFFICE VISIT (OUTPATIENT)
Dept: FAMILY MEDICINE CLINIC | Facility: CLINIC | Age: 50
End: 2025-04-24
Payer: MEDICAID

## 2025-04-24 VITALS
SYSTOLIC BLOOD PRESSURE: 110 MMHG | BODY MASS INDEX: 25.28 KG/M2 | RESPIRATION RATE: 16 BRPM | WEIGHT: 197 LBS | HEIGHT: 74 IN | DIASTOLIC BLOOD PRESSURE: 82 MMHG | OXYGEN SATURATION: 98 % | HEART RATE: 81 BPM

## 2025-04-24 DIAGNOSIS — Z13.1 DIABETES MELLITUS SCREENING: ICD-10-CM

## 2025-04-24 DIAGNOSIS — I10 ESSENTIAL HYPERTENSION: Primary | ICD-10-CM

## 2025-04-24 DIAGNOSIS — E78.5 DYSLIPIDEMIA, GOAL LDL BELOW 70: ICD-10-CM

## 2025-04-24 DIAGNOSIS — E55.9 VITAMIN D DEFICIENCY: ICD-10-CM

## 2025-04-24 DIAGNOSIS — M15.0 PRIMARY OSTEOARTHRITIS INVOLVING MULTIPLE JOINTS: ICD-10-CM

## 2025-04-24 DIAGNOSIS — Z13.29 THYROID DISORDER SCREEN: ICD-10-CM

## 2025-04-24 PROCEDURE — 3074F SYST BP LT 130 MM HG: CPT | Performed by: NURSE PRACTITIONER

## 2025-04-24 PROCEDURE — 1160F RVW MEDS BY RX/DR IN RCRD: CPT | Performed by: NURSE PRACTITIONER

## 2025-04-24 PROCEDURE — 99214 OFFICE O/P EST MOD 30 MIN: CPT | Performed by: NURSE PRACTITIONER

## 2025-04-24 PROCEDURE — 3079F DIAST BP 80-89 MM HG: CPT | Performed by: NURSE PRACTITIONER

## 2025-04-24 PROCEDURE — 1126F AMNT PAIN NOTED NONE PRSNT: CPT | Performed by: NURSE PRACTITIONER

## 2025-04-24 PROCEDURE — 1159F MED LIST DOCD IN RCRD: CPT | Performed by: NURSE PRACTITIONER

## 2025-04-24 RX ORDER — TRAMADOL HYDROCHLORIDE 50 MG/1
50 TABLET ORAL EVERY 8 HOURS PRN
Qty: 30 TABLET | Refills: 0 | Status: SHIPPED | OUTPATIENT
Start: 2025-04-24

## 2025-04-24 RX ORDER — MULTIVIT-MIN/IRON/FOLIC ACID/K 18-600-40
6000 CAPSULE ORAL DAILY
Qty: 90 CAPSULE | Refills: 2 | Status: SHIPPED | OUTPATIENT
Start: 2025-04-24

## 2025-04-24 NOTE — PATIENT INSTRUCTIONS
Health Risks of Smoking  Smoking tobacco is very bad for your health. Tobacco smoke contains many toxic chemicals that can damage every part of your body. Secondhand smoke can be harmful to those around you. Tobacco or nicotine use can cause many long-term (chronic) diseases.  Smoking is difficult to quit because a chemical in tobacco, called nicotine, causes addiction or dependence. When you smoke and inhale, nicotine is absorbed quickly into the bloodstream through your lungs. Both inhaled and non-inhaled nicotine may be addictive.  How can quitting affect me?  There are health benefits of quitting smoking. Some benefits happen right away and others take time. Benefits may include:  Blood flow, blood pressure, heart rate, and lung capacity may begin to improve. However, any lung damage that has already occurred cannot be repaired.  Temporary respiratory symptoms, such as nasal congestion and cough, may improve over time.  Your risk of heart disease, stroke, and cancer is reduced.  The overall quality of your health may improve.  You may save money, as you will not spend money on tobacco products and may spend less money on smoking-related health issues.  What can increase my risk?  Smoking harms nearly every organ in the body. People who smoke tobacco have a shorter life expectancy and an increased risk of many serious medical problems. These include:  More respiratory infections, such as colds and pneumonia.  Cancer.  Heart disease.  Stroke.  Chronic respiratory diseases.  Delayed wound healing and increased risk of complications during surgery.  Problems with reproduction, pregnancy, and childbirth, such as infertility, early (premature) births, stillbirths, and birth defects.  Secondhand smoke exposure to children increases the risk of:  Sudden infant death syndrome (SIDS).  Infections in the nose, throat, or airways (respiratory infections).  Chronic respiratory symptoms.  What actions can I take to  quit?  Smoking is an addiction that affects both your body and your mind, and long-time habits can be hard to change. Your health care provider can recommend:  Nicotine replacement products, such as patches, gum, and nasal sprays. Use these products only as directed. Do not replace cigarette smoking with electronic cigarettes, which are commonly called e-cigarettes. The safety of e-cigarettes is not known, and some may contain harmful chemicals.  Programs and community resources, which may include group support, education, or talk therapy.  Prescription medicines to help reduce cravings.  A combination of two or more quit methods, which will increase the success of quitting.  Where to find support  Follow the recommendations from your health care provider about support groups and other assistance. You can also visit:  [place] American Quitline Consortium: www.Taifatechline.org or call 8-443-QUIT-NOW.  U.S. Department of Health and Human Services: www.smokefree.gov  American Lung Association: www.freedomfromsmoking.org  American Heart Association: www.heart.org  Where to find more information  Centers for Disease Control and Prevention: www.cdc.gov  World Health Organization: www.who.int  Summary  Smoking tobacco is very bad for your health. Tobacco smoke contains many toxic chemicals that can damage every part of the body.  Smoking is difficult to quit because a chemical in tobacco, called nicotine, causes addiction or dependence.  There are immediate and long-term health benefits of quitting smoking.  A combination of two or more quit methods increases the success of quitting.  This information is not intended to replace advice given to you by your health care provider. Make sure you discuss any questions you have with your health care provider.  Document Revised: 08/22/2022 Document Reviewed: 02/01/2021  Elsevier Patient Education © 2022 Elsevier Inc.     Steps to Quit Smoking  Smoking tobacco is the leading cause of  preventable death. It can affect almost every organ in the body. Smoking puts you and those around you at risk for developing many serious chronic diseases. Quitting smoking can be difficult, but it is one of the best things that you can do for your health. It is never too late to quit.  How do I get ready to quit?  When you decide to quit smoking, create a plan to help you succeed. Before you quit:  Pick a date to quit. Set a date within the next 2 weeks to give you time to prepare.  Write down the reasons why you are quitting. Keep this list in places where you will see it often.  Tell your family, friends, and co-workers that you are quitting. Support from your loved ones can make quitting easier.  Talk with your health care provider about your options for quitting smoking.  Find out what treatment options are covered by your health insurance.  Identify people, places, things, and activities that make you want to smoke (triggers). Avoid them.  What first steps can I take to quit smoking?  Throw away all cigarettes at home, at work, and in your car.  Throw away smoking accessories, such as ashtrays and lighters.  Clean your car. Make sure to empty the ashtray.  Clean your home, including curtains and carpets.  What strategies can I use to quit smoking?  Talk with your health care provider about combining strategies, such as taking medicines while you are also receiving in-person counseling. Using these two strategies together makes you more likely to succeed in quitting than if you used either strategy on its own.  If you are pregnant or breastfeeding, talk with your health care provider about finding counseling or other support strategies to quit smoking. Do not take medicine to help you quit smoking unless your health care provider tells you to do so.  To quit smoking:  Quit right away  Quit smoking completely, instead of gradually reducing how much you smoke over a period of time. Research shows that stopping  smoking right away is more successful than gradually quitting.  Attend in-person counseling to help you build problem-solving skills. You are more likely to succeed in quitting if you attend counseling sessions regularly. Even short sessions of 10 minutes can be effective.  Take medicine  You may take medicines to help you quit smoking. Some medicines require a prescription and some you can purchase over-the-counter. Medicines may have nicotine in them to replace the nicotine in cigarettes. Medicines may:  Help to stop cravings.  Help to relieve withdrawal symptoms.  Your health care provider may recommend:  Nicotine patches, gum, or lozenges.  Nicotine inhalers or sprays.  Non-nicotine medicine that is taken by mouth.  Find resources  Find resources and support systems that can help you to quit smoking and remain smoke-free after you quit. These resources are most helpful when you use them often. They include:  Online chats with a counselor.  Telephone quitlines.  Printed self-help materials.  Support groups or group counseling.  Text messaging programs.  Mobile phone apps or applications. Use apps that can help you stick to your quit plan by providing reminders, tips, and encouragement. There are many free apps for mobile devices as well as websites. Examples include Quit Guide from the CDC and smokefree.gov  What things can I do to make it easier to quit?    Reach out to your family and friends for support and encouragement. Call telephone quitlines (7-708-QUIT-NOW), reach out to support groups, or work with a counselor for support.  Ask people who smoke to avoid smoking around you.  Avoid places that trigger you to smoke, such as bars, parties, or smoke-break areas at work.  Spend time with people who do not smoke.  Lessen the stress in your life. Stress can be a smoking trigger for some people. To lessen stress, try:  Exercising regularly.  Doing deep-breathing exercises.  Doing yoga.  Meditating.  Performing a  body scan. This involves closing your eyes, scanning your body from head to toe, and noticing which parts of your body are particularly tense. Try to relax the muscles in those areas.  How will I feel when I quit smoking?  Day 1 to 3 weeks  Within the first 24 hours of quitting smoking, you may start to feel withdrawal symptoms. These symptoms are usually most noticeable 2-3 days after quitting, but they usually do not last for more than 2-3 weeks. You may experience these symptoms:  Mood swings.  Restlessness, anxiety, or irritability.  Trouble concentrating.  Dizziness.  Strong cravings for sugary foods and nicotine.  Mild weight gain.  Constipation.  Nausea.  Coughing or a sore throat.  Changes in how the medicines that you take for unrelated issues work in your body.  Depression.  Trouble sleeping (insomnia).  Week 3 and afterward  After the first 2-3 weeks of quitting, you may start to notice more positive results, such as:  Improved sense of smell and taste.  Decreased coughing and sore throat.  Slower heart rate.  Lower blood pressure.  Clearer skin.  The ability to breathe more easily.  Fewer sick days.  Quitting smoking can be very challenging. Do not get discouraged if you are not successful the first time. Some people need to make many attempts to quit before they achieve long-term success. Do your best to stick to your quit plan, and talk with your health care provider if you have any questions or concerns.  Summary  Smoking tobacco is the leading cause of preventable death. Quitting smoking is one of the best things that you can do for your health.  When you decide to quit smoking, create a plan to help you succeed.  Quit smoking right away, not slowly over a period of time.  When you start quitting, seek help from your health care provider, family, or friends.  This information is not intended to replace advice given to you by your health care provider. Make sure you discuss any questions you have with  your health care provider.  Document Revised: 08/26/2022 Document Reviewed: 03/07/2020  Elsevier Patient Education © 2022 Elsevier Inc.

## 2025-04-24 NOTE — PROGRESS NOTES
"Subjective   Scott Montes is a 50 y.o. male.     Chief Complaint   Patient presents with    Hypertension       History of Present Illness     HTN-ongoing.  He is followed by cardiology.  He has a history of MI.  He is currently on lisinopril 10 mg.  No negative side effects.  He occasionally has upper left chest wall/shoulder pain.  He has had this since his heart cath.  He is currently going to PT for this symptom  Abdomen pain-\"rough morning\".  Diarrhea and abdomen bloating with cramps.  He has not seen any blood in his stool.  He has had an infusion of Sterlara  (extreme fatigue and HA) but was not able to tolerate so it was discontinued and is now on Skyrizi infusion.  He has had one infusion and will have a second on April 29, 2025.  He continue mesalamine 4 capsules per day.   He reports that he tolerated the skyrizi.  He did not have any negative side effects.  No vomiting.  He has had weight loss cine his last visit.    Back pain/shoulder pain-did see PT for shoulder pain.  He was treated for tendonitis.  He has not got to be consistent with sessions due to his other health problems.  Back and neck continues to be painful and stiff.  Increased this past week.  Weather changes have increased symptoms as well.  He has tried to avoid taking Tramadol but reports that it does help his symptoms.  He has not noted any abdomen side effects.    Hyperlipidemia-on atorvastatin 80 mg and ASA 81 mg.  No negative side effects.  Patient denies any negative side effects of cholesterol medication.  No reported myalgia or myopathies. Patient denies any bleeding of gums, melena, hematochezia, hematuria, or epistaxis.      The following portions of the patient's history were reviewed and updated as appropriate: CC, ROS, allergies, current medications, past family history, past medical history, past social history, past surgical history and problem list.      Review of Systems   Constitutional:  Positive for fatigue. Negative " "for appetite change, unexpected weight gain and unexpected weight loss.   HENT:  Negative for congestion, ear pain, postnasal drip, rhinorrhea, sore throat, swollen glands, trouble swallowing and voice change.    Eyes:  Negative for pain and visual disturbance.   Respiratory:  Negative for cough, chest tightness, shortness of breath and wheezing.    Cardiovascular:  Negative for chest pain, palpitations and leg swelling.   Gastrointestinal:  Positive for abdominal distention, abdominal pain, diarrhea and nausea. Negative for blood in stool, constipation, vomiting and indigestion.   Genitourinary:  Negative for dysuria, hematuria and urgency.   Musculoskeletal:  Positive for arthralgias, back pain, myalgias and neck pain. Negative for gait problem and joint swelling.   Skin:  Negative for color change and skin lesions.   Allergic/Immunologic: Negative.    Neurological:  Negative for dizziness, numbness and headache.   Hematological: Negative.    Psychiatric/Behavioral:  Negative for dysphoric mood, sleep disturbance and suicidal ideas. The patient is not nervous/anxious.    All other systems reviewed and are negative.      Objective     /82   Pulse 81   Resp 16   Ht 188 cm (74\")   Wt 89.4 kg (197 lb)   SpO2 98%   BMI 25.29 kg/m²     Physical Exam  Vitals reviewed.   Constitutional:       General: He is not in acute distress.     Appearance: He is well-developed. He is not diaphoretic.   HENT:      Head: Normocephalic and atraumatic.      Jaw: No tenderness.      Right Ear: Hearing, tympanic membrane, ear canal and external ear normal.      Left Ear: Hearing, tympanic membrane, ear canal and external ear normal.      Nose: Nose normal. No nasal tenderness or congestion.      Right Sinus: No maxillary sinus tenderness or frontal sinus tenderness.      Left Sinus: No maxillary sinus tenderness or frontal sinus tenderness.      Mouth/Throat:      Lips: Pink.      Mouth: Mucous membranes are moist.      " Pharynx: Oropharynx is clear. Uvula midline.   Eyes:      General: Lids are normal. No scleral icterus.     Extraocular Movements:      Right eye: Normal extraocular motion and no nystagmus.      Left eye: Normal extraocular motion and no nystagmus.      Conjunctiva/sclera: Conjunctivae normal.      Pupils: Pupils are equal, round, and reactive to light.   Neck:      Thyroid: No thyromegaly or thyroid tenderness.      Vascular: No carotid bruit or JVD.      Trachea: No tracheal tenderness.   Cardiovascular:      Rate and Rhythm: Normal rate and regular rhythm.      Pulses:           Dorsalis pedis pulses are 2+ on the right side and 2+ on the left side.        Posterior tibial pulses are 2+ on the right side and 2+ on the left side.      Heart sounds: Normal heart sounds, S1 normal and S2 normal. No murmur heard.  Pulmonary:      Effort: Pulmonary effort is normal. No accessory muscle usage, prolonged expiration or respiratory distress.      Breath sounds: Normal breath sounds.   Chest:      Chest wall: No tenderness.   Abdominal:      General: Bowel sounds are normal. There is no distension.      Palpations: Abdomen is soft. There is no hepatomegaly, splenomegaly or mass.      Tenderness: There is generalized abdominal tenderness. There is no right CVA tenderness, left CVA tenderness or guarding.      Comments: Bowel sounds loud and gurgling.  Audible without auscultation.   Musculoskeletal:         General: Tenderness present.      Cervical back: Normal range of motion and neck supple. Spasms, tenderness and bony tenderness present. Pain with movement present. Decreased range of motion.      Thoracic back: Tenderness present.      Lumbar back: Spasms and tenderness present. Decreased range of motion.      Right lower leg: No edema.      Left lower leg: No edema.      Comments: No muscular atrophy or flaccidity.  Multiple areas of generalized arthralgia and myalgia noted throughout spine, hips, knees, and  shoulders.     Lymphadenopathy:      Head:      Right side of head: No submental or submandibular adenopathy.      Left side of head: No submental or submandibular adenopathy.      Cervical: No cervical adenopathy.      Right cervical: No superficial cervical adenopathy.     Left cervical: No superficial cervical adenopathy.   Skin:     General: Skin is warm and dry.      Capillary Refill: Capillary refill takes less than 2 seconds.      Coloration: Skin is not jaundiced or pale.      Findings: No erythema.      Nails: There is no clubbing.   Neurological:      Mental Status: He is alert and oriented to person, place, and time.      Cranial Nerves: No cranial nerve deficit or facial asymmetry.      Sensory: No sensory deficit.      Motor: No weakness, tremor, atrophy or abnormal muscle tone.      Coordination: Coordination normal.      Deep Tendon Reflexes: Reflexes are normal and symmetric.   Psychiatric:         Attention and Perception: He is attentive.         Mood and Affect: Mood is depressed. Mood is not anxious.         Speech: Speech normal.         Behavior: Behavior normal. Behavior is cooperative.         Thought Content: Thought content normal.         Cognition and Memory: Cognition normal.         Judgment: Judgment normal.         Diagnoses and all orders for this visit:    1. Essential hypertension (Primary)  Overview:  With MI September 2022    Orders:  -     CBC & Differential  -     Lipid Panel    2. Primary osteoarthritis involving multiple joints  Comments:  continue conservative care  Orders:  -     traMADol (ULTRAM) 50 MG tablet; Take 1 tablet by mouth Every 8 (Eight) Hours As Needed for Moderate Pain.  Dispense: 30 tablet; Refill: 0    3. Vitamin D deficiency  Comments:  Stop weekly capsule.  Likely not being absorbed.  Will begin daily therapy 3 capsules/day  Orders:  -     Cholecalciferol (Vitamin D) 50 MCG (2000 UT) capsule; Take 3 capsules by mouth Daily.  Dispense: 90 capsule; Refill:  2  -     Vitamin D,25-Hydroxy    4. Dyslipidemia, goal LDL below 70  Overview:  19 2022 total cholesterol 161, triglycerides 97, HDL 28, and   9/7/2022 total cholesterol 174, triglycerides 178, HDL 28, and   2/14/2023 total cholesterol 112, triglycerides 91, HDL 40 and LDL 54  10/05/2023 total cholesterol 133, triglycerides 115, HDL 36, LDL 76  03/07/2024 total cholesterol 105, triglycerides 123, HDL 29, LDL 54      5. Thyroid disorder screen  -     TSH  -     Cancel: T4    6. Diabetes mellitus screening  -     Hemoglobin A1c       Understands disease processes and need for medications.  Understands reasons for urgent and emergent care.  Patient (& family) verbalized agreement for treatment plan.   Emotional support and active listening provided.  Patient provided time to verbalize feelings.    Scott Montes  reports that he has been smoking cigarettes. He has a 17.5 pack-year smoking history. He has been exposed to tobacco smoke. He has never used smokeless tobacco. I have educated him on the risk of diseases from using tobacco products such as cancer, COPD, and heart disease.     I advised him to quit and he is not willing to quit.    I spent  less than 3   minutes counseling the patient.    Will plan for updated labs.     Return to clinic 5-6 weeks for re-evaluation, sooner if needed for symptom management.          This document has been electronically signed by:  EDDIE Thurman, FNP-C    Dragon disclaimer:  Part of this note may be an electronic transcription/translation of spoken language to printed text using the Dragon Dictation System.

## 2025-04-28 ENCOUNTER — LAB (OUTPATIENT)
Dept: FAMILY MEDICINE CLINIC | Facility: CLINIC | Age: 50
End: 2025-04-28
Payer: MEDICAID

## 2025-04-28 DIAGNOSIS — Z13.1 DIABETES MELLITUS SCREENING: ICD-10-CM

## 2025-04-28 DIAGNOSIS — I10 ESSENTIAL HYPERTENSION: ICD-10-CM

## 2025-04-28 DIAGNOSIS — Z13.29 THYROID DISORDER SCREEN: ICD-10-CM

## 2025-04-28 DIAGNOSIS — R10.84 GENERALIZED ABDOMINAL PAIN: Primary | ICD-10-CM

## 2025-04-28 DIAGNOSIS — E55.9 VITAMIN D DEFICIENCY: ICD-10-CM

## 2025-04-29 ENCOUNTER — INFUSION (OUTPATIENT)
Dept: ONCOLOGY | Facility: HOSPITAL | Age: 50
End: 2025-04-29
Payer: MEDICAID

## 2025-04-29 VITALS
HEART RATE: 62 BPM | SYSTOLIC BLOOD PRESSURE: 117 MMHG | DIASTOLIC BLOOD PRESSURE: 77 MMHG | RESPIRATION RATE: 18 BRPM | WEIGHT: 196.3 LBS | BODY MASS INDEX: 25.2 KG/M2 | TEMPERATURE: 98.1 F | OXYGEN SATURATION: 98 %

## 2025-04-29 DIAGNOSIS — K50.011 CROHN'S DISEASE OF SMALL INTESTINE WITH RECTAL BLEEDING: Primary | ICD-10-CM

## 2025-04-29 LAB
25(OH)D3+25(OH)D2 SERPL-MCNC: 26.1 NG/ML (ref 30–100)
ALBUMIN SERPL-MCNC: 4.2 G/DL (ref 3.5–5.2)
ALBUMIN/GLOB SERPL: 1.8 G/DL
ALP SERPL-CCNC: 129 U/L (ref 39–117)
ALT SERPL-CCNC: 26 U/L (ref 1–41)
AMYLASE SERPL-CCNC: 46 U/L (ref 28–100)
AST SERPL-CCNC: 18 U/L (ref 1–40)
BASOPHILS # BLD AUTO: 0.1 10*3/MM3 (ref 0–0.2)
BASOPHILS NFR BLD AUTO: 1 % (ref 0–1.5)
BILIRUB SERPL-MCNC: 0.7 MG/DL (ref 0–1.2)
BUN SERPL-MCNC: 11 MG/DL (ref 6–20)
BUN/CREAT SERPL: 13.4 (ref 7–25)
CALCIUM SERPL-MCNC: 9.4 MG/DL (ref 8.6–10.5)
CHLORIDE SERPL-SCNC: 101 MMOL/L (ref 98–107)
CHOLEST SERPL-MCNC: 83 MG/DL (ref 0–200)
CO2 SERPL-SCNC: 26 MMOL/L (ref 22–29)
CREAT SERPL-MCNC: 0.82 MG/DL (ref 0.76–1.27)
EGFRCR SERPLBLD CKD-EPI 2021: 106.4 ML/MIN/1.73
EOSINOPHIL # BLD AUTO: 0.07 10*3/MM3 (ref 0–0.4)
EOSINOPHIL NFR BLD AUTO: 0.7 % (ref 0.3–6.2)
ERYTHROCYTE [DISTWIDTH] IN BLOOD BY AUTOMATED COUNT: 12.3 % (ref 12.3–15.4)
GLOBULIN SER CALC-MCNC: 2.4 GM/DL
GLUCOSE SERPL-MCNC: 83 MG/DL (ref 65–99)
HBA1C MFR BLD: 5.1 % (ref 4.8–5.6)
HCT VFR BLD AUTO: 41.6 % (ref 37.5–51)
HDLC SERPL-MCNC: 26 MG/DL (ref 40–60)
HGB BLD-MCNC: 13.9 G/DL (ref 13–17.7)
IMM GRANULOCYTES # BLD AUTO: 0.03 10*3/MM3 (ref 0–0.05)
IMM GRANULOCYTES NFR BLD AUTO: 0.3 % (ref 0–0.5)
LDLC SERPL CALC-MCNC: 36 MG/DL (ref 0–100)
LIPASE SERPL-CCNC: 15 U/L (ref 13–60)
LYMPHOCYTES # BLD AUTO: 2.55 10*3/MM3 (ref 0.7–3.1)
LYMPHOCYTES NFR BLD AUTO: 25.1 % (ref 19.6–45.3)
MCH RBC QN AUTO: 29.3 PG (ref 26.6–33)
MCHC RBC AUTO-ENTMCNC: 33.4 G/DL (ref 31.5–35.7)
MCV RBC AUTO: 87.8 FL (ref 79–97)
MONOCYTES # BLD AUTO: 0.88 10*3/MM3 (ref 0.1–0.9)
MONOCYTES NFR BLD AUTO: 8.7 % (ref 5–12)
NEUTROPHILS # BLD AUTO: 6.51 10*3/MM3 (ref 1.7–7)
NEUTROPHILS NFR BLD AUTO: 64.2 % (ref 42.7–76)
NRBC BLD AUTO-RTO: 0 /100 WBC (ref 0–0.2)
PLATELET # BLD AUTO: 301 10*3/MM3 (ref 140–450)
POTASSIUM SERPL-SCNC: 4.6 MMOL/L (ref 3.5–5.2)
PROT SERPL-MCNC: 6.6 G/DL (ref 6–8.5)
RBC # BLD AUTO: 4.74 10*6/MM3 (ref 4.14–5.8)
SODIUM SERPL-SCNC: 137 MMOL/L (ref 136–145)
T4 FREE SERPL-MCNC: 1.47 NG/DL (ref 0.92–1.68)
TRIGL SERPL-MCNC: 116 MG/DL (ref 0–150)
TSH SERPL DL<=0.005 MIU/L-ACNC: 1.67 UIU/ML (ref 0.27–4.2)
VIT B12 SERPL-MCNC: 288 PG/ML (ref 211–946)
VLDLC SERPL CALC-MCNC: 21 MG/DL (ref 5–40)
WBC # BLD AUTO: 10.14 10*3/MM3 (ref 3.4–10.8)

## 2025-04-29 PROCEDURE — 96365 THER/PROPH/DIAG IV INF INIT: CPT

## 2025-04-29 PROCEDURE — 25810000003 SODIUM CHLORIDE 0.9 % SOLUTION 250 ML FLEX CONT: Performed by: INTERNAL MEDICINE

## 2025-04-29 PROCEDURE — 25010000002 RISANKIZUMAB-RZAA 600 MG/10ML SOLUTION 10 ML VIAL: Performed by: INTERNAL MEDICINE

## 2025-04-29 RX ORDER — DIPHENHYDRAMINE HCL 25 MG
50 CAPSULE ORAL ONCE
OUTPATIENT
Start: 2025-05-27 | End: 2025-05-27

## 2025-04-29 RX ORDER — DIPHENHYDRAMINE HYDROCHLORIDE 50 MG/ML
50 INJECTION, SOLUTION INTRAMUSCULAR; INTRAVENOUS ONCE
OUTPATIENT
Start: 2025-05-27 | End: 2025-05-27

## 2025-04-29 RX ADMIN — SODIUM CHLORIDE 600 MG: 9 INJECTION, SOLUTION INTRAVENOUS at 14:01

## 2025-04-30 DIAGNOSIS — E55.9 VITAMIN D DEFICIENCY: ICD-10-CM

## 2025-04-30 RX ORDER — ERGOCALCIFEROL 1.25 MG/1
50000 CAPSULE, LIQUID FILLED ORAL WEEKLY
Qty: 4 CAPSULE | Refills: 5 | OUTPATIENT
Start: 2025-04-30

## 2025-05-01 DIAGNOSIS — I25.10 CORONARY ARTERY DISEASE INVOLVING NATIVE CORONARY ARTERY OF NATIVE HEART WITHOUT ANGINA PECTORIS: Chronic | ICD-10-CM

## 2025-05-01 RX ORDER — ASPIRIN 81 MG/1
81 TABLET ORAL DAILY
Qty: 90 TABLET | Refills: 2 | Status: SHIPPED | OUTPATIENT
Start: 2025-05-01

## 2025-05-19 ENCOUNTER — SPECIALTY PHARMACY (OUTPATIENT)
Dept: PHARMACY | Facility: HOSPITAL | Age: 50
End: 2025-05-19
Payer: MEDICAID

## 2025-05-19 NOTE — PROGRESS NOTES
[facility] Patient Management Program  Lipid Management Program - PCSK9i Initial Assessment     Scott Montes is a 50 y.o. male referred by their provider, Vanessa Diehl, to the Hyperlipidemia Patient Management program offered by [facility] for Lipid Management.  Scott Montes is  treated for clinical ASCVD and currently takes repatha sureclick, Lipitor 80 mg daily. In the past, Pt has not tried any other statins in the past. Patients LDL is not currently at goal on current high intensity statin. The patient denies any allergies to latex.       A follow-up outreach was conducted, including assessment of continued therapy appropriateness, medication adherence, and side effect incidence and management for Repatha. The patient denies any trouble giving themself the injection.  They deny missing doses or adverse effects.     Initial Start Date of PCSK9i: 11/20/2024  Initial LDL: 146 mg/dl 10/29/2024    Changes to Insurance Coverage or Financial Support  Medicaid- no changes    Relevant Past Medical History and Comorbidities  Relevant medical history and concomitant health conditions were discussed with the patient. The patient's chart has been reviewed for relevant past medical history and comorbid health conditions and updated as necessary.   Past Medical History:   Diagnosis Date    Anemia     Arthritis     BPH without obstruction/lower urinary tract symptoms 4/2/2024    Coronary artery disease     Crohn's disease     Elevated cholesterol     Erectile dysfunction     GERD (gastroesophageal reflux disease)     H/O blood clots     Heart attack     Hormone disorder 9 years    Hypertension     Interstitial cystitis     Low back pain     STEMI (ST elevation myocardial infarction) 09/06/2022    Ulcerative colitis      Social History     Socioeconomic History    Marital status:    Tobacco Use    Smoking status: Every Day      Current packs/day: 0.50     Average packs/day: 0.5 packs/day for 35.0 years (17.5 ttl pk-yrs)     Types: Cigarettes     Passive exposure: Current    Smokeless tobacco: Never    Tobacco comments:     Smoking cessation encouraged   Vaping Use    Vaping status: Every Day    Substances: Nicotine    Devices: Disposable   Substance and Sexual Activity    Alcohol use: No    Drug use: No    Sexual activity: Yes     Partners: Female     Birth control/protection: Vasectomy     Problem list reviewed by Mayela Rader, PharmD on 5/19/2025 at  1:30 PM    Hospitalizations and Urgent Care Since Last Assessment  ED Visits, Admissions, or Hospitalizations:   ED 3/17/25: inflammatory bowel disease  Urgent Office Visits: none    Allergies  Known allergies and reactions were discussed with the patient. The patient's chart has been reviewed for allergy information and updated as necessary.   Allergies   Allergen Reactions    Stelara [Ustekinumab] Dizziness     Joint and back pain.     Allergies reviewed by Mayela Rader, Mario Alberto on 5/19/2025 at  1:30 PM    Relevant Laboratory Values  Relevant laboratory values were discussed with the patient. The following specialty medication dose adjustment(s) are recommended: none    Lab Results   Component Value Date    GLUCOSE 83 04/28/2025    CALCIUM 9.4 04/28/2025     04/28/2025    K 4.6 04/28/2025    CO2 26.0 04/28/2025     04/28/2025    BUN 11 04/28/2025    CREATININE 0.82 04/28/2025    EGFRIFNONA 98 03/25/2021    BCR 13.4 04/28/2025    ANIONGAP 11.0 04/01/2025     Lab Results   Component Value Date    CHOL 218 (H) 10/29/2024    CHLPL 83 04/28/2025    TRIG 116 04/28/2025    HDL 26 (L) 04/28/2025    LDL 36 04/28/2025       Current Medication List  This medication list has been reviewed with the patient and evaluated for any interactions or necessary modifications/recommendations, and updated to include all prescription medications, OTC medications, and supplements the patient  is currently taking.  This list reflects what is contained in the patient's profile, which has also been marked as reviewed to communicate to other providers it is the most up to date version of the patient's current medication therapy.     Current Outpatient Medications:     aspirin (Aspirin Low Dose) 81 MG EC tablet, Take 1 tablet by mouth Daily., Disp: 90 tablet, Rfl: 2    atorvastatin (LIPITOR) 80 MG tablet, Take 1 tablet by mouth Every Night., Disp: 90 tablet, Rfl: 3    Cholecalciferol (Vitamin D) 50 MCG (2000 UT) capsule, Take 3 capsules by mouth Daily., Disp: 90 capsule, Rfl: 2    colestipol (COLESTID) 1 g tablet, TAKE ONE (1) TABLET BY MOUTH DAILY., Disp: 90 tablet, Rfl: 2    Evolocumab (REPATHA) solution auto-injector SureClick injection, Inject 1 mL under the skin into the appropriate area as directed Every 14 (Fourteen) Days., Disp: 6 mL, Rfl: 2    lansoprazole (PREVACID) 30 MG capsule, Take 1 capsule by mouth 2 (Two) Times a Day., Disp: 180 capsule, Rfl: 3    lisinopril (PRINIVIL,ZESTRIL) 10 MG tablet, Take 1 tablet by mouth Daily for 360 days., Disp: 90 tablet, Rfl: 3    mesalamine (Apriso) 0.375 g 24 hr capsule, Take 4 capsules by mouth Daily., Disp: 120 capsule, Rfl: 11    nitroglycerin (NITROSTAT) 0.4 MG SL tablet, DISSOLVE ONE (1) TABLET UNDER THE TONGUE AS NEEDED FOR ANGINA, MAY REPEAT EVERY FIVE (5) MINUTES FOR UP THREE DOSES, Disp: 25 tablet, Rfl: 0    tamsulosin (FLOMAX) 0.4 MG capsule 24 hr capsule, Take 1 capsule by mouth Daily., Disp: 30 capsule, Rfl: 5    traMADol (ULTRAM) 50 MG tablet, Take 1 tablet by mouth Every 8 (Eight) Hours As Needed for Moderate Pain., Disp: 30 tablet, Rfl: 0    triamcinolone (KENALOG) 0.1 % cream, Apply 1 application  topically to the appropriate area as directed 2 (Two) Times a Day., Disp: 80 g, Rfl: 3  No current facility-administered medications for this visit.    Medicines reviewed by Mayela Rader, PharmD on 5/19/2025 at  1:30 PM    Drug Interactions  None  with repatha    Adverse Drug Reactions  Medication tolerability: Tolerating with no to minimal ADRs  Medication plan: Continue therapy with normal follow-up  Plan for ADR Management: n/a    Adherence, Self-Administration, and Current Therapy Problems  Adherence related to the patient's specialty therapy was discussed with the patient. The Adherence segment of this outreach has been reviewed and updated.     Adherence Questions  Linked Medication(s) Assessed: Evolocumab (REPATHA)  On average, how many doses/injections does the patient miss per month?: 0  What are the identified reasons for non-adherence or missed doses? : no problems identified  What is the estimated medication adherence level?: %  Based on the patient/caregiver response and refill history, does this patient require an MTP to track adherence improvements?: no    Additional Barriers to Patient Self-Administration: none  Methods for Supporting Patient Self-Administration: calendar    Open Medication Therapy Problems  No medication therapy recommendations to display    Goals of Therapy  Goals related to the patient's specialty therapy were discussed with the patient. The Patient Goals segment of this outreach has been reviewed and updated.   Goals Addressed Today        Specialty Pharmacy General Goal      LDL less than 70     5/19/25 [place]:LDL reduced from 146 to 36 mg/dl on 4/28/2025 (at goal)              Quality of Life Assessment   Quality of Life related to the patient's enrollment in the patient management program and services provided was discussed with the patient. The QOL segment of this outreach has been reviewed and updated.  Quality of Life Improvement Scale: 10-Significantly better    Reassessment Plan & Follow-Up  1. Medication Therapy Changes: none reported  2. Related Plans, Therapy Recommendations, or Issues to Be Addressed:   LDL at goal  Refill too soon until 7/7/25-refills profiled for patient  3. Pharmacist to perform regular  assessments no more than (6) months from the previous assessment.  Patient will continue regular follow-up with referring provider.   4. Care Coordinator to set up future refill outreaches, coordinate prescription delivery, and escalate clinical questions to pharmacist.    Attestation  Therapeutic appropriateness: Appropriate   I attest the patient was actively involved in and has agreed to the above plan of care.  If the prescribed therapy is at any point deemed not appropriate based on the current or future assessments, a consultation will be initiated with the patient's specialty care provider to determine the best course of action. The revised plan of therapy will be documented along with any required assessments and/or additional patient education provided.     Mayela Rader, PharmD  5/19/2025  13:35 EDT

## 2025-05-27 ENCOUNTER — INFUSION (OUTPATIENT)
Dept: ONCOLOGY | Facility: HOSPITAL | Age: 50
End: 2025-05-27
Payer: MEDICAID

## 2025-05-27 VITALS
HEART RATE: 73 BPM | RESPIRATION RATE: 18 BRPM | OXYGEN SATURATION: 98 % | TEMPERATURE: 97.7 F | SYSTOLIC BLOOD PRESSURE: 115 MMHG | WEIGHT: 195.3 LBS | BODY MASS INDEX: 25.08 KG/M2 | DIASTOLIC BLOOD PRESSURE: 79 MMHG

## 2025-05-27 DIAGNOSIS — K50.011 CROHN'S DISEASE OF SMALL INTESTINE WITH RECTAL BLEEDING: Primary | ICD-10-CM

## 2025-05-27 PROCEDURE — 25010000002 RISANKIZUMAB-RZAA 600 MG/10ML SOLUTION 10 ML VIAL: Performed by: INTERNAL MEDICINE

## 2025-05-27 PROCEDURE — 96365 THER/PROPH/DIAG IV INF INIT: CPT

## 2025-05-27 PROCEDURE — 25810000003 SODIUM CHLORIDE 0.9 % SOLUTION 250 ML FLEX CONT: Performed by: INTERNAL MEDICINE

## 2025-05-27 RX ORDER — DIPHENHYDRAMINE HCL 25 MG
50 CAPSULE ORAL ONCE
OUTPATIENT
Start: 2025-05-27 | End: 2025-05-27

## 2025-05-27 RX ORDER — DIPHENHYDRAMINE HYDROCHLORIDE 50 MG/ML
50 INJECTION, SOLUTION INTRAMUSCULAR; INTRAVENOUS ONCE
OUTPATIENT
Start: 2025-05-27 | End: 2025-05-27

## 2025-05-27 RX ADMIN — SODIUM CHLORIDE 600 MG: 9 INJECTION, SOLUTION INTRAVENOUS at 13:34

## 2025-06-05 ENCOUNTER — OFFICE VISIT (OUTPATIENT)
Dept: FAMILY MEDICINE CLINIC | Facility: CLINIC | Age: 50
End: 2025-06-05
Payer: MEDICAID

## 2025-06-05 VITALS
OXYGEN SATURATION: 98 % | WEIGHT: 193.6 LBS | HEIGHT: 74 IN | RESPIRATION RATE: 16 BRPM | HEART RATE: 87 BPM | DIASTOLIC BLOOD PRESSURE: 84 MMHG | SYSTOLIC BLOOD PRESSURE: 130 MMHG | BODY MASS INDEX: 24.85 KG/M2

## 2025-06-05 DIAGNOSIS — E55.9 VITAMIN D DEFICIENCY: ICD-10-CM

## 2025-06-05 DIAGNOSIS — K50.011 CROHN'S DISEASE OF SMALL INTESTINE WITH RECTAL BLEEDING: ICD-10-CM

## 2025-06-05 DIAGNOSIS — I25.10 CORONARY ARTERY DISEASE INVOLVING NATIVE CORONARY ARTERY OF NATIVE HEART WITHOUT ANGINA PECTORIS: Chronic | ICD-10-CM

## 2025-06-05 DIAGNOSIS — E78.5 DYSLIPIDEMIA, GOAL LDL BELOW 70: Chronic | ICD-10-CM

## 2025-06-05 DIAGNOSIS — M15.0 PRIMARY OSTEOARTHRITIS INVOLVING MULTIPLE JOINTS: ICD-10-CM

## 2025-06-05 DIAGNOSIS — I10 ESSENTIAL HYPERTENSION: Primary | Chronic | ICD-10-CM

## 2025-06-05 PROCEDURE — 99214 OFFICE O/P EST MOD 30 MIN: CPT | Performed by: NURSE PRACTITIONER

## 2025-06-05 PROCEDURE — 3075F SYST BP GE 130 - 139MM HG: CPT | Performed by: NURSE PRACTITIONER

## 2025-06-05 PROCEDURE — 3079F DIAST BP 80-89 MM HG: CPT | Performed by: NURSE PRACTITIONER

## 2025-06-05 PROCEDURE — 1126F AMNT PAIN NOTED NONE PRSNT: CPT | Performed by: NURSE PRACTITIONER

## 2025-06-05 PROCEDURE — 1159F MED LIST DOCD IN RCRD: CPT | Performed by: NURSE PRACTITIONER

## 2025-06-05 PROCEDURE — 1160F RVW MEDS BY RX/DR IN RCRD: CPT | Performed by: NURSE PRACTITIONER

## 2025-06-05 RX ORDER — MULTIVIT-MIN/IRON/FOLIC ACID/K 18-600-40
6000 CAPSULE ORAL DAILY
Qty: 90 CAPSULE | Refills: 2 | Status: SHIPPED | OUTPATIENT
Start: 2025-06-05

## 2025-06-05 RX ORDER — TRAMADOL HYDROCHLORIDE 50 MG/1
50 TABLET ORAL EVERY 8 HOURS PRN
Qty: 30 TABLET | Refills: 0 | Status: SHIPPED | OUTPATIENT
Start: 2025-06-05

## 2025-06-05 NOTE — PROGRESS NOTES
"Subjective   Scott Montes is a 50 y.o. male.     Chief Complaint   Patient presents with    Abdominal Pain       History of Present Illness     Abdomen pain/chrons-has one infusion left on Skyrizi then he will like be changed to an injection.  He did well with his second infusion.  He reports that he did not have any medication associated fatigue.  He  has lost approx 3#.   He reports that he has some intermittent cramps but it may possibly be less since the second infusion.  He does feel that some days continue to be rough.  He reports diarrhea alters but is not daily.  He does feel he has episodes 2-3 times per week.  He does note blood in his stool at times.    Back pain/joint pain-he reports \"about drives me crazy\".   Mostly his low back.  Lay to sit position changes are uncomfortable.  The sensation to urinate worsens his back pain especially if he is laying down.  He has noted some increased hand pain on the right especially at the [facility].  Hand is aching in quality often.  He has generalized leg pain and myalgia.  He does feel that his leg irritation has been worsened since he had covid.  He has had chronic left shoulder pain since his MI.  Allergies-some PND.  Some occasional cough.  He reports some watering of eyes.  Some rhinorrhea.  He is managing conservatively.   He has been having symptoms for 3-4 days.  The air quality has been poor locally during this time.    HTN-ongoing.  On lisinopril 10 mg,  No negative side effects.  No associated symptoms such as CP, SOA, HA, or dizziness.  He has chest wall pain near his shoulder/arm that has been present since his MI.  Cardiology is aware.    History of MI/hyperlipidemia-ongoing.  On Lipitor 80 mg and repatha 1 mg Q 14 days.   He denies any negative side effects.  Patient denies any negative side effects of cholesterol medication.  No reported myalgia or myopathies.    The following portions of the patient's history were reviewed and updated as appropriate: " "CC, ROS, allergies, current medications, past family history, past medical history, past social history, past surgical history and problem list.      Review of Systems   Constitutional:  Positive for fatigue. Negative for appetite change, unexpected weight gain and unexpected weight loss.   HENT:  Negative for congestion, ear pain, postnasal drip, rhinorrhea, sore throat, swollen glands, trouble swallowing and voice change.    Eyes:  Negative for blurred vision, double vision, pain and visual disturbance.   Respiratory:  Negative for cough, chest tightness, shortness of breath and wheezing.    Cardiovascular:  Negative for chest pain, palpitations and leg swelling.   Gastrointestinal:  Negative for abdominal pain, blood in stool, constipation, diarrhea, nausea, vomiting and indigestion.   Genitourinary:  Negative for dysuria, hematuria and urgency.   Musculoskeletal:  Positive for arthralgias, back pain, myalgias and neck stiffness. Negative for gait problem and joint swelling.   Skin:  Negative for color change and skin lesions.   Allergic/Immunologic: Negative.    Neurological:  Negative for dizziness, tremors, weakness, numbness and headache.   Hematological: Negative.    Psychiatric/Behavioral:  Positive for dysphoric mood and stress. Negative for sleep disturbance and suicidal ideas. The patient is nervous/anxious.    All other systems reviewed and are negative.      Objective     /84   Pulse 87   Resp 16   Ht 188 cm (74\")   Wt 87.8 kg (193 lb 9.6 oz)   SpO2 98%   BMI 24.86 kg/m²     Physical Exam  Vitals reviewed.   Constitutional:       General: He is not in acute distress.     Appearance: He is well-developed. He is not diaphoretic.   HENT:      Head: Normocephalic and atraumatic.      Jaw: No tenderness.      Right Ear: Hearing, tympanic membrane, ear canal and external ear normal.      Left Ear: Hearing, tympanic membrane, ear canal and external ear normal.      Nose: Nose normal. No nasal " tenderness, congestion or rhinorrhea.      Right Sinus: No maxillary sinus tenderness or frontal sinus tenderness.      Left Sinus: No maxillary sinus tenderness or frontal sinus tenderness.      Mouth/Throat:      Lips: Pink.      Mouth: Mucous membranes are moist.      Pharynx: Oropharynx is clear. Uvula midline. No oropharyngeal exudate or posterior oropharyngeal erythema.   Eyes:      General: Lids are normal. No scleral icterus.     Extraocular Movements:      Right eye: Normal extraocular motion and no nystagmus.      Left eye: Normal extraocular motion and no nystagmus.      Conjunctiva/sclera: Conjunctivae normal.      Pupils: Pupils are equal, round, and reactive to light.   Neck:      Thyroid: No thyromegaly or thyroid tenderness.      Vascular: No carotid bruit or JVD.      Trachea: No tracheal tenderness.   Cardiovascular:      Rate and Rhythm: Normal rate and regular rhythm.      Pulses:           Dorsalis pedis pulses are 2+ on the right side and 2+ on the left side.        Posterior tibial pulses are 2+ on the right side and 2+ on the left side.      Heart sounds: Normal heart sounds, S1 normal and S2 normal. No murmur heard.  Pulmonary:      Effort: Pulmonary effort is normal. No accessory muscle usage, prolonged expiration or respiratory distress.      Breath sounds: Normal breath sounds.   Chest:      Chest wall: No tenderness.   Abdominal:      General: Bowel sounds are normal. There is no distension.      Palpations: Abdomen is soft. There is no hepatomegaly, splenomegaly or mass.      Tenderness: There is generalized abdominal tenderness. There is no right CVA tenderness, left CVA tenderness or guarding.      Comments: Bowel sounds loud and gurgling.  Audible without auscultation.   Musculoskeletal:         General: Tenderness present.      Left shoulder: Tenderness, bony tenderness and crepitus present. Decreased range of motion. Normal strength. Normal pulse.      Cervical back: Neck supple.  Spasms, tenderness and bony tenderness present. Pain with movement present. Decreased range of motion.      Thoracic back: Tenderness present.      Lumbar back: Spasms and tenderness present. Decreased range of motion.      Right lower leg: No edema.      Left lower leg: No edema.      Comments: No muscular atrophy or flaccidity.  Multiple areas of generalized arthralgia and myalgia noted throughout spine, hips, knees, and shoulders.     Lymphadenopathy:      Head:      Right side of head: No submental or submandibular adenopathy.      Left side of head: No submental or submandibular adenopathy.      Cervical: No cervical adenopathy.      Right cervical: No superficial cervical adenopathy.     Left cervical: No superficial cervical adenopathy.   Skin:     General: Skin is warm and dry.      Capillary Refill: Capillary refill takes less than 2 seconds.      Coloration: Skin is not jaundiced or pale.      Findings: No erythema.      Nails: There is no clubbing.   Neurological:      Mental Status: He is alert and oriented to person, place, and time.      Cranial Nerves: No cranial nerve deficit or facial asymmetry.      Sensory: No sensory deficit.      Motor: No weakness, tremor, atrophy or abnormal muscle tone.      Coordination: Coordination normal.      Gait: Gait abnormal (mild antalgia).      Deep Tendon Reflexes: Reflexes are normal and symmetric.   Psychiatric:         Attention and Perception: He is attentive.         Mood and Affect: Mood is depressed. Mood is not anxious.         Speech: Speech normal.         Behavior: Behavior normal. Behavior is cooperative.         Thought Content: Thought content normal.         Cognition and Memory: Cognition normal.         Judgment: Judgment normal.         Diagnoses and all orders for this visit:    1. Essential hypertension (Primary)  Overview:  With MI September 2022    Assessment & Plan:  Hypertension is stable and controlled  Continue current treatment  regimen.  Stop smoking.  Continue lisinopril 10 mg      2. Crohn's disease of small intestine with rectal bleeding  Assessment & Plan:  Continue stelara and Mesalamine   Continue under care of GI  Continue Colestid for diarrhea      3. Coronary artery disease involving native coronary artery of native heart without angina pectoris  Overview:  9/6/2022 [facility] for STEMI: PCI BUZZ to the RCA  9/6/2022 [facility]: LVEF 51 to 55%    Assessment & Plan:  Continue to pursue a low cholesterol diet and cholesterol lowering medications.  Continue under care of cardiology.      4. Dyslipidemia, goal LDL below 70  Overview:  19 2022 total cholesterol 161, triglycerides 97, HDL 28, and   9/7/2022 total cholesterol 174, triglycerides 178, HDL 28, and   2/14/2023 total cholesterol 112, triglycerides 91, HDL 40 and LDL 54  10/05/2023 total cholesterol 133, triglycerides 115, HDL 36, LDL 76  03/07/2024 total cholesterol 105, triglycerides 123, HDL 29, LDL 54    Assessment & Plan:  Continue Atorvastatin 80 mg and repatha injection  Pursue a low cholesterol diet      5. Primary osteoarthritis involving multiple joints  Comments:  continue conservative care  Assessment & Plan:  Continue PRN Flexeril. Continue conservative measures at home.   Avoid overuse activities.  No NSAIDs.  Referral to PT  Will continue to evaluate and treat PRN    Orders:  -     traMADol (ULTRAM) 50 MG tablet; Take 1 tablet by mouth Every 8 (Eight) Hours As Needed for Moderate Pain.  Dispense: 30 tablet; Refill: 0    6. Vitamin D deficiency  Comments:  Stop weekly capsule.  Likely not being absorbed.  Will begin daily therapy 3 capsules/day  Orders:  -     Cholecalciferol (Vitamin D) 50 MCG (2000 UT) capsule; Take 3 capsules by mouth Daily.  Dispense: 90 capsule; Refill: 2       BMI is within normal parameters. No other follow-up for BMI required.    Understands disease processes and need for medications.  Understands reasons for urgent and emergent care.   Patient (& family) verbalized agreement for treatment plan.   Emotional support and active listening provided.  Patient provided time to verbalize feelings.    Continue under the care of GI.  Continue under the care of Cardiology and urology.     CHADWICK/DIMPLE reviewed today and consistent.  Will refill prescribed controlled medication today.  Patient is aware they cannot receive narcotics from any other provider except if under care of pain management or speciality clinic.  Risk and benefits of medication use has been reviewed.  History and physical exam exhibit continued safe and appropriate use of controlled substances.  The patient is aware of the potential for addiction and dependence.  This patient has been made aware of the appropriate use of such medications, including potential risk of somnolence, limited ability to drive and / or work safely, and potential for overdose.    It has also been made clear that these medications are for use by this patient only, without concomitant use of alcohol or other substances unless prescribed/advised by medical provider.  Patient understands they may be subject to UDS and pill counts at random.    Patient considered to be low risk for addiction due to use of single controlled medications.  Patient understands and accepts these risks.  Patient need for medication will be reassessed at each visit.  Doses will be adjusted according to patient need and findings.    Goal of TX: Patient will not have any adverse reactions of medication.  Patient will have reduction in there chronic back and joint pain symptoms with use of tramadol as needed as directed.  Patient will be able to remain active in an outside of their home with minimal to no interference from their pain symptoms.    CHADW[place] Patient Controlled Substance Report (from 6/05/2024 to 6/05/2025)    Dispensed  Strength Quantity Days Supply Provider Pharmacy   04/30/2025 Tramadol Hydrochloride 50MG 30 each 10 LASHON,RETA  [facility]   01/25/2025 Tramadol Hydrochloride 50MG 12 each 4 [facility]   10/25/2024 Tramadol Hydrochloride 50MG 12 each 4 [facility]        Return to clinic 5-6 weeks for re-evaluation, sooner if needed for symptom management.        This document has been electronically signed by:  EDDIE Thurman, FNP-C    Dragon disclaimer:  Part of this note may be an electronic transcription/translation of spoken language to printed text using the Dragon Dictation System.

## 2025-06-15 NOTE — ASSESSMENT & PLAN NOTE
Continue to pursue a low cholesterol diet and cholesterol lowering medications.  Continue under care of cardiology.

## 2025-06-30 ENCOUNTER — OFFICE VISIT (OUTPATIENT)
Dept: GASTROENTEROLOGY | Facility: CLINIC | Age: 50
End: 2025-06-30
Payer: MEDICAID

## 2025-06-30 VITALS
DIASTOLIC BLOOD PRESSURE: 77 MMHG | WEIGHT: 191.4 LBS | SYSTOLIC BLOOD PRESSURE: 118 MMHG | HEIGHT: 74 IN | BODY MASS INDEX: 24.56 KG/M2 | HEART RATE: 73 BPM

## 2025-06-30 DIAGNOSIS — K50.00 CROHN'S DISEASE OF SMALL INTESTINE WITHOUT COMPLICATION: Primary | ICD-10-CM

## 2025-06-30 PROCEDURE — 3074F SYST BP LT 130 MM HG: CPT

## 2025-06-30 PROCEDURE — 1160F RVW MEDS BY RX/DR IN RCRD: CPT

## 2025-06-30 PROCEDURE — 1159F MED LIST DOCD IN RCRD: CPT

## 2025-06-30 PROCEDURE — 3078F DIAST BP <80 MM HG: CPT

## 2025-06-30 PROCEDURE — 99214 OFFICE O/P EST MOD 30 MIN: CPT

## 2025-06-30 NOTE — PROGRESS NOTES
Chief Complaint  Crohn's Disease    Scott Montes is a 50 y.o. male who presents today to [facility] for Crohn's Disease.    HPI:   50-year-old male with PMH of Crohn's disease and GERD  (well-controlled on Prevacid twice daily) who was previously followed by Dr. Casanova.  Please see documentation for prior management.  In short, patient was diagnosed with Crohn's disease in July 2022 which showed scattered erosions and erythema throughout the colon, biopsies revealed chronic active ileitis and mild acute colitis.  Patient had trialed Humira and mesalamine.  Patient was then initiated on Colestid 1 g daily for suspected bile acid diarrhea.  Patient has also struggled with vitamin D deficiency and is on a daily tablet.  Patient has also had a chronically elevated alkaline phosphatase.  In October 2024, patient noted having abdominal pain.  Subsequently, CT enterography was ordered.  This was performed on 12/12/2024 and was notable for multifocal wall thickening of the distal ileum with combination of both hyperemia and areas of prominence of submucosal fat with relative narrowing and focal proximal segments of mild small bowel dilation most consistent with multiple skip lesions in the setting of IBD terminal ileitis, areas of hyperemia suggest relative acute ileitis superimposed on chronic ileitis, no bowel obstruction, otherwise unremarkable.  Subsequently, patient's Humira was discontinued and he was initiated on Stelara.  He was also initiated on a short course of prednisone to decrease inflammation.  Patient presented to the ED in March 2025 with complaint of bloody stools.  CT scan of the abdomen was unremarkable regarding the GI tract.  Patient was placed on prednisone, ciprofloxacin, metronidazole due to perirectal swelling.  He also reported severe headaches and joint pain secondary to Stelara.  This was discontinued and patient was subsequently initiated on  Skyrizi.         Interval History:    Today, patient presents for follow-up.  He states that he is doing well.  She has received all 3 infusion doses of Skyrizi.  He is scheduled for his first maintenance dose of Skyrizi on July 17.  He notes having 3-4 bowel movements per day that he rates as BSS 5-6.  He has not had any bloody or black stools.  He has no rectal   Pain.  He notes having right  mid to lower abdominal pain.  States that this has been present chronically for several months.  This has not worsened since initiation of Skyrizi.  Patient notes that he is tolerating this medication well.  He notes that his GERD continues to be well-controlled on Prevacid twice daily.  He has no other complaints today.        Previous History:   Past Medical History:   Diagnosis Date    Anemia     Arthritis     BPH without obstruction/lower urinary tract symptoms 4/2/2024    Coronary artery disease     Crohn's disease     Elevated cholesterol     Erectile dysfunction     GERD (gastroesophageal reflux disease)     H/O blood clots     Heart attack     Hormone disorder 9 years    Hypertension     Interstitial cystitis     Low back pain     STEMI (ST elevation myocardial infarction) 09/06/2022    Ulcerative colitis       Past Surgical History:   Procedure Laterality Date    CARDIAC CATHETERIZATION N/A 09/06/2022    Procedure: Left Heart Cath;  Surgeon: Matthew Hill MD;  Location: [facility] INVASIVE LOCATION;  Service: Cardiovascular;  Laterality: N/A;    CARDIAC CATHETERIZATION N/A 09/06/2022    Procedure: Percutaneous Coronary Intervention;  Surgeon: Matthew Hill MD;  Location: [facility] INVASIVE LOCATION;  Service: Cardiovascular;  Laterality: N/A;    CARDIAC SURGERY      CHOLECYSTECTOMY N/A 11/13/2023    Procedure: CHOLECYSTECTOMY LAPAROSCOPIC WITH DAVINCI ROBOT;  Surgeon: Gonzalo Rudolph MD;  Location: [facility];  Service: Robotics - DaVinci;  Laterality: N/A;    COLONOSCOPY N/A  07/12/2022    Procedure: COLONOSCOPY;  Surgeon: Stella Aparicio MD;  Location: [facility];  Service: Gastroenterology;  Laterality: N/A;    CYST REMOVAL      shoulder    FRACTURE SURGERY Right     foot/toes    VASCULAR SURGERY      VASECTOMY        Social History     Socioeconomic History    Marital status:    Tobacco Use    Smoking status: Every Day     Current packs/day: 0.50     Average packs/day: 0.5 packs/day for 35.0 years (17.5 ttl pk-yrs)     Types: Cigarettes     Passive exposure: Current    Smokeless tobacco: Never    Tobacco comments:     Smoking cessation encouraged   Vaping Use    Vaping status: Every Day    Substances: Nicotine    Devices: Disposable   Substance and Sexual Activity    Alcohol use: No    Drug use: No    Sexual activity: Yes     Partners: Female     Birth control/protection: Vasectomy        Current Medications:  Current Outpatient Medications   Medication Sig Dispense Refill    aspirin (Aspirin Low Dose) 81 MG EC tablet Take 1 tablet by mouth Daily. 90 tablet 2    atorvastatin (LIPITOR) 80 MG tablet Take 1 tablet by mouth Every Night. 90 tablet 3    Cholecalciferol (Vitamin D) 50 MCG (2000 UT) capsule Take 3 capsules by mouth Daily. 90 capsule 2    colestipol (COLESTID) 1 g tablet TAKE ONE (1) TABLET BY MOUTH DAILY. 90 tablet 2    Evolocumab (REPATHA) solution auto-injector SureClick injection Inject 1 mL under the skin into the appropriate area as directed Every 14 (Fourteen) Days. 6 mL 2    lansoprazole (PREVACID) 30 MG capsule Take 1 capsule by mouth 2 (Two) Times a Day. 180 capsule 3    lisinopril (PRINIVIL,ZESTRIL) 10 MG tablet Take 1 tablet by mouth Daily for 360 days. 90 tablet 3    mesalamine (Apriso) 0.375 g 24 hr capsule Take 4 capsules by mouth Daily. 120 capsule 11    nitroglycerin (NITROSTAT) 0.4 MG SL tablet DISSOLVE ONE (1) TABLET UNDER THE TONGUE AS NEEDED FOR ANGINA, MAY REPEAT EVERY FIVE (5) MINUTES FOR UP THREE DOSES 25 tablet 0    tamsulosin (FLOMAX)  "0.4 MG capsule 24 hr capsule Take 1 capsule by mouth Daily. 30 capsule 5    traMADol (ULTRAM) 50 MG tablet Take 1 tablet by mouth Every 8 (Eight) Hours As Needed for Moderate Pain. 30 tablet 0    triamcinolone (KENALOG) 0.1 % cream Apply 1 application  topically to the appropriate area as directed 2 (Two) Times a Day. 80 g 3     No current facility-administered medications for this visit.       Allergies:   Allergies   Allergen Reactions    Stelara [Ustekinumab] Dizziness     Joint and back pain.       Vitals:   /77   Pulse 73   Ht 188 cm (74\")   Wt 86.8 kg (191 lb 6.4 oz)   BMI 24.57 kg/m²   Estimated body mass index is 24.57 kg/m² as calculated from the following:    Height as of this encounter: 188 cm (74\").    Weight as of this encounter: 86.8 kg (191 lb 6.4 oz).    ROS:   Review of Systems   Constitutional: Negative.    HENT: Negative.     Respiratory: Negative.     Cardiovascular: Negative.    Gastrointestinal:  Positive for abdominal pain and diarrhea. Negative for abdominal distention, anal bleeding, blood in stool, constipation, nausea, rectal pain and vomiting.   All other systems reviewed and are negative.       Physical Exam:   Physical Exam  Vitals reviewed.   Constitutional:       General: He is not in acute distress.     Appearance: He is well-groomed. He is not toxic-appearing.   HENT:      Head: Normocephalic and atraumatic.   Eyes:      Extraocular Movements: Extraocular movements intact.   Cardiovascular:      Rate and Rhythm: Normal rate and regular rhythm.      Heart sounds: Normal heart sounds. No murmur heard.  Pulmonary:      Effort: Pulmonary effort is normal. No respiratory distress.      Breath sounds: Normal breath sounds. No stridor. No wheezing or rales.   Abdominal:      General: Bowel sounds are normal. There is no distension.      Palpations: Abdomen is soft. There is no mass.      Tenderness: There is abdominal tenderness (mild RLQ pain). There is no guarding or rebound. "      Hernia: No hernia is present.   Skin:     General: Skin is warm.      Coloration: Skin is not jaundiced.      Findings: No rash.   Neurological:      Mental Status: He is alert and oriented to person, place, and time.   Psychiatric:         Mood and Affect: Mood and affect normal.         Speech: Speech normal.         Behavior: Behavior is cooperative.          Lab Results:   Lab Results   Component Value Date    WBC 10.14 04/28/2025    HGB 13.9 04/28/2025    HCT 41.6 04/28/2025    MCV 87.8 04/28/2025    RDW 12.3 04/28/2025     04/28/2025    NEUTRORELPCT 64.2 04/28/2025    LYMPHORELPCT 25.1 04/28/2025    MONORELPCT 8.7 04/28/2025    EOSRELPCT 0.7 04/28/2025    BASORELPCT 1.0 04/28/2025    NEUTROABS 6.51 04/28/2025    LYMPHSABS 2.55 04/28/2025       Lab Results   Component Value Date     04/28/2025    K 4.6 04/28/2025    CO2 26.0 04/28/2025     04/28/2025    BUN 11 04/28/2025    CREATININE 0.82 04/28/2025    EGFRIFNONA 98 03/25/2021    GLUCOSE 83 04/28/2025    CALCIUM 9.4 04/28/2025    ALKPHOS 129 (H) 04/28/2025    AST 18 04/28/2025    ALT 26 04/28/2025    BILITOT 0.7 04/28/2025    ALBUMIN 4.2 04/28/2025    PROTEINTOT 6.6 04/28/2025       Pathology:        Endoscopy:        Imaging:   CT Abdomen Pelvis With & Without Contrast  Result Date: 3/17/2025  1.  Stable right lower lobe 9 mm pulmonary nodule. 2.  Stable spiculated left upper lobe pulmonary nodule measuring 8 mm.  This report was finalized on 3/17/2025 11:30 AM by Dr. Jed Laguerre MD.          Results review: During today's encounter, all relevant clinical data has been reviewed.      Assessment and Plan  1. Crohn's disease of small intestine without complication (Primary)  Patient is currently tolerating Skyrizi well.  Please continue.  He is currently on Colestid 1 g once daily.  Please continue.  Per Dr. Casanova's prior note, we will repeat the following labs today.  -     Vitamin D 25 Hydroxy  -     Comprehensive Metabolic Panel;  Future  -     CBC & Differential      New Medications:   No orders of the defined types were placed in this encounter.      Discontinued Medications:   There are no discontinued medications.     Visit Diagnoses:    ICD-10-CM ICD-9-CM   1. Crohn's disease of small intestine without complication  K50.00 555.0            Follow Up:   Return in about 3 months (around 9/30/2025).    The patient was in agreement with the plan and all questions were answered to patient's satisfaction.        This document has been electronically signed by Brook Fermin PA-C   June 30, 2025 16:10 EDT    Dictated Utilizing Dragon Dictation: Part of this note may be an electronic transcription/translation of spoken language to printed text using the Dragon Dictation System.    CC:  No ref. provider found  Asuncion Keyes APRN

## 2025-07-01 ENCOUNTER — LAB (OUTPATIENT)
Dept: LAB | Facility: HOSPITAL | Age: 50
End: 2025-07-01
Payer: MEDICAID

## 2025-07-01 DIAGNOSIS — K50.00 CROHN'S DISEASE OF SMALL INTESTINE WITHOUT COMPLICATION: Primary | ICD-10-CM

## 2025-07-01 LAB
25(OH)D3 SERPL-MCNC: 21.7 NG/ML (ref 30–100)
BASOPHILS # BLD AUTO: 0.09 10*3/MM3 (ref 0–0.2)
BASOPHILS NFR BLD AUTO: 0.9 % (ref 0–1.5)
DEPRECATED RDW RBC AUTO: 43.5 FL (ref 37–54)
EOSINOPHIL # BLD AUTO: 0.18 10*3/MM3 (ref 0–0.4)
EOSINOPHIL NFR BLD AUTO: 1.8 % (ref 0.3–6.2)
ERYTHROCYTE [DISTWIDTH] IN BLOOD BY AUTOMATED COUNT: 13.3 % (ref 12.3–15.4)
HAV IGM SERPL QL IA: NORMAL
HBV CORE IGM SERPL QL IA: NORMAL
HBV SURFACE AG SERPL QL IA: NORMAL
HCT VFR BLD AUTO: 41.2 % (ref 37.5–51)
HCV AB SER QL: NORMAL
HGB BLD-MCNC: 13.1 G/DL (ref 13–17.7)
IMM GRANULOCYTES # BLD AUTO: 0.06 10*3/MM3 (ref 0–0.05)
IMM GRANULOCYTES NFR BLD AUTO: 0.6 % (ref 0–0.5)
LYMPHOCYTES # BLD AUTO: 2.34 10*3/MM3 (ref 0.7–3.1)
LYMPHOCYTES NFR BLD AUTO: 23 % (ref 19.6–45.3)
MCH RBC QN AUTO: 28.5 PG (ref 26.6–33)
MCHC RBC AUTO-ENTMCNC: 31.8 G/DL (ref 31.5–35.7)
MCV RBC AUTO: 89.8 FL (ref 79–97)
MONOCYTES # BLD AUTO: 0.88 10*3/MM3 (ref 0.1–0.9)
MONOCYTES NFR BLD AUTO: 8.7 % (ref 5–12)
NEUTROPHILS NFR BLD AUTO: 6.61 10*3/MM3 (ref 1.7–7)
NEUTROPHILS NFR BLD AUTO: 65 % (ref 42.7–76)
NRBC BLD AUTO-RTO: 0 /100 WBC (ref 0–0.2)
PLATELET # BLD AUTO: 292 10*3/MM3 (ref 140–450)
PMV BLD AUTO: 11.5 FL (ref 6–12)
RBC # BLD AUTO: 4.59 10*6/MM3 (ref 4.14–5.8)
WBC NRBC COR # BLD AUTO: 10.16 10*3/MM3 (ref 3.4–10.8)

## 2025-07-01 PROCEDURE — 80074 ACUTE HEPATITIS PANEL: CPT

## 2025-07-01 PROCEDURE — 36415 COLL VENOUS BLD VENIPUNCTURE: CPT

## 2025-07-01 PROCEDURE — 86480 TB TEST CELL IMMUN MEASURE: CPT

## 2025-07-01 PROCEDURE — 82306 VITAMIN D 25 HYDROXY: CPT

## 2025-07-01 PROCEDURE — 80053 COMPREHEN METABOLIC PANEL: CPT

## 2025-07-01 PROCEDURE — 85025 COMPLETE CBC W/AUTO DIFF WBC: CPT

## 2025-07-02 DIAGNOSIS — K21.9 GASTROESOPHAGEAL REFLUX DISEASE, UNSPECIFIED WHETHER ESOPHAGITIS PRESENT: ICD-10-CM

## 2025-07-02 DIAGNOSIS — K50.90 EXACERBATION OF CROHN'S DISEASE WITHOUT COMPLICATION: ICD-10-CM

## 2025-07-02 RX ORDER — LANSOPRAZOLE 30 MG/1
30 CAPSULE, DELAYED RELEASE ORAL 2 TIMES DAILY
Qty: 180 CAPSULE | Refills: 3 | Status: SHIPPED | OUTPATIENT
Start: 2025-07-02

## 2025-07-03 ENCOUNTER — RESULTS FOLLOW-UP (OUTPATIENT)
Dept: GASTROENTEROLOGY | Facility: CLINIC | Age: 50
End: 2025-07-03
Payer: MEDICAID

## 2025-07-03 LAB
GAMMA INTERFERON BACKGROUND BLD IA-ACNC: 0.04 IU/ML
M TB IFN-G BLD-IMP: NEGATIVE
M TB IFN-G CD4+ BCKGRND COR BLD-ACNC: 0.05 IU/ML
M TB IFN-G CD4+CD8+ BCKGRND COR BLD-ACNC: 0.05 IU/ML
MITOGEN IGNF BCKGRD COR BLD-ACNC: >10 IU/ML
QUANTIFERON INCUBATION: NORMAL
SERVICE CMNT-IMP: NORMAL

## 2025-07-07 ENCOUNTER — TELEPHONE (OUTPATIENT)
Dept: GASTROENTEROLOGY | Facility: CLINIC | Age: 50
End: 2025-07-07
Payer: MEDICAID

## 2025-07-07 NOTE — TELEPHONE ENCOUNTER
The lab called and left a message on my voicemail regarding the TB quant gold. She said that the lab code has changed and the correct order number for it is [id number].

## 2025-07-08 ENCOUNTER — SPECIALTY PHARMACY (OUTPATIENT)
Dept: PHARMACY | Facility: HOSPITAL | Age: 50
End: 2025-07-08
Payer: MEDICAID

## 2025-07-08 NOTE — PROGRESS NOTES
Specialty Pharmacy Patient Management Program  Refill Outreach     Scott was contacted today regarding refills of their medication(s).    Refill Questions      Flowsheet Row Most Recent Value   Changes to allergies? No   Changes to medications? No   New conditions or infections since last clinic visit No   Unplanned office visit, urgent care, ED, or hospital admission in the last 4 weeks  No   How does patient/caregiver feel medication is working? Good   Financial problems or insurance changes  No   Since the previous refill, were any specialty medication doses or scheduled injections missed or delayed?  No   Does this patient require a clinical escalation to a pharmacist? No            Delivery Questions      Flowsheet Row Most Recent Value   Delivery method [employer]   Delivery address verified with patient/caregiver? Yes   Delivery address Home   Medication(s) being filled and delivered Evolocumab (REPATHA)   Copay verified? Yes   Copay amount $0   Copay form of payment No copayment ($0)   Delivery Date Selection 07/10/25   Signature Required Yes                 Follow-up: 84 day(s)     Pebbles Oconnor, Pharmacy Technician  7/8/2025  15:30 EDT

## 2025-07-16 ENCOUNTER — TELEPHONE (OUTPATIENT)
Dept: FAMILY MEDICINE CLINIC | Facility: CLINIC | Age: 50
End: 2025-07-16
Payer: MEDICAID

## 2025-07-16 NOTE — TELEPHONE ENCOUNTER
I called and spoke to patient to confirm tomorrow appointment and he said that he needed to reschedule the appt after he has his first infusion therapy.    I rescheduled him for July 29

## 2025-07-21 ENCOUNTER — TELEPHONE (OUTPATIENT)
Dept: PHARMACY | Facility: HOSPITAL | Age: 50
End: 2025-07-21
Payer: MEDICAID

## 2025-07-21 NOTE — TELEPHONE ENCOUNTER
I was contacted today by one of our infusion clinic pharmacist regarding patient needing to be transitioned from Skyrizi infusions over to the maintenance Skyrizi injections.  I did reach out to Brook Fermin PA-C, who is agreeable to be referring provider on record for the GI clinic. Patient is being treated for Crohn's disease. Confirmed with Brook that maintenance dose was to be Skyrizi 180mg/1.2mL every 8 weeks.     I did speak with the patient today who confirmed he has not started Skyrizi maintenance injections and has had 3 Skyrizi infusions.     PA was required on the patients insurance and has been submitted. Care coordinator will follow up when response is received and get the patient scheduled.    Thank you,    Angie Shrestha. Giuliano, PharmD  07/21/25  15:51 EDT

## 2025-07-23 DIAGNOSIS — M15.0 PRIMARY OSTEOARTHRITIS INVOLVING MULTIPLE JOINTS: ICD-10-CM

## 2025-07-23 RX ORDER — TRAMADOL HYDROCHLORIDE 50 MG/1
50 TABLET ORAL EVERY 8 HOURS PRN
Qty: 30 TABLET | Refills: 0 | Status: SHIPPED | OUTPATIENT
Start: 2025-07-23

## 2025-07-23 NOTE — TELEPHONE ENCOUNTER
PA required extra questions for response. Response submitted. Awaiting decision.    Thank you,    Angie Shrestha. Giuliano, BrianD  07/23/25  13:50 EDT

## 2025-07-28 NOTE — TELEPHONE ENCOUNTER
PA for Skyrizi 180 mg/1.2 mL has been approved with $0 co-pay. Working with retail pharmacy on ordering in medication and patient will be scheduled for initial clinic visit.     Thank you,    Angie Shrestha. Giuliano, PharmD  07/28/25  08:10 EDT

## 2025-07-28 NOTE — TELEPHONE ENCOUNTER
Skyrizi in on order for Tuesday 7/29/25. Have called and LVM for patient to call to schedule appointment.    Thank you,    Angie Shrestha. Giuliano, PharmD  07/28/25  11:11 EDT

## 2025-07-29 ENCOUNTER — OFFICE VISIT (OUTPATIENT)
Dept: FAMILY MEDICINE CLINIC | Facility: CLINIC | Age: 50
End: 2025-07-29
Payer: MEDICAID

## 2025-07-29 ENCOUNTER — SPECIALTY PHARMACY (OUTPATIENT)
Dept: PHARMACY | Facility: HOSPITAL | Age: 50
End: 2025-07-29
Payer: MEDICAID

## 2025-07-29 VITALS
RESPIRATION RATE: 14 BRPM | OXYGEN SATURATION: 99 % | SYSTOLIC BLOOD PRESSURE: 110 MMHG | WEIGHT: 189.8 LBS | BODY MASS INDEX: 24.36 KG/M2 | HEART RATE: 77 BPM | HEIGHT: 74 IN | DIASTOLIC BLOOD PRESSURE: 78 MMHG

## 2025-07-29 DIAGNOSIS — I10 ESSENTIAL HYPERTENSION: Primary | Chronic | ICD-10-CM

## 2025-07-29 DIAGNOSIS — I25.10 CORONARY ARTERY DISEASE INVOLVING NATIVE CORONARY ARTERY OF NATIVE HEART WITHOUT ANGINA PECTORIS: Chronic | ICD-10-CM

## 2025-07-29 DIAGNOSIS — N40.1 BENIGN PROSTATIC HYPERPLASIA WITH NOCTURIA: ICD-10-CM

## 2025-07-29 DIAGNOSIS — K50.00 CROHN'S DISEASE OF SMALL INTESTINE WITHOUT COMPLICATION: Primary | ICD-10-CM

## 2025-07-29 DIAGNOSIS — E55.9 VITAMIN D DEFICIENCY: ICD-10-CM

## 2025-07-29 DIAGNOSIS — R35.1 BENIGN PROSTATIC HYPERPLASIA WITH NOCTURIA: ICD-10-CM

## 2025-07-29 DIAGNOSIS — K50.011 CROHN'S DISEASE OF SMALL INTESTINE WITH RECTAL BLEEDING: ICD-10-CM

## 2025-07-29 PROCEDURE — 3074F SYST BP LT 130 MM HG: CPT | Performed by: NURSE PRACTITIONER

## 2025-07-29 PROCEDURE — 1160F RVW MEDS BY RX/DR IN RCRD: CPT | Performed by: NURSE PRACTITIONER

## 2025-07-29 PROCEDURE — 3078F DIAST BP <80 MM HG: CPT | Performed by: NURSE PRACTITIONER

## 2025-07-29 PROCEDURE — 1126F AMNT PAIN NOTED NONE PRSNT: CPT | Performed by: NURSE PRACTITIONER

## 2025-07-29 PROCEDURE — 99214 OFFICE O/P EST MOD 30 MIN: CPT | Performed by: NURSE PRACTITIONER

## 2025-07-29 PROCEDURE — 1159F MED LIST DOCD IN RCRD: CPT | Performed by: NURSE PRACTITIONER

## 2025-07-29 RX ORDER — RISANKIZUMAB-RZAA 180 MG/1.2
180 KIT SUBCUTANEOUS
Qty: 1.2 ML | Refills: 0 | Status: SHIPPED | OUTPATIENT
Start: 2025-07-29 | End: 2025-07-29 | Stop reason: SDUPTHER

## 2025-07-29 RX ORDER — RISANKIZUMAB-RZAA 180 MG/1.2
180 KIT SUBCUTANEOUS
Qty: 1.2 ML | Refills: 2 | Status: SHIPPED | OUTPATIENT
Start: 2025-07-29

## 2025-07-29 NOTE — PROGRESS NOTES
Specialty Pharmacy Patient Management Program  Gastroenterology Initial Assessment     Scott Montes is a 50 y.o. male referred by their Gastroenterology provider to the Gastroenterology Patient Management program offered by [facility] for Crohn's Disease on 07/29/25. An initial outreach was conducted, including assessment of therapy appropriateness and specialty medication education for Skyrizi (risankizumab). The patient was introduced to services offered by [facility], including: regular assessments, refill coordination, curbside pick-up or mail order delivery options, prior authorization maintenance, and financial assistance programs as applicable. The patient was also provided with contact information for the pharmacy team.     Pateint reports that he has tried both Stelara and  Humira previously. Patient reports that he has intolerance to Stelara and was not able to pursue maintenance due to intolerance with the infusion. From patients understanding, Humira was stopped because he was not seeing any symptom improvement.  Patient denies any current infections.     Patient received Skyrizi infusions and reported tolerating well. He denies any issues. Below are dates of Skyzi infusions:  4/1/25: 600 mg IV (week 0)  4/29/25: 600 mg IV (week 4)  5/27/25:600 mg IV (week 8)      Insurance Coverage & Financial Support  [place] Medicaid      Relevant Past Medical History and Comorbidities  Relevant medical history and concomitant health conditions were discussed with the patient. The patient's chart has been reviewed for relevant past medical history and comorbid health conditions and updated as necessary.   Past Medical History:   Diagnosis Date    Anemia     Arthritis     BPH without obstruction/lower urinary tract symptoms 4/2/2024    Coronary artery disease     Crohn's disease     Elevated cholesterol     Erectile dysfunction     GERD (gastroesophageal reflux disease)      H/O blood clots     Heart attack     Hormone disorder 9 years    Hypertension     Interstitial cystitis     Low back pain     STEMI (ST elevation myocardial infarction) 09/06/2022    Ulcerative colitis      Social History     Socioeconomic History    Marital status:    Tobacco Use    Smoking status: Every Day     Current packs/day: 0.50     Average packs/day: 0.5 packs/day for 35.0 years (17.5 ttl pk-yrs)     Types: Cigarettes     Passive exposure: Current    Smokeless tobacco: Never    Tobacco comments:     Smoking cessation encouraged   Vaping Use    Vaping status: Every Day    Substances: Nicotine    Devices: Disposable   Substance and Sexual Activity    Alcohol use: No    Drug use: No    Sexual activity: Yes     Partners: Female     Birth control/protection: Vasectomy     Problem list reviewed by Angie Nobles, PharmCHAVO on 7/29/2025 at  1:14 PM    Allergies  Known allergies and reactions were discussed with the patient. The patient's chart has been reviewed for allergy information and updated as necessary.   Stelara [ustekinumab]  Allergies reviewed by Angie Nobles, PharmD on 7/29/2025 at  1:12 PM    Relevant Laboratory Values  Lab Results   Component Value Date    GLUCOSE 111 (H) 07/01/2025    BUN 12.6 07/01/2025    CREATININE 0.72 (L) 07/01/2025    BCR 17.5 07/01/2025     07/01/2025    K 3.9 07/01/2025     07/01/2025    CO2 21.4 (L) 07/01/2025    CALCIUM 8.6 07/01/2025    PROTEINTOT 6.4 07/01/2025    ALBUMIN 4.0 07/01/2025    ALT 18 07/01/2025    AST 20 07/01/2025    ALKPHOS 142 (H) 07/01/2025    BILITOT 0.4 07/01/2025    ANIONGAP 10.6 07/01/2025     Lab Results   Component Value Date    WBC 10.16 07/01/2025    HGB 13.1 07/01/2025    HCT 41.2 07/01/2025     07/01/2025    INR 0.89 (L) 03/17/2025     Lab Results   Component Value Date    HEPAIGM Non-Reactive 07/01/2025    HEPBIGMCORE Non-Reactive 07/01/2025    HEPBSAG Non-Reactive 07/01/2025    HEPCVIRUSABY Non-Reactive  07/01/2025    CMU2MLF6 Non-Reactive 05/04/2022     Lab Results   Component Value Date    QUANTITBGLDP Negative 07/01/2025    QUANTITBGLDP Negative 04/11/2024    QUANTITBGLDP Negative 10/10/2022     Lab Value Review  The above lab values have been reviewed; the following specialty medication(s) dose adjustment(s) are recommended: none.        (Live Vaccines Should not be given while on therapy or within 4 weeks of starting therapy)  COVID 19: Pt reports series completed  Influenza: Declines  Pneumococcal: Declines  Zoster: Declines    Current Medication List  This medication list has been reviewed with the patient and evaluated for any interactions or necessary modifications/recommendations, and updated to include all prescription medications, OTC medications, and supplements the patient is currently taking. This list reflects what is contained in the patient's profile, which has also been marked as reviewed to communicate to other providers it is the most up to date version of the patient's current medication therapy.     Current Outpatient Medications:     aspirin (Aspirin Low Dose) 81 MG EC tablet, Take 1 tablet by mouth Daily., Disp: 90 tablet, Rfl: 2    atorvastatin (LIPITOR) 80 MG tablet, Take 1 tablet by mouth Every Night., Disp: 90 tablet, Rfl: 3    Cholecalciferol (vitamin D3) 125 MCG (5000 UT) tablet tablet, Take 1 tablet by mouth Daily., Disp: 30 tablet, Rfl: 5    colestipol (COLESTID) 1 g tablet, TAKE ONE (1) TABLET BY MOUTH DAILY., Disp: 90 tablet, Rfl: 2    Evolocumab (REPATHA) solution auto-injector SureClick injection, Inject 1 mL under the skin into the appropriate area as directed Every 14 (Fourteen) Days., Disp: 6 mL, Rfl: 2    lansoprazole (PREVACID) 30 MG capsule, Take 1 capsule by mouth 2 (Two) Times a Day., Disp: 180 capsule, Rfl: 3    lisinopril (PRINIVIL,ZESTRIL) 10 MG tablet, Take 1 tablet by mouth Daily for 360 days., Disp: 90 tablet, Rfl: 3    nitroglycerin (NITROSTAT) 0.4 MG SL tablet,  DISSOLVE ONE (1) TABLET UNDER THE TONGUE AS NEEDED FOR ANGINA, MAY REPEAT EVERY FIVE (5) MINUTES FOR UP THREE DOSES, Disp: 25 tablet, Rfl: 0    Risankizumab-rzaa (Skyrizi) 180 MG/1.2ML solution cartridge cartridge, Inject 1.2 mL under the skin into the appropriate area as directed Every 8 (Eight) Weeks., Disp: 1.2 mL, Rfl: 2    tamsulosin (FLOMAX) 0.4 MG capsule 24 hr capsule, Take 1 capsule by mouth Daily., Disp: 30 capsule, Rfl: 5    traMADol (ULTRAM) 50 MG tablet, Take 1 tablet by mouth Every 8 (Eight) Hours As Needed for Moderate Pain., Disp: 30 tablet, Rfl: 0    triamcinolone (KENALOG) 0.1 % cream, Apply 1 application  topically to the appropriate area as directed 2 (Two) Times a Day., Disp: 80 g, Rfl: 3    mesalamine (Apriso) 0.375 g 24 hr capsule, Take 4 capsules by mouth Daily. (Patient not taking: Reported on 7/29/2025), Disp: 120 capsule, Rfl: 11  Medicines reviewed by Angie Nobles, PharmD on 7/29/2025 at  1:14 PM    Drug Interactions  No significant drug-drug interactions expected with Skyrizi according to literature      Initial Education Provided for Specialty Medication  The patient has been provided with the following education and any applicable administration techniques (i.e. self-injection) have been demonstrated for the therapies indicated. All questions and concerns have been addressed prior to the patient receiving the medication, and the patient has verbalized understanding of the education and any materials provided. Additional patient education shall be provided and documented upon request by the patient, provider, or payer.         Skyrizi (Risankizumab-rzaa)         Medication Expectations   Why am I taking this medication? You are taking this medication for Crohn's disease.   What should I expect while on this medication? You may experience symptom relief in as little as 4 weeks, including less abdominal pain and bowel movements. You should expect a decrease in frequency and  severity of symptoms within 12 weeks. Endoscopic response may be seen in as little as 12 weeks.   How does the medication work? Risankizumab-rzaa inhibits the release of pro-inflammatory cytokines and chemokines. Risankizumab-rzaa is a humanized immunoglobulin G1 (IgG1) monoclonal antibody that selectively binds human interleukin 23 (IL-23) cytokine and inhibits its interaction with the IL-23 receptor. IL-23 is a naturally occurring cytokine that is involved in inflammatory and immune responses.   How long will I be on this medication for? The amount of time you will be on this medication will be determined by your doctor and your response to the medication.    How do I take this medication? Take as directed on your prescription label. There is an initial IV loading dose at weeks 0, 4, and 8. Subcutaneous injections with the on-body injector start at week 12 and every 8 weeks therafter. It may be administered in the stomach or upper thigh. Allow to sit out of the refrigerator for 45 to 90 minutes before injecting.   What are some possible side effects? Injection site reactions and hypersensitivity reactions, headache, joint pain, back or abdominal pain, fever, upper respiratory or urinary tract infection.   What happens if I miss a dose? If you miss a dose, take it as soon as you remember.            Medication Safety   What are things I should warn my doctor immediately about? Allergic reaction such has hives or trouble breathing, or signs of liver problems such as dark urine, yellowing skin or eyes, nausea, vomiting, or loss of appetite. If you develop symptoms of infection, such as a cough or fever, that do not go away, weight loss, changes in how often you urinate, changes in sweating, muscle pain or weakness, or severe dizziness.    What are things that I should be cautious of? Injection site reaction, headache, and fatigue. You may have more chance of getting an infection. Wash your hands often and stay away  from people with infections, colds, or flu.   What are some medications that can interact with this one? Immunosuppressants and vaccines.            Medication Storage/Handling   How should I handle this medication? Keep this medicine out of reach of pets and children. Keep the product in the refrigerator in the original carton to protect from light until time of use. Do not shake or freeze. Do not inject where the skin is tender, bruised, red, hard, or affected by any lesions. Rotate injection sites.   How does this medication need to be stored? Store in refrigerator and keep dry. If needed, you may store at room temperature for up to 24 hours but do not return to the refrigerator if unused.    How should I dispose of this medication? You can dispose of the on-body injector in a sharps container, and if this is not available you may use an empty hard plastic container such as a milk jug or laundry detergent container. Discard any unused portion or if stored outside of refrigerator > 24 hours.            Resources/Support   How can I remind myself to take this medication? You can download a reminder dylon on your phone or use a calandar to help remember your next injection.   Is financial support available?  Yes, [employer] provides co-pay cards if you have commercial insurance or patient assistance if you have Medicare or no insurance.    Which vaccines are recommended for me? Talk to your doctor about these vaccines: Flu, Coronavirus (COVID-19), Pneumococcal (pneumonia), Tdap, Hepatitis B, Zoster (shingles)                Adherence and Self-Administration  Adherence related to the patient's specialty therapy was discussed with the patient. The adherence segment of this outreach has been reviewed and updated.  Is there a concern with patient's ability to self administer the medication correctly and without issue?: No  Were any potential barriers to adherence identified during the initial assessment or patient education?:  No  Are there any concerns regarding the patient's understanding of the importance of medication adherence?: No  Methods for Supporting Patient Adherence and/or Self-Administration: Pt received injection training education in clinic     Open Medication Therapy Problems  No medication therapy recommendations to display    Goals of Therapy  Goals related to the patient's specialty therapy were discussed with the patient. The Patient Goals segment of this outreach has been reviewed and updated.   Goals Addressed Today        Specialty Pharmacy General Goal      Reduce clinical symptoms by 50% from baseline              Reassessment Plan & Follow-Up  Medication Therapy Changes: Pt to start on Skyrizi 180 mg subQ (OBI) every 8 weeks   Patient applied OBI (on body infusor) on RIGHT side of stomach  Related Plans, Therapy Recommendations, or Therapy Problems to Be Addressed:   Vaccinations: Recommend routine vaccination avoiding any live vaccines.    Pharmacist to perform regular reassessments no more than (6) months from the previous assessment.  Care Coordinator to set up future refill outreaches, coordinate prescription delivery, and escalate clinical questions to pharmacist.   Welcome information and patient satisfaction survey to be sent by specialty pharmacy team with patient's initial fill.    Attestation  Therapeutic appropriateness: Appropriate   I attest the patient was actively involved in and has agreed to the above plan of care. If the prescribed therapy is at any point deemed not appropriate based on the current or future assessments, a consultation will be initiated with the patient's specialty care provider to determine the best course of action. The revised plan of therapy will be documented along with any required assessments and/or additional patient education provided.     Thank you,    Angie Shrestha. Giuliano, PharmD  07/29/25  14:27 EDT

## 2025-07-29 NOTE — PROGRESS NOTES
"Subjective   Scott Montes is a 50 y.o. male.     Chief Complaint   Patient presents with    Hypertension       History of Present Illness     Hypertension-chronic and ongoing.  Patient currently on lisinopril 10 mg.  No negative side effects.  No CP but he has chronic left shoulder pain since his MI.  He reports even minimal activity is uncomfortable.    History of MI-ongoing.  Currently on Repatha injection and atorvastatin 80 mg.  He is also on aspirin 81 mg.  No negative side effects.  Multiple areas of joint pain-ongoing.  He continues to feel that it is worsening.  He is taking tramadol 50 mg every 8 hours as needed.  He was last given a prescription June 25, 2025 for quantity of 30.  He is not having any negative side effects of the tramadol.  He has generalized stiffness and decreased ability for bending and flexion.  He is taking tramadol at least daily.  He reports otherwise he is too uncomfortable to be active minimally.    Colitis-ongoing.  He is under the care of of GI.  He remains on mesalamine 0.375 g and Skyrizi infusion.  He had 3/3 infusion.  He has a diagnosis of Crohn's disease with bleeding.  He has recurrent diarrhea for which he was given Colestid.  He will begin injections of Skyrizi today at the infusion clinic.  He is not sure what the schedule of his injection will be.   He feels that GI symptoms have been \"pretty decent\".  He reports that he has not had blood in his stool recent.  Abdomen cramps continue to be present.    GERD-ongoing.  Currently ordered Prevacid 30 mg twice daily.   He reports \"doing good\".  No Dysphagia.    Vitamin D deficiency-chronic and ongoing.  Patient is presently taking vitamin D 2000 units 2 caps daily for supplement.  No negative side effects of medication are reported.  Vitamin D level is low even with med use.  He is ordered 3 caps daily.  He reports that he does not like taking the supplement.    Urine concern-he reports that when he wakes and feels that he " "is hurting and needing to urinate but he does not have to urinate much.  He is on Flomax.  He does have some decreased stream.  Some occasional stopping and starting of his stream but it is not daily.        The following portions of the patient's history were reviewed and updated as appropriate: CC, ROS, allergies, current medications, past family history, past medical history, past social history, past surgical history and problem list.    Review of Systems   Constitutional:  Positive for fatigue. Negative for appetite change, unexpected weight gain and unexpected weight loss.   HENT:  Negative for congestion, ear pain, postnasal drip, rhinorrhea, sore throat, swollen glands, trouble swallowing and voice change.    Eyes:  Negative for pain and visual disturbance.   Respiratory:  Negative for cough, chest tightness, shortness of breath and wheezing.    Cardiovascular:  Negative for chest pain, palpitations and leg swelling.   Gastrointestinal:  Negative for abdominal pain, blood in stool, constipation, diarrhea, nausea and indigestion.   Genitourinary:  Negative for dysuria, hematuria and urgency.   Musculoskeletal:  Positive for arthralgias, back pain, myalgias and neck stiffness. Negative for gait problem and joint swelling.   Skin:  Negative for color change and skin lesions.   Allergic/Immunologic: Negative.    Neurological:  Positive for dizziness (occasional-worse with sudden postion changes). Negative for tremors, numbness (intermittent in hands) and headache.   Hematological: Negative.    Psychiatric/Behavioral:  Positive for stress. Negative for dysphoric mood, sleep disturbance and suicidal ideas. The patient is not nervous/anxious.    All other systems reviewed and are negative.      Objective     /78   Pulse 77   Resp 14   Ht 188 cm (74\")   Wt 86.1 kg (189 lb 12.8 oz)   SpO2 99%   BMI 24.37 kg/m²     Physical Exam  Vitals reviewed.   Constitutional:       General: He is not in acute " distress.     Appearance: He is well-developed. He is not diaphoretic.   HENT:      Head: Normocephalic and atraumatic.      Jaw: No tenderness.      Right Ear: Hearing, tympanic membrane, ear canal and external ear normal.      Left Ear: Hearing, tympanic membrane, ear canal and external ear normal.      Nose: Nose normal. No nasal tenderness, congestion or rhinorrhea.      Right Sinus: No maxillary sinus tenderness or frontal sinus tenderness.      Left Sinus: No maxillary sinus tenderness or frontal sinus tenderness.      Mouth/Throat:      Lips: Pink.      Mouth: Mucous membranes are moist.      Pharynx: Oropharynx is clear. Uvula midline. No oropharyngeal exudate or posterior oropharyngeal erythema.   Eyes:      General: Lids are normal. No scleral icterus.     Extraocular Movements:      Right eye: Normal extraocular motion and no nystagmus.      Left eye: Normal extraocular motion and no nystagmus.      Conjunctiva/sclera: Conjunctivae normal.      Pupils: Pupils are equal, round, and reactive to light.   Neck:      Thyroid: No thyromegaly or thyroid tenderness.      Vascular: No carotid bruit or JVD.      Trachea: No tracheal tenderness.   Cardiovascular:      Rate and Rhythm: Normal rate and regular rhythm.      Pulses:           Dorsalis pedis pulses are 2+ on the right side and 2+ on the left side.        Posterior tibial pulses are 2+ on the right side and 2+ on the left side.      Heart sounds: Normal heart sounds, S1 normal and S2 normal. No murmur heard.  Pulmonary:      Effort: Pulmonary effort is normal. No accessory muscle usage, prolonged expiration or respiratory distress.      Breath sounds: Normal breath sounds.   Chest:      Chest wall: No tenderness.   Abdominal:      General: Bowel sounds are normal. There is no distension.      Palpations: Abdomen is soft. There is no hepatomegaly, splenomegaly or mass.      Tenderness: There is generalized abdominal tenderness. There is no right CVA  tenderness, left CVA tenderness or guarding.      Comments: Bowel sounds loud and gurgling.  Audible without auscultation.   Musculoskeletal:         General: Tenderness present.      Left shoulder: Tenderness, bony tenderness and crepitus present. Decreased range of motion. Normal strength. Normal pulse.      Cervical back: Neck supple. Spasms, tenderness and bony tenderness present. Pain with movement present. Decreased range of motion.      Thoracic back: Tenderness present.      Lumbar back: Spasms and tenderness present. Decreased range of motion.      Right lower leg: No edema.      Left lower leg: No edema.      Comments: No muscular atrophy or flaccidity.  Multiple areas of generalized arthralgia and myalgia noted throughout spine, hips, knees, and shoulders.     Lymphadenopathy:      Head:      Right side of head: No submental or submandibular adenopathy.      Left side of head: No submental or submandibular adenopathy.      Cervical: No cervical adenopathy.      Right cervical: No superficial cervical adenopathy.     Left cervical: No superficial cervical adenopathy.   Skin:     General: Skin is warm and dry.      Capillary Refill: Capillary refill takes less than 2 seconds.      Coloration: Skin is not jaundiced or pale.      Findings: No erythema.      Nails: There is no clubbing.   Neurological:      Mental Status: He is alert and oriented to person, place, and time.      Cranial Nerves: No cranial nerve deficit or facial asymmetry.      Sensory: No sensory deficit.      Motor: No weakness, tremor, atrophy or abnormal muscle tone.      Coordination: Coordination normal.      Gait: Gait abnormal (mild antalgia).      Deep Tendon Reflexes: Reflexes are normal and symmetric.   Psychiatric:         Attention and Perception: He is attentive.         Mood and Affect: Mood is depressed. Mood is not anxious.         Speech: Speech normal.         Behavior: Behavior normal. Behavior is cooperative.          Thought Content: Thought content normal.         Cognition and Memory: Cognition normal.         Judgment: Judgment normal.         Diagnoses and all orders for this visit:    1. Essential hypertension (Primary)  Overview:  With MI September 2022    Assessment & Plan:  Hypertension is stable and controlled  Continue lisinopril 10 mg      2. Vitamin D deficiency  Assessment & Plan:  Increased to 5000 units daily    Orders:  -     Cholecalciferol (vitamin D3) 125 MCG (5000 UT) tablet tablet; Take 1 tablet by mouth Daily.  Dispense: 30 tablet; Refill: 5    3. Coronary artery disease involving native coronary artery of native heart without angina pectoris  Overview:  9/6/2022 [facility] for STEMI: PCI BUZZ to the RCA  9/6/2022 [facility]: LVEF 51 to 55%    Assessment & Plan:  Continue to pursue a low cholesterol diet and cholesterol lowering medications.  Continue under care of cardiology.      4. Crohn's disease of small intestine with rectal bleeding  Assessment & Plan:  Continue Mesalamine   Continue under care of GI  Continue Colestid for diarrhea      5. Benign prostatic hyperplasia with nocturia  Assessment & Plan:  Continue Flomax.          BMI is within normal parameters. No other follow-up for BMI required.      Understands disease processes and need for medications.  Understands reasons for urgent and emergent care.  Patient (& family) verbalized agreement for treatment plan.   Emotional support and active listening provided.  Patient provided time to verbalize feelings.    [facility] reviewed today and consistent.  Will refill prescribed controlled medication today.  Patient is aware they cannot receive narcotics from any other provider except if under care of pain management or speciality clinic.  Risk and benefits of medication use has been reviewed.  History and physical exam exhibit continued safe and appropriate use of controlled substances.  The patient is aware of the potential for addiction and dependence.  This patient  has been made aware of the appropriate use of such medications, including potential risk of somnolence, limited ability to drive and / or work safely, and potential for overdose.  Patient understands not to take medication and drive until they know how medication may affect their cognition/decision making.   It has also been made clear that these medications are for use by this patient only, without concomitant use of alcohol or other substances unless prescribed/advised by medical provider.  Patient understands they may be subject to UDS and pill counts at random.    Patient considered to be low risk for addiction due to use of single controlled medications.  Patient understands and accepts these risks.  Patient need for medication will be reassessed at each visit.  Doses will be adjusted according to patient need and findings.    Goal of [place]: Patient will not have any adverse reactions of medication.  Patient will have reduction in there chronic back and joint pain symptoms with use of tramadol as needed as directed.  Patient will be able to remain active in an outside of their home with minimal to no interference from their pain symptoms.    [facility] Patient Controlled Substance Report (from 7/29/2024 to 7/29/2025)    Dispensed  Strength Quantity Days Supply Provider Pharmacy   06/25/2025 Tramadol Hydrochloride 50MG 30 each 10 [facility]   04/30/2025 Tramadol Hydrochloride 50MG 30 each 10 [facility]   01/25/2025 Tramadol Hydrochloride 50MG 12 each 4 [facility]          RTC 2 months, sooner if needed.           This document has been electronically signed by:  EDDIE Thurman FNP-C Dragon disclaimer:  Part of this note may be an electronic transcription/translation of spoken language to printed text using the Dragon Dictation System.

## 2025-08-20 DIAGNOSIS — M15.0 PRIMARY OSTEOARTHRITIS INVOLVING MULTIPLE JOINTS: ICD-10-CM

## 2025-08-20 RX ORDER — TRAMADOL HYDROCHLORIDE 50 MG/1
50 TABLET ORAL EVERY 8 HOURS PRN
Qty: 30 TABLET | Refills: 0 | Status: SHIPPED | OUTPATIENT
Start: 2025-08-20

## (undated) DEVICE — LN FLTR ORL/NASL MICROSTREAM NONINTUB A/LNG

## (undated) DEVICE — ARM DRAPE

## (undated) DEVICE — PK CATH CARD 70

## (undated) DEVICE — DRAPE, RADIAL, STERILE: Brand: MEDLINE

## (undated) DEVICE — GLV SURG PREMIERPRO MIC LTX PF SZ7.5 BRN

## (undated) DEVICE — THE PRONTO CATHETER IS INDICATED FOR THE REMOVAL OF FRESH, SOFT EMBOLI AND THROMBI FROM VESSELS IN THE CORONARY AND PERIPHERAL VASCULATURE.: Brand: PRONTO® V4 EXTRACTION CATHETER

## (undated) DEVICE — CATH INTRAVAS ULTRASND EAGLE EYE 2.9FR

## (undated) DEVICE — DRAPE,UTILTY,TAPE,15X26, 4EA/PK: Brand: MEDLINE

## (undated) DEVICE — CVR DISP HUG U VAC STEEP TREND

## (undated) DEVICE — PAD, DEFIB, ADULT, RADIOTRANS, ZOLL: Brand: MEDLINE

## (undated) DEVICE — ST INF PRI SMRTSTE 20DRP 2VLV 24ML 117

## (undated) DEVICE — LARGE HEM-O-LOK CLIP APPLIER: Brand: ENDOWRIST

## (undated) DEVICE — DRSNG SURESITE WNDW 4X4.5

## (undated) DEVICE — UNDERGLV SURG BIOGEL INDICAT PF 8 GRN

## (undated) DEVICE — Device: Brand: DEFENDO AIR/WATER/SUCTION AND BIOPSY VALVE

## (undated) DEVICE — TREK CORONARY DILATATION CATHETER 2.50 MM X 12 MM / RAPID-EXCHANGE: Brand: TREK

## (undated) DEVICE — RUNTHROUGH NS EXTRA FLOPPY PTCA GUIDEWIRE: Brand: RUNTHROUGH

## (undated) DEVICE — PERMANENT CAUTERY HOOK: Brand: ENDOWRIST

## (undated) DEVICE — A2000 MULTI-USE SYRINGE KIT, P/N 701277-003KIT CONTENTS: 100ML CONTRAST RESERVOIR AND TUBING WITH CONTRAST SPIKE AND CLAMP: Brand: A2000 MULTI-USE SYRINGE KIT

## (undated) DEVICE — ADULT DISPOSABLE SINGLE-PATIENT USE PULSE OXIMETER SENSOR: Brand: NONIN

## (undated) DEVICE — CATH F5 INF PIG145 110CM 6SH: Brand: INFINITI

## (undated) DEVICE — SINGLE PORT MANIFOLD: Brand: NEPTUNE 2

## (undated) DEVICE — PAD POSTN ARMBND 1P/U

## (undated) DEVICE — 40595 XL TRENDELENBURG POSITIONING KIT: Brand: 40595 XL TRENDELENBURG POSITIONING KIT

## (undated) DEVICE — RADIFOCUS OPTITORQUE ANGIOGRAPHIC CATHETER: Brand: OPTITORQUE

## (undated) DEVICE — DEV INFL MONARCH 25W

## (undated) DEVICE — NC TREK CORONARY DILATATION CATHETER 4.0 MM X 20 MM / RAPID-EXCHANGE: Brand: NC TREK

## (undated) DEVICE — CADIERE FORCEPS: Brand: ENDOWRIST

## (undated) DEVICE — FRCP BX RADJAW4 NDL 2.8 240CM LG OG BX40

## (undated) DEVICE — NC TREK CORONARY DILATATION CATHETER 3.75 MM X 20 MM / RAPID-EXCHANGE: Brand: NC TREK

## (undated) DEVICE — SUT VIC 0/0 UR6 27IN DYED J603H

## (undated) DEVICE — MODEL AT P65, P/N 701554-001KIT CONTENTS: HAND CONTROLLER, 3-WAY HIGH-PRESSURE STOPCOCK WITH ROTATING END AND PREMIUM HIGH-PRESSURE TUBING: Brand: ANGIOTOUCH® KIT

## (undated) DEVICE — TUBING, SUCTION, 1/4" X 20', STRAIGHT: Brand: MEDLINE INDUSTRIES, INC.

## (undated) DEVICE — CANNULA SEAL

## (undated) DEVICE — RUNWAY RADL W/TOP PAD

## (undated) DEVICE — ENDOGATOR AUXILIARY WATER JET CONNECTOR: Brand: ENDOGATOR

## (undated) DEVICE — SUREFIT, DUAL DISPERSIVE ELECTRODE, CONTACT QUALITY MONITOR: Brand: SUREFIT

## (undated) DEVICE — 6F .070 JR 4 100CM: Brand: CORDIS

## (undated) DEVICE — ENDOGATOR TUBING FOR ENDOGATOR EGP-100 IRRIGATION PUMP,OLYMPUS OFP PUMP, OLYMPUS AFU-100 PUMP AND ERBE EIP2 PUMP: Brand: ENDOGATOR

## (undated) DEVICE — Device

## (undated) DEVICE — ST EXT IV SMARTSITE 2VLV SP M LL 5ML IV1

## (undated) DEVICE — GLIDESHEATH SLENDER STAINLESS STEEL KIT: Brand: GLIDESHEATH SLENDER

## (undated) DEVICE — CONN Y IRR DISP 1P/U

## (undated) DEVICE — COVER,MAYO STAND,STERILE: Brand: MEDLINE

## (undated) DEVICE — SUT MNCRYL 4/0 PS2 18 IN

## (undated) DEVICE — GLV SURG PREMIERPRO MIC LTX PF SZ8 BRN

## (undated) DEVICE — TR BAND RADIAL ARTERY COMPRESSION DEVICE: Brand: TR BAND

## (undated) DEVICE — BLADELESS OBTURATOR: Brand: WECK VISTA

## (undated) DEVICE — PK LAP GEN 70